# Patient Record
Sex: FEMALE | Race: WHITE | Employment: UNEMPLOYED | URBAN - METROPOLITAN AREA
[De-identification: names, ages, dates, MRNs, and addresses within clinical notes are randomized per-mention and may not be internally consistent; named-entity substitution may affect disease eponyms.]

---

## 2022-01-19 ENCOUNTER — HOSPITAL ENCOUNTER (EMERGENCY)
Age: 47
Discharge: HOME | End: 2022-01-19
Payer: MEDICARE

## 2022-01-19 ENCOUNTER — APPOINTMENT (OUTPATIENT)
Dept: CT IMAGING | Age: 47
End: 2022-01-19
Payer: MEDICARE

## 2022-01-19 ENCOUNTER — APPOINTMENT (OUTPATIENT)
Dept: GENERAL RADIOLOGY | Age: 47
End: 2022-01-19
Payer: MEDICARE

## 2022-01-19 VITALS
WEIGHT: 145 LBS | TEMPERATURE: 98.6 F | RESPIRATION RATE: 16 BRPM | HEART RATE: 77 BPM | DIASTOLIC BLOOD PRESSURE: 74 MMHG | OXYGEN SATURATION: 99 % | SYSTOLIC BLOOD PRESSURE: 122 MMHG | HEIGHT: 60 IN | BODY MASS INDEX: 28.47 KG/M2

## 2022-01-19 DIAGNOSIS — R07.9 CHEST PAIN, UNSPECIFIED TYPE: ICD-10-CM

## 2022-01-19 DIAGNOSIS — R06.02 SHORTNESS OF BREATH: ICD-10-CM

## 2022-01-19 DIAGNOSIS — Z76.0 ENCOUNTER FOR MEDICATION REFILL: Primary | ICD-10-CM

## 2022-01-19 LAB
A/G RATIO: 1.4 (ref 1.1–2.2)
ALBUMIN SERPL-MCNC: 4.1 G/DL (ref 3.4–5)
ALP BLD-CCNC: 148 U/L (ref 40–129)
ALT SERPL-CCNC: 10 U/L (ref 10–40)
ANION GAP SERPL CALCULATED.3IONS-SCNC: 12 MMOL/L (ref 3–16)
AST SERPL-CCNC: 13 U/L (ref 15–37)
BASOPHILS ABSOLUTE: 0 K/UL (ref 0–0.2)
BASOPHILS RELATIVE PERCENT: 0.5 %
BILIRUB SERPL-MCNC: 0.6 MG/DL (ref 0–1)
BUN BLDV-MCNC: 7 MG/DL (ref 7–20)
CALCIUM SERPL-MCNC: 9 MG/DL (ref 8.3–10.6)
CHLORIDE BLD-SCNC: 103 MMOL/L (ref 99–110)
CO2: 24 MMOL/L (ref 21–32)
CREAT SERPL-MCNC: 0.6 MG/DL (ref 0.6–1.1)
EKG ATRIAL RATE: 68 BPM
EKG DIAGNOSIS: NORMAL
EKG P AXIS: 18 DEGREES
EKG P-R INTERVAL: 140 MS
EKG Q-T INTERVAL: 380 MS
EKG QRS DURATION: 94 MS
EKG QTC CALCULATION (BAZETT): 404 MS
EKG R AXIS: -3 DEGREES
EKG T AXIS: 8 DEGREES
EKG VENTRICULAR RATE: 68 BPM
EOSINOPHILS ABSOLUTE: 0.1 K/UL (ref 0–0.6)
EOSINOPHILS RELATIVE PERCENT: 2.4 %
GFR AFRICAN AMERICAN: >60
GFR NON-AFRICAN AMERICAN: >60
GLUCOSE BLD-MCNC: 97 MG/DL (ref 70–99)
HCT VFR BLD CALC: 38.7 % (ref 36–48)
HEMOGLOBIN: 13.2 G/DL (ref 12–16)
INR BLD: 1.12 (ref 0.88–1.12)
LYMPHOCYTES ABSOLUTE: 1.6 K/UL (ref 1–5.1)
LYMPHOCYTES RELATIVE PERCENT: 27.4 %
MCH RBC QN AUTO: 30.8 PG (ref 26–34)
MCHC RBC AUTO-ENTMCNC: 34.1 G/DL (ref 31–36)
MCV RBC AUTO: 90.6 FL (ref 80–100)
MONOCYTES ABSOLUTE: 0.5 K/UL (ref 0–1.3)
MONOCYTES RELATIVE PERCENT: 8.3 %
NEUTROPHILS ABSOLUTE: 3.5 K/UL (ref 1.7–7.7)
NEUTROPHILS RELATIVE PERCENT: 61.4 %
PDW BLD-RTO: 12.5 % (ref 12.4–15.4)
PLATELET # BLD: 296 K/UL (ref 135–450)
PMV BLD AUTO: 8.5 FL (ref 5–10.5)
POTASSIUM SERPL-SCNC: 3.6 MMOL/L (ref 3.5–5.1)
PROTHROMBIN TIME: 12.7 SEC (ref 9.9–12.7)
RBC # BLD: 4.27 M/UL (ref 4–5.2)
SODIUM BLD-SCNC: 139 MMOL/L (ref 136–145)
SPECIMEN STATUS: NORMAL
TOTAL PROTEIN: 7.1 G/DL (ref 6.4–8.2)
TROPONIN: <0.01 NG/ML
WBC # BLD: 5.7 K/UL (ref 4–11)

## 2022-01-19 PROCEDURE — 93005 ELECTROCARDIOGRAM TRACING: CPT | Performed by: EMERGENCY MEDICINE

## 2022-01-19 PROCEDURE — 6360000002 HC RX W HCPCS: Performed by: NURSE PRACTITIONER

## 2022-01-19 PROCEDURE — 85025 COMPLETE CBC W/AUTO DIFF WBC: CPT

## 2022-01-19 PROCEDURE — 84484 ASSAY OF TROPONIN QUANT: CPT

## 2022-01-19 PROCEDURE — 85610 PROTHROMBIN TIME: CPT

## 2022-01-19 PROCEDURE — 96374 THER/PROPH/DIAG INJ IV PUSH: CPT

## 2022-01-19 PROCEDURE — 99283 EMERGENCY DEPT VISIT LOW MDM: CPT

## 2022-01-19 PROCEDURE — 80053 COMPREHEN METABOLIC PANEL: CPT

## 2022-01-19 PROCEDURE — 71260 CT THORAX DX C+: CPT

## 2022-01-19 PROCEDURE — 71045 X-RAY EXAM CHEST 1 VIEW: CPT

## 2022-01-19 PROCEDURE — 93010 ELECTROCARDIOGRAM REPORT: CPT | Performed by: INTERNAL MEDICINE

## 2022-01-19 PROCEDURE — 6360000004 HC RX CONTRAST MEDICATION: Performed by: NURSE PRACTITIONER

## 2022-01-19 RX ORDER — ONDANSETRON 2 MG/ML
4 INJECTION INTRAMUSCULAR; INTRAVENOUS ONCE
Status: COMPLETED | OUTPATIENT
Start: 2022-01-19 | End: 2022-01-19

## 2022-01-19 RX ADMIN — ONDANSETRON 4 MG: 2 INJECTION INTRAMUSCULAR; INTRAVENOUS at 15:17

## 2022-01-19 RX ADMIN — IOPAMIDOL 75 ML: 755 INJECTION, SOLUTION INTRAVENOUS at 15:43

## 2022-01-19 ASSESSMENT — PAIN SCALES - GENERAL: PAINLEVEL_OUTOF10: 6

## 2022-01-19 NOTE — ED NOTES
Patient left via personal vehicle to private residence in stable condition. Patients vitals were assessed before discharge and were stable. Patient verbalized understanding of discharge instructions, follow up and medications. RN explained information in discharge packet and patient verbalized they have no questions at this time. Patient left ER with all paperwork and belongings that RN is aware of.         Damon Molina RN  01/19/22 [id number]

## 2022-01-21 NOTE — ED PROVIDER NOTES
Cholesterol Mother     Heart Disease Father     High Blood Pressure Father     High Cholesterol Father     Asthma Sister      Social History     Socioeconomic History    Marital status: Single     Spouse name: Not on file    Number of children: Not on file    Years of education: Not on file    Highest education level: Not on file   Occupational History    Not on file   Tobacco Use    Smoking status: Current Every Day Smoker     Packs/day: 0.25     Types: Cigarettes    Smokeless tobacco: Not on file   Substance and Sexual Activity    Alcohol use: No    Drug use: No    Sexual activity: Yes     Partners: Male   Other Topics Concern    Not on file   Social History Narrative    Not on file     Social Determinants of Health     Financial Resource Strain:     Difficulty of Paying Living Expenses: Not on file   Food Insecurity:     Worried About Running Out of Food in the Last Year: Not on file    Cecy of Food in the Last Year: Not on file   Transportation Needs:     Lack of Transportation (Medical): Not on file    Lack of Transportation (Non-Medical):  Not on file   Physical Activity:     Days of Exercise per Week: Not on file    Minutes of Exercise per Session: Not on file   Stress:     Feeling of Stress : Not on file   Social Connections:     Frequency of Communication with Friends and Family: Not on file    Frequency of Social Gatherings with Friends and Family: Not on file    Attends Nondenominational Services: Not on file    Active Member of Clubs or Organizations: Not on file    Attends Club or Organization Meetings: Not on file    Marital Status: Not on file   Intimate Partner Violence:     Fear of Current or Ex-Partner: Not on file    Emotionally Abused: Not on file    Physically Abused: Not on file    Sexually Abused: Not on file   Housing Stability:     Unable to Pay for Housing in the Last Year: Not on file    Number of [place] in the Last Year: Not on file    Unstable Housing in the Last Year: Not on file     No current facility-administered medications for this encounter. Current Outpatient Medications   Medication Sig Dispense Refill    enoxaparin (LOVENOX) 40 MG/0.4ML injection Inject 0.4 mLs into the skin daily 12 mL 0    warfarin (COUMADIN) 5 MG tablet Take 5 mg by mouth Daily.  omeprazole (PRILOSEC) 40 MG capsule Take 40 mg by mouth 2 times daily.  oxycodone-acetaminophen (PERCOCET) 5-325 MG per tablet Take 2 tablets by mouth every 4 hours as needed.  nitroGLYCERIN (NITRODUR) 0.4 MG/HR Place 1 patch onto the skin daily.  gabapentin (NEURONTIN) 600 MG tablet Take 600 mg by mouth 3 times daily.  isosorbide mononitrate (IMDUR) 30 MG CR tablet Take 30 mg by mouth daily.  furosemide (LASIX) 40 MG tablet Take 40 mg by mouth daily.  UNABLE TO FIND magai 30mg       potassium chloride (KLOR-CON) 10 MEQ CR tablet Take 10 mEq by mouth daily. Unknown dosage or freq       enoxaparin (LOVENOX) 80 MG/0.8ML injection Inject 0.8 mLs into the skin every 12 hours. Continue until INR>2.0 10 Syringe 0    warfarin (COUMADIN) 5 MG tablet Take 2 tablets by mouth Daily. Pt to take 10 mg qHS beginning Wed 10/20 pm. PT/INR with primary Friday 10/22 and adjust per their instructions 1 tablet 0    busPIRone (BUSPAR) 5 MG tablet Take 15 mg by mouth 3 times daily.  nitroGLYCERIN (NITROSTAT) 0.4 MG SL tablet Place 0.4 mg under the tongue every 5 minutes as needed.  clopidogrel (PLAVIX) 75 MG tablet Take 75 mg by mouth daily.  alprazolam (XANAX) 0.25 MG tablet Take 0.25 mg by mouth as needed.  fluoxetine (PROZAC) 20 MG capsule Take 40 mg by mouth daily.  simvastatin (ZOCOR) 40 MG tablet Take 40 mg by mouth nightly.  cyclobenzaprine (FLEXERIL) 10 MG tablet Take 10 mg by mouth as needed.  vitamin D (CHOLECALCIFEROL) 400 UNIT TABS tablet Take 2,000 Units by mouth daily.        Allergies   Allergen Reactions    Hydrocodone        REVIEW OF SYSTEMS  10 systems reviewed, pertinent positives per HPI otherwise noted to be negative    PHYSICAL EXAM  /74   Pulse 77   Temp 98.6 °F (37 °C) (Oral)   Resp 16   Ht 5' (1.524 m)   Wt 145 lb (65.8 kg)   SpO2 99%   BMI 28.32 kg/m²   GENERAL APPEARANCE: Awake and alert. Cooperative. No acute distress. Vital signs are stable. Well appearing and non toxic. HEAD: Normocephalic. Atraumatic. EYES: PERRL. EOM's grossly intact. ENT: Mucous membranes are moist.   NECK: Supple. Normal ROM. HEART: RRR. Distal pulses are equal and intact. Cap refill less than 2 seconds. LUNGS: Respirations unlabored. CTAB. Good air exchange. Speaking comfortably in full sentences. No wheezing, rhonchi, rales, stridor. CHEST: Tenderness over pacemaker in left chest wall no ecchymosis  ABDOMEN: Soft. Non-distended. Non-tender. No guarding or rebound. No rigidity. Bowel sounds are present. Negative carmen's. Negative McBurney's point. Negative CVA tenderness. EXTREMITIES: No peripheral edema. Moves all extremities equally. All extremities neurovascularly intact. SKIN: Warm and dry. No acute rashes. NEUROLOGICAL: Alert and oriented. No gross facial drooping. Strength 5/5, sensation intact. PSYCHIATRIC: Normal mood and affect. SCREENINGS       RADIOLOGY  XR CHEST PORTABLE    Result Date: 1/19/2022  EXAMINATION: ONE XRAY VIEW OF THE CHEST 1/19/2022 2:50 pm COMPARISON: 05/30/2011. HISTORY: ORDERING SYSTEM PROVIDED HISTORY: chest pain TECHNOLOGIST PROVIDED HISTORY: Reason for exam:->chest pain FINDINGS: The cardiomediastinal silhouette is unremarkable. The lungs are clear. No infiltrate, pleural fluid or evidence of overt failure. Remote calcified granulomatous disease. Left-sided AICD device. No acute cardiopulmonary disease.      CT CHEST PULMONARY EMBOLISM W CONTRAST    Result Date: 1/19/2022  EXAMINATION: CTA OF THE CHEST 1/19/2022 3:34 pm TECHNIQUE: CTA of the chest was performed after the administration of intravenous contrast.  Multiplanar reformatted images are provided for review. MIP images are provided for review. Dose modulation, iterative reconstruction, and/or weight based adjustment of the mA/kV was utilized to reduce the radiation dose to as low as reasonably achievable. COMPARISON: None. HISTORY: ORDERING SYSTEM PROVIDED HISTORY: sob off meds history of pe TECHNOLOGIST PROVIDED HISTORY: Reason for exam:->sob off meds history of pe Decision Support Exception - unselect if not a suspected or confirmed emergency medical condition->Emergency Medical Condition (MA) Reason for Exam: chest pain and shortness of breath,off Lovenox x3 days Additional signs and symptoms: cough today,no hx of Covid Relevant Medical/Surgical History: cardiac stent,smoker x 15 years FINDINGS: Pulmonary Arteries: Pulmonary arteries are adequately opacified for evaluation. No evidence of intraluminal filling defect to suggest pulmonary embolism. Main pulmonary artery is normal in caliber. Mediastinum: There are a few mildly enlarged right hilar lymph nodes, the largest measuring approximately 1.5 x 1.5 cm (image 111 series 2). Lungs/pleura: The lungs are without acute process. No focal consolidation or pulmonary edema. No evidence of pleural effusion or pneumothorax. There are numerous scattered calcified granulomata. Upper Abdomen: Limited images of the upper abdomen are unremarkable. Soft Tissues/Bones: No acute bone or soft tissue abnormality. No evidence of pulmonary embolism. Mild right hilar adenopathy is nonspecific. Short-term follow-up should be considered if clinically indicated. Multiple scattered predominantly calcified or partially calcified pulmonary nodules. Findings are most likely granulomatous. However these are new since the previous exam from 2011. These could also be re-evaluated on short-term follow-up. RECOMMENDATIONS: Unavailable       ED COURSE/MDM  Patient seen and evaluated.  Old records Result Value Ref Range    WBC 5.7 4.0 - 11.0 K/uL    RBC 4.27 4.00 - 5.20 M/uL    Hemoglobin 13.2 12.0 - 16.0 g/dL    Hematocrit 38.7 36.0 - 48.0 %    MCV 90.6 80.0 - 100.0 fL    MCH 30.8 26.0 - 34.0 pg    MCHC 34.1 31.0 - 36.0 g/dL    RDW 12.5 12.4 - 15.4 %    Platelets 139 795 - 741 K/uL    MPV 8.5 5.0 - 10.5 fL    Neutrophils % 61.4 %    Lymphocytes % 27.4 %    Monocytes % 8.3 %    Eosinophils % 2.4 %    Basophils % 0.5 %    Neutrophils Absolute 3.5 1.7 - 7.7 K/uL    Lymphocytes Absolute 1.6 1.0 - 5.1 K/uL    Monocytes Absolute 0.5 0.0 - 1.3 K/uL    Eosinophils Absolute 0.1 0.0 - 0.6 K/uL    Basophils Absolute 0.0 0.0 - 0.2 K/uL   Comprehensive metabolic panel   Result Value Ref Range    Sodium 139 136 - 145 mmol/L    Potassium 3.6 3.5 - 5.1 mmol/L    Chloride 103 99 - 110 mmol/L    CO2 24 21 - 32 mmol/L    Anion Gap 12 3 - 16    Glucose 97 70 - 99 mg/dL    BUN 7 7 - 20 mg/dL    CREATININE 0.6 0.6 - 1.1 mg/dL    GFR Non-African American >60 >60    GFR African American >60 >60    Calcium 9.0 8.3 - 10.6 mg/dL    Total Protein 7.1 6.4 - 8.2 g/dL    Albumin 4.1 3.4 - 5.0 g/dL    Albumin/Globulin Ratio 1.4 1.1 - 2.2    Total Bilirubin 0.6 0.0 - 1.0 mg/dL    Alkaline Phosphatase 148 (H) 40 - 129 U/L    ALT 10 10 - 40 U/L    AST 13 (L) 15 - 37 U/L   Troponin   Result Value Ref Range    Troponin <0.01 <0.01 ng/mL   Sample possible blood bank testing   Result Value Ref Range    Specimen Status JOANIE    Protime-INR   Result Value Ref Range    Protime 12.7 9.9 - 12.7 sec    INR 1.12 0.88 - 1.12   EKG 12 Lead   Result Value Ref Range    Ventricular Rate 68 BPM    Atrial Rate 68 BPM    P-R Interval 140 ms    QRS Duration 94 ms    Q-T Interval 380 ms    QTc Calculation (Bazett) 404 ms    P Axis 18 degrees    R Axis -3 degrees    T Axis 8 degrees    Diagnosis       Normal sinus rhythmLateral infarct (cited on or before 16-MAY-2011)Inferior infarct (cited on or before 16-MAY-2011)Abnormal ECGConfirmed by Carolin Sexton MD, Evaristo Lesches ([id number]) on 1/19/2022 4:50:05 PM       I estimate there is LOW risk for PULMONARY EMBOLISM, ACUTE CORONARY SYNDROME, OR THORACIC AORTIC DISSECTION, thus I consider the discharge disposition reasonable. Shary Duverney and I have discussed the diagnosis and risks, and we agree with discharging home to follow-up with their primary doctor. We also discussed returning to the Emergency Department immediately if new or worsening symptoms occur. We have discussed the symptoms which are most concerning (e.g., bloody sputum, fever, worsening pain or shortness of breath, vomiting) that necessitate immediate return. FINAL Impression    1. Encounter for medication refill    2. Chest pain, unspecified type    3. Shortness of breath        Blood pressure 122/74, pulse 77, temperature 98.6 °F (37 °C), temperature source Oral, resp. rate 16, height 5' (1.524 m), weight 145 lb (65.8 kg), SpO2 99 %.mdm    Patient was sent home with a prescription for below medication/s. I did [place] patient on appropriate use of these medication. Discharge Medication List as of 1/19/2022  4:52 PM      START taking these medications    Details   !! enoxaparin (LOVENOX) 40 MG/0.4ML injection Inject 0.4 mLs into the skin daily, Disp-12 mL, R-0Print       !! - Potential duplicate medications found. Please discuss with provider. FOLLOW UP  [address] [place] [id number]  [phone]  Call in 1 day  follow up    [facility]  ED  [address]          DISPOSITION  Patient was discharged to home in good condition. Comment: Please note this report has been produced using speech recognition software and may contain errors related to that system including errors in grammar, punctuation, and spelling, as well as words and phrases that may be inappropriate.  If there are any questions or concerns please feel free to contact the dictating provider for clarification.             LESLI De La Rosa - CNP  01/20/22 2020

## 2022-02-24 ENCOUNTER — HOSPITAL ENCOUNTER (EMERGENCY)
Age: 47
Discharge: HOME | End: 2022-02-25
Payer: MEDICARE

## 2022-02-24 ENCOUNTER — APPOINTMENT (OUTPATIENT)
Dept: GENERAL RADIOLOGY | Age: 47
End: 2022-02-24
Payer: MEDICARE

## 2022-02-24 DIAGNOSIS — M25.512 ACUTE PAIN OF LEFT SHOULDER: Primary | ICD-10-CM

## 2022-02-24 DIAGNOSIS — M54.2 NECK PAIN ON LEFT SIDE: ICD-10-CM

## 2022-02-24 DIAGNOSIS — Z95.810 AICD (AUTOMATIC CARDIOVERTER/DEFIBRILLATOR) PRESENT: ICD-10-CM

## 2022-02-24 LAB
A/G RATIO: 1.7 (ref 1.1–2.2)
ALBUMIN SERPL-MCNC: 4.5 G/DL (ref 3.4–5)
ALP BLD-CCNC: 138 U/L (ref 40–129)
ALT SERPL-CCNC: 15 U/L (ref 10–40)
ANION GAP SERPL CALCULATED.3IONS-SCNC: 11 MMOL/L (ref 3–16)
AST SERPL-CCNC: 39 U/L (ref 15–37)
BASOPHILS ABSOLUTE: 0.1 K/UL (ref 0–0.2)
BASOPHILS RELATIVE PERCENT: 1 %
BILIRUB SERPL-MCNC: 0.4 MG/DL (ref 0–1)
BUN BLDV-MCNC: 9 MG/DL (ref 7–20)
CALCIUM SERPL-MCNC: 9.1 MG/DL (ref 8.3–10.6)
CHLORIDE BLD-SCNC: 102 MMOL/L (ref 99–110)
CO2: 24 MMOL/L (ref 21–32)
CREAT SERPL-MCNC: 0.7 MG/DL (ref 0.6–1.1)
EOSINOPHILS ABSOLUTE: 0.2 K/UL (ref 0–0.6)
EOSINOPHILS RELATIVE PERCENT: 2.1 %
GFR AFRICAN AMERICAN: >60
GFR NON-AFRICAN AMERICAN: >60
GLUCOSE BLD-MCNC: 81 MG/DL (ref 70–99)
HCG QUALITATIVE: NEGATIVE
HCT VFR BLD CALC: 42.1 % (ref 36–48)
HEMOGLOBIN: 14.3 G/DL (ref 12–16)
LYMPHOCYTES ABSOLUTE: 3.4 K/UL (ref 1–5.1)
LYMPHOCYTES RELATIVE PERCENT: 36.4 %
MCH RBC QN AUTO: 31 PG (ref 26–34)
MCHC RBC AUTO-ENTMCNC: 33.9 G/DL (ref 31–36)
MCV RBC AUTO: 91.6 FL (ref 80–100)
MONOCYTES ABSOLUTE: 0.6 K/UL (ref 0–1.3)
MONOCYTES RELATIVE PERCENT: 6 %
NEUTROPHILS ABSOLUTE: 5.1 K/UL (ref 1.7–7.7)
NEUTROPHILS RELATIVE PERCENT: 54.5 %
PDW BLD-RTO: 13 % (ref 12.4–15.4)
PLATELET # BLD: 165 K/UL (ref 135–450)
PMV BLD AUTO: 8.7 FL (ref 5–10.5)
POTASSIUM REFLEX MAGNESIUM: 5.7 MMOL/L (ref 3.5–5.1)
POTASSIUM SERPL-SCNC: 3.8 MMOL/L (ref 3.5–5.1)
PRO-BNP: 142 PG/ML (ref 0–124)
RBC # BLD: 4.59 M/UL (ref 4–5.2)
SODIUM BLD-SCNC: 137 MMOL/L (ref 136–145)
TOTAL PROTEIN: 7.1 G/DL (ref 6.4–8.2)
TROPONIN: <0.01 NG/ML
WBC # BLD: 9.3 K/UL (ref 4–11)

## 2022-02-24 PROCEDURE — 85025 COMPLETE CBC W/AUTO DIFF WBC: CPT

## 2022-02-24 PROCEDURE — 84703 CHORIONIC GONADOTROPIN ASSAY: CPT

## 2022-02-24 PROCEDURE — 84484 ASSAY OF TROPONIN QUANT: CPT

## 2022-02-24 PROCEDURE — 71046 X-RAY EXAM CHEST 2 VIEWS: CPT

## 2022-02-24 PROCEDURE — 93005 ELECTROCARDIOGRAM TRACING: CPT | Performed by: PHYSICIAN ASSISTANT

## 2022-02-24 PROCEDURE — 99285 EMERGENCY DEPT VISIT HI MDM: CPT

## 2022-02-24 PROCEDURE — 83880 ASSAY OF NATRIURETIC PEPTIDE: CPT

## 2022-02-24 PROCEDURE — 84132 ASSAY OF SERUM POTASSIUM: CPT

## 2022-02-24 PROCEDURE — 80053 COMPREHEN METABOLIC PANEL: CPT

## 2022-02-24 ASSESSMENT — ENCOUNTER SYMPTOMS
CONSTIPATION: 0
SHORTNESS OF BREATH: 0
ABDOMINAL PAIN: 0
SINUS PRESSURE: 0
SORE THROAT: 0
EYE DISCHARGE: 0
CHEST TIGHTNESS: 0
SINUS PAIN: 0
NAUSEA: 0
COUGH: 0
RHINORRHEA: 0
EYE REDNESS: 0
VOMITING: 0
DIARRHEA: 0

## 2022-02-24 ASSESSMENT — PAIN DESCRIPTION - DESCRIPTORS: DESCRIPTORS: BURNING

## 2022-02-24 ASSESSMENT — PAIN DESCRIPTION - PAIN TYPE: TYPE: ACUTE PAIN

## 2022-02-24 ASSESSMENT — PAIN SCALES - GENERAL: PAINLEVEL_OUTOF10: 6

## 2022-02-25 VITALS
DIASTOLIC BLOOD PRESSURE: 71 MMHG | RESPIRATION RATE: 18 BRPM | HEART RATE: 82 BPM | HEIGHT: 60 IN | BODY MASS INDEX: 28.47 KG/M2 | OXYGEN SATURATION: 98 % | WEIGHT: 145 LBS | SYSTOLIC BLOOD PRESSURE: 105 MMHG | TEMPERATURE: 97.2 F

## 2022-02-25 LAB
EKG ATRIAL RATE: 80 BPM
EKG DIAGNOSIS: NORMAL
EKG P AXIS: 23 DEGREES
EKG P-R INTERVAL: 134 MS
EKG Q-T INTERVAL: 380 MS
EKG QRS DURATION: 90 MS
EKG QTC CALCULATION (BAZETT): 438 MS
EKG R AXIS: -10 DEGREES
EKG T AXIS: -19 DEGREES
EKG VENTRICULAR RATE: 80 BPM

## 2022-02-25 PROCEDURE — 6370000000 HC RX 637 (ALT 250 FOR IP): Performed by: PHYSICIAN ASSISTANT

## 2022-02-25 PROCEDURE — 93010 ELECTROCARDIOGRAM REPORT: CPT | Performed by: INTERNAL MEDICINE

## 2022-02-25 RX ORDER — OXYCODONE HYDROCHLORIDE AND ACETAMINOPHEN 5; 325 MG/1; MG/1
1 TABLET ORAL ONCE
Status: COMPLETED | OUTPATIENT
Start: 2022-02-25 | End: 2022-02-25

## 2022-02-25 RX ADMIN — OXYCODONE HYDROCHLORIDE AND ACETAMINOPHEN 1 TABLET: 5; 325 TABLET ORAL at 00:10

## 2022-02-25 ASSESSMENT — PAIN SCALES - GENERAL: PAINLEVEL_OUTOF10: 5

## 2022-02-25 NOTE — ED NOTES
Bed: 29  Expected date:   Expected time:   Means of arrival:   Comments:  [address] [place] [address] Avenue, RN  02/24/22 [id number]

## 2022-02-25 NOTE — ED PROVIDER NOTES
[facility]  ED  EMERGENCY DEPARTMENT ENCOUNTER        Pt Name: Jennie Jacques  MRN: [id number]  [place] 1975  Date of evaluation: 2/24/2022  Provider: Xavi Porras PA-C  PCP: No primary care provider on file. ED Attending: No att. providers found      This patient was not seen and evaluated by the attending physician    I have independently evaluated this patient. CHIEF COMPLAINT       Chief Complaint   Patient presents with    AICD Problem     a few days ago, device flipped while she was sleeping and now she has some burning in her chest, neck and shoulder       HISTORY OF PRESENT ILLNESS   (Location/Symptom, Timing/Onset, Context/Setting, Quality, Duration, Modifying Factors, Severity)  Note limiting factors. Jennie Jacques is a 40 y.o. female history of DVT, PE, MI, CVA, cardiac sarcoidosis, CHF with AICD placed 12/18/2020 Dr. Tiera Najera at [facility], presents for evaluation via EMS, with a complaint of 6/10 left-sided burning pain in her left anterior chest, left side of her neck and left shoulder. Patient advised that while she was sleeping last night her AICD \"flipped\" she felt it turn on its side. Sudden onset of pain, which is present to the current time. Pt advised she is not taking any of her medications, as her medicaid does not kick in until March 1.     Nursing Notes were all reviewed and agreed with or any disagreements were addressed  in the HPI. REVIEW OF SYSTEMS  (2-9 systems for level 4, 10 or more for level 5)     Review of Systems   Constitutional: Negative for chills and fever. HENT: Negative. Negative for congestion, rhinorrhea, sinus pressure, sinus pain and sore throat. Eyes: Negative for discharge, redness and visual disturbance. Respiratory: Negative for cough, chest tightness and shortness of breath. Cardiovascular: Positive for chest pain. Negative for palpitations.    Gastrointestinal: Negative for abdominal pain, constipation, diarrhea, nausea and vomiting. Genitourinary: Negative for difficulty urinating, dysuria and frequency. Musculoskeletal: Positive for arthralgias and myalgias. Skin: Negative. Neurological: Negative. Negative for dizziness, weakness, numbness and headaches. Psychiatric/Behavioral: Negative. All other systems reviewed and are negative. Positivesand Pertinent negatives as per HPI. Except as noted above in the ROS, all other systems were reviewed and negative. PAST MEDICAL HISTORY     Past Medical History:   Diagnosis Date    DVT     Hypercholesterolemia     Myocardial infarct ([address]     Pulmonary embolism (HCC)     Unspecified cerebral artery occlusion with cerebral infarction          SURGICAL HISTORY       Past Surgical History:   Procedure Laterality Date    CARDIAC SURGERY      catherization x 4    CHEST TUBE INSERTION      at birth    CORONARY ANGIOPLASTY WITH STENT PLACEMENT      x 1 stent in rca 3/10    DILATION AND CURETTAGE OF UTERUS      KNEE ARTHROSCOPY      bilat knees    LAPAROSCOPY      x 2 for ovaries    TONSILLECTOMY AND ADENOIDECTOMY      UPPER GASTROINTESTINAL ENDOSCOPY  10/18/10    with biopsies         CURRENT MEDICATIONS       Previous Medications    ALPRAZOLAM (XANAX) 0.25 MG TABLET    Take 0.25 mg by mouth as needed. BUSPIRONE (BUSPAR) 5 MG TABLET    Take 15 mg by mouth 3 times daily. CLOPIDOGREL (PLAVIX) 75 MG TABLET    Take 75 mg by mouth daily. CYCLOBENZAPRINE (FLEXERIL) 10 MG TABLET    Take 10 mg by mouth as needed. ENOXAPARIN (LOVENOX) 80 MG/0.8ML INJECTION    Inject 0.8 mLs into the skin every 12 hours. Continue until INR>2.0    FLUOXETINE (PROZAC) 20 MG CAPSULE    Take 40 mg by mouth daily. FUROSEMIDE (LASIX) 40 MG TABLET    Take 40 mg by mouth daily. GABAPENTIN (NEURONTIN) 600 MG TABLET    Take 600 mg by mouth 3 times daily. ISOSORBIDE MONONITRATE (IMDUR) 30 MG CR TABLET    Take 30 mg by mouth daily.     NITROGLYCERIN (NITRODUR) 0.4 MG/HR    Place 1 patch onto the skin daily. NITROGLYCERIN (NITROSTAT) 0.4 MG SL TABLET    Place 0.4 mg under the tongue every 5 minutes as needed. OMEPRAZOLE (PRILOSEC) 40 MG CAPSULE    Take 40 mg by mouth 2 times daily. OXYCODONE-ACETAMINOPHEN (PERCOCET) 5-325 MG PER TABLET    Take 2 tablets by mouth every 4 hours as needed. POTASSIUM CHLORIDE (KLOR-CON) 10 MEQ CR TABLET    Take 10 mEq by mouth daily. Unknown dosage or freq     SIMVASTATIN (ZOCOR) 40 MG TABLET    Take 40 mg by mouth nightly. UNABLE TO FIND    magai 30mg     VITAMIN D (CHOLECALCIFEROL) 400 UNIT TABS TABLET    Take 2,000 Units by mouth daily. WARFARIN (COUMADIN) 5 MG TABLET    Take 2 tablets by mouth Daily. Pt to take 10 mg qHS beginning Wed 10/20 pm. PT/INR with primary Friday 10/22 and adjust per their instructions    WARFARIN (COUMADIN) 5 MG TABLET    Take 5 mg by mouth Daily.          ALLERGIES     Hydrocodone    FAMILY HISTORY       Family History   Problem Relation Age of Onset    Heart Disease Mother     High Blood Pressure Mother     Diabetes Mother     Depression Mother     High Cholesterol Mother     Heart Disease Father     High Blood Pressure Father     High Cholesterol Father     Asthma Sister          SOCIAL HISTORY       Social History     Socioeconomic History    Marital status: Single     Spouse name: None    Number of children: None    Years of education: None    Highest education level: None   Occupational History    None   Tobacco Use    Smoking status: Current Every Day Smoker     Packs/day: 0.25     Types: Cigarettes    Smokeless tobacco: None   Substance and Sexual Activity    Alcohol use: No    Drug use: No    Sexual activity: Yes     Partners: Male   Other Topics Concern    None   Social History Narrative    None     Social Determinants of Health     Financial Resource Strain:     Difficulty of Paying Living Expenses: Not on file   Food Insecurity:     Worried About Running Out of Food in the Last Year: Not on file    Ran Out of Food in the Last Year: Not on file   Transportation Needs:     Lack of Transportation (Medical): Not on file    Lack of Transportation (Non-Medical): Not on file   Physical Activity:     Days of Exercise per Week: Not on file    Minutes of Exercise per Session: Not on file   Stress:     Feeling of Stress : Not on file   Social Connections:     Frequency of Communication with Friends and Family: Not on file    Frequency of Social Gatherings with Friends and Family: Not on file    Attends Shinto Services: Not on file    Active Member of [address] [place] [address] [employer] or Organizations: Not on file    Attends Club or Organization Meetings: Not on file    Marital Status: Not on file   Intimate Partner Violence:     Fear of Current or Ex-Partner: Not on file    Emotionally Abused: Not on file    Physically Abused: Not on file    Sexually Abused: Not on file   Housing Stability:     Unable to Pay for Housing in the Last Year: Not on file    Number of [place] in the Last Year: Not on file    Unstable Housing in the Last Year: Not on file       SCREENINGS     NIH Score       [place] Coma Scale  Eye Opening: Spontaneous  Best Verbal Response: Oriented  Best Motor Response: Obeys commands  Juve Coma Scale Score: 15    [facility]     Heart Score         PHYSICAL EXAM    (up to 7 for level 4, 8 ormore for level 5)     ED Triage Vitals [02/24/22 2144]   BP Temp Temp Source Pulse Resp SpO2 Height Weight   124/70 97.2 °F (36.2 °C) Temporal 83 14 100 % 5' (1.524 m) 145 lb (65.8 kg)       Physical Exam  Vitals and nursing note reviewed. Constitutional:       Appearance: She is well-developed. She is not diaphoretic. HENT:      Head: Normocephalic. Nose: Nose normal.      Mouth/Throat:      Pharynx: No oropharyngeal exudate. Eyes:      General:         Right eye: No discharge. Left eye: No discharge.       Conjunctiva/sclera: Conjunctivae normal. Pupils: Pupils are equal, round, and reactive to light. Cardiovascular:      Rate and Rhythm: Normal rate and regular rhythm. Heart sounds: Normal heart sounds. No murmur heard. No friction rub. No gallop. Pulmonary:      Effort: Pulmonary effort is normal. No respiratory distress. Breath sounds: Normal breath sounds. No wheezing. Abdominal:      General: Bowel sounds are normal. There is no distension. Palpations: Abdomen is soft. Tenderness: There is no abdominal tenderness. Musculoskeletal:         General: Normal range of motion. Cervical back: Normal range of motion. Skin:     General: Skin is warm and dry. Capillary Refill: Capillary refill takes less than 2 seconds. Neurological:      General: No focal deficit present. Mental Status: She is alert.    Psychiatric:         Behavior: Behavior normal.           DIAGNOSTIC RESULTS   LABS:    Labs Reviewed   COMPREHENSIVE METABOLIC PANEL W/ REFLEX TO MG FOR LOW K - Abnormal; Notable for the following components:       Result Value    Potassium reflex Magnesium 5.7 (*)     Alkaline Phosphatase 138 (*)     AST 39 (*)     All other components within normal limits    Narrative:     Performed at:  [address],  [place], [address] [employer]   Phone [phone]   BRAIN NATRIURETIC PEPTIDE - Abnormal; Notable for the following components:    Pro- (*)     All other components within normal limits    Narrative:     Performed at:  [address],  [place], [address]   Phone [phone]   CBC WITH AUTO DIFFERENTIAL    Narrative:     Performed at:  [address],  [place], [address]   Phone [phone]   TROPONIN    Narrative:     Performed at:  [address],  [place], [address]   Phone [phone]   HCG, SERUM, QUALITATIVE Narrative:     Performed at:  [facility]) - [facility]  [address],  [place], [facility] [employer]   Phone [phone]   POTASSIUM    Narrative:     Performed at:  [facility]) - [address], [address] [place] [address] [place]   Phone [phone]       All other labs were within normal range or notreturned as of this dictation. EKG: All EKG's are interpreted by the Emergency Department Physician who either signs or Co-signs this chart in the absence of a cardiologist.  Please see their note for interpretation of EKG. RADIOLOGY:   Interpretation per the Radiologist below, if available at the time of this note:    XR CHEST (2 VW)   Final Result   No acute process by radiograph. CRITICAL CARE TIME   N/A    CONSULTS:  IP CONSULT TO CARDIOLOGY      EMERGENCY DEPARTMENT COURSE and DIFFERENTIAL DIAGNOSIS/MDM:   Vitals:    Vitals:    02/24/22 2144 02/24/22 2239 02/24/22 2306 02/24/22 2307   BP: 124/70 129/78  105/65   Pulse: 83 98 81 85   Resp: 14 17 25 17   Temp: 97.2 °F (36.2 °C)      TempSrc: Temporal      SpO2: 100% 99% 100% 97%   Weight: 145 lb (65.8 kg)      Height: 5' (1.524 m)          Patient was given the following medications:  Medications   oxyCODONE-acetaminophen (PERCOCET) 5-325 MG per tablet 1 tablet (1 tablet Oral Given 2/25/22 0010)         Afebrile, stable, patient presents to the ED for evaluation. Patients PO2 is 97% on room air they are not hypoxic. Nontoxic in no acute distress. Patient's pain is reproducible, although no evidence of trauma contusions hematoma formation to patient's anterior chest wall. Labs are evaluated which reveal a BNP elevated at 142. Otherwise unremarkable. EKG is sinus rhythm with occasional PVCs. Chest x-ray shows transvenous AICD appears similar in position to prior exam.  Consulted patient's cardiologist spoke with Dr. Divine Orozco on call electrophysiology physician at [facility].   Discussed patient's concern presentation and work-up. He advised to interrogate the device and as long as there have been no acute episodes would be appropriate to reassure the patient and have her follow-up as an outpatient. Device is interrogated and I spoke with AICD representative who advised no episodes recently, last episode in January. Patient is provided with percocet which helps her symptoms on reevaluation. All questions are answered. Indications for return to the ED are discussed. Patient is advised if any new or worsening symptoms arise they should immediately return to the emergency room. Follow-up with cardiology, PCP in 1-2 days. The patient tolerated their visit well. The patient and / or the family were informed of the results of any tests, a time was given to answer questions, a plan was proposed and they agreed Jose Maria Shine. I estimate there is LOW risk for PULMONARY EMBOLISM, ACUTE CORONARY SYNDROME, OR THORACIC AORTIC DISSECTION, thus I consider the discharge disposition reasonable. Matheus Masterson and I have discussed the diagnosis and risks, and we agree with discharging home to follow-up with their primary doctor. We also discussed returning to the Emergency Department immediately if new or worsening symptoms occur. We have discussed the symptoms which are most concerning (e.g., bloody sputum, fever, worsening pain or shortness of breath, vomiting) that necessitate immediate return. FINAL IMPRESSION      1. Acute pain of left shoulder    2. Neck pain on left side    3. AICD (automatic cardioverter/defibrillator) present          DISPOSITION/PLAN   DISPOSITION    D/c to home    PATIENT REFERRED TO:  Ashleigh Quevedo MD  [address]  [phone]    Schedule an appointment as soon as possible for a visit   for a recheck in 2 days    [facility]             Pt was seen during the [place] 19 pandemic.  Appropriate PPE worn by ME during patient encounters. Pt seen during a time with constrained hospital bed capacity and other potential inpatient and outpatient resources were constrained due to the viral pandemic.    Please note that portions of this note were completed with a voice recognition program.  Efforts were made to edit the dictations but occasionally words are mis-transcribed.)    Susan Lacey PA-C (electronically signed)        Susna Lacey PA-C  02/25/22 [id number]

## 2022-02-25 NOTE — ED PROVIDER NOTES
I have reviewed the below EKG. I was not otherwise involved in this patient's care. EKG  The Ekg interpreted by myself  normal sinus rhythm with a rate of 80 with occasional PVCs  Axis is   Normal  QTc is  normal  Intervals and Durations are unremarkable. T wave inversions noted in leads III and aVF  No evidence of acute ischemia. No significant change from prior EKG dated January 19, 2022.         Juanpablo Newby MD  02/24/22 9205

## 2022-02-25 NOTE — ED NOTES
Called  Cardio @[phone]  Per Kathy KOHLI  RE AICD problem  Walker Lombardi returned page @[phone]       Susana Harris  02/24/22 6560

## 2022-03-08 ENCOUNTER — OFFICE VISIT (OUTPATIENT)
Dept: FAMILY MEDICINE CLINIC | Age: 47
End: 2022-03-08
Payer: MEDICARE

## 2022-03-08 VITALS
HEART RATE: 87 BPM | WEIGHT: 150.8 LBS | SYSTOLIC BLOOD PRESSURE: 108 MMHG | HEIGHT: 61 IN | BODY MASS INDEX: 28.47 KG/M2 | OXYGEN SATURATION: 96 % | DIASTOLIC BLOOD PRESSURE: 74 MMHG

## 2022-03-08 DIAGNOSIS — K21.9 GASTROESOPHAGEAL REFLUX DISEASE WITHOUT ESOPHAGITIS: ICD-10-CM

## 2022-03-08 DIAGNOSIS — Z13.1 SCREENING FOR DIABETES MELLITUS: ICD-10-CM

## 2022-03-08 DIAGNOSIS — D86.9 SARCOIDOSIS: Primary | ICD-10-CM

## 2022-03-08 DIAGNOSIS — Z12.11 SCREENING FOR COLON CANCER: ICD-10-CM

## 2022-03-08 DIAGNOSIS — Z13.29 SCREENING FOR THYROID DISORDER: ICD-10-CM

## 2022-03-08 DIAGNOSIS — F33.1 MODERATE EPISODE OF RECURRENT MAJOR DEPRESSIVE DISORDER (HCC): ICD-10-CM

## 2022-03-08 DIAGNOSIS — Z76.89 ENCOUNTER TO ESTABLISH CARE: ICD-10-CM

## 2022-03-08 DIAGNOSIS — Z13.220 SCREENING FOR LIPID DISORDERS: ICD-10-CM

## 2022-03-08 PROCEDURE — G8427 DOCREV CUR MEDS BY ELIG CLIN: HCPCS | Performed by: NURSE PRACTITIONER

## 2022-03-08 PROCEDURE — G8484 FLU IMMUNIZE NO ADMIN: HCPCS | Performed by: NURSE PRACTITIONER

## 2022-03-08 PROCEDURE — G8419 CALC BMI OUT NRM PARAM NOF/U: HCPCS | Performed by: NURSE PRACTITIONER

## 2022-03-08 PROCEDURE — 36415 COLL VENOUS BLD VENIPUNCTURE: CPT | Performed by: NURSE PRACTITIONER

## 2022-03-08 PROCEDURE — 4004F PT TOBACCO SCREEN RCVD TLK: CPT | Performed by: NURSE PRACTITIONER

## 2022-03-08 PROCEDURE — 99203 OFFICE O/P NEW LOW 30 MIN: CPT | Performed by: NURSE PRACTITIONER

## 2022-03-08 RX ORDER — TIZANIDINE 2 MG/1
2 TABLET ORAL 3 TIMES DAILY PRN
Qty: 90 TABLET | Refills: 0 | Status: SHIPPED | OUTPATIENT
Start: 2022-03-08 | End: 2022-04-18

## 2022-03-08 RX ORDER — OMEPRAZOLE 40 MG/1
40 CAPSULE, DELAYED RELEASE ORAL 2 TIMES DAILY
Qty: 30 CAPSULE | Refills: 1 | Status: SHIPPED | OUTPATIENT
Start: 2022-03-08 | End: 2022-05-09

## 2022-03-08 RX ORDER — ESCITALOPRAM OXALATE 20 MG/1
20 TABLET ORAL DAILY
Qty: 30 TABLET | Refills: 1 | Status: SHIPPED | OUTPATIENT
Start: 2022-03-08 | End: 2022-05-09

## 2022-03-08 RX ORDER — METOPROLOL SUCCINATE 25 MG/1
25 TABLET, EXTENDED RELEASE ORAL DAILY
COMMUNITY
Start: 2021-08-28 | End: 2022-06-14 | Stop reason: SDUPTHER

## 2022-03-08 RX ORDER — GABAPENTIN 600 MG/1
600 TABLET ORAL 3 TIMES DAILY
Qty: 90 TABLET | Refills: 0 | Status: SHIPPED | OUTPATIENT
Start: 2022-03-08 | End: 2022-04-08

## 2022-03-08 RX ORDER — ESCITALOPRAM OXALATE 20 MG/1
20 TABLET ORAL DAILY
COMMUNITY
Start: 2021-04-27 | End: 2022-03-08 | Stop reason: SDUPTHER

## 2022-03-08 RX ORDER — ASPIRIN 81 MG/1
TABLET, CHEWABLE ORAL
COMMUNITY

## 2022-03-08 RX ORDER — TIZANIDINE 2 MG/1
2 TABLET ORAL 3 TIMES DAILY PRN
COMMUNITY
Start: 2021-05-03 | End: 2022-03-08 | Stop reason: SDUPTHER

## 2022-03-08 RX ORDER — NITROGLYCERIN 0.4 MG/1
0.4 TABLET SUBLINGUAL EVERY 5 MIN PRN
Qty: 25 TABLET | Refills: 1 | Status: SHIPPED | OUTPATIENT
Start: 2022-03-08

## 2022-03-08 RX ORDER — LORATADINE 10 MG/1
10 TABLET ORAL DAILY
COMMUNITY
Start: 2021-08-28 | End: 2022-06-14 | Stop reason: SDUPTHER

## 2022-03-08 ASSESSMENT — PATIENT HEALTH QUESTIONNAIRE - PHQ9
SUM OF ALL RESPONSES TO PHQ QUESTIONS 1-9: 1
2. FEELING DOWN, DEPRESSED OR HOPELESS: 1
SUM OF ALL RESPONSES TO PHQ QUESTIONS 1-9: 1
SUM OF ALL RESPONSES TO PHQ QUESTIONS 1-9: 1
1. LITTLE INTEREST OR PLEASURE IN DOING THINGS: 0
SUM OF ALL RESPONSES TO PHQ9 QUESTIONS 1 & 2: 1
SUM OF ALL RESPONSES TO PHQ QUESTIONS 1-9: 1

## 2022-03-08 ASSESSMENT — ENCOUNTER SYMPTOMS
GASTROINTESTINAL NEGATIVE: 1
EYES NEGATIVE: 1

## 2022-03-08 NOTE — PROGRESS NOTES
[facility]  Establish care visit   3/8/2022    Flako Costello (:  1975) is a 40 y.o. female, here to establish care. Chief Complaint   Patient presents with    New Patient     pt is fasting today, she notes she has been out of medications, she was previously diagnosed with heart failure, cardiac sarcoidosis         ASSESSMENT/ PLAN  1. Sarcoidosis  - Orlin Dumont MD, Cadiology, Orlando Olivo MD, Pulmonary, [facility]    2. Gastroesophageal reflux disease without esophagitis  - omeprazole (PRILOSEC) 40 MG delayed release capsule; Take 1 capsule by mouth 2 times daily  Dispense: 30 capsule; Refill: 1    3. Moderate episode of recurrent major depressive disorder (HCC)  - escitalopram (LEXAPRO) 20 MG tablet; Take 1 tablet by mouth daily  Dispense: 30 tablet; Refill: 1    4. Screening for colon cancer  - Fecal DNA Colorectal cancer screening (Cologuard)    5. Encounter to establish care  - CBC with Auto Differential  - Comprehensive Metabolic Panel    6. Screening for diabetes mellitus  - Hemoglobin A1C    7. Screening for lipid disorders  - Lipid Panel    8. Screening for thyroid disorder  - TSH  - T4, Free       Return in about 3 months (around 2022). HPI  Patient is here to establish care; she was diagnosed previously with cardiac sarcoidosis and needs referral to get established with cardiology and pulmonology; patient also needs medication refills for lexapro and omeprazole. She denied any complains     ROS  Review of Systems   Constitutional: Negative. HENT: Negative. Eyes: Negative. Cardiovascular: Negative. Gastrointestinal: Negative. Endocrine: Negative. Genitourinary: Negative. Musculoskeletal: Positive for myalgias. Skin: Negative. Neurological: Negative. Hematological: Negative. Psychiatric/Behavioral: Positive for behavioral problems and sleep disturbance.         HISTORIES  Current Outpatient Medications on File Prior to Visit   Medication Sig Dispense Refill    loratadine (CLARITIN) 10 MG tablet Take 10 mg by mouth daily      metoprolol succinate (TOPROL XL) 25 MG extended release tablet Take 25 mg by mouth daily      furosemide (LASIX) 40 MG tablet Take 40 mg by mouth daily.  potassium chloride (KLOR-CON) 10 MEQ CR tablet Take 10 mEq by mouth daily. Unknown dosage or freq       simvastatin (ZOCOR) 40 MG tablet Take 40 mg by mouth nightly.  vitamin D (CHOLECALCIFEROL) 400 UNIT TABS tablet Take 2,000 Units by mouth daily.  albuterol (PROVENTIL) (5 MG/ML) 0.5% nebulizer solution Inhale 2.5 mg into the lungs as needed      aspirin 81 MG chewable tablet Take by mouth       No current facility-administered medications on file prior to visit. Allergies   Allergen Reactions    Coumadin [Warfarin]      Other reaction(s): states \"bled out from by bowels'    Duloxetine      Increased depression and feelings of hopelessness.  Fluoxetine      Increased depression and feelings of hopelessness.  Lisinopril      Other reaction(s): severe bp drop  \"bottom out\"  Per patient req      Morphine      Other reaction(s): SEVERELY ALTERS PATIENTS MOOD  Pt states it makes her \"mentally go crazy\"  States that it makes her feel \"crazy\".  Pregabalin Nausea And Vomiting     Other reaction(s): vomitting  nausea  Nausea, vomiting and rapid heart rate       Baclofen      Increased spasticity and tightening of her muscles.  Losartan Other (See Comments)     \"lose potassium\"  Skin turned red   Skin turned red.       Bisoprolol Fumarate Other (See Comments)     Hypotension  hypotension      Bupropion     Hydrocodone     Hydrocodone-Acetaminophen Itching    Spironolactone Other (See Comments)     Nausea loss of appetite  Nausea loss of appetite  Nausea and loss of appetite         Past Medical History:   Diagnosis Date    DVT     Hypercholesterolemia     [facility] [address]     Pulmonary embolism ([facility] [address]     Unspecified cerebral artery occlusion with cerebral infarction        There is no problem list on file for this patient. Past Surgical History:   Procedure Laterality Date    CARDIAC SURGERY      catherization x 4    CHEST TUBE INSERTION      at birth   [facility] CORONARY ANGIOPLASTY WITH STENT PLACEMENT      x 1 stent in rca 3/10    DILATION AND CURETTAGE OF UTERUS      KNEE ARTHROSCOPY      bilat knees    LAPAROSCOPY      x 2 for ovaries    TONSILLECTOMY AND ADENOIDECTOMY      UPPER GASTROINTESTINAL ENDOSCOPY  10/18/10    with biopsies       Social History     Socioeconomic History    Marital status: Single     Spouse name: Not on file    Number of children: Not on file    Years of education: Not on file    Highest education level: Not on file   Occupational History    Not on file   Tobacco Use    Smoking status: Current Every Day Smoker     Packs/day: 0.25     Types: Cigarettes     Start date: 3/8/2007    Smokeless tobacco: Never Used   Substance and Sexual Activity    Alcohol use: No    Drug use: Not Currently     Types: Marijuana Don Safer)    Sexual activity: Yes     Partners: Male   Other Topics Concern    Not on file   Social History Narrative    Not on file     Social Determinants of Health     Financial Resource Strain:     Difficulty of Paying Living Expenses: Not on file   Food Insecurity:     Worried About Running Out of Food in the Last Year: Not on file    Cecy of Food in the Last Year: Not on file   Transportation Needs:     Lack of Transportation (Medical): Not on file    Lack of Transportation (Non-Medical):  Not on file   Physical Activity:     Days of Exercise per Week: Not on file    Minutes of Exercise per Session: Not on file   Stress:     Feeling of Stress : Not on file   Social Connections:     Frequency of Communication with Friends and Family: Not on file    Frequency of Social Gatherings with Friends and Family: Not on file    Attends Anglican Services: Not on file    Active Member of Clubs or Organizations: Not on file    Attends Club or Organization Meetings: Not on file    Marital Status: Not on file   Intimate Partner Violence:     Fear of Current or Ex-Partner: Not on file    Emotionally Abused: Not on file    Physically Abused: Not on file    Sexually Abused: Not on file   Housing Stability:     Unable to Pay for Housing in the Last Year: Not on file    Number of [place] in the Last Year: Not on file    Unstable Housing in the Last Year: Not on file        Family History   Problem Relation Age of Onset    Heart Disease Mother     High Blood Pressure Mother     Diabetes Mother     Depression Mother     High Cholesterol Mother     Heart Disease Father     High Blood Pressure Father     High Cholesterol Father     Asthma Sister        PE  Vitals:    03/08/22 1533   BP: 108/74   Site: Left Upper Arm   Position: Sitting   Pulse: 87   SpO2: 96%   Weight: 150 lb 12.8 oz (68.4 kg)   Height: 5' 1.1\" (1.552 m)     Estimated body mass index is 28.4 kg/m² as calculated from the following:    Height as of this encounter: 5' 1.1\" (1.552 m). Weight as of this encounter: 150 lb 12.8 oz (68.4 kg). Physical Exam  HENT:      Right Ear: Tympanic membrane and ear canal normal.      Left Ear: Tympanic membrane and ear canal normal.      Nose: Nose normal.      Mouth/Throat:      Mouth: Mucous membranes are moist.   Eyes:      Extraocular Movements: Extraocular movements intact. Cardiovascular:      Rate and Rhythm: Normal rate. Pulses: Normal pulses. Heart sounds: Normal heart sounds. Pulmonary:      Effort: Pulmonary effort is normal.      Breath sounds: Normal breath sounds. Abdominal:      General: Bowel sounds are normal.      Palpations: Abdomen is soft. Musculoskeletal:         General: Normal range of motion. Cervical back: Normal range of motion. Skin:     General: Skin is warm.    Neurological: General: No focal deficit present. Mental Status: She is alert and oriented to person, place, and time. Psychiatric:         Mood and Affect: Mood normal.         Behavior: Behavior normal.         Thought Content: Thought content normal.         Judgment: Judgment normal.         Immunization History   Administered Date(s) Administered    Influenza Virus Vaccine 10/17/2009, 10/19/2010, 11/14/2011, 01/02/2013, 10/18/2013, 09/09/2014, 12/04/2015, 12/25/2016, 09/18/2017, 10/15/2018, 09/06/2019, 09/10/2020    Influenza Whole 10/17/2009    PPD Test 11/07/2012    Pneumococcal Conjugate 7-valent (Prevnar7) 03/17/2010, 04/01/2010, 10/18/2010    Pneumococcal Polysaccharide ([id number]) 02/27/2016    Td (Adult), 2 Lf Tetanus Toxoid, Pf (Td, Absorbed) 04/01/2010    Td vaccine (adult) 04/01/2010    Tdap (Boostrix, Adacel) 04/27/2018       Health Maintenance   Topic Date Due    COVID-19 Vaccine (1) Never done    Flu vaccine (1) 09/01/2021    Colorectal Cancer Screen  Never done    Lipid screen  03/08/2023    Depression Monitoring  03/08/2023    Potassium monitoring  03/08/2023    Creatinine monitoring  03/08/2023    DTaP/Tdap/Td vaccine (2 - Td or Tdap) 04/27/2028    Pneumococcal 0-64 years Vaccine (2 of 2 - PPSV23) 09/04/2041    HIV screen  Completed    Hepatitis A vaccine  Aged Out    Hepatitis B vaccine  Aged Out    Hib vaccine  Aged Out    Meningococcal (ACWY) vaccine  Aged Out    Hepatitis C screen  Discontinued       PSH, PMH, SH and FH reviewed and noted. Recent and past labs, tests and consults also reviewed. Recent or new meds also reviewed. Marilyn Ruvalcaba, APRN - CNP    This dictation was generated by voice recognition computer software. Although all attempts are made to edit the dictation for accuracy, there may be errors in the transcription that are not intended.

## 2022-03-09 ENCOUNTER — TELEPHONE (OUTPATIENT)
Dept: FAMILY MEDICINE CLINIC | Age: 47
End: 2022-03-09

## 2022-03-09 LAB
A/G RATIO: 1.8 (ref 1.1–2.2)
ALBUMIN SERPL-MCNC: 4.6 G/DL (ref 3.4–5)
ALP BLD-CCNC: 146 U/L (ref 40–129)
ALT SERPL-CCNC: 21 U/L (ref 10–40)
ANION GAP SERPL CALCULATED.3IONS-SCNC: 15 MMOL/L (ref 3–16)
AST SERPL-CCNC: 23 U/L (ref 15–37)
BASOPHILS ABSOLUTE: 0.1 K/UL (ref 0–0.2)
BASOPHILS RELATIVE PERCENT: 0.9 %
BILIRUB SERPL-MCNC: 0.5 MG/DL (ref 0–1)
BUN BLDV-MCNC: 9 MG/DL (ref 7–20)
CALCIUM SERPL-MCNC: 10 MG/DL (ref 8.3–10.6)
CHLORIDE BLD-SCNC: 102 MMOL/L (ref 99–110)
CHOLESTEROL, TOTAL: 246 MG/DL (ref 0–199)
CO2: 24 MMOL/L (ref 21–32)
CREAT SERPL-MCNC: 0.7 MG/DL (ref 0.6–1.1)
EOSINOPHILS ABSOLUTE: 0.1 K/UL (ref 0–0.6)
EOSINOPHILS RELATIVE PERCENT: 1.9 %
ESTIMATED AVERAGE GLUCOSE: 99.7 MG/DL
GFR AFRICAN AMERICAN: >60
GFR NON-AFRICAN AMERICAN: >60
GLUCOSE BLD-MCNC: 90 MG/DL (ref 70–99)
HBA1C MFR BLD: 5.1 %
HCT VFR BLD CALC: 39.9 % (ref 36–48)
HDLC SERPL-MCNC: 58 MG/DL (ref 40–60)
HEMOGLOBIN: 13.5 G/DL (ref 12–16)
LDL CHOLESTEROL CALCULATED: 161 MG/DL
LYMPHOCYTES ABSOLUTE: 2.1 K/UL (ref 1–5.1)
LYMPHOCYTES RELATIVE PERCENT: 34.7 %
MCH RBC QN AUTO: 31.1 PG (ref 26–34)
MCHC RBC AUTO-ENTMCNC: 33.9 G/DL (ref 31–36)
MCV RBC AUTO: 91.8 FL (ref 80–100)
MONOCYTES ABSOLUTE: 0.3 K/UL (ref 0–1.3)
MONOCYTES RELATIVE PERCENT: 5.8 %
NEUTROPHILS ABSOLUTE: 3.4 K/UL (ref 1.7–7.7)
NEUTROPHILS RELATIVE PERCENT: 56.7 %
PDW BLD-RTO: 13.1 % (ref 12.4–15.4)
PLATELET # BLD: 324 K/UL (ref 135–450)
PMV BLD AUTO: 9 FL (ref 5–10.5)
POTASSIUM SERPL-SCNC: 4.9 MMOL/L (ref 3.5–5.1)
RBC # BLD: 4.35 M/UL (ref 4–5.2)
REASON FOR REJECTION: NORMAL
REJECTED TEST: NORMAL
SODIUM BLD-SCNC: 141 MMOL/L (ref 136–145)
T4 FREE: 1.1 NG/DL (ref 0.9–1.8)
TOTAL PROTEIN: 7.2 G/DL (ref 6.4–8.2)
TRIGL SERPL-MCNC: 136 MG/DL (ref 0–150)
TSH SERPL DL<=0.05 MIU/L-ACNC: 2.19 UIU/ML (ref 0.27–4.2)
VLDLC SERPL CALC-MCNC: 27 MG/DL
WBC # BLD: 5.9 K/UL (ref 4–11)

## 2022-03-09 NOTE — TELEPHONE ENCOUNTER
----- Message from Woo Zacarias sent at 3/9/2022  9:21 AM EST -----  Subject: Message to Provider    QUESTIONS  Information for Provider? Juliet Tompkins called in from the lab and stated that QUS   was rejected due to not enough to be tested and want to to let provider   know .  ---------------------------------------------------------------------------  --------------  [address]  What is the best way for the office to contact you? OK to leave message on   voicemail  Preferred Call Back Phone Number? [phone]  ---------------------------------------------------------------------------  --------------  SCRIPT ANSWERS  Relationship to Patient?  Third Party  Representative Name? Lela Vogel

## 2022-03-15 ENCOUNTER — NURSE ONLY (OUTPATIENT)
Dept: FAMILY MEDICINE CLINIC | Age: 47
End: 2022-03-15
Payer: MEDICARE

## 2022-03-15 DIAGNOSIS — Z13.220 SCREENING FOR LIPID DISORDERS: Primary | ICD-10-CM

## 2022-03-15 DIAGNOSIS — Z13.1 SCREENING FOR DIABETES MELLITUS: ICD-10-CM

## 2022-03-15 PROCEDURE — 36415 COLL VENOUS BLD VENIPUNCTURE: CPT | Performed by: NURSE PRACTITIONER

## 2022-03-16 LAB
A/G RATIO: 2 (ref 1.1–2.2)
ALBUMIN SERPL-MCNC: 4.8 G/DL (ref 3.4–5)
ALP BLD-CCNC: 143 U/L (ref 40–129)
ALT SERPL-CCNC: 26 U/L (ref 10–40)
ANION GAP SERPL CALCULATED.3IONS-SCNC: 16 MMOL/L (ref 3–16)
AST SERPL-CCNC: 29 U/L (ref 15–37)
BILIRUB SERPL-MCNC: 0.5 MG/DL (ref 0–1)
BUN BLDV-MCNC: 8 MG/DL (ref 7–20)
CALCIUM SERPL-MCNC: 9.9 MG/DL (ref 8.3–10.6)
CHLORIDE BLD-SCNC: 101 MMOL/L (ref 99–110)
CHOLESTEROL, TOTAL: 242 MG/DL (ref 0–199)
CO2: 25 MMOL/L (ref 21–32)
CREAT SERPL-MCNC: 0.8 MG/DL (ref 0.6–1.1)
GFR AFRICAN AMERICAN: >60
GFR NON-AFRICAN AMERICAN: >60
GLUCOSE BLD-MCNC: 95 MG/DL (ref 70–99)
HDLC SERPL-MCNC: 58 MG/DL (ref 40–60)
LDL CHOLESTEROL CALCULATED: 150 MG/DL
POTASSIUM SERPL-SCNC: 5.3 MMOL/L (ref 3.5–5.1)
SODIUM BLD-SCNC: 142 MMOL/L (ref 136–145)
TOTAL PROTEIN: 7.2 G/DL (ref 6.4–8.2)
TRIGL SERPL-MCNC: 169 MG/DL (ref 0–150)
VLDLC SERPL CALC-MCNC: 34 MG/DL

## 2022-03-17 ENCOUNTER — TELEPHONE (OUTPATIENT)
Dept: FAMILY MEDICINE CLINIC | Age: 47
End: 2022-03-17

## 2022-03-17 NOTE — TELEPHONE ENCOUNTER
Patient returned call. I read physician notes, I went over fasting protocol and she said yes she was fasting 12 hours and no alcohol 48 hours prior. She had no questions about medication dosage increase.

## 2022-03-22 LAB — NONINV COLON CA DNA+OCC BLD SCRN STL QL: NEGATIVE

## 2022-04-08 RX ORDER — GABAPENTIN 600 MG/1
600 TABLET ORAL 3 TIMES DAILY
Qty: 90 TABLET | Refills: 1 | Status: SHIPPED | OUTPATIENT
Start: 2022-04-08 | End: 2022-07-14 | Stop reason: SDUPTHER

## 2022-04-08 NOTE — TELEPHONE ENCOUNTER
Geovanny Simpson is requesting refill(s)   Last OV 03/08/2022 (pertaining to medication)  LR 03/08/2022 (per medication requested)  Next office visit scheduled or attempted 06/14/2022

## 2022-04-18 RX ORDER — TIZANIDINE 2 MG/1
TABLET ORAL
Qty: 90 TABLET | Refills: 0 | Status: SHIPPED | OUTPATIENT
Start: 2022-04-18 | End: 2022-05-27

## 2022-04-18 NOTE — TELEPHONE ENCOUNTER
Refill Request     Last Seen: Last Seen Department: 3/8/2022  Last Seen by PCP: 3/8/2022    Last Written: 03/08/2022, 90 tablets, 0 refills    Next Appointment: 06/14/2022  Future Appointments   Date Time Provider Alexis Arndt   6/14/2022 12:30 PM LESLI Heller - CNP [place] [facility] [place]       Medications pending      Requested Prescriptions     Pending Prescriptions Disp Refills    tiZANidine (ZANAFLEX) 2 MG tablet [Pharmacy Med Name: tiZANidine HCl 2 MG Oral Tablet] 90 tablet 0     Sig: TAKE 1 TABLET BY MOUTH THREE TIMES DAILY AS NEEDED (SARCODOISIS)

## 2022-05-07 DIAGNOSIS — F33.1 MODERATE EPISODE OF RECURRENT MAJOR DEPRESSIVE DISORDER (HCC): ICD-10-CM

## 2022-05-07 DIAGNOSIS — K21.9 GASTROESOPHAGEAL REFLUX DISEASE WITHOUT ESOPHAGITIS: ICD-10-CM

## 2022-05-09 RX ORDER — ENOXAPARIN SODIUM 100 MG/ML
INJECTION SUBCUTANEOUS
Qty: 30 ML | Refills: 0 | Status: SHIPPED | OUTPATIENT
Start: 2022-05-09 | End: 2022-06-14 | Stop reason: SDUPTHER

## 2022-05-09 RX ORDER — OMEPRAZOLE 40 MG/1
CAPSULE, DELAYED RELEASE ORAL
Qty: 30 CAPSULE | Refills: 0 | Status: SHIPPED | OUTPATIENT
Start: 2022-05-09 | End: 2022-06-16

## 2022-05-09 RX ORDER — ESCITALOPRAM OXALATE 20 MG/1
TABLET ORAL
Qty: 30 TABLET | Refills: 0 | Status: SHIPPED | OUTPATIENT
Start: 2022-05-09 | End: 2022-06-14 | Stop reason: SDUPTHER

## 2022-05-09 NOTE — TELEPHONE ENCOUNTER
Chan Gross is requesting refill(s)   Last OV 03/08/20022 (pertaining to medication)  LR 03/08/2022 (per medication requested)  Next office visit scheduled or attempted 06/14/2022

## 2022-05-27 RX ORDER — TIZANIDINE 2 MG/1
TABLET ORAL
Qty: 90 TABLET | Refills: 0 | Status: SHIPPED | OUTPATIENT
Start: 2022-05-27 | End: 2022-08-01 | Stop reason: SDUPTHER

## 2022-05-27 NOTE — TELEPHONE ENCOUNTER
Refill Request       Last Seen: Last Seen Department: 3/8/2022  Last Seen by PCP: 3/8/2022    Last Written: 04/18/2022    Next Appointment:   Future Appointments   Date Time Provider Alexis Arndt   6/14/2022 12:30 PM [facility]       Future appointment scheduled      Requested Prescriptions     Pending Prescriptions Disp Refills    tiZANidine ([place]) 2 MG tablet [Pharmacy Med Name: tiZANidine HCl 2 MG Oral Tablet] 90 tablet 0     Sig: TAKE 1 TABLET BY MOUTH THREE TIMES DAILY AS NEEDED (SARCODOISIS)

## 2022-06-14 ENCOUNTER — TELEMEDICINE (OUTPATIENT)
Dept: FAMILY MEDICINE CLINIC | Age: 47
End: 2022-06-14
Payer: COMMERCIAL

## 2022-06-14 DIAGNOSIS — F33.1 MODERATE EPISODE OF RECURRENT MAJOR DEPRESSIVE DISORDER (HCC): ICD-10-CM

## 2022-06-14 PROCEDURE — G8419 CALC BMI OUT NRM PARAM NOF/U: HCPCS | Performed by: NURSE PRACTITIONER

## 2022-06-14 PROCEDURE — 4004F PT TOBACCO SCREEN RCVD TLK: CPT | Performed by: NURSE PRACTITIONER

## 2022-06-14 PROCEDURE — G8427 DOCREV CUR MEDS BY ELIG CLIN: HCPCS | Performed by: NURSE PRACTITIONER

## 2022-06-14 PROCEDURE — 99213 OFFICE O/P EST LOW 20 MIN: CPT | Performed by: NURSE PRACTITIONER

## 2022-06-14 RX ORDER — FUROSEMIDE 40 MG/1
20 TABLET ORAL DAILY
Qty: 30 TABLET | Refills: 2 | Status: ON HOLD | OUTPATIENT
Start: 2022-06-14 | End: 2022-07-19 | Stop reason: HOSPADM

## 2022-06-14 RX ORDER — LORATADINE 10 MG/1
10 TABLET ORAL DAILY
Qty: 30 TABLET | Refills: 3 | Status: SHIPPED | OUTPATIENT
Start: 2022-06-14 | End: 2022-07-14

## 2022-06-14 RX ORDER — ENOXAPARIN SODIUM 100 MG/ML
80 INJECTION SUBCUTANEOUS DAILY
Qty: 30 ML | Refills: 0 | Status: SHIPPED | OUTPATIENT
Start: 2022-06-14 | End: 2022-07-14 | Stop reason: SDUPTHER

## 2022-06-14 RX ORDER — ESCITALOPRAM OXALATE 20 MG/1
20 TABLET ORAL DAILY
Qty: 30 TABLET | Refills: 0 | Status: SHIPPED | OUTPATIENT
Start: 2022-06-14 | End: 2022-08-01 | Stop reason: SDUPTHER

## 2022-06-14 RX ORDER — SIMVASTATIN 40 MG
40 TABLET ORAL NIGHTLY
Qty: 30 TABLET | Refills: 1 | Status: SHIPPED | OUTPATIENT
Start: 2022-06-14 | End: 2022-09-02

## 2022-06-14 RX ORDER — METOPROLOL SUCCINATE 25 MG/1
25 TABLET, EXTENDED RELEASE ORAL DAILY
Qty: 30 TABLET | Refills: 1 | Status: ON HOLD | OUTPATIENT
Start: 2022-06-14 | End: 2022-07-28 | Stop reason: SDUPTHER

## 2022-06-14 ASSESSMENT — PATIENT HEALTH QUESTIONNAIRE - PHQ9
SUM OF ALL RESPONSES TO PHQ QUESTIONS 1-9: 0
6. FEELING BAD ABOUT YOURSELF - OR THAT YOU ARE A FAILURE OR HAVE LET YOURSELF OR YOUR FAMILY DOWN: 0
9. THOUGHTS THAT YOU WOULD BE BETTER OFF DEAD, OR OF HURTING YOURSELF: 0
SUM OF ALL RESPONSES TO PHQ QUESTIONS 1-9: 0
SUM OF ALL RESPONSES TO PHQ QUESTIONS 1-9: 0
7. TROUBLE CONCENTRATING ON THINGS, SUCH AS READING THE NEWSPAPER OR WATCHING TELEVISION: 0
1. LITTLE INTEREST OR PLEASURE IN DOING THINGS: 0
8. MOVING OR SPEAKING SO SLOWLY THAT OTHER PEOPLE COULD HAVE NOTICED. OR THE OPPOSITE, BEING SO FIGETY OR RESTLESS THAT YOU HAVE BEEN MOVING AROUND A LOT MORE THAN USUAL: 0
5. POOR APPETITE OR OVEREATING: 0
10. IF YOU CHECKED OFF ANY PROBLEMS, HOW DIFFICULT HAVE THESE PROBLEMS MADE IT FOR YOU TO DO YOUR WORK, TAKE CARE OF THINGS AT HOME, OR GET ALONG WITH OTHER PEOPLE: 0
SUM OF ALL RESPONSES TO PHQ9 QUESTIONS 1 & 2: 0
3. TROUBLE FALLING OR STAYING ASLEEP: 0
SUM OF ALL RESPONSES TO PHQ QUESTIONS 1-9: 0
2. FEELING DOWN, DEPRESSED OR HOPELESS: 0
4. FEELING TIRED OR HAVING LITTLE ENERGY: 0

## 2022-06-14 NOTE — PROGRESS NOTES
Dinah Davis (:  1975) is a Established patient, here for evaluation of the following:    Assessment & Plan   Below is the assessment and plan developed based on review of pertinent history, physical exam, labs, studies, and medications. 1. Moderate episode of recurrent major depressive disorder (HCC)  No follow-ups on file. Subjective   HPI   Patient is following up on depression and medication refill. lexapro is working well for her but she stated she has been eating more and gaining weight.  She would like to review more options if this continues to be a concern  Review of Systems       Objective   Patient-Reported Vitals  No data recorded       Physical Exam  [INSTRUCTIONS:  \"[x]\" Indicates a positive item  \"[]\" Indicates a negative item  -- DELETE ALL ITEMS NOT EXAMINED]    Constitutional: [x] Appears well-developed and well-nourished [x] No apparent distress      [] Abnormal -     Mental status: [x] Alert and awake  [x] Oriented to person/place/time [x] Able to follow commands    [] Abnormal -     Eyes:   EOM    [x]  Normal    [] Abnormal -   Sclera  [x]  Normal    [] Abnormal -          Discharge [x]  None visible   [] Abnormal -     HENT: [x] Normocephalic, atraumatic  [] Abnormal -   [x] Mouth/Throat: Mucous membranes are moist    External Ears [x] Normal  [] Abnormal -    Neck: [x] No visualized mass [] Abnormal -     Pulmonary/Chest: [x] Respiratory effort normal   [x] No visualized signs of difficulty breathing or respiratory distress        [] Abnormal -      Musculoskeletal:   [x] Normal gait with no signs of ataxia         [x] Normal range of motion of neck        [] Abnormal -     Neurological:        [x] No Facial Asymmetry (Cranial nerve 7 motor function) (limited exam due to video visit)          [x] No gaze palsy        [] Abnormal -          Skin:        [x] No significant exanthematous lesions or discoloration noted on facial skin         [] Abnormal -            Psychiatric: [x] Normal Affect [] Abnormal -        [x] No Hallucinations    Other pertinent observable physical exam findings: Patty Mujica, was evaluated through a synchronous (real-time) audio-video encounter. The patient (or guardian if applicable) is aware that this is a billable service, which includes applicable co-pays. This Virtual Visit was conducted with patient's (and/or legal guardian's) consent. The visit was conducted pursuant to the emergency declaration under the [address] [place] [address] authority and the [employer] and [employer] General Act. Patient identification was verified, and a caregiver was present when appropriate. The patient was located at Home: [place] [address].    Provider was located at Home ([place] [address]): [place].        --LESLI Montgomery - CNP

## 2022-06-15 DIAGNOSIS — K21.9 GASTROESOPHAGEAL REFLUX DISEASE WITHOUT ESOPHAGITIS: ICD-10-CM

## 2022-06-16 ENCOUNTER — TELEPHONE (OUTPATIENT)
Dept: FAMILY MEDICINE CLINIC | Age: 47
End: 2022-06-16

## 2022-06-16 DIAGNOSIS — E78.5 HYPERLIPIDEMIA, UNSPECIFIED HYPERLIPIDEMIA TYPE: Primary | ICD-10-CM

## 2022-06-16 RX ORDER — GABAPENTIN 600 MG/1
TABLET ORAL
Qty: 90 TABLET | Refills: 0 | OUTPATIENT
Start: 2022-06-16

## 2022-06-16 RX ORDER — OMEPRAZOLE 40 MG/1
CAPSULE, DELAYED RELEASE ORAL
Qty: 30 CAPSULE | Refills: 0 | Status: SHIPPED | OUTPATIENT
Start: 2022-06-16 | End: 2022-09-02

## 2022-06-16 NOTE — TELEPHONE ENCOUNTER
Patient was on the lab schedule for 06/17 advised patient to go to [facility] outpatient lab for blood draw, pt needs orders for fbw for repeat lipid, her medication was increased 03/17/2022

## 2022-06-16 NOTE — TELEPHONE ENCOUNTER
Refill Request     CONFIRM preferrred pharmacy with the patient. If Mail Order Rx - Pend for 90 day refill.       Last Seen: Last Seen Department: 6/14/2022  Last Seen by PCP: 6/14/2022    Last Written:   1) prilosec - 05/09/2022, 30 capsules, 0 refills  2) gabapentin - 04/08/222, 90 tablets, 1 refill    Next Appointment:   Future Appointments   Date Time Provider Alexis Arndt   6/20/2022  1:20 PM [facility] [place]             Requested Prescriptions     Pending Prescriptions Disp Refills    omeprazole (PRILOSEC) 40 MG delayed release capsule [Pharmacy Med Name: Omeprazole 40 MG Oral Capsule Delayed Release] 30 capsule 0     Sig: Take 1 capsule by mouth twice daily    gabapentin (NEURONTIN) 600 MG tablet [Pharmacy Med Name: Gabapentin 600 MG Oral Tablet] 90 tablet 0     Sig: TAKE 1 TABLET BY MOUTH THREE TIMES DAILY

## 2022-06-17 NOTE — TELEPHONE ENCOUNTER
Spoke to the patient and advised that she would need an appointment, she stated that she did not have the means for transportation right now for an appointment. She will call back when she is able to make an appt.

## 2022-06-28 DIAGNOSIS — E78.5 HYPERLIPIDEMIA, UNSPECIFIED HYPERLIPIDEMIA TYPE: ICD-10-CM

## 2022-06-28 LAB
CHOLESTEROL, TOTAL: 228 MG/DL (ref 0–199)
HDLC SERPL-MCNC: 57 MG/DL (ref 40–60)
LDL CHOLESTEROL CALCULATED: 150 MG/DL
TRIGL SERPL-MCNC: 105 MG/DL (ref 0–150)
VLDLC SERPL CALC-MCNC: 21 MG/DL

## 2022-07-13 ENCOUNTER — NURSE TRIAGE (OUTPATIENT)
Dept: OTHER | Facility: CLINIC | Age: 47
End: 2022-07-13

## 2022-07-13 NOTE — TELEPHONE ENCOUNTER
Received call from [facility] with [employer] Complaint. Subjective: Caller states \"Having a headache, increased heart rate, low pulse ox, heart's skipping as well. \"     Current Symptoms: headache, HR 96 (high for me), fatigued, Pulse Ox 94%, weakness in legs    Onset: a few day ago; intermittent    Associated Symptoms: reduced activity    Pain Severity: 4/10; dull, aching; constant    Temperature: Denies      What has been tried: rest/fluids    LMP: NA Pregnant: NA    Recommended disposition: See PCP within 24 Hours    Care advice provided, patient verbalizes understanding; denies any other questions or concerns; instructed to call back for any new or worsening symptoms. Patient/Caller agrees with recommended disposition; writer provided warm transfer to [facility] for appointment scheduling     Attention Provider: Thank you for allowing me to participate in the care of your patient. The patient was connected to triage in response to information provided to the ECC/PSC. Please do not respond through this encounter as the response is not directed to a shared pool.       Reason for Disposition   [1] MODERATE weakness (i.e., interferes with work, school, normal activities) AND [2] persists > 3 days    Protocols used: WEAKNESS (GENERALIZED) AND FATIGUE-ADULT-

## 2022-07-14 ENCOUNTER — TELEPHONE (OUTPATIENT)
Dept: INTERNAL MEDICINE CLINIC | Age: 47
End: 2022-07-14

## 2022-07-14 ENCOUNTER — TELEMEDICINE (OUTPATIENT)
Dept: INTERNAL MEDICINE CLINIC | Age: 47
End: 2022-07-14
Payer: COMMERCIAL

## 2022-07-14 DIAGNOSIS — I50.9 CHRONIC CONGESTIVE HEART FAILURE, UNSPECIFIED HEART FAILURE TYPE (HCC): Primary | ICD-10-CM

## 2022-07-14 PROCEDURE — 99214 OFFICE O/P EST MOD 30 MIN: CPT | Performed by: NURSE PRACTITIONER

## 2022-07-14 PROCEDURE — G8427 DOCREV CUR MEDS BY ELIG CLIN: HCPCS | Performed by: NURSE PRACTITIONER

## 2022-07-14 RX ORDER — ENOXAPARIN SODIUM 100 MG/ML
80 INJECTION SUBCUTANEOUS DAILY
Qty: 30 ML | Refills: 0 | Status: SHIPPED | OUTPATIENT
Start: 2022-07-14 | End: 2022-09-02

## 2022-07-14 RX ORDER — GABAPENTIN 600 MG/1
600 TABLET ORAL 3 TIMES DAILY
Qty: 90 TABLET | Refills: 0 | Status: ON HOLD | OUTPATIENT
Start: 2022-07-14 | End: 2022-07-28 | Stop reason: HOSPADM

## 2022-07-14 SDOH — ECONOMIC STABILITY: FOOD INSECURITY: WITHIN THE PAST 12 MONTHS, YOU WORRIED THAT YOUR FOOD WOULD RUN OUT BEFORE YOU GOT MONEY TO BUY MORE.: NEVER TRUE

## 2022-07-14 SDOH — ECONOMIC STABILITY: FOOD INSECURITY: WITHIN THE PAST 12 MONTHS, THE FOOD YOU BOUGHT JUST DIDN'T LAST AND YOU DIDN'T HAVE MONEY TO GET MORE.: NEVER TRUE

## 2022-07-14 ASSESSMENT — SOCIAL DETERMINANTS OF HEALTH (SDOH): HOW HARD IS IT FOR YOU TO PAY FOR THE VERY BASICS LIKE FOOD, HOUSING, MEDICAL CARE, AND HEATING?: NOT VERY HARD

## 2022-07-14 ASSESSMENT — ENCOUNTER SYMPTOMS
BACK PAIN: 1
CHEST TIGHTNESS: 0
NAUSEA: 0
WHEEZING: 0
CONSTIPATION: 0
VOMITING: 0
DIARRHEA: 0
SHORTNESS OF BREATH: 0
ABDOMINAL DISTENTION: 0
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
## 2022-07-14 NOTE — TELEPHONE ENCOUNTER
Pt needs in person visit rather than VV as symptoms are concerning overall.  If symptoms have been persistent she might need to go to the ER rather than OV as well

## 2022-07-14 NOTE — PROGRESS NOTES
light-headedness and headaches. Psychiatric/Behavioral: Negative for decreased concentration and dysphoric mood. The patient is not nervous/anxious. Prior to Visit Medications    Medication Sig Taking? Authorizing Provider   enoxaparin (LOVENOX) 80 MG/0.8ML Inject 0.8 mLs into the skin daily Yes LESLI Mcgarry CNP   gabapentin (NEURONTIN) 600 MG tablet Take 1 tablet by mouth 3 times daily for 30 days. Yes LESLI Mcgarry CNP   omeprazole (PRILOSEC) 40 MG delayed release capsule Take 1 capsule by mouth twice daily Yes KAMILLA Lamas   loratadine (CLARITIN) 10 MG tablet Take 1 tablet by mouth daily Yes LESLI Lowry CNP   simvastatin (ZOCOR) 40 MG tablet Take 1 tablet by mouth nightly Yes LESLI Lowry CNP   furosemide (LASIX) 40 MG tablet Take 0.5 tablets by mouth daily Yes LESLI Lowry CNP   metoprolol succinate (TOPROL XL) 25 MG extended release tablet Take 1 tablet by mouth daily Yes LESLI Lowry CNP   escitalopram (LEXAPRO) 20 MG tablet Take 1 tablet by mouth daily Yes LESLI Lowry CNP   tiZANidine (ZANAFLEX) 2 MG tablet TAKE 1 TABLET BY MOUTH THREE TIMES DAILY AS NEEDED (SARCODOISIS) Yes LESLI Lowry CNP   aspirin 81 MG chewable tablet Take by mouth Yes Historical Provider, MD   nitroGLYCERIN (NITROSTAT) 0.4 MG SL tablet Place 1 tablet under the tongue every 5 minutes as needed for Chest pain Yes LESLI Lowry CNP   potassium chloride (KLOR-CON) 10 MEQ CR tablet Take 10 mEq by mouth daily. Unknown dosage or freq  Yes Historical Provider, MD   vitamin D (CHOLECALCIFEROL) 400 UNIT TABS tablet Take 2,000 Units by mouth daily.  Yes Historical Provider, MD       Social History     Tobacco Use    Smoking status: Current Every Day Smoker     Packs/day: 0.25     Types: Cigarettes     Start date: 3/8/2007    Smokeless tobacco: Never Used   Substance Use Topics    Alcohol use: No    Drug use: Not Currently     Types: Marijuana Yann Dong)        Allergies   Allergen Reactions    Coumadin [Warfarin]      Other reaction(s): states \"bled out from by bowels'    Duloxetine      Increased depression and feelings of hopelessness.  Fluoxetine      Increased depression and feelings of hopelessness.  Lisinopril      Other reaction(s): severe bp drop  \"bottom out\"  Per patient req      Morphine      Other reaction(s): SEVERELY ALTERS PATIENTS MOOD  Pt states it makes her \"mentally go crazy\"  States that it makes her feel \"crazy\".  Pregabalin Nausea And Vomiting     Other reaction(s): vomitting  nausea  Nausea, vomiting and rapid heart rate       Baclofen      Increased spasticity and tightening of her muscles.  Losartan Other (See Comments)     \"lose potassium\"  Skin turned red   Skin turned red.       Bisoprolol Fumarate Other (See Comments)     Hypotension  hypotension      Bupropion     Hydrocodone     Hydrocodone-Acetaminophen Itching    Spironolactone Other (See Comments)     Nausea loss of appetite  Nausea loss of appetite  Nausea and loss of appetite     ,   Past Medical History:   Diagnosis Date    DVT     Hypercholesterolemia     Myocardial infarct ([address]     Pulmonary embolism (HCC)     Unspecified cerebral artery occlusion with cerebral infarction    ,   Past Surgical History:   Procedure Laterality Date    CARDIAC SURGERY      catherization x 4    CHEST TUBE INSERTION      at birth    CORONARY ANGIOPLASTY WITH STENT PLACEMENT      x 1 stent in rca 3/10    DILATION AND CURETTAGE OF UTERUS      KNEE ARTHROSCOPY      bilat knees    LAPAROSCOPY      x 2 for ovaries    TONSILLECTOMY AND ADENOIDECTOMY      UPPER GASTROINTESTINAL ENDOSCOPY  10/18/10    with biopsies   ,   Social History     Tobacco Use    Smoking status: Current Every Day Smoker     Packs/day: 0.25     Types: Cigarettes     Start date: 3/8/2007    Smokeless tobacco: Never Used   Substance Use Topics    Alcohol use: No    Drug use: Not Currently Normal  [] Abnormal-  Sclera  [x]  Normal  [] Abnormal -         Discharge [x]  None visible  [] Abnormal -    HENT:   [x] Normocephalic, atraumatic. [] Abnormal   [x] Mouth/Throat: Mucous membranes are moist.     External Ears [x] Normal  [] Abnormal-     Neck: [x] No visualized mass     Pulmonary/Chest: [x] Respiratory effort normal.  [x] No visualized signs of difficulty breathing or respiratory distress        [] Abnormal-      Musculoskeletal:   [x] Normal gait with no signs of ataxia         [x] Normal range of motion of neck        [] Abnormal-       Neurological:        [x] No Facial Asymmetry (Cranial nerve 7 motor function) (limited exam to video visit)          [] No gaze palsy        [] Abnormal-         Skin:        [x] No significant exanthematous lesions or discoloration noted on facial skin         [] Abnormal-            Psychiatric:       [x] Normal Affect [x] No Hallucinations        [] Abnormal-     Other pertinent observable physical exam findings-       ASSESSMENT/PLAN:    1. Chronic congestive heart failure, unspecified heart failure type ([address]        Bo Ortez was seen today for fatigue, palpitations and headache. Diagnoses and all orders for this visit:    Chronic congestive heart failure, unspecified heart failure type ([address]  -     N TERMINAL PROBNP (AKA NTPROBNP); Future  -     Erica Larry MD, [facility], [facility]    Other orders  -     enoxaparin (LOVENOX) 80 MG/0.8ML; Inject 0.8 mLs into the skin daily  -     gabapentin (NEURONTIN) 600 MG tablet; Take 1 tablet by mouth 3 times daily for 30 days. Referral provided to cardiology if she does not plan on staying with [facility]. She needs to be seen for evaluation given the amount of time it has been. Discussed that she could try to take an extra 1/2 tablet of her metoprolol to counter palpitations, however do not take more. Continue diuretic daily. Consider dose increase to counter potential fluid retention.  If symptoms worsen aggressively or further concerning symptoms (chest pain, shortness of breath, further weight gain, faintness, lethargy, rapid heart rate, neurological deficits) recommend she call 911 immediately. Return if symptoms worsen or fail to improve. Mily Perez is a 40 y.o. female being evaluated by a Virtual Visit (video visit) encounter to address concerns as mentioned above. A caregiver was present when appropriate. Due to this being a TeleHealth encounter (During YHNXG-58 public health emergency), evaluation of the following organ systems was limited: Vitals/Constitutional/EENT/Resp/CV/GI//MS/Neuro/Skin/Heme-Lymph-Imm. Pursuant to the emergency declaration under the [address] [place] [address] authority and the [employer] Act, this Virtual Visit was conducted with patient's (and/or legal guardian's) consent, to reduce the patient's risk of exposure to COVID-19 and provide necessary medical care. The patient (and/or legal guardian) has also been advised to contact this office for worsening conditions or problems, and seek emergency medical treatment and/or call 911 if deemed necessary. Services were provided through a phone discussion virtually to substitute for in-person clinic visit. Patient and provider were located at their individual homes. Consent:  She and/or health care decision maker is aware that that she may receive a bill for this telephone service, depending on her insurance coverage, and has provided verbal consent to proceed: Yes    Total Time: minutes: 21-30 minutes    --LESLI Vidal CNP on 7/14/2022 at 1:05 PM    An electronic signature was used to authenticate this note.

## 2022-07-15 ENCOUNTER — NURSE ONLY (OUTPATIENT)
Dept: CARDIOLOGY CLINIC | Age: 47
End: 2022-07-15
Payer: COMMERCIAL

## 2022-07-15 ENCOUNTER — APPOINTMENT (OUTPATIENT)
Dept: GENERAL RADIOLOGY | Age: 47
End: 2022-07-15
Payer: COMMERCIAL

## 2022-07-15 ENCOUNTER — APPOINTMENT (OUTPATIENT)
Dept: NUCLEAR MEDICINE | Age: 47
End: 2022-07-15
Payer: COMMERCIAL

## 2022-07-15 ENCOUNTER — HOSPITAL ENCOUNTER (OUTPATIENT)
Age: 47
Setting detail: OBSERVATION
Discharge: HOME | End: 2022-07-19
Attending: EMERGENCY MEDICINE | Admitting: INTERNAL MEDICINE
Payer: COMMERCIAL

## 2022-07-15 DIAGNOSIS — R00.2 PALPITATIONS: ICD-10-CM

## 2022-07-15 DIAGNOSIS — R55 SYNCOPE AND COLLAPSE: ICD-10-CM

## 2022-07-15 DIAGNOSIS — D86.9 SARCOIDOSIS: ICD-10-CM

## 2022-07-15 DIAGNOSIS — Z95.810 ICD (IMPLANTABLE CARDIOVERTER-DEFIBRILLATOR), DUAL, IN SITU: Primary | ICD-10-CM

## 2022-07-15 DIAGNOSIS — I46.9 SUDDEN CARDIAC DEATH (HCC): ICD-10-CM

## 2022-07-15 DIAGNOSIS — R07.9 CHEST PAIN, UNSPECIFIED TYPE: Primary | ICD-10-CM

## 2022-07-15 DIAGNOSIS — Z95.5 STENTED CORONARY ARTERY: ICD-10-CM

## 2022-07-15 LAB
A/G RATIO: 1.4 (ref 1.1–2.2)
ALBUMIN SERPL-MCNC: 4.6 G/DL (ref 3.4–5)
ALP BLD-CCNC: 163 U/L (ref 40–129)
ALT SERPL-CCNC: 16 U/L (ref 10–40)
ANION GAP SERPL CALCULATED.3IONS-SCNC: 10 MMOL/L (ref 3–16)
AST SERPL-CCNC: 20 U/L (ref 15–37)
BASOPHILS ABSOLUTE: 0.1 K/UL (ref 0–0.2)
BASOPHILS RELATIVE PERCENT: 0.7 %
BILIRUB SERPL-MCNC: 0.5 MG/DL (ref 0–1)
BUN BLDV-MCNC: 7 MG/DL (ref 7–20)
CALCIUM SERPL-MCNC: 9.3 MG/DL (ref 8.3–10.6)
CHLORIDE BLD-SCNC: 96 MMOL/L (ref 99–110)
CO2: 27 MMOL/L (ref 21–32)
CREAT SERPL-MCNC: 0.7 MG/DL (ref 0.6–1.1)
EKG ATRIAL RATE: 68 BPM
EKG DIAGNOSIS: NORMAL
EKG P AXIS: 13 DEGREES
EKG P-R INTERVAL: 152 MS
EKG Q-T INTERVAL: 414 MS
EKG QRS DURATION: 96 MS
EKG QTC CALCULATION (BAZETT): 440 MS
EKG R AXIS: -11 DEGREES
EKG T AXIS: -1 DEGREES
EKG VENTRICULAR RATE: 68 BPM
EOSINOPHILS ABSOLUTE: 0.2 K/UL (ref 0–0.6)
EOSINOPHILS RELATIVE PERCENT: 2.3 %
GFR AFRICAN AMERICAN: >60
GFR NON-AFRICAN AMERICAN: >60
GLUCOSE BLD-MCNC: 92 MG/DL (ref 70–99)
HCG QUALITATIVE: NEGATIVE
HCT VFR BLD CALC: 38.7 % (ref 36–48)
HEMOGLOBIN: 13 G/DL (ref 12–16)
LV EF: 50 %
LV EF: 57 %
LVEF MODALITY: NORMAL
LVEF MODALITY: NORMAL
LYMPHOCYTES ABSOLUTE: 3.2 K/UL (ref 1–5.1)
LYMPHOCYTES RELATIVE PERCENT: 37.7 %
MCH RBC QN AUTO: 30.8 PG (ref 26–34)
MCHC RBC AUTO-ENTMCNC: 33.7 G/DL (ref 31–36)
MCV RBC AUTO: 91.5 FL (ref 80–100)
MONOCYTES ABSOLUTE: 0.5 K/UL (ref 0–1.3)
MONOCYTES RELATIVE PERCENT: 6.6 %
NEUTROPHILS ABSOLUTE: 4.4 K/UL (ref 1.7–7.7)
NEUTROPHILS RELATIVE PERCENT: 52.7 %
PDW BLD-RTO: 12.5 % (ref 12.4–15.4)
PLATELET # BLD: 407 K/UL (ref 135–450)
PMV BLD AUTO: 8.5 FL (ref 5–10.5)
POTASSIUM SERPL-SCNC: 3.7 MMOL/L (ref 3.5–5.1)
PRO-BNP: 115 PG/ML (ref 0–124)
RBC # BLD: 4.23 M/UL (ref 4–5.2)
SARS-COV-2, NAAT: NOT DETECTED
SODIUM BLD-SCNC: 133 MMOL/L (ref 136–145)
TOTAL PROTEIN: 7.9 G/DL (ref 6.4–8.2)
TROPONIN: <0.01 NG/ML
WBC # BLD: 8.4 K/UL (ref 4–11)

## 2022-07-15 PROCEDURE — 6360000002 HC RX W HCPCS: Performed by: INTERNAL MEDICINE

## 2022-07-15 PROCEDURE — 6370000000 HC RX 637 (ALT 250 FOR IP): Performed by: NURSE PRACTITIONER

## 2022-07-15 PROCEDURE — 6360000002 HC RX W HCPCS: Performed by: EMERGENCY MEDICINE

## 2022-07-15 PROCEDURE — 96374 THER/PROPH/DIAG INJ IV PUSH: CPT

## 2022-07-15 PROCEDURE — 96372 THER/PROPH/DIAG INJ SC/IM: CPT

## 2022-07-15 PROCEDURE — 78452 HT MUSCLE IMAGE SPECT MULT: CPT

## 2022-07-15 PROCEDURE — 96375 TX/PRO/DX INJ NEW DRUG ADDON: CPT

## 2022-07-15 PROCEDURE — 99285 EMERGENCY DEPT VISIT HI MDM: CPT

## 2022-07-15 PROCEDURE — 84484 ASSAY OF TROPONIN QUANT: CPT

## 2022-07-15 PROCEDURE — 85025 COMPLETE CBC W/AUTO DIFF WBC: CPT

## 2022-07-15 PROCEDURE — 93017 CV STRESS TEST TRACING ONLY: CPT

## 2022-07-15 PROCEDURE — 83880 ASSAY OF NATRIURETIC PEPTIDE: CPT

## 2022-07-15 PROCEDURE — 36415 COLL VENOUS BLD VENIPUNCTURE: CPT

## 2022-07-15 PROCEDURE — 6370000000 HC RX 637 (ALT 250 FOR IP): Performed by: INTERNAL MEDICINE

## 2022-07-15 PROCEDURE — 2580000003 HC RX 258: Performed by: INTERNAL MEDICINE

## 2022-07-15 PROCEDURE — G0378 HOSPITAL OBSERVATION PER HR: HCPCS

## 2022-07-15 PROCEDURE — 71045 X-RAY EXAM CHEST 1 VIEW: CPT

## 2022-07-15 PROCEDURE — 6360000002 HC RX W HCPCS

## 2022-07-15 PROCEDURE — 93005 ELECTROCARDIOGRAM TRACING: CPT | Performed by: EMERGENCY MEDICINE

## 2022-07-15 PROCEDURE — 93306 TTE W/DOPPLER COMPLETE: CPT

## 2022-07-15 PROCEDURE — 87635 SARS-COV-2 COVID-19 AMP PRB: CPT

## 2022-07-15 PROCEDURE — 3430000000 HC RX DIAGNOSTIC RADIOPHARMACEUTICAL: Performed by: INTERNAL MEDICINE

## 2022-07-15 PROCEDURE — A9502 TC99M TETROFOSMIN: HCPCS | Performed by: INTERNAL MEDICINE

## 2022-07-15 PROCEDURE — 93010 ELECTROCARDIOGRAM REPORT: CPT | Performed by: INTERNAL MEDICINE

## 2022-07-15 PROCEDURE — 84703 CHORIONIC GONADOTROPIN ASSAY: CPT

## 2022-07-15 PROCEDURE — 80053 COMPREHEN METABOLIC PANEL: CPT

## 2022-07-15 RX ORDER — FUROSEMIDE 20 MG/1
20 TABLET ORAL DAILY
Status: DISCONTINUED | OUTPATIENT
Start: 2022-07-15 | End: 2022-07-19

## 2022-07-15 RX ORDER — PANTOPRAZOLE SODIUM 40 MG/1
40 TABLET, DELAYED RELEASE ORAL
Status: DISCONTINUED | OUTPATIENT
Start: 2022-07-15 | End: 2022-07-19 | Stop reason: HOSPADM

## 2022-07-15 RX ORDER — POLYETHYLENE GLYCOL 3350 17 G/17G
17 POWDER, FOR SOLUTION ORAL DAILY PRN
Status: DISCONTINUED | OUTPATIENT
Start: 2022-07-15 | End: 2022-07-19 | Stop reason: HOSPADM

## 2022-07-15 RX ORDER — ACETAMINOPHEN 650 MG/1
650 SUPPOSITORY RECTAL EVERY 6 HOURS PRN
Status: DISCONTINUED | OUTPATIENT
Start: 2022-07-15 | End: 2022-07-19 | Stop reason: HOSPADM

## 2022-07-15 RX ORDER — ESCITALOPRAM OXALATE 10 MG/1
20 TABLET ORAL DAILY
Status: DISCONTINUED | OUTPATIENT
Start: 2022-07-15 | End: 2022-07-19 | Stop reason: HOSPADM

## 2022-07-15 RX ORDER — METOPROLOL SUCCINATE 25 MG/1
25 TABLET, EXTENDED RELEASE ORAL DAILY
Status: DISCONTINUED | OUTPATIENT
Start: 2022-07-15 | End: 2022-07-19 | Stop reason: HOSPADM

## 2022-07-15 RX ORDER — ACETAMINOPHEN 325 MG/1
650 TABLET ORAL EVERY 6 HOURS PRN
Status: DISCONTINUED | OUTPATIENT
Start: 2022-07-15 | End: 2022-07-19 | Stop reason: HOSPADM

## 2022-07-15 RX ORDER — FENTANYL CITRATE 50 UG/ML
50 INJECTION, SOLUTION INTRAMUSCULAR; INTRAVENOUS ONCE
Status: COMPLETED | OUTPATIENT
Start: 2022-07-15 | End: 2022-07-15

## 2022-07-15 RX ORDER — POTASSIUM CHLORIDE 750 MG/1
10 TABLET, EXTENDED RELEASE ORAL DAILY
Status: DISCONTINUED | OUTPATIENT
Start: 2022-07-15 | End: 2022-07-19

## 2022-07-15 RX ORDER — GABAPENTIN 300 MG/1
600 CAPSULE ORAL EVERY 8 HOURS PRN
Status: DISCONTINUED | OUTPATIENT
Start: 2022-07-15 | End: 2022-07-19 | Stop reason: HOSPADM

## 2022-07-15 RX ORDER — NITROGLYCERIN 0.4 MG/1
0.4 TABLET SUBLINGUAL EVERY 5 MIN PRN
Status: DISCONTINUED | OUTPATIENT
Start: 2022-07-15 | End: 2022-07-19 | Stop reason: HOSPADM

## 2022-07-15 RX ORDER — LORATADINE 10 MG/1
10 CAPSULE, LIQUID FILLED ORAL DAILY
COMMUNITY

## 2022-07-15 RX ORDER — TIZANIDINE 4 MG/1
2 TABLET ORAL EVERY 8 HOURS PRN
Status: DISCONTINUED | OUTPATIENT
Start: 2022-07-15 | End: 2022-07-19 | Stop reason: HOSPADM

## 2022-07-15 RX ORDER — ATORVASTATIN CALCIUM 10 MG/1
20 TABLET, FILM COATED ORAL NIGHTLY
Status: DISCONTINUED | OUTPATIENT
Start: 2022-07-15 | End: 2022-07-19 | Stop reason: HOSPADM

## 2022-07-15 RX ORDER — SODIUM CHLORIDE 9 MG/ML
INJECTION, SOLUTION INTRAVENOUS PRN
Status: DISCONTINUED | OUTPATIENT
Start: 2022-07-15 | End: 2022-07-19 | Stop reason: HOSPADM

## 2022-07-15 RX ORDER — ONDANSETRON 4 MG/1
4 TABLET, ORALLY DISINTEGRATING ORAL EVERY 8 HOURS PRN
Status: DISCONTINUED | OUTPATIENT
Start: 2022-07-15 | End: 2022-07-19 | Stop reason: HOSPADM

## 2022-07-15 RX ORDER — ONDANSETRON 2 MG/ML
4 INJECTION INTRAMUSCULAR; INTRAVENOUS EVERY 6 HOURS PRN
Status: DISCONTINUED | OUTPATIENT
Start: 2022-07-15 | End: 2022-07-19 | Stop reason: HOSPADM

## 2022-07-15 RX ORDER — GABAPENTIN 300 MG/1
600 CAPSULE ORAL 3 TIMES DAILY
Status: DISCONTINUED | OUTPATIENT
Start: 2022-07-15 | End: 2022-07-15

## 2022-07-15 RX ORDER — ONDANSETRON 2 MG/ML
INJECTION INTRAMUSCULAR; INTRAVENOUS
Status: COMPLETED
Start: 2022-07-15 | End: 2022-07-15

## 2022-07-15 RX ORDER — SODIUM CHLORIDE 0.9 % (FLUSH) 0.9 %
5-40 SYRINGE (ML) INJECTION PRN
Status: DISCONTINUED | OUTPATIENT
Start: 2022-07-15 | End: 2022-07-19 | Stop reason: HOSPADM

## 2022-07-15 RX ORDER — ONDANSETRON 2 MG/ML
4 INJECTION INTRAMUSCULAR; INTRAVENOUS ONCE
Status: COMPLETED | OUTPATIENT
Start: 2022-07-15 | End: 2022-07-15

## 2022-07-15 RX ORDER — SODIUM CHLORIDE 0.9 % (FLUSH) 0.9 %
5-40 SYRINGE (ML) INJECTION EVERY 12 HOURS SCHEDULED
Status: DISCONTINUED | OUTPATIENT
Start: 2022-07-15 | End: 2022-07-19 | Stop reason: HOSPADM

## 2022-07-15 RX ORDER — ENOXAPARIN SODIUM 100 MG/ML
80 INJECTION SUBCUTANEOUS DAILY
Status: DISCONTINUED | OUTPATIENT
Start: 2022-07-15 | End: 2022-07-19 | Stop reason: HOSPADM

## 2022-07-15 RX ORDER — ASPIRIN 81 MG/1
81 TABLET, CHEWABLE ORAL DAILY
Status: DISCONTINUED | OUTPATIENT
Start: 2022-07-15 | End: 2022-07-19 | Stop reason: HOSPADM

## 2022-07-15 RX ORDER — ALBUTEROL SULFATE 90 UG/1
2 AEROSOL, METERED RESPIRATORY (INHALATION) EVERY 6 HOURS PRN
Status: DISCONTINUED | OUTPATIENT
Start: 2022-07-15 | End: 2022-07-19 | Stop reason: HOSPADM

## 2022-07-15 RX ORDER — AMINOPHYLLINE DIHYDRATE 25 MG/ML
75 INJECTION, SOLUTION INTRAVENOUS ONCE
Status: COMPLETED | OUTPATIENT
Start: 2022-07-15 | End: 2022-07-15

## 2022-07-15 RX ADMIN — AMINOPHYLLINE 75 MG: 25 INJECTION, SOLUTION INTRAVENOUS at 14:12

## 2022-07-15 RX ADMIN — ASPIRIN 81 MG: 81 TABLET, CHEWABLE ORAL at 15:42

## 2022-07-15 RX ADMIN — PANTOPRAZOLE SODIUM 40 MG: 40 TABLET, DELAYED RELEASE ORAL at 06:44

## 2022-07-15 RX ADMIN — REGADENOSON 0.4 MG: 0.08 INJECTION, SOLUTION INTRAVENOUS at 13:56

## 2022-07-15 RX ADMIN — GABAPENTIN 600 MG: 300 CAPSULE ORAL at 23:51

## 2022-07-15 RX ADMIN — TETROFOSMIN 10.9 MILLICURIE: 1.38 INJECTION, POWDER, LYOPHILIZED, FOR SOLUTION INTRAVENOUS at 12:46

## 2022-07-15 RX ADMIN — ONDANSETRON 4 MG: 2 INJECTION INTRAMUSCULAR; INTRAVENOUS at 04:39

## 2022-07-15 RX ADMIN — ESCITALOPRAM OXALATE 20 MG: 10 TABLET ORAL at 15:41

## 2022-07-15 RX ADMIN — TIZANIDINE 2 MG: 4 TABLET ORAL at 22:24

## 2022-07-15 RX ADMIN — POTASSIUM CHLORIDE 10 MEQ: 10 TABLET, EXTENDED RELEASE ORAL at 15:41

## 2022-07-15 RX ADMIN — ATORVASTATIN CALCIUM 20 MG: 10 TABLET, FILM COATED ORAL at 20:20

## 2022-07-15 RX ADMIN — ENOXAPARIN SODIUM 80 MG: 100 INJECTION SUBCUTANEOUS at 15:41

## 2022-07-15 RX ADMIN — GABAPENTIN 600 MG: 300 CAPSULE ORAL at 15:41

## 2022-07-15 RX ADMIN — ACETAMINOPHEN 650 MG: 325 TABLET ORAL at 06:44

## 2022-07-15 RX ADMIN — FENTANYL CITRATE 50 MCG: 50 INJECTION, SOLUTION INTRAMUSCULAR; INTRAVENOUS at 04:12

## 2022-07-15 RX ADMIN — TETROFOSMIN 30 MILLICURIE: 1.38 INJECTION, POWDER, LYOPHILIZED, FOR SOLUTION INTRAVENOUS at 13:56

## 2022-07-15 RX ADMIN — Medication 10 ML: at 20:20

## 2022-07-15 ASSESSMENT — PAIN SCALES - GENERAL
PAINLEVEL_OUTOF10: 6
PAINLEVEL_OUTOF10: 5
PAINLEVEL_OUTOF10: 6
PAINLEVEL_OUTOF10: 5
PAINLEVEL_OUTOF10: 5

## 2022-07-15 ASSESSMENT — PAIN DESCRIPTION - ORIENTATION
ORIENTATION: MID

## 2022-07-15 ASSESSMENT — PAIN DESCRIPTION - LOCATION
LOCATION: CHEST
LOCATION: GENERALIZED

## 2022-07-15 ASSESSMENT — PAIN DESCRIPTION - DESCRIPTORS
DESCRIPTORS: CRUSHING
DESCRIPTORS: PRESSURE
DESCRIPTORS: PRESSURE

## 2022-07-15 ASSESSMENT — PAIN - FUNCTIONAL ASSESSMENT
PAIN_FUNCTIONAL_ASSESSMENT: 0-10
PAIN_FUNCTIONAL_ASSESSMENT: 0-10
PAIN_FUNCTIONAL_ASSESSMENT: ACTIVITIES ARE NOT PREVENTED

## 2022-07-15 ASSESSMENT — PAIN DESCRIPTION - ONSET: ONSET: GRADUAL

## 2022-07-15 ASSESSMENT — ENCOUNTER SYMPTOMS
COUGH: 1
HEMATOCHEZIA: 0
RIGHT EYE: 0
WHEEZING: 0
LEFT EYE: 0
STRIDOR: 0
HEMATEMESIS: 0
SHORTNESS OF BREATH: 1

## 2022-07-15 ASSESSMENT — PAIN DESCRIPTION - FREQUENCY: FREQUENCY: INTERMITTENT

## 2022-07-15 ASSESSMENT — PAIN DESCRIPTION - PAIN TYPE: TYPE: ACUTE PAIN

## 2022-07-15 NOTE — ED PROVIDER NOTES
CHIEF COMPLAINT  Tachycardia (patient called cardiologist for tachycardia, Dr told her to take an additional half of metoprolol. Patient did this around 4pm and hasn't felt well since taking it. reports a chest pressure. ) and Fall (lost balance and fell forward hitting head on wall)      HISTORY OF PRESENT ILLNESS  Trinh Siegel is a 40 y.o. female with a history of DVT, pulmonary emboli, and myocardial infarction who presents to the ED complaining of chest pain, palpitations. Patient states that she was noting labile pulses with range from  earlier today. Patient contacted her PCP and was instructed to take an extra half dose of Lopressor. Patient did so and states that since that time she has felt extreme fatigue and passed out earlier. Patient denies fevers, chills, or sweats. No cough, congestion. She has noted substernal chest pain/pressure. No alleviating or exacerbating factors. .  Of note, patient states that she is currently taking Lovenox. No other complaints, modifying factors or associated symptoms. I have reviewed the following from the nursing documentation.     Past Medical History:   Diagnosis Date    DVT     Hypercholesterolemia     Myocardial infarct ([address]     Pulmonary embolism (HCC)     Unspecified cerebral artery occlusion with cerebral infarction      Past Surgical History:   Procedure Laterality Date    CARDIAC SURGERY      catherization x 4    CHEST TUBE INSERTION      at birth    CORONARY ANGIOPLASTY WITH STENT PLACEMENT      x 1 stent in rca 3/10    DILATION AND CURETTAGE OF UTERUS      KNEE ARTHROSCOPY      bilat knees    LAPAROSCOPY      x 2 for ovaries    TONSILLECTOMY AND ADENOIDECTOMY      UPPER GASTROINTESTINAL ENDOSCOPY  10/18/10    with biopsies     Family History   Problem Relation Age of Onset    Heart Disease Mother     High Blood Pressure Mother     Diabetes Mother     Depression Mother     High Cholesterol Mother     Heart Disease Father     High Blood Pressure Father     High Cholesterol Father     Asthma Sister      Social History     Socioeconomic History    Marital status: Single     Spouse name: Not on file    Number of children: Not on file    Years of education: Not on file    Highest education level: Not on file   Occupational History    Not on file   Tobacco Use    Smoking status: Every Day     Packs/day: 0.25     Types: Cigarettes     Start date: 3/8/2007    Smokeless tobacco: Never   Substance and Sexual Activity    Alcohol use: No    Drug use: Not Currently     Types: Marijuana Vilma Sarkis)    Sexual activity: Yes     Partners: Male   Other Topics Concern    Not on file   Social History Narrative    Not on file     Social Determinants of Health     Financial Resource Strain: Low Risk     Difficulty of Paying Living Expenses: Not very hard   Food Insecurity: No Food Insecurity    Worried About Running Out of Food in the Last Year: Never true    Ran Out of Food in the Last Year: Never true   Transportation Needs: Not on file   Physical Activity: Not on file   Stress: Not on file   Social Connections: Not on file   Intimate Partner Violence: Not on file   Housing Stability: Not on file     No current facility-administered medications for this encounter. Current Outpatient Medications   Medication Sig Dispense Refill    enoxaparin (LOVENOX) 80 MG/0.8ML Inject 0.8 mLs into the skin daily 30 mL 0    gabapentin (NEURONTIN) 600 MG tablet Take 1 tablet by mouth 3 times daily for 30 days.  90 tablet 0    omeprazole (PRILOSEC) 40 MG delayed release capsule Take 1 capsule by mouth twice daily 30 capsule 0    simvastatin (ZOCOR) 40 MG tablet Take 1 tablet by mouth nightly 30 tablet 1    furosemide (LASIX) 40 MG tablet Take 0.5 tablets by mouth daily 30 tablet 2    metoprolol succinate (TOPROL XL) 25 MG extended release tablet Take 1 tablet by mouth daily 30 tablet 1    escitalopram (LEXAPRO) 20 MG tablet Take 1 tablet by mouth daily 30 tablet 0    tiZANidine (ZANAFLEX) 2 MG tablet TAKE 1 TABLET BY MOUTH THREE TIMES DAILY AS NEEDED (SARCODOISIS) 90 tablet 0    aspirin 81 MG chewable tablet Take by mouth      nitroGLYCERIN (NITROSTAT) 0.4 MG SL tablet Place 1 tablet under the tongue every 5 minutes as needed for Chest pain 25 tablet 1    potassium chloride (KLOR-CON) 10 MEQ CR tablet Take 10 mEq by mouth daily. Unknown dosage or freq       vitamin D (CHOLECALCIFEROL) 400 UNIT TABS tablet Take 2,000 Units by mouth daily. Allergies   Allergen Reactions    Coumadin [Warfarin]      Other reaction(s): states \"bled out from by bowels'    Duloxetine      Increased depression and feelings of hopelessness. Fluoxetine      Increased depression and feelings of hopelessness. Lisinopril      Other reaction(s): severe bp drop  \"bottom out\"  Per patient req      Morphine      Other reaction(s): SEVERELY ALTERS PATIENTS MOOD  Pt states it makes her \"mentally go crazy\"  States that it makes her feel \"crazy\". Pregabalin Nausea And Vomiting     Other reaction(s): vomitting  nausea  Nausea, vomiting and rapid heart rate       Baclofen      Increased spasticity and tightening of her muscles. Losartan Other (See Comments)     \"lose potassium\"  Skin turned red   Skin turned red. Bisoprolol Fumarate Other (See Comments)     Hypotension  hypotension      Bupropion     Hydrocodone     Hydrocodone-Acetaminophen Itching    Spironolactone Other (See Comments)     Nausea loss of appetite  Nausea loss of appetite  Nausea and loss of appetite         REVIEW OF SYSTEMS  10 systems reviewed, pertinent positives per HPI otherwise noted to be negative. PHYSICAL EXAM  /73   Pulse 62   Temp 97.9 °F (36.6 °C) (Oral)   Resp 18   Ht 5' (1.524 m)   Wt 183 lb (83 kg)   SpO2 100%   BMI 35.74 kg/m²   GENERAL APPEARANCE: Awake and alert. Cooperative. No acute distress  HEAD: Normocephalic. Atraumatic. EYES: PERRL. EOM's grossly intact.    ENT: Mucous membranes are moist. NECK: Supple, trachea midline. HEART: RRR. Normal S1, S2. No murmurs, rubs or gallops. LUNGS: Respirations unlabored. CTAB. Good air exchange. No wheezes, rales, or rhonchi. Speaking comfortably in full sentences. ABDOMEN: Soft. Non-distended. Non-tender. No guarding or rebound. Normal Bowel sounds. EXTREMITIES: No peripheral edema. MAEE. No acute deformities. SKIN: Warm and dry. No acute rashes. NEUROLOGICAL: Alert and oriented X 3. CN II-XII intact. No gross facial drooping. Strength 5/5, sensation intact. No pronator drift. Normal coordination. PSYCHIATRIC: Normal mood and affect. LABS  I have reviewed all labs for this visit.    Results for orders placed or performed during the hospital encounter of 07/15/22   COVID-19, Rapid   Result Value Ref Range    SARS-CoV-2, NAAT Not Detected Not Detected   CBC with Auto Differential   Result Value Ref Range    WBC 8.4 4.0 - 11.0 K/uL    RBC 4.23 4.00 - 5.20 M/uL    Hemoglobin 13.0 12.0 - 16.0 g/dL    Hematocrit 38.7 36.0 - 48.0 %    MCV 91.5 80.0 - 100.0 fL    MCH 30.8 26.0 - 34.0 pg    MCHC 33.7 31.0 - 36.0 g/dL    RDW 12.5 12.4 - 15.4 %    Platelets 512 120 - 722 K/uL    MPV 8.5 5.0 - 10.5 fL    Neutrophils % 52.7 %    Lymphocytes % 37.7 %    Monocytes % 6.6 %    Eosinophils % 2.3 %    Basophils % 0.7 %    Neutrophils Absolute 4.4 1.7 - 7.7 K/uL    Lymphocytes Absolute 3.2 1.0 - 5.1 K/uL    Monocytes Absolute 0.5 0.0 - 1.3 K/uL    Eosinophils Absolute 0.2 0.0 - 0.6 K/uL    Basophils Absolute 0.1 0.0 - 0.2 K/uL   Comprehensive Metabolic Panel   Result Value Ref Range    Sodium 133 (L) 136 - 145 mmol/L    Potassium 3.7 3.5 - 5.1 mmol/L    Chloride 96 (L) 99 - 110 mmol/L    CO2 27 21 - 32 mmol/L    Anion Gap 10 3 - 16    Glucose 92 70 - 99 mg/dL    BUN 7 7 - 20 mg/dL    CREATININE 0.7 0.6 - 1.1 mg/dL    GFR Non-African American >60 >60    GFR African American >60 >60    Calcium 9.3 8.3 - 10.6 mg/dL    Total Protein 7.9 6.4 - 8.2 g/dL    Albumin 4.6 3.4 - 5.0 g/dL    Albumin/Globulin Ratio 1.4 1.1 - 2.2    Total Bilirubin 0.5 0.0 - 1.0 mg/dL    Alkaline Phosphatase 163 (H) 40 - 129 U/L    ALT 16 10 - 40 U/L    AST 20 15 - 37 U/L   Troponin   Result Value Ref Range    Troponin <0.01 <0.01 ng/mL   EKG 12 Lead   Result Value Ref Range    Ventricular Rate 68 BPM    Atrial Rate 68 BPM    P-R Interval 152 ms    QRS Duration 96 ms    Q-T Interval 414 ms    QTc Calculation (Bazett) 440 ms    P Axis 13 degrees    R Axis -11 degrees    T Axis -1 degrees    Diagnosis       Normal sinus rhythmLateral infarct (cited on or before 16-MAY-2011)Inferior infarct (cited on or before 16-MAY-2011)Abnormal ECGWhen compared with ECG of 24-FEB-2022 22:34,Premature ventricular complexes are no longer Present       EKG  The Ekg interpreted by myself  Normal sinus rhythm with a rate of 68. Normal axis. Normal intervals and durations. Inferior/lateral Q waves noted. These are unchanged when compared to previous EKG on 2/24/2022. RADIOLOGY  X-RAYS:  I have reviewed radiologic plain film image(s). ALL OTHER NON-PLAIN FILM IMAGES SUCH AS CT, ULTRASOUND AND MRI HAVE BEEN READ BY THE RADIOLOGIST. No orders to display              Rechecks: Physical assessment performed. Patient resting comfortably throughout her stay. Is this patient to be included in the SEP-1 Core Measure? No   Exclusion criteria - the patient is NOT to be included for SEP-1 Core Measure due to: Infection is not suspected          Pacer interrogation: No noted events. ED COURSE/MDM  Patient seen and evaluated. Old records reviewed. Labs and imaging reviewed and results discussed with patient. Patient was given pain medicine in the ED with good symptomatic relief. Patient was reassessed as noted above. Patient's labs, EKG, and imaging are stable. Pacer interrogation was without noted events. Given patient's ongoing chest pain she will be admitted for further evaluation and treatment. . Plan of care discussed with patient and family. Patient and family in agreement with plan. Patient was given scripts for the following medications. I counseled patient how to take these medications. New Prescriptions    No medications on file       CLINICAL IMPRESSION  1. Chest pain, unspecified type    2. Palpitations    3. Syncope and collapse        Blood pressure 106/73, pulse 62, temperature 97.9 °F (36.6 °C), temperature source Oral, resp. rate 18, height 5' (1.524 m), weight 183 lb (83 kg), SpO2 100 %. AmyRhonda Lambert 89 was admitted in stable condition.          Verona Parrish DO  07/15/22 [id number]

## 2022-07-15 NOTE — PROGRESS NOTES
A Xiomara Myoview stress test was completed on this patient as ordered. The patient became nauseated. Aminophylline 75 mg given IVP per protocol. Patient states she feels better. Awaiting stress imaging at this time.

## 2022-07-15 NOTE — PROGRESS NOTES
2nd Device programming evaluation for patient's DC ICD by company representative shows normal device function. EP physician will review. See interrogation under cardiology tab in the [address] [place] [address] [address] field for more details. Update after checking. I reduced atrial sensitivity from 0.3 to 0.5 mV which should reduce majority of oversensing. Unable to reproduce noise with isometrics. She also has a history of some RNRVAS during SIR (rate response sensor indicated rate) causing some competitive pacing during refractory. I had 2 options to either reduce rate response aggressiveness or just lower PAVD. I reduced PAVD to 200 ms from 225 to see if that would help pace longer after the retrograde conduction and not into refractory. She has good conduction and VIP on, so it should not prevent her from conducting. I can reduce rate response if needed.

## 2022-07-15 NOTE — CONSULTS
follow after the stress test    Subjective:     History of Present Illness:    Patient is a 40year old woman with multiple medical issues. 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 Status post ICD implant in 2020. Usually follows up with  cardiology. She presented to [facility] with variety of symptoms including dizziness (possible fall forward while sitting on toilet but with no reported loss of consciousness). Has been having issues with episodes of rapid heart beat associated with sweating and not feeling well (few seconds). Sometimes more sustained palpitations. Not always related are symptoms of chest pain and shortness of breath. Symptoms have not improved despite some medication changes recently by outside physician (including increase in diuretic dose). Feels her legs are weak and about to give out. No reported bleeding issues. No fever reported, but has intermittent chills. She is an active smoker but trying to cut down. No significant caffeine or alcohol intake as per patient. Some recent weight gain.      Problem List:  Patient Active Problem List   Diagnosis    Chest pain       History:  Past Medical History:   Diagnosis Date    CHF (congestive heart failure) ([facility])     COPD (chronic obstructive pulmonary disease) (HCC)     DVT     Hx of blood clots     Hypercholesterolemia     Myocardial infarct ([facility] [address]     Pulmonary embolism ([facility])     Unspecified cerebral artery occlusion with cerebral infarction      Past Surgical History:   Procedure Laterality Date    CARDIAC SURGERY      catherization x 4    CHEST TUBE INSERTION      at birth    [address] [facility]      x 1 stent in rca 3/10    DILATION AND CURETTAGE OF UTERUS      KNEE ARTHROSCOPY      bilat knees    LAPAROSCOPY      x 2 for ovaries    TONSILLECTOMY AND ADENOIDECTOMY      UPPER GASTROINTESTINAL ENDOSCOPY  10/18/10    with biopsies     Family History   Problem Relation Age of Onset Heart Disease Mother     High Blood Pressure Mother     Diabetes Mother     Depression Mother     High Cholesterol Mother     Heart Disease Father     High Blood Pressure Father     High Cholesterol Father     Asthma Sister          Review of Systems:  Review of Systems   Constitutional: Positive for chills, malaise/fatigue and weight gain. Negative for weight loss. HENT:  Negative for nosebleeds and stridor. Eyes:  Negative for vision loss in left eye and vision loss in right eye. Cardiovascular:  Positive for chest pain and dyspnea on exertion. Negative for leg swelling and syncope. Respiratory:  Positive for cough and shortness of breath. Negative for wheezing. Hematologic/Lymphatic: Negative for bleeding problem. Does not bruise/bleed easily. Skin:  Negative for itching and rash. Musculoskeletal:  Negative for joint pain and joint swelling. Gastrointestinal:  Negative for hematemesis and hematochezia. Genitourinary:  Negative for dysuria and hematuria. Neurological:  Positive for dizziness and light-headedness. Psychiatric/Behavioral:  Negative for altered mental status. The patient is nervous/anxious. Objective:     Vital Signs (last 24 hours):  Patient Vitals for the past 24 hrs:   BP Temp Temp src Pulse Resp SpO2 Height Weight   07/15/22 1225 (!) 102/57 97.9 °F (36.6 °C) Oral 63 17 93 % -- --   07/15/22 0758 (!) 91/54 97.5 °F (36.4 °C) -- 60 18 99 % -- --   07/15/22 0633 -- -- -- -- -- -- 5' (1.524 m) 178 lb 11.2 oz (81.1 kg)   07/15/22 0541 111/80 98 °F (36.7 °C) Oral 63 17 93 % -- --   07/15/22 0511 102/66 -- -- 63 24 92 % -- --   07/15/22 0333 106/73 -- -- 62 18 -- -- --   07/15/22 0046 (!) 116/52 97.9 °F (36.6 °C) Oral 65 16 100 % 5' (1.524 m) 183 lb (83 kg)         Physical Examination:     Physical Exam  Constitutional:       Appearance: Normal appearance.    HENT:      Right Ear: External ear normal.      Left Ear: External ear normal.      Nose: Nose normal. No rhinorrhea. Eyes:      General: No scleral icterus. Conjunctiva/sclera: Conjunctivae normal.   Cardiovascular:      Rate and Rhythm: Normal rate and regular rhythm. Pulmonary:      Comments: Coarse breath sounds bilaterally  Abdominal:      General: There is no distension. Tenderness: There is no abdominal tenderness. Musculoskeletal:         General: No swelling or deformity. Cervical back: Normal range of motion and neck supple. Skin:     General: Skin is warm and dry. Neurological:      General: No focal deficit present. Mental Status: She is alert and oriented to person, place, and time.    Psychiatric:         Mood and Affect: Mood normal.         Behavior: Behavior normal.         Medications:    Current Facility-Administered Medications   Medication Dose Route Frequency Provider Last Rate Last Admin    aspirin chewable tablet 81 mg  81 mg Oral Daily Ila Bragg MD        enoxaparin (LOVENOX) injection 80 mg  80 mg SubCUTAneous Daily Ila Bragg MD        escitalopram (LEXAPRO) tablet 20 mg  20 mg Oral Daily Ila Bragg MD        [Held by provider] furosemide (LASIX) tablet 20 mg  20 mg Oral Daily Ila Bragg MD        metoprolol succinate (TOPROL XL) extended release tablet 25 mg  25 mg Oral Daily Ila Bragg MD        pantoprazole (PROTONIX) tablet 40 mg  40 mg Oral QAM AC Ila Bragg MD   40 mg at 07/15/22 0644    potassium chloride (KLOR-CON M) extended release tablet 10 mEq  10 mEq Oral Daily Ila Bragg MD        atorvastatin (LIPITOR) tablet 20 mg  20 mg Oral Nightly Ila Bragg MD        sodium chloride flush 0.9 % injection 5-40 mL  5-40 mL IntraVENous 2 times per day Ila Bragg MD        sodium chloride flush 0.9 % injection 5-40 mL  5-40 mL IntraVENous PRN Ila Bragg MD        0.9 % sodium chloride infusion   IntraVENous PRN Ila Bragg MD        ondansetron (ZOFRAN-ODT) disintegrating tablet 4 mg  4 mg Oral Q8H PRN Dann Block MD        Or    ondansetron (ZOFRAN) injection 4 mg  4 mg IntraVENous Q6H PRN Dann Block MD        polyethylene glycol (GLYCOLAX) packet 17 g  17 g Oral Daily PRN Dann Block MD        acetaminophen (TYLENOL) tablet 650 mg  650 mg Oral Q6H PRN Dann Block MD   650 mg at 07/15/22 8930    Or    acetaminophen (TYLENOL) suppository 650 mg  650 mg Rectal Q6H PRN Dann Block MD        nitroGLYCERIN (NITROSTAT) SL tablet 0.4 mg  0.4 mg SubLINGual Q5 Min PRN Dann Block MD        perflutren lipid microspheres (DEFINITY) injection 1.65 mg  1.5 mL IntraVENous ONCE PRN Carlos Sheppard MD        regadenoson (LEXISCAN) injection 0.4 mg  0.4 mg IntraVENous ONCE PRN Carlos Sheppard MD        albuterol sulfate HFA (PROVENTIL;VENTOLIN;PROAIR) 108 (90 Base) MCG/ACT inhaler 2 puff  2 puff Inhalation Q6H PRN Carlos Sheppard MD        gabapentin (NEURONTIN) capsule 600 mg  600 mg Oral Q8H PRN Carlos Sheppard MD             ECG Interpretation:  Date: 7/15/2022  Rhythm: Sinus rhythm  Rate: 68 beats/minute  QT/QTc: normal  Other: probable infero-lateral old infarct        Echocardiogram:    Ordered here    February 2020 (outside hospital)    Study Conclusions     - Left ventricle: The cavity size is normal. Wall thickness is normal. Systolic function was normal.     The estimated ejection fraction was in the range of 50% to 55%. The study is not technically     sufficient to allow evaluation of LV diastolic function.   - Regional wall motion abnormality: Akinesis of the basal inferior myocardium; severe hypokinesis of     the mid inferior and mid inferolateral myocardium.   - Mitral valve: Mild to moderate regurgitation.   - Right ventricle: The cavity size is mildly dilated. Wall thickness is normal. Systolic function     was low normal by visual assessment.   - Tricuspid valve:  Moderate-severe regurgitation.   - Pulmonary arteries: The peak pressure during systole by Doppler is 31mm Hg. Telemetry:    Sinus rhythm with no significant arrhythmias noted    Lab Review:    CBC:   Lab Results   Component Value Date/Time    WBC 8.4 07/15/2022 12:56 AM    RBC 4.23 07/15/2022 12:56 AM     BMP:   Lab Results   Component Value Date/Time    GLUCOSE 92 07/15/2022 12:56 AM    CO2 27 07/15/2022 12:56 AM    BUN 7 07/15/2022 12:56 AM    CREATININE 0.7 07/15/2022 12:56 AM    CALCIUM 9.3 07/15/2022 12:56 AM     Coagulation:   Lab Results   Component Value Date/Time    INR 1.12 01/19/2022 03:00 PM    APTT 28.9 05/30/2011 06:15 PM       Lab Results   Component Value Date    CKTOTAL 28 12/13/2010    CKMB 0.57 10/25/2010    TROPONINI <0.01 07/15/2022        Imaging:    Chest X-Ray:   Cardiac and mediastinal silhouettes appear stable. Calcified granulomatous   changes are seen in the lungs. Implanted cardiac device appear stable. No   acute focal infiltrate, pleural effusion, pneumothorax or pulmonary edema. No acute osseous abnormality. I have spent a total of 40 minutes providing care to this patient during this encounter. At least half of this time was spent in patient counseling and coordination of care.       Signed by: Zeke Tucker MD
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
## 2022-07-15 NOTE — CARE COORDINATION
Attempted to meet with patient for initial assessment. She is currently off unit for stress test.   Chart reviewed. Has PCP and payor source. Hx of Cardiac Sarcoidosis (ICD), Copd, and COPD . Reportedly comes from home and is IPTA. Will continue to follow, and complete assessment as time allows.       Darrel Bishop RN

## 2022-07-15 NOTE — ED NOTES
Dr. Nandini Gamboa at [address] [place] [address], [address] [place] [address]  07/15/22 0612

## 2022-07-15 NOTE — PROGRESS NOTES
Device programming evaluation for patient's DC ICD by company representative shows normal device function. EP physician will review. See interrogation under cardiology tab in the [address] [place] [address] [address] field for more details. 7/15/2022 - present  [facility]    Please find the remote interrogation for our interrogation at 3:30AM at the [place] ED. Currently enrolled with  for remote monitoring. Patient has a DC-ICD with leads implanted in 2012. Presenting AS-VS around 67 bpm.  No tachy episodes since June 15th.  4 mode switch episodes since June 15th with longest 10 seconds. All egms indicate low amplitude noise on the atrial lead. Our atrial sensitivity is set quite aggressive. Eli Beaulieu-company rep will re interrogate for isometrics and optimize sensitivity to hopefully reduce oversensing of noise. I will pass along any updates when there.

## 2022-07-16 PROCEDURE — G0378 HOSPITAL OBSERVATION PER HR: HCPCS

## 2022-07-16 PROCEDURE — 96372 THER/PROPH/DIAG INJ SC/IM: CPT

## 2022-07-16 PROCEDURE — 94640 AIRWAY INHALATION TREATMENT: CPT

## 2022-07-16 PROCEDURE — 6370000000 HC RX 637 (ALT 250 FOR IP): Performed by: INTERNAL MEDICINE

## 2022-07-16 PROCEDURE — 99233 SBSQ HOSP IP/OBS HIGH 50: CPT | Performed by: INTERNAL MEDICINE

## 2022-07-16 PROCEDURE — 6360000002 HC RX W HCPCS: Performed by: INTERNAL MEDICINE

## 2022-07-16 PROCEDURE — 2580000003 HC RX 258: Performed by: INTERNAL MEDICINE

## 2022-07-16 PROCEDURE — 6370000000 HC RX 637 (ALT 250 FOR IP): Performed by: NURSE PRACTITIONER

## 2022-07-16 RX ORDER — IPRATROPIUM BROMIDE AND ALBUTEROL SULFATE 2.5; .5 MG/3ML; MG/3ML
1 SOLUTION RESPIRATORY (INHALATION)
Status: DISCONTINUED | OUTPATIENT
Start: 2022-07-16 | End: 2022-07-16

## 2022-07-16 RX ORDER — IPRATROPIUM BROMIDE AND ALBUTEROL SULFATE 2.5; .5 MG/3ML; MG/3ML
1 SOLUTION RESPIRATORY (INHALATION) 2 TIMES DAILY
Status: DISCONTINUED | OUTPATIENT
Start: 2022-07-17 | End: 2022-07-17

## 2022-07-16 RX ADMIN — ATORVASTATIN CALCIUM 20 MG: 10 TABLET, FILM COATED ORAL at 22:00

## 2022-07-16 RX ADMIN — ENOXAPARIN SODIUM 80 MG: 100 INJECTION SUBCUTANEOUS at 09:49

## 2022-07-16 RX ADMIN — IPRATROPIUM BROMIDE AND ALBUTEROL SULFATE 1 AMPULE: .5; 2.5 SOLUTION RESPIRATORY (INHALATION) at 15:43

## 2022-07-16 RX ADMIN — ESCITALOPRAM OXALATE 20 MG: 10 TABLET ORAL at 09:49

## 2022-07-16 RX ADMIN — TIZANIDINE 2 MG: 4 TABLET ORAL at 22:00

## 2022-07-16 RX ADMIN — GABAPENTIN 600 MG: 300 CAPSULE ORAL at 22:00

## 2022-07-16 RX ADMIN — PANTOPRAZOLE SODIUM 40 MG: 40 TABLET, DELAYED RELEASE ORAL at 05:54

## 2022-07-16 RX ADMIN — Medication 10 ML: at 09:51

## 2022-07-16 RX ADMIN — POTASSIUM CHLORIDE 10 MEQ: 10 TABLET, EXTENDED RELEASE ORAL at 09:49

## 2022-07-16 RX ADMIN — ASPIRIN 81 MG: 81 TABLET, CHEWABLE ORAL at 09:49

## 2022-07-16 RX ADMIN — Medication 10 ML: at 22:00

## 2022-07-16 NOTE — RT PROTOCOL NOTE
RT Inhaler-Nebulizer Bronchodilator Protocol Note    There is a bronchodilator order in the chart from a provider indicating to follow the RT Bronchodilator Protocol and there is an Initiate RT Inhaler-Nebulizer Bronchodilator Protocol order as well (see protocol at bottom of note). CXR Findings:  XR CHEST PORTABLE    Result Date: 7/15/2022  Stable chest without acute cardiopulmonary process. The findings from the last RT Protocol Assessment were as follows:   History Pulmonary Disease: Smoker 15 pack years or more  Respiratory Pattern: Regular pattern and RR 12-20 bpm  Breath Sounds: Intermittent or unilateral wheezes  Cough: Strong, spontaneous, non-productive  Indication for Bronchodilator Therapy: On home bronchodilators  Bronchodilator Assessment Score: 5    Aerosolized bronchodilator medication orders have been revised according to the RT Inhaler-Nebulizer Bronchodilator Protocol below. Respiratory Therapist to perform RT Therapy Protocol Assessment initially then follow the protocol. Repeat RT Therapy Protocol Assessment PRN for score 0-3 or on second treatment, BID, and PRN for scores above 3. No Indications - adjust the frequency to every 6 hours PRN wheezing or bronchospasm, if no treatments needed after 48 hours then discontinue using Per Protocol order mode. If indication present, adjust the RT bronchodilator orders based on the Bronchodilator Assessment Score as indicated below. Use Inhaler orders unless patient has one or more of the following: on home nebulizer, not able to hold breath for 10 seconds, is not alert and oriented, cannot activate and use MDI correctly, or respiratory rate 25 breaths per minute or more, then use the equivalent nebulizer order(s) with same Frequency and PRN reasons based on the score. If a patient is on this medication at home then do not decrease Frequency below that used at home.     0-3 - enter or revise RT bronchodilator order(s) to equivalent RT Bronchodilator order with Frequency of every 4 hours PRN for wheezing or increased work of breathing using Per Protocol order mode. 4-6 - enter or revise RT Bronchodilator order(s) to two equivalent RT bronchodilator orders with one order with BID Frequency and one order with Frequency of every 4 hours PRN wheezing or increased work of breathing using Per Protocol order mode. 7-10 - enter or revise RT Bronchodilator order(s) to two equivalent RT bronchodilator orders with one order with TID Frequency and one order with Frequency of every 4 hours PRN wheezing or increased work of breathing using Per Protocol order mode. 11-13 - enter or revise RT Bronchodilator order(s) to one equivalent RT bronchodilator order with QID Frequency and an Albuterol order with Frequency of every 4 hours PRN wheezing or increased work of breathing using Per Protocol order mode. Greater than 13 - enter or revise RT Bronchodilator order(s) to one equivalent RT bronchodilator order with every 4 hours Frequency and an Albuterol order with Frequency of every 2 hours PRN wheezing or increased work of breathing using Per Protocol order mode. RT to enter RT Home Evaluation for COPD & MDI Assessment order using Per Protocol order mode.     Electronically signed by Amy Fuller RCP on 7/16/2022 at 4:36 PM

## 2022-07-16 NOTE — PLAN OF CARE
Problem: Pain  Goal: Verbalizes/displays adequate comfort level or baseline comfort level  Outcome: Progressing   Monitor for pain. Give pain medication if needed. Problem: Safety - Adult  Goal: Free from fall injury  Outcome: Progressing   Encouraged patient to call for assistance before getting out of bed. Non skid slipper socks on. Bed alarm is on.

## 2022-07-16 NOTE — PROGRESS NOTES
Hospitalist Progress Note      PCP: Yessica Stephens APRN - CNP    Date of Admission: 7/15/2022    Chief Complaint: palpitations, exertional chest pain, weight gain and orthopnea       Subjective:  she is still having some mild chest pressure. Stress test with some areas of mild ischemia yesterday. Further cardia input pending. Medications:  Reviewed    Infusion Medications    sodium chloride       Scheduled Medications    aspirin  81 mg Oral Daily    enoxaparin  80 mg SubCUTAneous Daily    escitalopram  20 mg Oral Daily    [Held by provider] furosemide  20 mg Oral Daily    metoprolol succinate  25 mg Oral Daily    pantoprazole  40 mg Oral QAM AC    potassium chloride  10 mEq Oral Daily    atorvastatin  20 mg Oral Nightly    sodium chloride flush  5-40 mL IntraVENous 2 times per day     PRN Meds: sodium chloride flush, sodium chloride, ondansetron **OR** ondansetron, polyethylene glycol, acetaminophen **OR** acetaminophen, nitroGLYCERIN, perflutren lipid microspheres, albuterol sulfate HFA, gabapentin, tiZANidine      Intake/Output Summary (Last 24 hours) at 7/16/2022 1147  Last data filed at 7/16/2022 9748  Gross per 24 hour   Intake 1080 ml   Output 600 ml   Net 480 ml       Physical Exam Performed:    BP (!) 93/35   Pulse 60   Temp 97.7 °F (36.5 °C) (Oral)   Resp 16   Ht 5' (1.524 m)   Wt 178 lb 11.2 oz (81.1 kg)   SpO2 95%   BMI 34.90 kg/m²       General appearance:  No apparent distress, appears stated age and cooperative. HEENT:  Normal cephalic, atraumatic without obvious deformity. Pupils equal, round, and reactive to light. Extra ocular muscles intact. Conjunctivae/corneas clear. Neck: Supple, with full range of motion. No jugular venous distention. Trachea midline. Respiratory:  Normal respiratory effort. Bilaterally without Rales/Rhonchi. Prominent bilateral expiratory wheezing.    Cardiovascular:  Regular rate and rhythm with normal S1/S2 without murmurs, rubs or abuse and is quite anxious and a tangential historian. She has been lost to follow up with her  cardiologist and wants to start over with a [facility] cardiologist.  Also, her pulmonary sarcoidosis specialist retired. She presents with essentially two complaints:  First, she complains of 3 months of weight gain with orthopnea. She weighed 150.5 lbs at PCP visit four months ago, is 178 here. Recently her PCP increased her furosemide from 20 mg po qd to 20 mg po BID. This didn't seem to help. She actually doesn't have any peripheral edema, no rales, CXR was clear, and BNP is normal.  Since increasing her furosemide the patient has felt lightheaded upon standing. She became very dizzy today while standing, lost her balance, and fell forward into a wall, hitting her head. She denies actually losing consciousness. Orthostats were negative in the ED, but she had a new mild hyponatremia. Second, she complains of spells of simultaneous palpitations, chest pressure, diaphoresis, and exertional intolerance. These spells typically occur with exertion, last about 2 minutes, and have been occurring about 2 times per day for the last week. She takes her pulse and is alarmed that it is sometimes as high as 100 - 110. Her PCP has instructed her to take an extra half dose of her metoprolol when these symptoms are bothering her, but this hasn't seemed to help. The chest pressure is substernal and is relieved with rest, associated with prominent diaphoresis. No radiation of the discomfort. For the last couple of weeks the patient hasn't been able to walk more than 30 feet or so without stopping to rest and catch her breath. Suspected tissue weight gain due to excess calories. Doubt CHF decompensation. Encouraged diet and exercise. Orthostatic symptoms and fall. Perhaps she didn't need the recent furosemide increase, held this med.     - patient also says that she has a h/o adrenal insufficiency, as diagnosed at some hospital in [place], [place] in 2011. She says that she was on hydrocortisone for a year, but then was lost to follow up when she moved. She hasn't been on any prolonged courses of steroid since, other than a 30 day course when she first established care with Dr. Mohit Cosme in 2020. F/u AM cortisol level here. Palpitations. Pacer interrogations did not reveal any tachyarrhythmia. No events on tele here. Anxiety may be contributing. Exertional chest pressure, with known h/o CAD s/p previous RCA stent. Troponin and EKG were eassuring. Nuclear stress test showed infarcts and areas of mild ischemia. Cardiology consulted. Aspirin, statin, metoprolol. Chronic diastolic CHF. EF 50% with mild RWMA's. Held furosemide initially. Ok to continue metoprolol, but advised her against taking the extra half dose, advised her that occasional HR's in the 100's, especially during exertion, are OK. Did not start ACEi/ARB due to orthostatic symptoms. Possible h/o \"groin\" DVT and PE in 2010. Details are murky. I can see about a dozen CTPA's which were negative for PE. Also negative venous dopplers, V/Q scans. 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 There are a few anticoagulants listed in her allergies, so the patient says that she has been on enoxaparin injections for at least 6 years. This doesn't make sense to me either, she isn't on a therapeutic dose. I suppose I will defer treatment to whichever outpatient physician is giving her refills of this medication. Sarcoidosis. She is no longer on methotrexate because she didn't tolerate this med, also her doc has retired. She will need to re-establish care with another pulmonologist as outpatient. COPD, with mild exacerbation. Encouraged smoking cessation.   Inhaled bronchodilators. Avoiding steroids for now so as to not interfere with testing for adrenal insufficiency. Possible h/o CVA. On a 2012 MRI brain at [facility]. E's a possible old L periventricular infarct was found. Exertional intolerance. Due to combination of all of the above issues, also muscular deconditioning, treat accordingly. Neuropathy. She takes 600 mg gabapentin PRN, seen on [facility]. DVT Prophylaxis: anticoagulation as above  Diet: ADULT DIET; Regular; Low Fat/Low Chol/High Fiber/VERENICE  Code Status: Full Code    PT/OT Eval Status: not indicated    Dispo - when untreated adrenal insufficiency has been ruled out, and when inpatient ischemia workup is complete per cardiology. Perhaps 7/18. She lives at home.         Nishant Campuzano MD

## 2022-07-16 NOTE — PROGRESS NOTES
[facility]   Cardiology Progress Note     Admit Date: 7/15/2022     Reason for follow up: Chest discomfort    HPI and Interval History: 40 y.o. female presented with chest discomfort. History of CAD and RCA stent, cardiac arrest, s/p ICD, ischemic cardiomyopathy, EFrEF now with improved EF, sarcoidosis with cardiac involvement by PET scan at [facility] , who has presented with chest discomfort. Stress test is abnormal.     Patient seen and examined. Clinical notes reviewed. Telemetry reviewed. No new complaint today. No major events overnight. Denies having chest pain, shortness of breath, dyspnea on exertion, Orthopnea, PND at the time of this visit. Assessment:   - CAD, Chest discomfort    History of RCA stent. Abnormal stress test   - History of MI, cardiac arrest, s/p AICD implantation   - History of HFrEF with improved LV function.  - HTN  - History of DVT/PE    Plan:     - CAD now has chest discomfort and also abnormal stress testing. Diagnostic options including cardiac cath reviewed by patient. Interventional cardiology evaluation on Monday for possible cath. Keep NPO on Sunday night. - ASA  - Statin  - Toprol XL    - Sarcoidosis with PET in 2019 showing cardiac involvement     She was seeing Dr. Gerald Barrientos pulmonary, at [facility]. Needs follow up and establishing care with pulmonary for sarcoidosis. She was being treated with methotrexate. Will defer to primary team.     ICD has been interrogated and normal function with no significant arrhythmia. AP: 20% and  < 1%. Discussed with nursing staff. Active Hospital Problems    Diagnosis Date Noted    Chest pain [R07.9] 07/15/2022     Priority: Medium       Diagnostic studies:     Myoview:   Abnormal moderate risk myocardial perfusion study. There is a defect within the inferior wall consistent with prior infarction    and mild terry-infarct ischemia. There is mild ischemia defect within the apical lateral wall.     There is a mild fixed defect within the basal chris-septal wall. The estimated left ventricular function is 57%. Recommendation    There is extra-cardiac activity within the GI tract that reduces    sensitivity. CT: 2022:   No evidence of pulmonary embolism. Mild right hilar adenopathy is nonspecific. Short-term follow-up should be   considered if clinically indicated. Multiple scattered predominantly calcified or partially calcified pulmonary   nodules. Findings are most likely granulomatous. However these are new   since the previous exam from . These could also be re-evaluated on   short-term follow-up. I independently reviewed the cardiac diagnostic studies, ECG and relevant imaging studies. Physical Examination:  Vitals:    22 1222   BP: 103/64   Pulse: 59   Resp: 17   Temp: 97.5 °F (36.4 °C)   SpO2: 97%      In: 1560 [P.O.:1560]  Out: 1500    Wt Readings from Last 3 Encounters:   07/15/22 178 lb 11.2 oz (81.1 kg)   22 150 lb 12.8 oz (68.4 kg)   22 145 lb (65.8 kg)     Temp  Av.8 °F (36.6 °C)  Min: 97.5 °F (36.4 °C)  Max: 98.2 °F (36.8 °C)  Pulse  Av.2  Min: 59  Max: 64  BP  Min: 83/52  Max: 107/53  SpO2  Av %  Min: 95 %  Max: 97 %    Intake/Output Summary (Last 24 hours) at 2022 1345  Last data filed at 2022 1332  Gross per 24 hour   Intake 1560 ml   Output 1500 ml   Net 60 ml       I independently reviewed all cardiac tracing from cardiac telemetry. Constitutional: Oriented. No distress. Head: Normocephalic and atraumatic. Mouth/Throat: Oropharynx is clear and moist.   Eyes: Conjunctivae normal. EOM are normal.   Neck: Neck supple. No JVD present. Cardiovascular: Normal rate, regular rhythm, S1&S2. Pulmonary/Chest: Bilateral respiratory sounds. No rhonchi. Abdominal: Soft. No tenderness. Musculoskeletal: No tenderness. No edema    Lymphadenopathy: Has no cervical adenopathy. Neurological: Alert and oriented. Follows command, No Gross deficit   Skin: Skin is warm, No rash noted. Psychiatric: Has a normal behavior     Scheduled Meds:   ipratropium-albuterol  1 ampule Inhalation Q4H WA    aspirin  81 mg Oral Daily    enoxaparin  80 mg SubCUTAneous Daily    escitalopram  20 mg Oral Daily    [Held by provider] furosemide  20 mg Oral Daily    metoprolol succinate  25 mg Oral Daily    pantoprazole  40 mg Oral QAM AC    potassium chloride  10 mEq Oral Daily    atorvastatin  20 mg Oral Nightly    sodium chloride flush  5-40 mL IntraVENous 2 times per day     Continuous Infusions:   sodium chloride       PRN Meds:sodium chloride flush, sodium chloride, ondansetron **OR** ondansetron, polyethylene glycol, acetaminophen **OR** acetaminophen, nitroGLYCERIN, perflutren lipid microspheres, albuterol sulfate HFA, gabapentin, tiZANidine     Prior to Admission medications    Medication Sig Start Date End Date Taking? Authorizing Provider   loratadine (CLARITIN) 10 MG capsule Take 10 mg by mouth in the morning. Yes Historical Provider, MD   enoxaparin (LOVENOX) 80 MG/0.8ML Inject 0.8 mLs into the skin daily 7/14/22   LESLI Osorio CNP   gabapentin (NEURONTIN) 600 MG tablet Take 1 tablet by mouth 3 times daily for 30 days. Patient taking differently: Take 600 mg by mouth in the morning and 600 mg at noon and 600 mg before bedtime. Taking only 1 daily.  7/14/22 8/13/22  LESLI Osorio CNP   omeprazole (PRILOSEC) 40 MG delayed release capsule Take 1 capsule by mouth twice daily 6/16/22   KAMILLA Castellanos   simvastatin (ZOCOR) 40 MG tablet Take 1 tablet by mouth nightly 6/14/22 7/14/22  LESLI Joe CNP   furosemide (LASIX) 40 MG tablet Take 0.5 tablets by mouth daily 6/14/22 7/14/22  LESLI Joe CNP   metoprolol succinate (TOPROL XL) 25 MG extended release tablet Take 1 tablet by mouth daily 6/14/22   LESLI Joe CNP   escitalopram (LEXAPRO) 20 MG tablet Take 1 tablet by mouth daily 6/14/22 understanding and agreed with the plan. NOTE: This report was transcribed using voice recognition software. Every effort was made to ensure accuracy, however, inadvertent computerized transcription errors may be present.      Dave Wright MD, MPH  [facility]   Office: [phone]  Fax: [phone]

## 2022-07-17 PROBLEM — I25.119 CORONARY ARTERY DISEASE INVOLVING NATIVE CORONARY ARTERY OF NATIVE HEART WITH ANGINA PECTORIS (HCC): Status: ACTIVE | Noted: 2022-07-17

## 2022-07-17 PROBLEM — I10 PRIMARY HYPERTENSION: Status: ACTIVE | Noted: 2022-07-17

## 2022-07-17 PROBLEM — D86.9 SARCOIDOSIS: Status: ACTIVE | Noted: 2022-07-17

## 2022-07-17 PROBLEM — I50.32 CHRONIC DIASTOLIC CHF (CONGESTIVE HEART FAILURE) (HCC): Status: ACTIVE | Noted: 2022-07-17

## 2022-07-17 PROBLEM — R00.2 PALPITATIONS: Status: ACTIVE | Noted: 2022-07-17

## 2022-07-17 PROBLEM — R55 SYNCOPE AND COLLAPSE: Status: ACTIVE | Noted: 2022-07-17

## 2022-07-17 PROBLEM — Z95.810 ICD (IMPLANTABLE CARDIOVERTER-DEFIBRILLATOR) IN PLACE: Status: ACTIVE | Noted: 2022-07-17

## 2022-07-17 LAB
ANION GAP SERPL CALCULATED.3IONS-SCNC: 13 MMOL/L (ref 3–16)
BUN BLDV-MCNC: 16 MG/DL (ref 7–20)
CALCIUM SERPL-MCNC: 9.9 MG/DL (ref 8.3–10.6)
CHLORIDE BLD-SCNC: 101 MMOL/L (ref 99–110)
CO2: 25 MMOL/L (ref 21–32)
CORTISOL - AM: 6.2 UG/DL (ref 4.3–22.4)
CREAT SERPL-MCNC: 0.9 MG/DL (ref 0.6–1.1)
GFR AFRICAN AMERICAN: >60
GFR NON-AFRICAN AMERICAN: >60
GLUCOSE BLD-MCNC: 118 MG/DL (ref 70–99)
HCT VFR BLD CALC: 38.6 % (ref 36–48)
HEMOGLOBIN: 12.9 G/DL (ref 12–16)
MAGNESIUM: 2.3 MG/DL (ref 1.8–2.4)
MCH RBC QN AUTO: 30.5 PG (ref 26–34)
MCHC RBC AUTO-ENTMCNC: 33.4 G/DL (ref 31–36)
MCV RBC AUTO: 91.2 FL (ref 80–100)
PDW BLD-RTO: 12.7 % (ref 12.4–15.4)
PLATELET # BLD: 371 K/UL (ref 135–450)
PMV BLD AUTO: 8.3 FL (ref 5–10.5)
POTASSIUM SERPL-SCNC: 4.6 MMOL/L (ref 3.5–5.1)
RBC # BLD: 4.23 M/UL (ref 4–5.2)
SODIUM BLD-SCNC: 139 MMOL/L (ref 136–145)
WBC # BLD: 6.9 K/UL (ref 4–11)

## 2022-07-17 PROCEDURE — 6360000002 HC RX W HCPCS: Performed by: INTERNAL MEDICINE

## 2022-07-17 PROCEDURE — 85027 COMPLETE CBC AUTOMATED: CPT

## 2022-07-17 PROCEDURE — G0378 HOSPITAL OBSERVATION PER HR: HCPCS

## 2022-07-17 PROCEDURE — 83735 ASSAY OF MAGNESIUM: CPT

## 2022-07-17 PROCEDURE — 6370000000 HC RX 637 (ALT 250 FOR IP): Performed by: INTERNAL MEDICINE

## 2022-07-17 PROCEDURE — 6370000000 HC RX 637 (ALT 250 FOR IP): Performed by: NURSE PRACTITIONER

## 2022-07-17 PROCEDURE — 96372 THER/PROPH/DIAG INJ SC/IM: CPT

## 2022-07-17 PROCEDURE — 99232 SBSQ HOSP IP/OBS MODERATE 35: CPT | Performed by: NURSE PRACTITIONER

## 2022-07-17 PROCEDURE — 36415 COLL VENOUS BLD VENIPUNCTURE: CPT

## 2022-07-17 PROCEDURE — 80048 BASIC METABOLIC PNL TOTAL CA: CPT

## 2022-07-17 PROCEDURE — 2580000003 HC RX 258: Performed by: INTERNAL MEDICINE

## 2022-07-17 PROCEDURE — 82533 TOTAL CORTISOL: CPT

## 2022-07-17 RX ORDER — IPRATROPIUM BROMIDE AND ALBUTEROL SULFATE 2.5; .5 MG/3ML; MG/3ML
1 SOLUTION RESPIRATORY (INHALATION) EVERY 4 HOURS PRN
Status: DISCONTINUED | OUTPATIENT
Start: 2022-07-17 | End: 2022-07-19 | Stop reason: HOSPADM

## 2022-07-17 RX ADMIN — ESCITALOPRAM OXALATE 20 MG: 10 TABLET ORAL at 09:13

## 2022-07-17 RX ADMIN — ASPIRIN 81 MG: 81 TABLET, CHEWABLE ORAL at 09:13

## 2022-07-17 RX ADMIN — TIZANIDINE 2 MG: 4 TABLET ORAL at 20:44

## 2022-07-17 RX ADMIN — GABAPENTIN 600 MG: 300 CAPSULE ORAL at 20:44

## 2022-07-17 RX ADMIN — POTASSIUM CHLORIDE 10 MEQ: 10 TABLET, EXTENDED RELEASE ORAL at 09:13

## 2022-07-17 RX ADMIN — Medication 10 ML: at 09:14

## 2022-07-17 RX ADMIN — ATORVASTATIN CALCIUM 20 MG: 10 TABLET, FILM COATED ORAL at 20:44

## 2022-07-17 RX ADMIN — PANTOPRAZOLE SODIUM 40 MG: 40 TABLET, DELAYED RELEASE ORAL at 05:37

## 2022-07-17 RX ADMIN — ENOXAPARIN SODIUM 80 MG: 100 INJECTION SUBCUTANEOUS at 09:13

## 2022-07-17 ASSESSMENT — PAIN SCALES - GENERAL
PAINLEVEL_OUTOF10: 0
PAINLEVEL_OUTOF10: 5
PAINLEVEL_OUTOF10: 0

## 2022-07-17 ASSESSMENT — PAIN DESCRIPTION - LOCATION: LOCATION: GENERALIZED
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
## 2022-07-17 NOTE — PROGRESS NOTES
low  Hx DVT/ PE      PLAN:  Continue aspirin statin beta-blocker  Agree with holding Lasix  Will have interventional cardiology evaluate patient tomorrow consider cardiac catheterization. We will keep n.p.o. after midnight  Labs in a.m. Maymonika DanyellLESLI CNP, 7/17/2022, 11:09 AM  [facility]   [phone]       Telemetry: A paced 60s with intermittent sinus rhythm up to 90s  NYHA: III    Physical Exam:  General:  Awake, alert, NAD  Skin:  Warm and dry  Neck:  JVP normal  Chest: Coarse throughout upon inspiration and expiration posteriorly  Cardiovascular:  RRR, normal S1S2, no MRG  Abdomen:  Soft, nontender, +bowel sounds  Extremities: No BLE edema      Medications:    ipratropium-albuterol  1 ampule Inhalation BID    aspirin  81 mg Oral Daily    enoxaparin  80 mg SubCUTAneous Daily    escitalopram  20 mg Oral Daily    [Held by provider] furosemide  20 mg Oral Daily    metoprolol succinate  25 mg Oral Daily    pantoprazole  40 mg Oral QAM AC    potassium chloride  10 mEq Oral Daily    atorvastatin  20 mg Oral Nightly    sodium chloride flush  5-40 mL IntraVENous 2 times per day      sodium chloride         Lab Data: Lab results independently reviewed and analyzed by myself 7/17/2022    CBC:   Recent Labs     07/15/22  0056 07/17/22  0626   WBC 8.4 6.9   HGB 13.0 12.9    371     BMP:    Recent Labs     07/15/22  0056 07/17/22  0626   * 139   K 3.7 4.6   CO2 27 25   BUN 7 16   CREATININE 0.7 0.9     INR:  No results for input(s): INR in the last 72 hours.   BNP:    Recent Labs     07/15/22  0056   PROBNP 115     Cardiac Enzymes:   Recent Labs     07/15/22  0056 07/15/22  0717 07/15/22  1104   TROPONINI <0.01 <0.01 <0.01     Lipids:   Lab Results   Component Value Date/Time    TRIG 105 06/28/2022 01:28 PM    TRIG 169 03/15/2022 03:14 PM    HDL 57 06/28/2022 01:28 PM    HDL 58 03/15/2022 03:14 PM    HDL 68 05/09/2011 06:15 AM    HDL 50 12/06/2010 06:05 AM    LDLCALC 150 06/28/2022 01:28 PM    [address] 150 03/15/2022 03:14 PM       Cardiac Imaging:   ECHO 7/5/22:    Summary   Normal left ventricle systolic function with an estimated ejection fraction   of 50%. There is mild hypokinesis of the inferior wall segments. Normal left ventricular diastolic filling pressures. The right ventricle is normal in size and function. The right atrium is mildly dilated. Mild posterior mitral annular calcification is present. Systolic pulmonary artery pressure (sPAP) is normal and estimated at 35 mmHg   (right atrial pressure 8 mmHg)    Myoview: 7/15/22  Abnormal moderate risk myocardial perfusion study. There is a defect within the inferior wall consistent with prior infarction    and mild terry-infarct ischemia. There is mild ischemia defect within the apical lateral wall. There is a mild fixed defect within the basal chris-septal wall. The estimated left ventricular function is 57%. Recommendation    There is extra-cardiac activity within the GI tract that reduces    sensitivity. CT: 1/19/2022:   No evidence of pulmonary embolism. Mild right hilar adenopathy is nonspecific. Short-term follow-up should be   considered if clinically indicated. Multiple scattered predominantly calcified or partially calcified pulmonary   nodules. Findings are most likely granulomatous. However these are new   since the previous exam from 2011. These could also be re-evaluated on   short-term follow-up.

## 2022-07-17 NOTE — PLAN OF CARE
Problem: Pain  Goal: Verbalizes/displays adequate comfort level or baseline comfort level  Outcome: Progressing  Monitor for pain. Currently pain free.

## 2022-07-17 NOTE — PLAN OF CARE
Problem: Discharge Planning  Goal: Discharge to home or other facility with appropriate resources  7/17/2022 0627 by Isaac Cleveland RN  Outcome: Progressing  Flowsheets (Taken 7/17/2022 0535)  Discharge to home or other facility with appropriate resources:   Identify barriers to discharge with patient and caregiver   Arrange for needed discharge resources and transportation as appropriate   Identify discharge learning needs (meds, wound care, etc)   Arrange for interpreters to assist at discharge as needed   Refer to discharge planning if patient needs post-hospital services based on physician order or complex needs related to functional status, cognitive ability or social support system
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
## 2022-07-17 NOTE — RT PROTOCOL NOTE
RT Inhaler-Nebulizer Bronchodilator Protocol Note    There is a bronchodilator order in the chart from a provider indicating to follow the RT Bronchodilator Protocol and there is an Initiate RT Inhaler-Nebulizer Bronchodilator Protocol order as well (see protocol at bottom of note). CXR Findings:  No results found. The findings from the last RT Protocol Assessment were as follows:   History Pulmonary Disease: Smoker 15 pack years or more  Respiratory Pattern: Regular pattern and RR 12-20 bpm  Breath Sounds: Slightly diminished and/or crackles  Cough: Strong, spontaneous, non-productive  Indication for Bronchodilator Therapy: None  Bronchodilator Assessment Score: 3    Aerosolized bronchodilator medication orders have been revised according to the RT Inhaler-Nebulizer Bronchodilator Protocol below. Respiratory Therapist to perform RT Therapy Protocol Assessment initially then follow the protocol. Repeat RT Therapy Protocol Assessment PRN for score 0-3 or on second treatment, BID, and PRN for scores above 3. No Indications - adjust the frequency to every 6 hours PRN wheezing or bronchospasm, if no treatments needed after 48 hours then discontinue using Per Protocol order mode. If indication present, adjust the RT bronchodilator orders based on the Bronchodilator Assessment Score as indicated below. Use Inhaler orders unless patient has one or more of the following: on home nebulizer, not able to hold breath for 10 seconds, is not alert and oriented, cannot activate and use MDI correctly, or respiratory rate 25 breaths per minute or more, then use the equivalent nebulizer order(s) with same Frequency and PRN reasons based on the score. If a patient is on this medication at home then do not decrease Frequency below that used at home.     0-3 - enter or revise RT bronchodilator order(s) to equivalent RT Bronchodilator order with Frequency of every 4 hours PRN for wheezing or increased work of breathing using Per Protocol order mode. 4-6 - enter or revise RT Bronchodilator order(s) to two equivalent RT bronchodilator orders with one order with BID Frequency and one order with Frequency of every 4 hours PRN wheezing or increased work of breathing using Per Protocol order mode. 7-10 - enter or revise RT Bronchodilator order(s) to two equivalent RT bronchodilator orders with one order with TID Frequency and one order with Frequency of every 4 hours PRN wheezing or increased work of breathing using Per Protocol order mode. 11-13 - enter or revise RT Bronchodilator order(s) to one equivalent RT bronchodilator order with QID Frequency and an Albuterol order with Frequency of every 4 hours PRN wheezing or increased work of breathing using Per Protocol order mode. Greater than 13 - enter or revise RT Bronchodilator order(s) to one equivalent RT bronchodilator order with every 4 hours Frequency and an Albuterol order with Frequency of every 2 hours PRN wheezing or increased work of breathing using Per Protocol order mode. RT to enter RT Home Evaluation for COPD & MDI Assessment order using Per Protocol order mode.     Electronically signed by Ashley Rosas RCP on 7/17/2022 at 12:01 PM

## 2022-07-18 ENCOUNTER — APPOINTMENT (OUTPATIENT)
Dept: CARDIAC CATH/INVASIVE PROCEDURES | Age: 47
End: 2022-07-18
Payer: COMMERCIAL

## 2022-07-18 PROBLEM — R94.39 ABNORMAL CARDIOVASCULAR STRESS TEST: Status: ACTIVE | Noted: 2022-07-18

## 2022-07-18 LAB
ANION GAP SERPL CALCULATED.3IONS-SCNC: 13 MMOL/L (ref 3–16)
BUN BLDV-MCNC: 18 MG/DL (ref 7–20)
CALCIUM SERPL-MCNC: 10.2 MG/DL (ref 8.3–10.6)
CHLORIDE BLD-SCNC: 99 MMOL/L (ref 99–110)
CO2: 25 MMOL/L (ref 21–32)
CREAT SERPL-MCNC: 0.7 MG/DL (ref 0.6–1.1)
GFR AFRICAN AMERICAN: >60
GFR NON-AFRICAN AMERICAN: >60
GLUCOSE BLD-MCNC: 91 MG/DL (ref 70–99)
POTASSIUM SERPL-SCNC: 5 MMOL/L (ref 3.5–5.1)
SODIUM BLD-SCNC: 137 MMOL/L (ref 136–145)

## 2022-07-18 PROCEDURE — 92978 ENDOLUMINL IVUS OCT C 1ST: CPT

## 2022-07-18 PROCEDURE — 2580000003 HC RX 258

## 2022-07-18 PROCEDURE — 6370000000 HC RX 637 (ALT 250 FOR IP)

## 2022-07-18 PROCEDURE — C1753 CATH, INTRAVAS ULTRASOUND: HCPCS

## 2022-07-18 PROCEDURE — 2580000003 HC RX 258: Performed by: INTERNAL MEDICINE

## 2022-07-18 PROCEDURE — 99153 MOD SED SAME PHYS/QHP EA: CPT

## 2022-07-18 PROCEDURE — 6360000004 HC RX CONTRAST MEDICATION

## 2022-07-18 PROCEDURE — 93458 L HRT ARTERY/VENTRICLE ANGIO: CPT | Performed by: INTERNAL MEDICINE

## 2022-07-18 PROCEDURE — 92978 ENDOLUMINL IVUS OCT C 1ST: CPT | Performed by: INTERNAL MEDICINE

## 2022-07-18 PROCEDURE — 2500000003 HC RX 250 WO HCPCS

## 2022-07-18 PROCEDURE — 6370000000 HC RX 637 (ALT 250 FOR IP): Performed by: INTERNAL MEDICINE

## 2022-07-18 PROCEDURE — C1769 GUIDE WIRE: HCPCS

## 2022-07-18 PROCEDURE — C1894 INTRO/SHEATH, NON-LASER: HCPCS

## 2022-07-18 PROCEDURE — G0378 HOSPITAL OBSERVATION PER HR: HCPCS

## 2022-07-18 PROCEDURE — 80048 BASIC METABOLIC PNL TOTAL CA: CPT

## 2022-07-18 PROCEDURE — C1874 STENT, COATED/COV W/DEL SYS: HCPCS

## 2022-07-18 PROCEDURE — C1887 CATHETER, GUIDING: HCPCS

## 2022-07-18 PROCEDURE — 2700000000 HC OXYGEN THERAPY PER DAY

## 2022-07-18 PROCEDURE — 92928 PRQ TCAT PLMT NTRAC ST 1 LES: CPT | Performed by: INTERNAL MEDICINE

## 2022-07-18 PROCEDURE — 2709999900 HC NON-CHARGEABLE SUPPLY

## 2022-07-18 PROCEDURE — C1725 CATH, TRANSLUMIN NON-LASER: HCPCS

## 2022-07-18 PROCEDURE — 99205 OFFICE O/P NEW HI 60 MIN: CPT | Performed by: INTERNAL MEDICINE

## 2022-07-18 PROCEDURE — 99152 MOD SED SAME PHYS/QHP 5/>YRS: CPT

## 2022-07-18 PROCEDURE — 6370000000 HC RX 637 (ALT 250 FOR IP): Performed by: NURSE PRACTITIONER

## 2022-07-18 PROCEDURE — 36415 COLL VENOUS BLD VENIPUNCTURE: CPT

## 2022-07-18 PROCEDURE — 94761 N-INVAS EAR/PLS OXIMETRY MLT: CPT

## 2022-07-18 PROCEDURE — C9600 PERC DRUG-EL COR STENT SING: HCPCS

## 2022-07-18 PROCEDURE — 6360000002 HC RX W HCPCS

## 2022-07-18 PROCEDURE — 93458 L HRT ARTERY/VENTRICLE ANGIO: CPT

## 2022-07-18 RX ORDER — FENTANYL CITRATE 50 UG/ML
INJECTION, SOLUTION INTRAMUSCULAR; INTRAVENOUS
Status: COMPLETED | OUTPATIENT
Start: 2022-07-18 | End: 2022-07-18

## 2022-07-18 RX ORDER — MIDAZOLAM HYDROCHLORIDE 1 MG/ML
INJECTION INTRAMUSCULAR; INTRAVENOUS
Status: COMPLETED | OUTPATIENT
Start: 2022-07-18 | End: 2022-07-18

## 2022-07-18 RX ORDER — 0.9 % SODIUM CHLORIDE 0.9 %
500 INTRAVENOUS SOLUTION INTRAVENOUS ONCE
Status: COMPLETED | OUTPATIENT
Start: 2022-07-18 | End: 2022-07-18

## 2022-07-18 RX ORDER — CLOPIDOGREL BISULFATE 75 MG/1
TABLET ORAL
Status: COMPLETED | OUTPATIENT
Start: 2022-07-18 | End: 2022-07-18

## 2022-07-18 RX ADMIN — FENTANYL CITRATE 50 MCG: 50 INJECTION, SOLUTION INTRAMUSCULAR; INTRAVENOUS at 11:26

## 2022-07-18 RX ADMIN — TIZANIDINE 2 MG: 4 TABLET ORAL at 21:05

## 2022-07-18 RX ADMIN — ASPIRIN 81 MG: 81 TABLET, CHEWABLE ORAL at 09:06

## 2022-07-18 RX ADMIN — FENTANYL CITRATE 50 MCG: 50 INJECTION, SOLUTION INTRAMUSCULAR; INTRAVENOUS at 11:12

## 2022-07-18 RX ADMIN — SODIUM CHLORIDE 500 ML: 9 INJECTION, SOLUTION INTRAVENOUS at 13:07

## 2022-07-18 RX ADMIN — Medication 10 ML: at 21:05

## 2022-07-18 RX ADMIN — MIDAZOLAM HYDROCHLORIDE 1 MG: 1 INJECTION INTRAMUSCULAR; INTRAVENOUS at 11:19

## 2022-07-18 RX ADMIN — ATORVASTATIN CALCIUM 20 MG: 10 TABLET, FILM COATED ORAL at 21:05

## 2022-07-18 RX ADMIN — Medication 10 ML: at 09:06

## 2022-07-18 RX ADMIN — FENTANYL CITRATE 50 MCG: 50 INJECTION, SOLUTION INTRAMUSCULAR; INTRAVENOUS at 11:54

## 2022-07-18 RX ADMIN — ESCITALOPRAM OXALATE 20 MG: 10 TABLET ORAL at 09:06

## 2022-07-18 RX ADMIN — MIDAZOLAM HYDROCHLORIDE 1 MG: 1 INJECTION INTRAMUSCULAR; INTRAVENOUS at 11:56

## 2022-07-18 RX ADMIN — MIDAZOLAM HYDROCHLORIDE 2 MG: 1 INJECTION INTRAMUSCULAR; INTRAVENOUS at 11:13

## 2022-07-18 RX ADMIN — CLOPIDOGREL BISULFATE 600 MG: 75 TABLET ORAL at 11:35

## 2022-07-18 RX ADMIN — GABAPENTIN 600 MG: 300 CAPSULE ORAL at 21:05

## 2022-07-18 ASSESSMENT — PAIN SCALES - GENERAL
PAINLEVEL_OUTOF10: 0

## 2022-07-18 NOTE — PROGRESS NOTES
Pt is alert and oriented, vs stable, assessment updated and charted. Denies pain at this time. Call light in reach. Pt denies any other needs at this time. Will continue to monitor.

## 2022-07-18 NOTE — PLAN OF CARE
Problem: Discharge Planning  Goal: Discharge to home or other facility with appropriate resources  Outcome: Progressing  Flowsheets (Taken 7/17/2022 6281)  Discharge to home or other facility with appropriate resources:   Identify barriers to discharge with patient and caregiver   Arrange for needed discharge resources and transportation as appropriate   Identify discharge learning needs (meds, wound care, etc)   Arrange for interpreters to assist at discharge as needed   Refer to discharge planning if patient needs post-hospital services based on physician order or complex needs related to functional status, cognitive ability or social support system     Problem: Safety - Adult  Goal: Free from fall injury  7/17/2022 2145 by Jeff Garcia RN  Outcome: Progressing  Note: Pt at risk for accidental injury. Pt will remain free from injury during admission. Pt educated on the use of the call light and the need to have an active bed alarm. Pt will call out prior to ambulation. Pt's pathway will remain free from tripping hazards. Pt's bed is in lowest position and call light in reach. Pt has no further needs at this time.

## 2022-07-18 NOTE — PROGRESS NOTES
Dr. Ryan Rash aware of BP in 80s. Pt otherwise asymptomatic and resting comfortably. No new orders. Will likely remain low for a period of time due to meds given in procedure. Will continue to monitor.

## 2022-07-18 NOTE — PROGRESS NOTES
Pt returned from cath lab. Angiomax infusing. TR band/armboard in place. CMU aware. VSS. Will continue to monitor.

## 2022-07-18 NOTE — POST SEDATION
Patient:  Karin Sheppard   :   1975    A pre-sedation re-evaluation was performed immediately at the end of the procedure. Procedure:  Cardiac cath  Medications: Procedural sedation with minimal conscious sedation  Complications: None  Estimated Blood Loss: none  Specimens: Were not obtained        Tigre Medication and Procedural Reconciliation:  I agree that the documented medications and procedures performed are true. The medications were given under my order. The procedures were performed under my direct supervision.

## 2022-07-18 NOTE — CONSULTS
[address] [facility]   Cardiovascular Evaluation    PATIENT: Adarsh Salter  DATE: 2022  MRN: [id number]  CSN: [id number]  : 1975    Primary Care Doctor/Referring provider: LESLI Lyn CNP, Dann Block MD     Reason for evaluation/Chief complaint:   Tachycardia (patient called cardiologist for tachycardia, Dr told her to take an additional half of metoprolol. Patient did this around 4pm and hasn't felt well since taking it. reports a chest pressure. ) and Fall (lost balance and fell forward hitting head on wall)      Subjective:    History of present illness on initial date of evaluation:   Adarsh Salter is a 40 y.o. patient who presents for the evaluation of palpitations and overall feeling weak. Patient states that her symptom complex prompted her to seek medical evaluation. She was seen and evaluated in our hospital and admitted. She subsequently underwent stress testing which demonstrated inferior and inferolateral wall ischemia. She was seen by general cardiology over the weekend for further evaluation. She is asked to be seen by interventional cardiology for formal consultation regarding candidacy and appropriateness for cardiac catheterization. The patient states that she is also had chest pressure that she has felt like a tightness in her chest that has been going on. This is worse with exertion and associated with some shortness of breath and diaphoresis. The patient has a previous history of ischemic heart disease for which she underwent a stent placement in the right coronary artery in . Patient has been symptom-free until recently. Her stress test was reviewed and demonstrates a large ischemic defect along with some associated fixed defect.        Patient Active Problem List   Diagnosis    Chest pain    Syncope and collapse    Palpitations    Coronary artery disease involving native coronary artery of native heart with angina pectoris [facility])    ICD (implantable cardioverter-defibrillator) in place    Sarcoidosis    Chronic diastolic CHF (congestive heart failure) ([facility]    Primary hypertension         Cardiac Testing: I have reviewed the findings below. EKG:  ECHO:   STRESS TEST:  CATH:  BYPASS:  VASCULAR:    Past Medical History:   has a past medical history of CHF (congestive heart failure) ([facility] [address] COPD (chronic obstructive pulmonary disease) ([facility] [address] DVT, Hx of blood clots, Hypercholesterolemia, Myocardial infarct ([facility] [address] [facility] embol[facility] ([facility] and Unspecified cerebral artery occlusion with cerebral infarction. Surgical History:   has a past surgical history that includes laparoscopy; Knee arthroscopy; Tonsillectomy and adenoidectomy; chest tube insertion; Dilation and curettage of uterus; Cardiac surgery; Coronary angioplasty with stent; and Upper gastrointestinal endoscopy (10/18/10). Social History:   reports that she has been smoking cigarettes. She started smoking about 15 years ago. She has been smoking an average of .25 packs per day. She has never used smokeless tobacco. She reports current drug use. Drug: Marijuana Vilma [place]anes. She reports that she does not drink alcohol. Family History:  No evidence for sudden cardiac death or premature CAD    Medications:  Reviewed and are listed in nursing record. and/or listed below  Outpatient Medications:  Prior to Admission medications    Medication Sig Start Date End Date Taking? Authorizing Provider   loratadine (CLARITIN) 10 MG capsule Take 10 mg by mouth in the morning. Yes Historical Provider, MD   enoxaparin (LOVENOX) 80 MG/0.8ML Inject 0.8 mLs into the skin daily 7/14/22   LESLI Hernandez CNP   gabapentin (NEURONTIN) 600 MG tablet Take 1 tablet by mouth 3 times daily for 30 days. Patient taking differently: Take 600 mg by mouth in the morning and 600 mg at noon and 600 mg before bedtime. Taking only 1 daily.  7/14/22 8/13/22  LESLI Hernandez CNP   omeprazole (PRILOSEC) 40 MG delayed release capsule Take 1 capsule by mouth twice daily 6/16/22   KAMILLA Jose   simvastatin (ZOCOR) 40 MG tablet Take 1 tablet by mouth nightly 6/14/22 7/14/22  Radha Pippa, APRN - CNP   furosemide (LASIX) 40 MG tablet Take 0.5 tablets by mouth daily 6/14/22 7/14/22  Radha Popflorencia, APRN - CNP   metoprolol succinate (TOPROL XL) 25 MG extended release tablet Take 1 tablet by mouth daily 6/14/22   Radha Popper, APRN - CNP   escitalopram (LEXAPRO) 20 MG tablet Take 1 tablet by mouth daily 6/14/22   [facility], APRN - CNP   tiZANidine (ZANAFLEX) 2 MG tablet TAKE 1 TABLET BY MOUTH THREE TIMES DAILY AS NEEDED (SARCODOISIS) 5/27/22   Radha Pippa, APRN - CNP   aspirin 81 MG chewable tablet Take by mouth    Historical Provider, MD   nitroGLYCERIN (NITROSTAT) 0.4 MG SL tablet Place 1 tablet under the tongue every 5 minutes as needed for Chest pain 3/8/22   Radha Pippa, APRN - CNP   potassium chloride (KLOR-CON) 10 MEQ CR tablet Take 10 mEq by mouth daily. Unknown dosage or freq     Historical Provider, MD   vitamin D (CHOLECALCIFEROL) 400 UNIT TABS tablet Take 2,000 Units by mouth daily. Historical Provider, MD       In-patient schedule medications:   aspirin  81 mg Oral Daily    enoxaparin  80 mg SubCUTAneous Daily    escitalopram  20 mg Oral Daily    [Held by provider] furosemide  20 mg Oral Daily    metoprolol succinate  25 mg Oral Daily    pantoprazole  40 mg Oral QAM AC    potassium chloride  10 mEq Oral Daily    atorvastatin  20 mg Oral Nightly    sodium chloride flush  5-40 mL IntraVENous 2 times per day         Infusion Medications:   sodium chloride           Allergies:  Coumadin [warfarin], Duloxetine, Fluoxetine, Lisinopril, Morphine, Pregabalin, Baclofen, Losartan, Bisoprolol fumarate, Bupropion, Hydrocodone, Hydrocodone-acetaminophen, and Spironolactone     Review of Systems:   All 14 point review of symptoms completed.  Pertinent positives identified in the HPI, all other review of symptoms findings as below.      Review of Systems - History obtained from the patient  General ROS: negative for - chills, fever or night sweats  Psychological ROS: negative for - disorientation or hallucinations  Ophthalmic ROS: negative for - dry eyes, eye pain or loss of vision  ENT ROS: negative for - nasal discharge or sore throat  Allergy and Immunology ROS: negative for - hives or itchy/watery eyes  Hematological and Lymphatic ROS: negative for - jaundice or night sweats  Endocrine ROS: negative for - mood swings or temperature intolerance  Breast ROS: deferred  Respiratory ROS: negative for - hemoptysis or stridor  Gastrointestinal ROS: no abdominal pain, change in bowel habits, or black or bloody stools  Genito-Urinary ROS: no dysuria, trouble voiding, or hematuria  Musculoskeletal ROS: negative for - gait disturbance, joint pain or joint stiffness  Neurological ROS: negative for - seizures or speech problems  Dermatological ROS: negative for - rash or skin lesion changes      Physical Examination:    [unfilled]  BP (!) 102/50   Pulse 61   Temp 97.9 °F (36.6 °C) (Oral)   Resp 16   Ht 5' (1.524 m)   Wt 178 lb 11.2 oz (81.1 kg)   SpO2 94%   BMI 34.90 kg/m²    Weight: 178 lb 11.2 oz (81.1 kg)     Wt Readings from Last 3 Encounters:   07/15/22 178 lb 11.2 oz (81.1 kg)   03/08/22 150 lb 12.8 oz (68.4 kg)   02/24/22 145 lb (65.8 kg)       Intake/Output Summary (Last 24 hours) at 7/18/2022 1244  Last data filed at 7/17/2022 2345  Gross per 24 hour   Intake 1920 ml   Output 1900 ml   Net 20 ml       General Appearance:  Alert, cooperative, no distress, appears stated age   Head:  Normocephalic, without obvious abnormality, atraumatic   Eyes:  PERRL, conjunctiva/corneas clear       Nose: Nares normal, no drainage or sinus tenderness   Throat: Lips, mucosa, and tongue normal   Neck: Supple, symmetrical, trachea midline, no adenopathy, thyroid: not enlarged, symmetric, no tenderness/mass/nodules, no carotid bruit or JVD       Lungs:   Clear to auscultation bilaterally, respirations unlabored   Chest Wall:  No tenderness or deformity   Heart:  Regular rhythm and normal rate; S1, S2 are normal; no murmur noted; no rub or gallop   Abdomen:   Soft, non-tender, bowel sounds active all four quadrants,  no masses, no organomegaly           Extremities: Extremities normal, atraumatic, no cyanosis or edema   Pulses: 2+ and symmetric   Skin: Skin color, texture, turgor normal, no rashes or lesions   Pysch: Normal mood and affect   Neurologic: Normal gross motor and sensory exam.         Labs  Recent Labs     07/17/22  0626   WBC 6.9   HGB 12.9   HCT 38.6   MCV 91.2        Recent Labs     07/17/22  0626 07/18/22  0553   CREATININE 0.9 0.7   BUN 16 18    137   K 4.6 5.0    99   CO2 25 25     No results for input(s): INR, PROTIME in the last 72 hours. Recent Labs     07/15/22  1104   TROPONINI <0.01     Invalid input(s): PRO-BNP  No results for input(s): CHOL, LDL, HDL in the last 72 hours. Invalid input(s): TG      Imaging:  I have reviewed the below testing personally and my interpretation is below. EKG:  Normal sinus rhythmLateral infarct (cited on or before 16-MAY-2011)Inferior infarct (cited on or before 16-MAY-2011)Nonspecific ST abnormalityWhen compared with ECG of 24-FEB-2022 22:34,Premature ventricular complexes are no longer Presentinferior ST changes improvedConfirmed by Radha Mosquera MD, Rebecca Batista ([id number]) on 7/15/2022 7:53:52 AM      CXR:      Assessment:  40 y.o. patient with:  Abnormal stress test   ~Patient stress test is intermediate risk but may actually be high risk with extensive territory at jeopardy.     Chest pain    Coronary artery disease involving native coronary artery of native heart with angina pectoris [facility])    ICD (implantable cardioverter-defibrillator) in place    Primary hypertension      Plan:  1. I had the opportunity to review the Lunaannita Krishna clinical presentation and the available pertinent data. With the patient's clinical risk factors and symptoms, there is a high pretest likelihood for CAD. I have asked Darryl Sorto to undergo cardiac angiography for further diagnostic testing. The procedure was explained in depth. All questions and alternate treatment strategies were discussed. The patient agrees to proceed. They understand all the risks associated with the procedure, including myocardial infarction, stroke, death, vascular complications, and the possible need for emergent surgery. 2. Serial troponin levels will be ordered and reviewed  3. The patient has been given instructions on addressing diet, regular exercise, weight control, smoking abstention, medication compliance, and stress minimization. 4. The patient should be treated with these therapies unless contraindicated:   ~asa daily   ~beta-blockers   ~statin therapy   ~ACE/ARB   ~repeat troponin until down trending   ~EKG as clinically needed  5. Keep NPO   6. Pre-cath orders will be placed. Medical Decision Making: The following items were considered in medical decision making:  Independent review of images  Review / order clinical lab tests  Review / order radiology tests  Decision to obtain old records  Review and summation of old records as accessed through [facility] (a summary of my findings in these old records)            All questions and concerns were addressed to the patient/family. Alternatives to my treatment were discussed. The note was completed using EMR. Every effort was made to ensure accuracy; however, inadvertent computerized transcription errors may be present.     Marina Becker MD, [id number], [place], [place]  [phone] [facility]  [phone] [facility]  7/18/2022  8:24 AM
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
## 2022-07-18 NOTE — PLAN OF CARE
Problem: Chronic Conditions and Co-morbidities  Goal: Patient's chronic conditions and co-morbidity symptoms are monitored and maintained or improved  Outcome: Progressing Towards Goal  Note: Discussed pts diagnosis of CAD with stents. Patient's EF (Ejection Fraction) is greater than 40%    Heart Failure Medications:  Diuretics[de-identified] None    (One of the following REQUIRED for EF </= 40%/SYSTOLIC FAILURE but MAY be used in EF% >40%/DIASTOLIC FAILURE)        ACE[de-identified] None        ARB[de-identified] None         ARNI[de-identified] None    (Beta Blockers)  NON- Evidenced Based Beta Blocker (for EF% >40%/DIASTOLIC FAILURE): None    Evidenced Based Beta Blocker::(REQUIRED for EF% <40%/SYSTOLIC FAILURE) Metoprolol SUCCinate- Toprol XL  . .................................................................................................................................................. Patient's weights and intake/output reviewed: Yes    Patient's Last Weight: 172 lbs obtained by standing scale. Difference of N/A lbs than last documented weight. Intake/Output Summary (Last 24 hours) at 7/18/2022 1820  Last data filed at 7/18/2022 0830  Gross per 24 hour   Intake 720 ml   Output 1200 ml   Net -480 ml       Comorbidities Reviewed Yes    Patient has a past medical history of CHF (congestive heart failure) ([address] COPD (chronic obstructive pulmonary disease) ([address] DVT, Hx of blood clots, Hypercholesterolemia, Myocardial infarct ([address] Pulmonary embolism ([address] and Unspecified cerebral artery occlusion with cerebral infarction. >>For CHF and Comorbidity documentation on Education Time and Topics, please see Education Tab    Progressive Mobility Assessment:  What is this patient's Current Level of Mobility?: Ambulatory- with Assistance  How was this patient Mobilized today?: Edge of Bed and  Up to Toilet/Shower, ambulated 10 ft                 With Whom?  Nurse                 Level of Difficulty/Assistance: Independent     Pt resting in bed at this time on room air. Pt denies shortness of breath. Pt without lower extremity edema.      Patient and/or Family's stated Goal of Care this Admission: increase activity tolerance and better understand heart failure and disease management prior to discharge        :

## 2022-07-18 NOTE — PROCEDURES
[address]       Cardiac Catheterization Lab Report    PATIENT: Doreen Sifuentes  DATE: 2022  MRN: [id number]  CSN: [id number]  : 1975      Performing Physician: Chirag Girard MD, [address], [place], [place]  Primary Care Physician: Theodora Howard APRN - CNP  Admitting Provider: Linda Anton MD    Procedures Performed:   1. Left heart catheterization  2. Selective left and right coronary angiogram  3. Left ventriculography   4. Intravascular ultrasound of the right coronary artery  5. Cutting Balloon angioplasty, noncompliant balloon angioplasty, and drug-eluting stent insertion to the right coronary artery    Procedure Findings:  1. Single-vessel critical coronary disease with 70% in-stent restenosis of the proximal and mid right coronary artery in the setting of a previously placed bare-metal stent from   2. Normal left ventricular function with EF estimated at 55-60% with mild inferior wall hypokinesis  3. Normal left heart hemodynamics    Indications:   Patient Active Problem List   Diagnosis    Chest pain    Syncope and collapse    Palpitations    Coronary artery disease involving native coronary artery of native heart with angina pectoris ([address]    ICD (implantable cardioverter-defibrillator) in place    Sarcoidosis    Chronic diastolic CHF (congestive heart failure) ([address]    Primary hypertension       Details:   Doreen Sifuentes was brought to the cardiac catheterization lab in a fasting state after informed consent was obtained. If the patient was able to provide written consent, it was obtained. The patient's vitals were monitored through out the procedure. The patient was sedated using the appropriate levels of sedation and ASA guidelines. The appropriate access site area was prepped and drapped in a sterile fashion. The area was anesthetized with 2% lidocaine.  Using the modified Seldinger technique, an arterial sheath was introduced into the arterial access restenosis that creates about a 70% area of atherosclerosis. 5. Left ventriculogram showed normal LVEF at 55-60%. There was inferior wall motion hypokinesis. There was no significant mitral valve or aortic valve disease noted. LVEDP was normal. There was no gradient noted across the aortic valve during pullback of the catheter. /61   Pulse 65   Temp 97.7 °F (36.5 °C) (Oral)   Resp 16   Ht 5' (1.524 m)   Wt 178 lb 11.2 oz (81.1 kg)   SpO2 99%   BMI 34.90 kg/m²     The access site was controlled with manual pressure and/or appropriate closure device. Moderate Conscious Sedation Details: An independent trained observer pushed medications at my direction. We monitoring the patient's level of consciousness and vital signs/physiologic status throughout the procedure. CPT codes 60701 and 36061      Start time: 1119  Stop time: 1201  ASA class: 3    Sedation totals:  Versad - 6mg  Fentanyl - 150mcg    EBL - minimal <5 cc blood loss    The patient was monitored continuously with the ECG, pulse oximetry, blood pressure, and direct observation. CONCLUSIONS:    1. Successful IVUS guided Cutting Balloon angioplasty, noncompliant balloon angioplasty, and drug-eluting stent insertion to the right coronary artery    ASSESSMENT/RECOMMENDATIONS:    1. Antiplatelet therapy with aspirin and Plavix for minimum of 1-3 months followed by single antiplatelet coverage. 2.  The patient will continue to take coagulation with Lovenox as necessary.       Emily Henriquez MD, DILCIA, [place] [address] Cardiology  [facility]  [phone] Main central office  [phone] [facility]  7/18/2022  12:06 PM

## 2022-07-18 NOTE — PRE SEDATION
Brief Pre-Op Note/Sedation Assessment      Ryan Simons  1975  [id number]  11:10 AM    Planned Procedure: Cardiac Catheterization Procedure  Post Procedure Plan: Return to same level of care  Consent: I have discussed with the patient and/or the patient representative the indication, alternatives, and the possible risks and/or complications of the planned procedure and the anesthesia methods. The patient and/or patient representative appear to understand and agree to proceed. Chief Complaint:   Chest Pain/Pressure      Indications for Cath Procedure:  Presentation:  New Onset Angina <= 2 months  2. Anginal Classification within 2 weeks:  CCS III - Symptoms with everyday living activities, i.e., moderate limitation  3. Angina Symptoms Assessment:  Typical Chest Pain  4. Heart Failure Class within last 2 weeks:  Yes:  Heart Failure Type: Diastolic Severity:  Class II - Symptoms of HF on ordinary exertion  5. Cardiovascular Instability:  No    Prior Ischemic Workup/Eval:  Pre-Procedural Medications: Yes: Aspirin, Beta Blockers, and STATIN  2. Stress Test Completed? Yes:  Stress or Imaging Studies Performed (within ANY time period):   Type:  Stress Nuclear  Results:  Positive:  Myocardial Perfusion Defects (Nuclear) Extent of Ischemia:  Intermediate    Does Patient need surgery? Cath Valve Surgery:  No    Pre-Procedure Medical History:  Vital Signs:  /61   Pulse 65   Temp 97.7 °F (36.5 °C) (Oral)   Resp 16   Ht 5' (1.524 m)   Wt 178 lb 11.2 oz (81.1 kg)   SpO2 99%   BMI 34.90 kg/m²     Allergies: Allergies   Allergen Reactions    Coumadin [Warfarin]      Other reaction(s): states \"bled out from by bowels'    Duloxetine      Increased depression and feelings of hopelessness. Fluoxetine      Increased depression and feelings of hopelessness.     Lisinopril      Other reaction(s): severe bp drop  \"bottom out\"  Per patient req      Morphine      Other reaction(s): SEVERELY ALTERS PATIENTS MOOD  Pt states it makes her \"mentally go crazy\"  States that it makes her feel \"crazy\". Pregabalin Nausea And Vomiting     Other reaction(s): vomitting  nausea  Nausea, vomiting and rapid heart rate       Baclofen      Increased spasticity and tightening of her muscles. Losartan Other (See Comments)     \"lose potassium\"  Skin turned red   Skin turned red.       Bisoprolol Fumarate Other (See Comments)     Hypotension  hypotension      Bupropion     Hydrocodone     Hydrocodone-Acetaminophen Itching    Spironolactone Other (See Comments)     Nausea loss of appetite  Nausea loss of appetite  Nausea and loss of appetite       Medications:    Current Facility-Administered Medications   Medication Dose Route Frequency Provider Last Rate Last Admin    ipratropium-albuterol (DUONEB) nebulizer solution 1 ampule  1 ampule Inhalation Q4H PRN Lyle Toney MD        aspirin chewable tablet 81 mg  81 mg Oral Daily Anderson Hogan MD   81 mg at 07/18/22 0906    [Held by provider] enoxaparin (LOVENOX) injection 80 mg  80 mg SubCUTAneous Daily Anderson Hogan MD   80 mg at 07/17/22 0913    escitalopram (LEXAPRO) tablet 20 mg  20 mg Oral Daily Anderson Hogan MD   20 mg at 07/18/22 2138    [Held by provider] furosemide (LASIX) tablet 20 mg  20 mg Oral Daily Anderson Hogan MD        metoprolol succinate (TOPROL XL) extended release tablet 25 mg  25 mg Oral Daily Anderson Hogan MD        pantoprazole (PROTONIX) tablet 40 mg  40 mg Oral QAM AC Anderson Hogan MD   40 mg at 07/17/22 0537    potassium chloride (KLOR-CON M) extended release tablet 10 mEq  10 mEq Oral Daily Anderson Hogan MD   10 mEq at 07/17/22 0913    atorvastatin (LIPITOR) tablet 20 mg  20 mg Oral Nightly Anderson Hogan MD   20 mg at 07/17/22 2044    sodium chloride flush 0.9 % injection 5-40 mL  5-40 mL IntraVENous 2 times per day Anderson Hogan MD   10 mL at 07/18/22 0906    sodium chloride flush 0.9 % injection 5-40 mL  5-40 mL IntraVENous PRN Desirae Ricardo MD        0.9 % sodium chloride infusion   IntraVENous PRN Desirae Ricardo MD        ondansetron (ZOFRAN-ODT) disintegrating tablet 4 mg  4 mg Oral Q8H PRN Desirae Ricardo MD        Or    ondansetron (ZOFRAN) injection 4 mg  4 mg IntraVENous Q6H PRN Desirae Ricardo MD        polyethylene glycol (GLYCOLAX) packet 17 g  17 g Oral Daily PRN Desirae Ricardo MD        acetaminophen (TYLENOL) tablet 650 mg  650 mg Oral Q6H PRN Desirae Ricardo MD   650 mg at 07/15/22 6760    Or    acetaminophen (TYLENOL) suppository 650 mg  650 mg Rectal Q6H PRN Desirae Ricardo MD        nitroGLYCERIN (NITROSTAT) SL tablet 0.4 mg  0.4 mg SubLINGual Q5 Min PRN Desirae Ricardo MD        perflutren lipid microspheres (DEFINITY) injection 1.65 mg  1.5 mL IntraVENous ONCE PRN Sneha Andrade MD        albuterol sulfate HFA (PROVENTIL;VENTOLIN;PROAIR) 108 (90 Base) MCG/ACT inhaler 2 puff  2 puff Inhalation Q6H PRN Sneha Andrade MD        gabapentin (NEURONTIN) capsule 600 mg  600 mg Oral Q8H PRN Sneha Andrade MD   600 mg at 07/17/22 2044    tiZANidine (ZANAFLEX) tablet 2 mg  2 mg Oral Q8H PRN LESLI Arriola - CNP   2 mg at 07/17/22 2044       Past Medical History:    Past Medical History:   Diagnosis Date    CHF (congestive heart failure) ([address]     COPD (chronic obstructive pulmonary disease) ([address]     DVT     Hx of blood clots     Hypercholesterolemia     Myocardial infarct ([address]     Pulmonary embolism ([address]     Unspecified cerebral artery occlusion with cerebral infarction        Surgical History:    Past Surgical History:   Procedure Laterality Date    CARDIAC DEFIBRILLATOR PLACEMENT  09/15/2020    gen change, original device implanted 8/25/2012    CARDIAC SURGERY      catherization x 4    CHEST TUBE INSERTION      at birth    CORONARY ANGIOPLASTY WITH STENT PLACEMENT  03/2010    BMS to pRCA 4x28 [employer]    DILATION AND

## 2022-07-19 VITALS
BODY MASS INDEX: 35.08 KG/M2 | DIASTOLIC BLOOD PRESSURE: 56 MMHG | HEART RATE: 70 BPM | TEMPERATURE: 97.7 F | OXYGEN SATURATION: 96 % | RESPIRATION RATE: 16 BRPM | SYSTOLIC BLOOD PRESSURE: 95 MMHG | WEIGHT: 178.7 LBS | HEIGHT: 60 IN

## 2022-07-19 PROBLEM — Z95.5 STENTED CORONARY ARTERY: Status: ACTIVE | Noted: 2022-07-19

## 2022-07-19 LAB
ANION GAP SERPL CALCULATED.3IONS-SCNC: 11 MMOL/L (ref 3–16)
BUN BLDV-MCNC: 16 MG/DL (ref 7–20)
CALCIUM SERPL-MCNC: 10.1 MG/DL (ref 8.3–10.6)
CHLORIDE BLD-SCNC: 101 MMOL/L (ref 99–110)
CO2: 22 MMOL/L (ref 21–32)
CREAT SERPL-MCNC: 0.8 MG/DL (ref 0.6–1.1)
GFR AFRICAN AMERICAN: >60
GFR NON-AFRICAN AMERICAN: >60
GLUCOSE BLD-MCNC: 121 MG/DL (ref 70–99)
HCT VFR BLD CALC: 38.2 % (ref 36–48)
HEMOGLOBIN: 13.1 G/DL (ref 12–16)
MCH RBC QN AUTO: 30.9 PG (ref 26–34)
MCHC RBC AUTO-ENTMCNC: 34.4 G/DL (ref 31–36)
MCV RBC AUTO: 89.9 FL (ref 80–100)
PDW BLD-RTO: 12.3 % (ref 12.4–15.4)
PLATELET # BLD: 333 K/UL (ref 135–450)
PMV BLD AUTO: 8.5 FL (ref 5–10.5)
POTASSIUM REFLEX MAGNESIUM: 4.9 MMOL/L (ref 3.5–5.1)
RBC # BLD: 4.25 M/UL (ref 4–5.2)
SODIUM BLD-SCNC: 134 MMOL/L (ref 136–145)
WBC # BLD: 6.4 K/UL (ref 4–11)

## 2022-07-19 PROCEDURE — 2580000003 HC RX 258: Performed by: INTERNAL MEDICINE

## 2022-07-19 PROCEDURE — 80048 BASIC METABOLIC PNL TOTAL CA: CPT

## 2022-07-19 PROCEDURE — 85027 COMPLETE CBC AUTOMATED: CPT

## 2022-07-19 PROCEDURE — 6370000000 HC RX 637 (ALT 250 FOR IP): Performed by: NURSE PRACTITIONER

## 2022-07-19 PROCEDURE — 6370000000 HC RX 637 (ALT 250 FOR IP): Performed by: INTERNAL MEDICINE

## 2022-07-19 PROCEDURE — 36415 COLL VENOUS BLD VENIPUNCTURE: CPT

## 2022-07-19 PROCEDURE — G0378 HOSPITAL OBSERVATION PER HR: HCPCS

## 2022-07-19 PROCEDURE — 99215 OFFICE O/P EST HI 40 MIN: CPT | Performed by: NURSE PRACTITIONER

## 2022-07-19 RX ORDER — CLOPIDOGREL BISULFATE 75 MG/1
75 TABLET ORAL DAILY
Qty: 30 TABLET | Refills: 11 | Status: SHIPPED | OUTPATIENT
Start: 2022-07-19

## 2022-07-19 RX ORDER — CLOPIDOGREL BISULFATE 75 MG/1
75 TABLET ORAL DAILY
Status: DISCONTINUED | OUTPATIENT
Start: 2022-07-19 | End: 2022-07-19 | Stop reason: HOSPADM

## 2022-07-19 RX ADMIN — METOPROLOL SUCCINATE 25 MG: 25 TABLET, EXTENDED RELEASE ORAL at 10:26

## 2022-07-19 RX ADMIN — Medication 10 ML: at 10:28

## 2022-07-19 RX ADMIN — ESCITALOPRAM OXALATE 20 MG: 10 TABLET ORAL at 10:26

## 2022-07-19 RX ADMIN — CLOPIDOGREL BISULFATE 75 MG: 75 TABLET ORAL at 10:30

## 2022-07-19 RX ADMIN — PANTOPRAZOLE SODIUM 40 MG: 40 TABLET, DELAYED RELEASE ORAL at 05:32

## 2022-07-19 RX ADMIN — ASPIRIN 81 MG: 81 TABLET, CHEWABLE ORAL at 10:26

## 2022-07-19 ASSESSMENT — PAIN SCALES - GENERAL: PAINLEVEL_OUTOF10: 0

## 2022-07-19 NOTE — DISCHARGE INSTRUCTIONS
FOLLOW-UP APPOINTMENTS      Follow up with PCP within 1-2 weeks. Follow up with cardiology per their instructions. Try to get an appointment with another  pulmonologist who specializes in sarcoidosis patients. Otherwise please call to make an appointment with our Dr. Vasquez Lynn:    [place], Sleep and Doroteo Franco MD  [address] [place] [address] [address], [facility], [address]  Carrie Murray [address] - Follow-up appointment on August 9th at 8:45am with Sadia Novoa CNP. Please arrive 15 minutes early to complete necessary paperwork. You and your one visitor will need to have your mouth and nose covered with a mask. No children please. [facility] Office [address]  [address]:  [phone]. If you are unable to make this appointment, please call to reschedule [phone]. To get to the office go to the side of the hospital at [facility], enter at the [facility], entrance that faces [address]. Take the elevator to the 3rd floor turn right off elevator then take hallway to the right. Go down the [place] and office will be on your right [address].

## 2022-07-19 NOTE — PROGRESS NOTES
[address]   Daily Progress Note    Admit Date:  7/15/2022  HPI:    Chief Complaint   Patient presents with    Tachycardia     patient called cardiologist for tachycardia, Dr told her to take an additional half of metoprolol. Patient did this around 4pm and hasn't felt well since taking it. reports a chest pressure. Fall     lost balance and fell forward hitting head on wall      Juan Manuel Tamayo presented with chest pain. Hx of CAD s/p PCI to RCA, cardiac arrest s/p ICD, ICM (with recovered EF), sarcoidosis with cardiac involvement. EKG and troponin's without evidence of ischemia. ICD device check without arrhythmia;s. Stress test abnormal.   [facility] 7/18/22 with PCI to RCA. Subjective:  Ms. Grover Arevalo sitting up in bed, states feels much better. Denies any chest pain or palpitations. Still with some wheezing and shortness of breath. BP at home runs 110's/ 70's, tolerates the Toprol. She reports feeling more puffy after taking Lasix.      Objective:   Patient Vitals for the past 24 hrs:   BP Temp Temp src Pulse Resp SpO2   07/19/22 0830 (!) 95/56 97.7 °F (36.5 °C) Oral 70 16 96 %   07/19/22 0419 (!) 98/51 98.2 °F (36.8 °C) Oral 64 18 92 %   07/18/22 2356 130/61 -- -- 75 -- --   07/18/22 2352 (!) 87/44 98.6 °F (37 °C) Oral 66 18 96 %   07/18/22 2002 117/71 97.7 °F (36.5 °C) Oral 73 18 --   07/18/22 2001 117/71 97.7 °F (36.5 °C) Oral 71 18 98 %   07/18/22 1640 (!) 102/56 -- -- 59 -- 97 %   07/18/22 1625 (!) 100/56 -- -- -- -- --   07/18/22 1624 (!) 102/56 -- -- 60 -- --   07/18/22 1600 (!) 102/52 -- -- 65 -- --   07/18/22 1545 99/60 -- -- 60 16 --   07/18/22 1530 (!) 98/56 -- -- 64 16 --   07/18/22 1515 (!) 98/49 -- -- 59 16 97 %   07/18/22 1500 (!) 102/57 -- -- 60 16 97 %   07/18/22 1445 99/64 -- -- 59 16 99 %   07/18/22 1430 (!) 91/30 -- -- 59 -- 98 %   07/18/22 1415 87/60 -- -- 67 18 99 %   07/18/22 1400 (!) 89/56 -- -- 59 16 98 %   07/18/22 1345 83/63 -- -- 61 16 99 %   07/18/22 1330 88/60 -- -- 62 16 100 %   07/18/22 1315 (!) 88/58 -- -- 59 16 100 %   07/18/22 1300 (!) 83/52 -- -- 59 16 99 %   07/18/22 1250 (!) 89/56 -- -- 59 16 96 %   07/18/22 1245 95/61 -- -- 60 16 95 %   07/18/22 1235 103/65 -- -- 60 16 100 %   07/18/22 1218 103/70 97.7 °F (36.5 °C) Oral 59 16 97 %         Intake/Output Summary (Last 24 hours) at 7/19/2022 0849  Last data filed at 7/19/2022 0225  Gross per 24 hour   Intake 245 ml   Output 2100 ml   Net -1855 ml       Wt Readings from Last 3 Encounters:   07/15/22 178 lb 11.2 oz (81.1 kg)   03/08/22 150 lb 12.8 oz (68.4 kg)   02/24/22 145 lb (65.8 kg)         ASSESSMENT:   Chest pain: Troponins and EKG negative for ischemia, stress test abnormal  CAD: prior RCA stent, s/p PCI to RCA 7/18/22,   Hx cardiac arrest s/p ICD: Per  cardiology, recent interrogation without arrhythmia  Sarcoidosis with cardiac involvement  CHF, chronic diastolic: Euvolemia on exam  HYPERTENSION: Now stable to low  Hx DVT/ PE      PLAN:  Resume Toprol XL 25 mg nightly  Hold Lasix and KCL  Continue ASA 81 mg and Plavix 75 mg daily. After 30 days, stop ASA. Continue Lovenox injections per PCP or hem/onc  Continue atorvastatin  Okay for discharge from cardiac perspective. Will review cardiac medications on discharge medication reconciliation form. Patient has follow up appointment with [facility] in 3 weeks.       LESLI Christiansen CNP, 7/19/2022, 8:49 AM  [facility]   [phone]       Telemetry:   NYHA: III    Physical Exam:  General:  Awake, alert, NAD  Skin:  Warm and dry  Neck:  JVP normal upright  Chest: Inspiratory and expiratory wheezing  Cardiovascular:  RRR, normal S1S2, no MRG  Abdomen:  Soft, nontender, +bowel sounds  Extremities: No BLE edema  right radial site without ooze, bruise or hematoma, dressing C,D,I, 2+ pulse       Medications:    aspirin  81 mg Oral Daily    [Held by provider] enoxaparin  80 mg SubCUTAneous Daily    escitalopram  20 mg Oral Daily    [Held by provider] furosemide  20 mg Oral Daily    metoprolol succinate  25 mg Oral Daily    pantoprazole  40 mg Oral QAM AC    potassium chloride  10 mEq Oral Daily    atorvastatin  20 mg Oral Nightly    sodium chloride flush  5-40 mL IntraVENous 2 times per day      sodium chloride         Lab Data: Lab results independently reviewed and analyzed by myself 7/19/2022    CBC:   Recent Labs     07/17/22  0626 07/19/22  0648   WBC 6.9 6.4   HGB 12.9 13.1    333       BMP:    Recent Labs     07/17/22  0626 07/18/22  0553 07/19/22  0648    137 134*   K 4.6 5.0 4.9   CO2 25 25 22   BUN 16 18 16   CREATININE 0.9 0.7 0.8       INR:  No results for input(s): INR in the last 72 hours. BNP:    No results for input(s): PROBNP in the last 72 hours. Cardiac Enzymes:   No results for input(s): TROPONINI in the last 72 hours. Lipids:   Lab Results   Component Value Date/Time    TRIG 105 06/28/2022 01:28 PM    TRIG 169 03/15/2022 03:14 PM    HDL 57 06/28/2022 01:28 PM    HDL 58 03/15/2022 03:14 PM    HDL 68 05/09/2011 06:15 AM    HDL 50 12/06/2010 06:05 AM    LDLCALC 150 06/28/2022 01:28 PM    LDLCALC 150 03/15/2022 03:14 PM       Cardiac Imaging:   CARDIAC CATH/ PCI 7/18/22  Procedures Performed:  1. Left heart catheterization  2. Selective left and right coronary angiogram  3. Left ventriculography   4. Intravascular ultrasound of the right coronary artery  5. Cutting Balloon angioplasty, noncompliant balloon angioplasty, and drug-eluting stent insertion to the right coronary artery   Procedure Findings:  1. Single-vessel critical coronary disease with 70% in-stent restenosis of the proximal and mid right coronary artery in the setting of a previously placed bare-metal stent from 2010  2. Normal left ventricular function with EF estimated at 55-60% with mild inferior wall hypokinesis  3.  Normal left heart hemodynamics  Details:              Mily Perez was brought to the cardiac catheterization lab in a fasting state after informed consent was obtained. If the patient was able to provide written consent, it was obtained. The patient's vitals were monitored through out the procedure. The patient was sedated using the appropriate levels of sedation and ASA guidelines. The appropriate access site area was prepped and drapped in a sterile fashion. The area was anesthetized with 2% lidocaine. Using the modified Seldinger technique, an arterial sheath was introduced into the arterial access site using a single anterior wall puncture. The sheath was flushed and prepped in usual fashion. Catheters used during the procedure included 5 [place] TIG 4.0 catheter. The catheters were advanced and removed over a .035\" wire, into the appropriate positions. Multiple angiographic views were obtained. An LV gram was obtained. ~The interventional portion of the procedure was completed utilizing a 6 [place] Ngozi system. Adjunctive pharmacotherapy was aspirin, Angiomax, and Plavix. Initially Daryn JR4 6 Appseera guide was advanced to the right coronary ostium. A BMW wire was advanced into the distal right coronary without difficulty. We completed intravascular ultrasound using the Cashback Chintai system. Vessel diameter and plaque composition were evaluated. The vessel was measured at 4.4 to 4.97 mm in diameter. There was a previously placed stent within the mid and proximal right coronary that had diffuse in-stent restenosis. The stent was subsequently then predilated with initially a 4.0 mm x 15 mm [place] cutting balloon. We then utilized a noncompliant four 5 x 15 [place] balloon. In the subcu stented to the right coronary utilizing 2 drug-eluting stents. We used a resolute Tonny 5.0 mm x 30 mm followed by a 5.0 mm x 12 mm. These were both inflated to rated burst pressures and postdilated to maximal luminal diameter. Findings:   1.  Left main coronary artery was normal. It gave off the left anterior descending artery and left circumflex. 2. Left anterior descending artery was normal. It was moderate in size. It gave off septal perforators and a moderate sized diagonal branch. The LAD covered the entire apex of the left ventricle. 3. Left circumflex was normal. It was moderate in size. There was a moderate sized obtuse marginal branch. 4. Right coronary had a previously placed stent within the right coronary. This is evident in the proximal mid segment. There is diffuse in-stent restenosis that creates about a 70% area of atherosclerosis. 5. Left ventriculogram showed normal LVEF at 55-60%. There was inferior wall motion hypokinesis. There was no significant mitral valve or aortic valve disease noted. LVEDP was normal. There was no gradient noted across the aortic valve during pullback of the catheter. CONCLUSIONS:   1. Successful IVUS guided Cutting Balloon angioplasty, noncompliant balloon angioplasty, and drug-eluting stent insertion to the right coronary artery   ASSESSMENT/RECOMMENDATIONS:   1. Antiplatelet therapy with aspirin and Plavix for minimum of 1-3 months followed by single antiplatelet coverage. 2.  The patient will continue to take coagulation with Lovenox as necessary. Myoview: 7/15/22  Abnormal moderate risk myocardial perfusion study. There is a defect within the inferior wall consistent with prior infarction    and mild terry-infarct ischemia. There is mild ischemia defect within the apical lateral wall. There is a mild fixed defect within the basal chris-septal wall. The estimated left ventricular function is 57%. Recommendation    There is extra-cardiac activity within the GI tract that reduces    sensitivity. ECHO 7/5/22:    Summary   Normal left ventricle systolic function with an estimated ejection fraction of 50%. There is mild hypokinesis of the inferior wall segments.    Normal left ventricular diastolic filling pressures. The right ventricle is normal in size and function. The right atrium is mildly dilated. Mild posterior mitral annular calcification is present. Systolic pulmonary artery pressure (sPAP) is normal and estimated at 35 mmHg   (right atrial pressure 8 mmHg)    CT: 1/19/2022:   No evidence of pulmonary embolism. Mild right hilar adenopathy is nonspecific. Short-term follow-up should be   considered if clinically indicated. Multiple scattered predominantly calcified or partially calcified pulmonary   nodules. Findings are most likely granulomatous. However these are new   since the previous exam from 2011. These could also be re-evaluated on   short-term follow-up.
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
## 2022-07-19 NOTE — PROGRESS NOTES
Pt d/c'd home. Removed peripheral IV and stopped bleeding. Catheter intact. Pt tolerated well. No redness noted at site. Notified [facility] and removed tele box. Reviewed d/c instructions, home meds, and  f/u information utilizing teach-back method. Scripts for Plavix given to patient. Patient verbalized understanding.

## 2022-07-19 NOTE — PROGRESS NOTES
[facility] called to notify writer that pt had 6 beats of vtach. Pt asymptomatic. VSS. Cardiology notified. Will continue to monitor.

## 2022-07-20 ENCOUNTER — TELEPHONE (OUTPATIENT)
Dept: FAMILY MEDICINE CLINIC | Age: 47
End: 2022-07-20

## 2022-07-20 PROCEDURE — 93283 PRGRMG EVAL IMPLANTABLE DFB: CPT | Performed by: INTERNAL MEDICINE

## 2022-07-20 NOTE — TELEPHONE ENCOUNTER
R[facility] Transitions Initial Follow Up Call    Outreach made within 2 business days of discharge: yes    Patient: Gerald Brown Patient : 1975   MRN: [id number]  Reason for Admission: chest pain  Discharge Date: 22       Spoke with: [facility] with patient, [phone], 1st attempt    Discharge department/facility: [facility]ram[facility] Plate     Will need to complete tcm report and schedule f/u, can be scheduled with Shasta Engel    Follow Up  Future Appointments   Date Time Provider Alexis Arndt   2022  8:45 AM LESLI Mock - CNP P [facility]  [address] [place] [address]

## 2022-07-24 ENCOUNTER — TELEPHONE (OUTPATIENT)
Dept: FAMILY MEDICINE CLINIC | Age: 47
End: 2022-07-24

## 2022-07-25 ENCOUNTER — HOSPITAL ENCOUNTER (INPATIENT)
Age: 47
LOS: 1 days | Discharge: HOME | DRG: 291 | End: 2022-07-29
Attending: EMERGENCY MEDICINE | Admitting: INTERNAL MEDICINE
Payer: COMMERCIAL

## 2022-07-25 ENCOUNTER — APPOINTMENT (OUTPATIENT)
Dept: GENERAL RADIOLOGY | Age: 47
DRG: 291 | End: 2022-07-25
Payer: COMMERCIAL

## 2022-07-25 DIAGNOSIS — I50.9 ACUTE CONGESTIVE HEART FAILURE, UNSPECIFIED HEART FAILURE TYPE (HCC): ICD-10-CM

## 2022-07-25 DIAGNOSIS — R07.9 CHEST PAIN, UNSPECIFIED TYPE: Primary | ICD-10-CM

## 2022-07-25 DIAGNOSIS — E87.70 HYPERVOLEMIA, UNSPECIFIED HYPERVOLEMIA TYPE: ICD-10-CM

## 2022-07-25 PROBLEM — I25.10 CAD (CORONARY ARTERY DISEASE): Status: ACTIVE | Noted: 2022-07-17

## 2022-07-25 PROBLEM — E66.9 OBESITY: Status: ACTIVE | Noted: 2022-07-25

## 2022-07-25 PROBLEM — R63.5 WEIGHT GAIN: Status: ACTIVE | Noted: 2022-07-25

## 2022-07-25 LAB
A/G RATIO: 1.3 (ref 1.1–2.2)
ALBUMIN SERPL-MCNC: 4.1 G/DL (ref 3.4–5)
ALP BLD-CCNC: 144 U/L (ref 40–129)
ALT SERPL-CCNC: 12 U/L (ref 10–40)
ANION GAP SERPL CALCULATED.3IONS-SCNC: 10 MMOL/L (ref 3–16)
ANION GAP SERPL CALCULATED.3IONS-SCNC: 11 MMOL/L (ref 3–16)
AST SERPL-CCNC: 16 U/L (ref 15–37)
BASOPHILS ABSOLUTE: 0.1 K/UL (ref 0–0.2)
BASOPHILS ABSOLUTE: 0.1 K/UL (ref 0–0.2)
BASOPHILS RELATIVE PERCENT: 0.8 %
BASOPHILS RELATIVE PERCENT: 0.9 %
BILIRUB SERPL-MCNC: <0.2 MG/DL (ref 0–1)
BUN BLDV-MCNC: 12 MG/DL (ref 7–20)
BUN BLDV-MCNC: 15 MG/DL (ref 7–20)
CALCIUM SERPL-MCNC: 9.3 MG/DL (ref 8.3–10.6)
CALCIUM SERPL-MCNC: 9.4 MG/DL (ref 8.3–10.6)
CHLORIDE BLD-SCNC: 102 MMOL/L (ref 99–110)
CHLORIDE BLD-SCNC: 104 MMOL/L (ref 99–110)
CO2: 25 MMOL/L (ref 21–32)
CO2: 26 MMOL/L (ref 21–32)
CREAT SERPL-MCNC: 0.8 MG/DL (ref 0.6–1.1)
CREAT SERPL-MCNC: 0.8 MG/DL (ref 0.6–1.1)
EKG ATRIAL RATE: 67 BPM
EKG DIAGNOSIS: NORMAL
EKG P AXIS: 19 DEGREES
EKG P-R INTERVAL: 144 MS
EKG Q-T INTERVAL: 408 MS
EKG QRS DURATION: 100 MS
EKG QTC CALCULATION (BAZETT): 431 MS
EKG R AXIS: -12 DEGREES
EKG T AXIS: -1 DEGREES
EKG VENTRICULAR RATE: 67 BPM
EOSINOPHILS ABSOLUTE: 0.3 K/UL (ref 0–0.6)
EOSINOPHILS ABSOLUTE: 0.3 K/UL (ref 0–0.6)
EOSINOPHILS RELATIVE PERCENT: 3.2 %
EOSINOPHILS RELATIVE PERCENT: 3.7 %
GFR AFRICAN AMERICAN: >60
GFR AFRICAN AMERICAN: >60
GFR NON-AFRICAN AMERICAN: >60
GFR NON-AFRICAN AMERICAN: >60
GLUCOSE BLD-MCNC: 100 MG/DL (ref 70–99)
GLUCOSE BLD-MCNC: 106 MG/DL (ref 70–99)
HCT VFR BLD CALC: 32.7 % (ref 36–48)
HCT VFR BLD CALC: 35.9 % (ref 36–48)
HEMOGLOBIN: 11.1 G/DL (ref 12–16)
HEMOGLOBIN: 12.4 G/DL (ref 12–16)
LYMPHOCYTES ABSOLUTE: 3 K/UL (ref 1–5.1)
LYMPHOCYTES ABSOLUTE: 3.2 K/UL (ref 1–5.1)
LYMPHOCYTES RELATIVE PERCENT: 39.6 %
LYMPHOCYTES RELATIVE PERCENT: 42 %
MAGNESIUM: 2 MG/DL (ref 1.8–2.4)
MCH RBC QN AUTO: 30.9 PG (ref 26–34)
MCH RBC QN AUTO: 31.4 PG (ref 26–34)
MCHC RBC AUTO-ENTMCNC: 34 G/DL (ref 31–36)
MCHC RBC AUTO-ENTMCNC: 34.5 G/DL (ref 31–36)
MCV RBC AUTO: 90.9 FL (ref 80–100)
MCV RBC AUTO: 90.9 FL (ref 80–100)
MONOCYTES ABSOLUTE: 0.4 K/UL (ref 0–1.3)
MONOCYTES ABSOLUTE: 0.5 K/UL (ref 0–1.3)
MONOCYTES RELATIVE PERCENT: 5.4 %
MONOCYTES RELATIVE PERCENT: 6.9 %
NEUTROPHILS ABSOLUTE: 3.3 K/UL (ref 1.7–7.7)
NEUTROPHILS ABSOLUTE: 4.1 K/UL (ref 1.7–7.7)
NEUTROPHILS RELATIVE PERCENT: 46.6 %
NEUTROPHILS RELATIVE PERCENT: 50.9 %
PDW BLD-RTO: 12.6 % (ref 12.4–15.4)
PDW BLD-RTO: 12.6 % (ref 12.4–15.4)
PLATELET # BLD: 285 K/UL (ref 135–450)
PLATELET # BLD: 318 K/UL (ref 135–450)
PMV BLD AUTO: 8.6 FL (ref 5–10.5)
PMV BLD AUTO: 9.1 FL (ref 5–10.5)
POTASSIUM REFLEX MAGNESIUM: 3.7 MMOL/L (ref 3.5–5.1)
POTASSIUM SERPL-SCNC: 3.9 MMOL/L (ref 3.5–5.1)
PRO-BNP: 643 PG/ML (ref 0–124)
RBC # BLD: 3.6 M/UL (ref 4–5.2)
RBC # BLD: 3.95 M/UL (ref 4–5.2)
SODIUM BLD-SCNC: 138 MMOL/L (ref 136–145)
SODIUM BLD-SCNC: 140 MMOL/L (ref 136–145)
TOTAL PROTEIN: 7.3 G/DL (ref 6.4–8.2)
TROPONIN: <0.01 NG/ML
WBC # BLD: 7.1 K/UL (ref 4–11)
WBC # BLD: 8 K/UL (ref 4–11)

## 2022-07-25 PROCEDURE — 6370000000 HC RX 637 (ALT 250 FOR IP): Performed by: EMERGENCY MEDICINE

## 2022-07-25 PROCEDURE — 36415 COLL VENOUS BLD VENIPUNCTURE: CPT

## 2022-07-25 PROCEDURE — 80053 COMPREHEN METABOLIC PANEL: CPT

## 2022-07-25 PROCEDURE — 96376 TX/PRO/DX INJ SAME DRUG ADON: CPT

## 2022-07-25 PROCEDURE — 85025 COMPLETE CBC W/AUTO DIFF WBC: CPT

## 2022-07-25 PROCEDURE — 99285 EMERGENCY DEPT VISIT HI MDM: CPT

## 2022-07-25 PROCEDURE — 96374 THER/PROPH/DIAG INJ IV PUSH: CPT

## 2022-07-25 PROCEDURE — 6360000002 HC RX W HCPCS: Performed by: NURSE PRACTITIONER

## 2022-07-25 PROCEDURE — 96372 THER/PROPH/DIAG INJ SC/IM: CPT

## 2022-07-25 PROCEDURE — 96375 TX/PRO/DX INJ NEW DRUG ADDON: CPT

## 2022-07-25 PROCEDURE — 83735 ASSAY OF MAGNESIUM: CPT

## 2022-07-25 PROCEDURE — G0378 HOSPITAL OBSERVATION PER HR: HCPCS

## 2022-07-25 PROCEDURE — 6370000000 HC RX 637 (ALT 250 FOR IP): Performed by: NURSE PRACTITIONER

## 2022-07-25 PROCEDURE — 84484 ASSAY OF TROPONIN QUANT: CPT

## 2022-07-25 PROCEDURE — 71045 X-RAY EXAM CHEST 1 VIEW: CPT

## 2022-07-25 PROCEDURE — 93010 ELECTROCARDIOGRAM REPORT: CPT | Performed by: INTERNAL MEDICINE

## 2022-07-25 PROCEDURE — 6360000002 HC RX W HCPCS: Performed by: EMERGENCY MEDICINE

## 2022-07-25 PROCEDURE — 2580000003 HC RX 258: Performed by: NURSE PRACTITIONER

## 2022-07-25 PROCEDURE — 83880 ASSAY OF NATRIURETIC PEPTIDE: CPT

## 2022-07-25 PROCEDURE — 93005 ELECTROCARDIOGRAM TRACING: CPT | Performed by: EMERGENCY MEDICINE

## 2022-07-25 RX ORDER — SODIUM CHLORIDE 9 MG/ML
INJECTION, SOLUTION INTRAVENOUS PRN
Status: DISCONTINUED | OUTPATIENT
Start: 2022-07-25 | End: 2022-07-29 | Stop reason: HOSPADM

## 2022-07-25 RX ORDER — ONDANSETRON 2 MG/ML
4 INJECTION INTRAMUSCULAR; INTRAVENOUS ONCE
Status: COMPLETED | OUTPATIENT
Start: 2022-07-25 | End: 2022-07-25

## 2022-07-25 RX ORDER — ENOXAPARIN SODIUM 100 MG/ML
40 INJECTION SUBCUTANEOUS DAILY
Status: DISCONTINUED | OUTPATIENT
Start: 2022-07-25 | End: 2022-07-29 | Stop reason: HOSPADM

## 2022-07-25 RX ORDER — ONDANSETRON 2 MG/ML
4 INJECTION INTRAMUSCULAR; INTRAVENOUS EVERY 6 HOURS PRN
Status: DISCONTINUED | OUTPATIENT
Start: 2022-07-25 | End: 2022-07-29 | Stop reason: HOSPADM

## 2022-07-25 RX ORDER — CLOPIDOGREL BISULFATE 75 MG/1
75 TABLET ORAL DAILY
Status: DISCONTINUED | OUTPATIENT
Start: 2022-07-25 | End: 2022-07-29 | Stop reason: HOSPADM

## 2022-07-25 RX ORDER — METOPROLOL SUCCINATE 25 MG/1
25 TABLET, EXTENDED RELEASE ORAL DAILY
Status: DISCONTINUED | OUTPATIENT
Start: 2022-07-25 | End: 2022-07-28

## 2022-07-25 RX ORDER — ASPIRIN 81 MG/1
81 TABLET, CHEWABLE ORAL DAILY
Status: DISCONTINUED | OUTPATIENT
Start: 2022-07-25 | End: 2022-07-29 | Stop reason: HOSPADM

## 2022-07-25 RX ORDER — ACETAMINOPHEN 650 MG/1
650 SUPPOSITORY RECTAL EVERY 6 HOURS PRN
Status: DISCONTINUED | OUTPATIENT
Start: 2022-07-25 | End: 2022-07-29 | Stop reason: HOSPADM

## 2022-07-25 RX ORDER — POLYETHYLENE GLYCOL 3350 17 G/17G
17 POWDER, FOR SOLUTION ORAL DAILY PRN
Status: DISCONTINUED | OUTPATIENT
Start: 2022-07-25 | End: 2022-07-29 | Stop reason: HOSPADM

## 2022-07-25 RX ORDER — SIMVASTATIN 40 MG
40 TABLET ORAL NIGHTLY
Status: DISCONTINUED | OUTPATIENT
Start: 2022-07-25 | End: 2022-07-25

## 2022-07-25 RX ORDER — OXYCODONE HYDROCHLORIDE AND ACETAMINOPHEN 5; 325 MG/1; MG/1
1 TABLET ORAL ONCE
Status: COMPLETED | OUTPATIENT
Start: 2022-07-25 | End: 2022-07-25

## 2022-07-25 RX ORDER — ESCITALOPRAM OXALATE 10 MG/1
20 TABLET ORAL DAILY
Status: DISCONTINUED | OUTPATIENT
Start: 2022-07-25 | End: 2022-07-29 | Stop reason: HOSPADM

## 2022-07-25 RX ORDER — GABAPENTIN 300 MG/1
600 CAPSULE ORAL 3 TIMES DAILY
Status: DISCONTINUED | OUTPATIENT
Start: 2022-07-25 | End: 2022-07-27

## 2022-07-25 RX ORDER — FUROSEMIDE 10 MG/ML
20 INJECTION INTRAMUSCULAR; INTRAVENOUS 2 TIMES DAILY
Status: DISCONTINUED | OUTPATIENT
Start: 2022-07-25 | End: 2022-07-26

## 2022-07-25 RX ORDER — FUROSEMIDE 10 MG/ML
20 INJECTION INTRAMUSCULAR; INTRAVENOUS ONCE
Status: COMPLETED | OUTPATIENT
Start: 2022-07-25 | End: 2022-07-25

## 2022-07-25 RX ORDER — SODIUM CHLORIDE 0.9 % (FLUSH) 0.9 %
5-40 SYRINGE (ML) INJECTION PRN
Status: DISCONTINUED | OUTPATIENT
Start: 2022-07-25 | End: 2022-07-29 | Stop reason: HOSPADM

## 2022-07-25 RX ORDER — SODIUM CHLORIDE 0.9 % (FLUSH) 0.9 %
5-40 SYRINGE (ML) INJECTION EVERY 12 HOURS SCHEDULED
Status: DISCONTINUED | OUTPATIENT
Start: 2022-07-25 | End: 2022-07-29 | Stop reason: HOSPADM

## 2022-07-25 RX ORDER — ONDANSETRON 4 MG/1
4 TABLET, ORALLY DISINTEGRATING ORAL EVERY 8 HOURS PRN
Status: DISCONTINUED | OUTPATIENT
Start: 2022-07-25 | End: 2022-07-29 | Stop reason: HOSPADM

## 2022-07-25 RX ORDER — ACETAMINOPHEN 325 MG/1
650 TABLET ORAL EVERY 6 HOURS PRN
Status: DISCONTINUED | OUTPATIENT
Start: 2022-07-25 | End: 2022-07-29 | Stop reason: HOSPADM

## 2022-07-25 RX ORDER — ATORVASTATIN CALCIUM 10 MG/1
20 TABLET, FILM COATED ORAL DAILY
Status: DISCONTINUED | OUTPATIENT
Start: 2022-07-25 | End: 2022-07-29 | Stop reason: HOSPADM

## 2022-07-25 RX ADMIN — GABAPENTIN 600 MG: 300 CAPSULE ORAL at 20:50

## 2022-07-25 RX ADMIN — FUROSEMIDE 20 MG: 10 INJECTION, SOLUTION INTRAMUSCULAR; INTRAVENOUS at 18:27

## 2022-07-25 RX ADMIN — ESCITALOPRAM OXALATE 20 MG: 10 TABLET ORAL at 09:53

## 2022-07-25 RX ADMIN — ONDANSETRON 4 MG: 2 INJECTION INTRAMUSCULAR; INTRAVENOUS at 01:59

## 2022-07-25 RX ADMIN — ATORVASTATIN CALCIUM 20 MG: 10 TABLET, FILM COATED ORAL at 09:53

## 2022-07-25 RX ADMIN — ASPIRIN 81 MG: 81 TABLET, CHEWABLE ORAL at 09:53

## 2022-07-25 RX ADMIN — CLOPIDOGREL BISULFATE 75 MG: 75 TABLET ORAL at 09:53

## 2022-07-25 RX ADMIN — FUROSEMIDE 20 MG: 10 INJECTION, SOLUTION INTRAMUSCULAR; INTRAVENOUS at 02:03

## 2022-07-25 RX ADMIN — GABAPENTIN 600 MG: 300 CAPSULE ORAL at 09:53

## 2022-07-25 RX ADMIN — OXYCODONE AND ACETAMINOPHEN 1 TABLET: 5; 325 TABLET ORAL at 02:00

## 2022-07-25 RX ADMIN — SODIUM CHLORIDE, PRESERVATIVE FREE 10 ML: 5 INJECTION INTRAVENOUS at 09:53

## 2022-07-25 RX ADMIN — ENOXAPARIN SODIUM 40 MG: 100 INJECTION SUBCUTANEOUS at 09:52

## 2022-07-25 ASSESSMENT — PAIN SCALES - GENERAL
PAINLEVEL_OUTOF10: 7
PAINLEVEL_OUTOF10: 0
PAINLEVEL_OUTOF10: 6

## 2022-07-25 ASSESSMENT — PAIN - FUNCTIONAL ASSESSMENT: PAIN_FUNCTIONAL_ASSESSMENT: 0-10

## 2022-07-25 ASSESSMENT — PAIN DESCRIPTION - ORIENTATION: ORIENTATION: MID

## 2022-07-25 ASSESSMENT — PAIN DESCRIPTION - LOCATION
LOCATION: CHEST;BACK
LOCATION: CHEST

## 2022-07-25 ASSESSMENT — PAIN DESCRIPTION - DESCRIPTORS
DESCRIPTORS: TIGHTNESS;SQUEEZING;PRESSURE
DESCRIPTORS: HEAVINESS

## 2022-07-25 ASSESSMENT — PAIN DESCRIPTION - PAIN TYPE: TYPE: ACUTE PAIN

## 2022-07-25 NOTE — ED PROVIDER NOTES
[facility] Emergency Department      CHIEF COMPLAINT  Chest Pain (Recently released from hospital after getting stents placed. Pt states has gained 11 lb in 5 days. History of CHF)      HISTORY OF PRESENT ILLNESS  Doreen Sifuentes is a 40 y.o. female with a history of COPD, MI and CHF. She was just admitted here in the past week for chest pain and had 2 stents placed in the RCA. She states while she was here her blood pressures were low so they took her off of her Lasix. She states in the past 5 days since she has been home she has gained 11 pounds. She is feeling short of breath and bloated and having leg swelling. She is also having some chest pressure. She has been compliant with her aspirin and Plavix. .   No other complaints, modifying factors or associated symptoms. I have reviewed the following from the nursing documentation.     Past Medical History:   Diagnosis Date    CHF (congestive heart failure) (HCC)     COPD (chronic obstructive pulmonary disease) (HCC)     DVT     Hx of blood clots     Hypercholesterolemia     Myocardial infarct (HCC)     Pulmonary embolism (HCC)     Unspecified cerebral artery occlusion with cerebral infarction      Past Surgical History:   Procedure Laterality Date    CARDIAC DEFIBRILLATOR PLACEMENT  09/15/2020    gen change, original device implanted 8/25/2012    CARDIAC SURGERY      catherization x 4    CHEST TUBE INSERTION      at birth    [address] [facility]  03/2010    BMS to pRCA 4x28 Vision    DILATION AND CURETTAGE OF UTERUS      KNEE ARTHROSCOPY      bilat knees    LAPAROSCOPY      x 2 for ovaries    TONSILLECTOMY AND ADENOIDECTOMY      UPPER GASTROINTESTINAL ENDOSCOPY  10/18/2010    with biopsies     Family History   Problem Relation Age of Onset    Heart Disease Mother     High Blood Pressure Mother     Diabetes Mother     Depression Mother     High Cholesterol Mother     Heart Disease Father     High Blood Pressure Father High Cholesterol Father     Asthma Sister      Social History     Socioeconomic History    Marital status: Single     Spouse name: Not on file    Number of children: Not on file    Years of education: Not on file    Highest education level: Not on file   Occupational History    Not on file   Tobacco Use    Smoking status: Every Day     Packs/day: 0.25     Types: Cigarettes     Start date: 3/8/2007    Smokeless tobacco: Never   Vaping Use    Vaping Use: Never used   Substance and Sexual Activity    Alcohol use: No    Drug use: Yes     Types: Marijuana Satish Semen)     Comment: rarely    Sexual activity: Not Currently     Partners: Male   Other Topics Concern    Not on file   Social History Narrative    Not on file     Social Determinants of Health     Financial Resource Strain: Low Risk     Difficulty of Paying Living Expenses: Not very hard   Food Insecurity: No Food Insecurity    Worried About Running Out of Food in the Last Year: Never true    Ran Out of Food in the Last Year: Never true   Transportation Needs: Not on file   Physical Activity: Not on file   Stress: Not on file   Social Connections: Not on file   Intimate Partner Violence: Not on file   Housing Stability: Not on file     Current Facility-Administered Medications   Medication Dose Route Frequency Provider Last Rate Last Admin    aspirin chewable tablet 81 mg  81 mg Oral Daily Julia Overcast, APRN - CNP   81 mg at 07/25/22 0953    clopidogrel (PLAVIX) tablet 75 mg  75 mg Oral Daily Julia Overcast, APRN - CNP   75 mg at 07/25/22 0953    escitalopram (LEXAPRO) tablet 20 mg  20 mg Oral Daily Julia Overcast, APRN - CNP   20 mg at 07/25/22 0953    gabapentin (NEURONTIN) capsule 600 mg  600 mg Oral TID Julia Overcast, APRN - CNP   600 mg at 07/25/22 2050    metoprolol succinate (TOPROL XL) extended release tablet 25 mg  25 mg Oral Daily Theresa Ryann, DO        sodium chloride flush 0.9 % injection 5-40 mL  5-40 mL IntraVENous 2 times per day Julia Overcast, APRN - CNP 10 mL at 07/25/22 0953    sodium chloride flush 0.9 % injection 5-40 mL  5-40 mL IntraVENous PRN LESLI Parsons - CNP        0.9 % sodium chloride infusion   IntraVENous PRN LESLI Parsons - HELENE        ondansetron (ZOFRAN-ODT) disintegrating tablet 4 mg  4 mg Oral Q8H PRN LESLI Parsons - CNP        Or    ondansetron (ZOFRAN) injection 4 mg  4 mg IntraVENous Q6H PRN Shahram Wesley, LESLI - HELENE        polyethylene glycol (GLYCOLAX) packet 17 g  17 g Oral Daily PRN Shahram Wesley, APRN - HELENE        acetaminophen (TYLENOL) tablet 650 mg  650 mg Oral Q6H PRN Shahram Wesley, LESLI - CNP        Or    acetaminophen (TYLENOL) suppository 650 mg  650 mg Rectal Q6H PRN Shahram Wesley, LESLI - CNP        enoxaparin (LOVENOX) injection 40 mg  40 mg SubCUTAneous Daily Shahram Wesley APRN - CNP   40 mg at 07/25/22 1304    furosemide (LASIX) injection 20 mg  20 mg IntraVENous BID Shahram Wesley APRN - CNP   20 mg at 07/25/22 1827    atorvastatin (LIPITOR) tablet 20 mg  20 mg Oral Daily LESLI Greene - CNP   20 mg at 07/25/22 5672     Allergies   Allergen Reactions    Coumadin [Warfarin]      Other reaction(s): states \"bled out from by bowels'    Duloxetine      Increased depression and feelings of hopelessness. Fluoxetine      Increased depression and feelings of hopelessness. Lisinopril      Other reaction(s): severe bp drop  \"bottom out\"  Per patient req      Morphine      Other reaction(s): SEVERELY ALTERS PATIENTS MOOD  Pt states it makes her \"mentally go crazy\"  States that it makes her feel \"crazy\". Pregabalin Nausea And Vomiting     Other reaction(s): vomitting  nausea  Nausea, vomiting and rapid heart rate       Baclofen      Increased spasticity and tightening of her muscles. Losartan Other (See Comments)     \"lose potassium\"  Skin turned red   Skin turned red.       Bisoprolol Fumarate Other (See Comments)     Hypotension  hypotension      Bupropion     Hydrocodone     Hydrocodone-Acetaminophen Itching Spironolactone Other (See Comments)     Nausea loss of appetite  Nausea loss of appetite  Nausea and loss of appetite         REVIEW OF SYSTEMS    General:  No fevers. Weight gain  Eyes:  No recent vison changes  ENT:  No sore throat, no nasal congestion  Cardiovascular:  no palpitations. Chest pain  Respiratory:   no cough, no wheezing. Shortness of breath  Gastrointestinal:  No abdominal pain, no vomiting, no diarrhea  Musculoskeletal:  No muscle pain, no joint pain. Leg edema  Skin:  No rash   Neurologic:  No speech problems, no headache, no extremity numbness, no extremity weakness  Genitourinary:  No dysuria  Extremities:  no edema, no pain      Unless otherwise stated in this report, this patient's positive and negative responses for review of systems (constitutional, eyes, ENT, cardiovascular, respiratory, gastrointestinal, neurological, genitourinary, musculoskeletal, integument systems and systems related to the presenting problem) are either stated in the preceding paragraph, were not pertinent or were negative for the symptoms and/or complaints related to the medical problem. PHYSICAL EXAM  /60   Pulse 60   Temp 97.9 °F (36.6 °C) (Oral)   Resp 16   Ht 5' (1.524 m)   Wt 183 lb 8 oz (83.2 kg)   SpO2 93%   BMI 35.84 kg/m²   GENERAL APPEARANCE: Awake and alert. Cooperative. No acute distress. HEAD: Normocephalic. Atraumatic. EYES: PERRL. EOM's grossly intact. ENT: Mucous membranes are moist.   NECK: Supple, trachea midline. HEART: RRR. LUNGS: Respirations unlabored. Faint bibasilar crackles. Speaking comfortably in full sentences. ABDOMEN: Soft. Non-distended. Non-tender. No guarding or rebound. EXTREMITIES: 1+ pitting edema to bilateral ankles. MAEE. No acute deformities. SKIN: Warm, dry and intact. No acute rashes. NEUROLOGICAL: Alert and oriented X 3. CN II-XII grossly intact. PSYCHIATRIC: Normal mood and affect. LABS  I have reviewed all labs for this visit. Results for orders placed or performed during the hospital encounter of 07/25/22   Comprehensive Metabolic Panel w/ Reflex to MG   Result Value Ref Range    Sodium 138 136 - 145 mmol/L    Potassium reflex Magnesium 3.7 3.5 - 5.1 mmol/L    Chloride 102 99 - 110 mmol/L    CO2 25 21 - 32 mmol/L    Anion Gap 11 3 - 16    Glucose 100 (H) 70 - 99 mg/dL    BUN 12 7 - 20 mg/dL    Creatinine 0.8 0.6 - 1.1 mg/dL    GFR Non-African American >60 >60    GFR African American >60 >60    Calcium 9.4 8.3 - 10.6 mg/dL    Total Protein 7.3 6.4 - 8.2 g/dL    Albumin 4.1 3.4 - 5.0 g/dL    Albumin/Globulin Ratio 1.3 1.1 - 2.2    Total Bilirubin <0.2 0.0 - 1.0 mg/dL    Alkaline Phosphatase 144 (H) 40 - 129 U/L    ALT 12 10 - 40 U/L    AST 16 15 - 37 U/L   CBC with Auto Differential   Result Value Ref Range    WBC 8.0 4.0 - 11.0 K/uL    RBC 3.95 (L) 4.00 - 5.20 M/uL    Hemoglobin 12.4 12.0 - 16.0 g/dL    Hematocrit 35.9 (L) 36.0 - 48.0 %    MCV 90.9 80.0 - 100.0 fL    MCH 31.4 26.0 - 34.0 pg    MCHC 34.5 31.0 - 36.0 g/dL    RDW 12.6 12.4 - 15.4 %    Platelets 787 880 - 675 K/uL    MPV 8.6 5.0 - 10.5 fL    Neutrophils % 50.9 %    Lymphocytes % 39.6 %    Monocytes % 5.4 %    Eosinophils % 3.2 %    Basophils % 0.9 %    Neutrophils Absolute 4.1 1.7 - 7.7 K/uL    Lymphocytes Absolute 3.2 1.0 - 5.1 K/uL    Monocytes Absolute 0.4 0.0 - 1.3 K/uL    Eosinophils Absolute 0.3 0.0 - 0.6 K/uL    Basophils Absolute 0.1 0.0 - 0.2 K/uL   Troponin   Result Value Ref Range    Troponin <0.01 <0.01 ng/mL   Brain Natriuretic Peptide   Result Value Ref Range    Pro- (H) 0 - 124 pg/mL   Basic Metabolic Panel   Result Value Ref Range    Sodium 140 136 - 145 mmol/L    Potassium 3.9 3.5 - 5.1 mmol/L    Chloride 104 99 - 110 mmol/L    CO2 26 21 - 32 mmol/L    Anion Gap 10 3 - 16    Glucose 106 (H) 70 - 99 mg/dL    BUN 15 7 - 20 mg/dL    Creatinine 0.8 0.6 - 1.1 mg/dL    GFR Non-African American >60 >60    GFR African American >60 >60    Calcium 9.3 8.3 - 10.6 mg/dL   Magnesium   Result Value Ref Range    Magnesium 2.00 1.80 - 2.40 mg/dL   CBC with Auto Differential   Result Value Ref Range    WBC 7.1 4.0 - 11.0 K/uL    RBC 3.60 (L) 4.00 - 5.20 M/uL    Hemoglobin 11.1 (L) 12.0 - 16.0 g/dL    Hematocrit 32.7 (L) 36.0 - 48.0 %    MCV 90.9 80.0 - 100.0 fL    MCH 30.9 26.0 - 34.0 pg    MCHC 34.0 31.0 - 36.0 g/dL    RDW 12.6 12.4 - 15.4 %    Platelets 114 289 - 718 K/uL    MPV 9.1 5.0 - 10.5 fL    Neutrophils % 46.6 %    Lymphocytes % 42.0 %    Monocytes % 6.9 %    Eosinophils % 3.7 %    Basophils % 0.8 %    Neutrophils Absolute 3.3 1.7 - 7.7 K/uL    Lymphocytes Absolute 3.0 1.0 - 5.1 K/uL    Monocytes Absolute 0.5 0.0 - 1.3 K/uL    Eosinophils Absolute 0.3 0.0 - 0.6 K/uL    Basophils Absolute 0.1 0.0 - 0.2 K/uL   EKG 12 Lead   Result Value Ref Range    Ventricular Rate 67 BPM    Atrial Rate 67 BPM    P-R Interval 144 ms    QRS Duration 100 ms    Q-T Interval 408 ms    QTc Calculation (Bazett) 431 ms    P Axis 19 degrees    R Axis -12 degrees    T Axis -1 degrees    Diagnosis       Normal sinus rhythmInferior infarct (cited on or before 16-MAY-2011)Anterolateral infarct (cited on or before 16-MAY-2011)Abnormal ECGConfirmed by Marin Fernandez MD, Rosa Shown ([id number]) on 7/25/2022 9:10:49 AM       EKG  The Ekg interpreted by myself  normal sinus rhythm with a rate of 67  Axis is   Normal  QTc is  normal  Intervals and Durations are unremarkable. No specific ST-T wave changes appreciated. No evidence of acute ischemia. No significant change from prior EKG dated 6/15/2022    Cardiac Monitoring: The cardiac monitor revealed normal sinus rhythm as interpreted by me. The cardiac monitor was ordered secondary to the patient's complaint of chest pain and to monitor the patient for dysrhythmia. RADIOLOGY  X-RAYS: ALL IMAGES INCLUDING PLAIN FILMS, CT, ULTRASOUND AND MRI HAVE BEEN READ BY THE RADIOLOGIST.  I have personally reviewed plain film images and have reviewed the radiology reports. XR CHEST PORTABLE   Final Result   Stable chest with no acute abnormality seen. Rechecks: Physical assessment performed. She was given a dose of Lasix here in the ER and is agreeable to admission            Sepsis:  Is this patient to be included in the SEP-1 Core Measure due to severe sepsis or septic shock? No   Exclusion criteria - the patient is NOT to be included for SEP-1 Core Measure due to: Infection is not suspected     Heart Score: HEART SCORE:  History: +2 high suspicion, +1 moderate, 0 low  EKG: +2 significant ST depression, +1 nonspec changes, 0 normal  Age: +2 >65, +1 45-65, 0 <45  Risk factors: +2 >3 or known CAD, +1 1-2, 0 none (factors = HLD, HTN, DM, tobacco, obesity, FHx)  Troponin: +2 >3x normal limit, +1 1-3x normal limit, 0 neg    Heart score: 4      ED COURSE/MDM  Patient seen and evaluated. Here the patient is afebrile with normal vitals signs. Old records reviewed, including details of her recent admission and heart cath. Here she does appear fluid overloaded although she is not in distress. No hypoxia. Chest x-ray is normal.  Her BNP is elevated compared with prior. EKG shows no ischemic changes and her troponin is negative. I have restarted her Lasix here with 1 dose of IV Lasix and will admit her to the hospitalist.  Labs and imaging reviewed and results discussed with patient. Patient was reassessed as noted above . Plan of care discussed with patient. Patient in agreement with plan. CLINICAL IMPRESSION  1. Chest pain, unspecified type    2. Hypervolemia, unspecified hypervolemia type    3. Acute congestive heart failure, unspecified heart failure type (HCC)        Blood pressure 115/60, pulse 60, temperature 97.9 °F (36.6 °C), temperature source Oral, resp. rate 16, height 5' (1.524 m), weight 183 lb 8 oz (83.2 kg), SpO2 93 %. Paula Verdin 89 was admitted in stable condition.     Edward Cano MD am the primary clinician of record.     (Please note this note was completed with a voice recognition program.  Efforts were made to edit the dictations but occasionally words are mis-transcribed.)        Elmo Enriquez MD  07/26/22 1562

## 2022-07-25 NOTE — TELEPHONE ENCOUNTER
Patient contact me to the on-call service line in regard to significant shortness of breath, 10 pound weight gain since being discharged from [facility] on 7/19/2022. During her admission the patient had stent placed in her RCA and also had her furosemide discontinued. Does not appear the patient ever had furosemide resumed and the patient confirms she is not actively taking at home. Patient states she feels like her blood pressure is low but she cannot monitor her BP because her machine is not actively working. Feels that she cannot catch her breath, has extreme fatigue, and overall feels very lousy. Given the patient's recent hospital mission and current symptoms I advised her to be seen back in the ED for evaluation. Patient does have a history of CHF and may have fluid overload with her 10 pound weight gain since hospital discharge and 6 pounds stated weight gain today alone. Patient denies any fluid buildup in her lower extremities. Patient verbalized understanding and will go back to the emergency department for evaluation.

## 2022-07-25 NOTE — H&P
Hospital Medicine History & Physical      PCP: Danita Gonzalez, APRN - CNP    Date of Admission: 7/25/2022    Date of Service: Pt seen/examined on 7/25/2022 and Admitted to observation with expected LOS less  than two midnights due to medical therapy. Chief Complaint:  Chest pain, shortness of breath, weight gain and bloating    History Of Present Illness:      40 y.o. female, with PMH of HTN, CAD, HLD and obesity, who presented to [facility] with chest pain, shortness of breath, weight gain and bloating. History obtained from the patient and review of EMR. The patient stated she had 2 stents placed 5 days ago here at [facility] with Dr. Julia Ramirez. She stated at discharge she was hypotensive and was told to stop taking her lasix. The patient stated over the last 5 days she has gained 11 pounds. She stated she has also been experiencing shortness of breath, chest pain and bloating. In the emergency room a CXR was obtained that revealed no acute cardiopulmonary findings. The patient had a negative troponin. Her ProBNP was slightly elevated at 643. She was given 20 mg IV lasix in the ED. The patient will be admitted for further evaluation and treatment. She denied any other associated symptoms as well as any aggravating and/or alleviating factors. At the time of this assessment, the patient was resting comfortably in bed. She currently denies any chest pain, back pain, abdominal pain, shortness of breath, numbness, tingling, N/V/C/D, fever and/or chills.      Past Medical History:          Diagnosis Date    CHF (congestive heart failure) (HCC)     COPD (chronic obstructive pulmonary disease) (HCC)     DVT     Hx of blood clots     Hypercholesterolemia     Myocardial infarct ([facility] [address]     Pulmonary embolism ([facility])     Unspecified cerebral artery occlusion with cerebral infarction      Past Surgical History:          Procedure Laterality Date    CARDIAC DEFIBRILLATOR PLACEMENT  09/15/2020    gen change, original device implanted 8/25/2012    CARDIAC SURGERY      catherization x 4    CHEST TUBE INSERTION      at birth    CORONARY ANGIOPLASTY WITH STENT PLACEMENT  03/2010    BMS to pRCA 4x28 [facility]    DILATION AND CURETTAGE OF UTERUS      KNEE ARTHROSCOPY      bilat knees    LAPAROSCOPY      x 2 for ovaries    TONSILLECTOMY AND ADENOIDECTOMY      UPPER GASTROINTESTINAL ENDOSCOPY  10/18/2010    with biopsies     Medications Prior to Admission:      Prior to Admission medications    Medication Sig Start Date End Date Taking? Authorizing Provider   clopidogrel (PLAVIX) 75 MG tablet Take 1 tablet by mouth in the morning. 7/19/22   LESLI Arreola CNP   loratadine (CLARITIN) 10 MG capsule Take 10 mg by mouth in the morning. Historical Provider, MD   enoxaparin (LOVENOX) 80 MG/0.8ML Inject 0.8 mLs into the skin daily 7/14/22   LESLI Hoyt CNP   gabapentin (NEURONTIN) 600 MG tablet Take 1 tablet by mouth 3 times daily for 30 days.  7/14/22 8/13/22  LESLI Hoyt CNP   omeprazole (PRILOSEC) 40 MG delayed release capsule Take 1 capsule by mouth twice daily 6/16/22   Olton, PA   simvastatin (ZOCOR) 40 MG tablet Take 1 tablet by mouth nightly 6/14/22 7/14/22  LESLI Fitzgerald CNP   metoprolol succinate (TOPROL XL) 25 MG extended release tablet Take 1 tablet by mouth daily 6/14/22   Radha LESLI Das CNP   escitalopram (LEXAPRO) 20 MG tablet Take 1 tablet by mouth daily 6/14/22   LESLI Fitzgerald CNP   furosemide (LASIX) 40 MG tablet Take 0.5 tablets by mouth daily 6/14/22 7/19/22  LESLI Fitzgerald CNP   tiZANidine (ZANAFLEX) 2 MG tablet TAKE 1 TABLET BY MOUTH THREE TIMES DAILY AS NEEDED (SARCODOISIS) 5/27/22   LESLI Fitzgerald CNP   aspirin 81 MG chewable tablet Take by mouth    Historical Provider, MD   nitroGLYCERIN (NITROSTAT) 0.4 MG SL tablet Place 1 tablet under the tongue every 5 minutes as needed for Chest pain 3/8/22   LESLI Fitzgerald CNP   vitamin D (CHOLECALCIFEROL) 400 UNIT TABS tablet Take 2,000 Units by mouth daily. Historical Provider, MD     Allergies:  Coumadin [warfarin], Duloxetine, Fluoxetine, Lisinopril, Morphine, Pregabalin, Baclofen, Losartan, Bisoprolol fumarate, Bupropion, Hydrocodone, Hydrocodone-acetaminophen, and Spironolactone    Social History:      The patient currently lives at home    TOBACCO:   reports that she has been smoking cigarettes. She started smoking about 15 years ago. She has been smoking an average of .25 packs per day. She has never used smokeless tobacco.  ETOH:   reports no history of alcohol use. E-cigarette/Vaping       Questions Responses    E-cigarette/Vaping Use Never User    Start Date     Passive Exposure     Quit Date     Counseling Given     Comments           Family History:      Reviewed and negative in regards to presenting illness/complaint. Problem Relation Age of Onset    Heart Disease Mother     High Blood Pressure Mother     Diabetes Mother     Depression Mother     High Cholesterol Mother     Heart Disease Father     High Blood Pressure Father     High Cholesterol Father     Asthma Sister      REVIEW OF SYSTEMS COMPLETED:   Pertinent positives as noted in the HPI. All other systems reviewed and negative. PHYSICAL EXAM PERFORMED:    BP (!) 114/56   Pulse 72   Temp 98.7 °F (37.1 °C) (Oral)   Resp 18   Ht 5' 5\" (1.651 m)   Wt 189 lb (85.7 kg)   SpO2 93%   BMI 31.45 kg/m²     General appearance:  Pleasant, obese female in no apparent distress, appears stated age and cooperative. HEENT: Pupils equal, round, and reactive to light. Extra ocular muscles intact. Conjunctivae/corneas clear. Neck: Supple, with full range of motion. No jugular venous distention. Trachea midline. Respiratory:  Normal respiratory effort. Clear to auscultation, bilaterally without Rales/Wheezes/Rhonchi. Cardiovascular:  Regular rate and rhythm with normal S1/S2 without murmurs, rubs or gallops.   Abdomen: Soft, obese, round non-tender, non-distended with normal bowel sounds. Musculoskeletal:  No clubbing, cyanosis or edema bilaterally. Full range of motion without deformity. Skin: Skin color, texture, turgor normal.  No significant rashes or lesions. Neurologic:  Neurovascularly intact. Cranial nerves: II-XII intact, grossly non-focal.  Psychiatric:  Alert and oriented, thought content appropriate, normal insight  Capillary Refill: Brisk,3 seconds, normal  Peripheral Pulses: +2 palpable, equal bilaterally     Labs:     Recent Labs     07/25/22 0039   WBC 8.0   HGB 12.4   HCT 35.9*        Recent Labs     07/25/22 0039      K 3.7      CO2 25   BUN 12   CREATININE 0.8   CALCIUM 9.4     Recent Labs     07/25/22 0039   AST 16   ALT 12   BILITOT <0.2   ALKPHOS 144*     Recent Labs     07/25/22 0039   TROPONINI <0.01     Urinalysis:      Lab Results   Component Value Date/Time    NITRU NEGATIVE 12/17/2010 10:40 PM    WBCUA Rare 12/17/2010 10:40 PM    RBCUA 0-2 12/17/2010 10:40 PM    BLOODU TRACE 12/17/2010 10:40 PM    SPECGRAV 1.015 12/17/2010 10:40 PM    GLUCOSEU NEGATIVE 12/17/2010 10:40 PM     Radiology:     CXR: I have reviewed the CXR with the following interpretation: no acute cardiopulmonary findings    EKG:  I have reviewed the EKG with the following interpretation: The Ekg interpreted in the absence of a cardiologist shows Normal sinus rhythm. Inferior infarct (cited on or before 16-MAY-2011) Anterolateral infarct (cited on or before 16-MAY-2011) Abnormal ECG. When compared with ECG of 15-JUL-2022 00:44, Questionable change in initial forces of Anterior leads. Nonspecific T wave abnormality now evident in Lateral leads    XR CHEST PORTABLE   Final Result   Stable chest with no acute abnormality seen.            Consults:    IP CONSULT TO HOSPITALIST    ASSESSMENT:    Active Hospital Problems    Diagnosis Date Noted    Obesity [E66.9] 07/25/2022     Priority: Medium    Ch[facility] [I50.32] 07/17/2022     Priority: Medium    CAD (coronary artery disease) [I25.10] 07/17/2022     Priority: Medium     PLAN:    Chest pain, shortness of breath, weight gain and bloating in setting of chronic diastolic CHF, unstable  -pt has not been taking lasix d/t hypotension  -CXR revealed: no acute cardiopulmonary findings  -ProBNP 643  -20 mg IV lasix given in ED  -Oxycodone given in ED  -strict I&O  -daily weights  -tele monitoring  -IV lasix bid until po restarted    Essential HTN in setting of known CAD  -continue ASA and plavix  -continue metoprolol    HLD  -continue simvastatin    Obesity  With Body mass index is 31.4 kg/m². Complicating assessment and treatment. Placing patient at risk for multiple co-morbidities as well as early death and contributing to the patient's presentation. Counseled on weight loss    DVT Prophylaxis: Lovenox    Diet: No diet orders on file    Code Status: Prior    PT/OT Eval Status: No indication for need at this time    Dispo - 1-2 days pending clinical improvement     LESLI Peñaloza - HELENE    Thank you LESLI Connell - HELENE for the opportunity to be involved in this patient's care. If you have any questions or concerns please feel free to contact me at [phone].  -------------------Anticipated DR. Maite valentine-----------------------

## 2022-07-25 NOTE — CONSULTS
Nutrition Note    RD received consult for CHF nutrition education. Pt sleeping soundly at time of visit. Handout on low sodium, fluid restriction and weight monitoring left at bedside. RD to follow up per pt and RD availability. Will continue to monitor per nutrition standards of care.      Panda Cerda MS, RD, LD on 7/25/2022 at 12:23 PM  Office: [phone]

## 2022-07-25 NOTE — PLAN OF CARE
Problem: Chronic Conditions and Co-morbidities  Goal: Patient's chronic conditions and co-morbidity symptoms are monitored and maintained or improved  Outcome: Progressing     Patient's EF (Ejection Fraction) is greater than 40%    Heart Failure Medications:  Diuretics[de-identified] Furosemide    (One of the following REQUIRED for EF </= 40%/SYSTOLIC FAILURE but MAY be used in EF% >40%/DIASTOLIC FAILURE)        ACE[de-identified] None        ARB[de-identified] None         ARNI[de-identified] None    (Beta Blockers)  NON- Evidenced Based Beta Blocker (for EF% >40%/DIASTOLIC FAILURE): None    Evidenced Based Beta Blocker::(REQUIRED for EF% <40%/SYSTOLIC FAILURE) Metoprolol SUCCinate- Toprol XL  . .................................................................................................................................................. Patient's weights and intake/output reviewed: Yes    Patient's Last Weight: 188 lbs obtained by standing scale. Difference of 1 lbs more than last documented weight. No intake or output data in the 24 hours ending 07/25/22 1031    Comorbidities Reviewed Yes    Patient has a past medical history of CHF (congestive heart failure) ([address] COPD (chronic obstructive pulmonary disease) ([address] DVT, Hx of blood clots, Hypercholesterolemia, Myocardial infarct ([address] Pulmonary embolism ([address] and Unspecified cerebral artery occlusion with cerebral infarction. >>For CHF and Comorbidity documentation on Education Time and Topics, please see Education Tab    Progressive Mobility Assessment:  What is this patient's Current Level of Mobility?: Ambulatory-Up Ad Nancy  How was this patient Mobilized today?: Edge of Bed, Up to Chair,  Up to Toilet/Shower, and Up in Room, ambulated 50 ft                 With Whom? Self                 Level of Difficulty/Assistance: Independent     Pt resting in bed at this time on room air. Pt denies shortness of breath. Pt without lower extremity edema.      Patient and/or Family's stated Goal of Care this Admission: increase activity tolerance, better understand heart failure and disease management, and be more comfortable prior to discharge        :

## 2022-07-25 NOTE — CARE COORDINATION
Chart reviewed. Pt is 40year old female admitted for weight gain secondary to CHF. Pt had two stents placed 5 days ago and was told to hold her lasix dose due to hypotension. Pt from mobile home. IPTA. Has PCP and insurance. Previous CM notes denies needs. No needs identified. Please consult if needs arise.

## 2022-07-26 LAB
ANION GAP SERPL CALCULATED.3IONS-SCNC: 12 MMOL/L (ref 3–16)
BUN BLDV-MCNC: 20 MG/DL (ref 7–20)
CALCIUM SERPL-MCNC: 9.1 MG/DL (ref 8.3–10.6)
CHLORIDE BLD-SCNC: 101 MMOL/L (ref 99–110)
CO2: 26 MMOL/L (ref 21–32)
CREAT SERPL-MCNC: 0.7 MG/DL (ref 0.6–1.1)
GFR AFRICAN AMERICAN: >60
GFR NON-AFRICAN AMERICAN: >60
GLUCOSE BLD-MCNC: 101 MG/DL (ref 70–99)
MAGNESIUM: 2.3 MG/DL (ref 1.8–2.4)
POTASSIUM SERPL-SCNC: 4.8 MMOL/L (ref 3.5–5.1)
SODIUM BLD-SCNC: 139 MMOL/L (ref 136–145)

## 2022-07-26 PROCEDURE — 80048 BASIC METABOLIC PNL TOTAL CA: CPT

## 2022-07-26 PROCEDURE — 6370000000 HC RX 637 (ALT 250 FOR IP)

## 2022-07-26 PROCEDURE — 36415 COLL VENOUS BLD VENIPUNCTURE: CPT

## 2022-07-26 PROCEDURE — 2580000003 HC RX 258: Performed by: NURSE PRACTITIONER

## 2022-07-26 PROCEDURE — 83735 ASSAY OF MAGNESIUM: CPT

## 2022-07-26 PROCEDURE — G0378 HOSPITAL OBSERVATION PER HR: HCPCS

## 2022-07-26 PROCEDURE — 6370000000 HC RX 637 (ALT 250 FOR IP): Performed by: NURSE PRACTITIONER

## 2022-07-26 PROCEDURE — 6360000002 HC RX W HCPCS: Performed by: NURSE PRACTITIONER

## 2022-07-26 PROCEDURE — 96372 THER/PROPH/DIAG INJ SC/IM: CPT

## 2022-07-26 RX ORDER — FUROSEMIDE 20 MG/1
20 TABLET ORAL DAILY
Status: DISCONTINUED | OUTPATIENT
Start: 2022-07-26 | End: 2022-07-27

## 2022-07-26 RX ORDER — HYDROXYZINE HYDROCHLORIDE 25 MG/1
50 TABLET, FILM COATED ORAL NIGHTLY PRN
Status: DISCONTINUED | OUTPATIENT
Start: 2022-07-26 | End: 2022-07-27 | Stop reason: CLARIF

## 2022-07-26 RX ADMIN — GABAPENTIN 600 MG: 300 CAPSULE ORAL at 09:58

## 2022-07-26 RX ADMIN — ASPIRIN 81 MG: 81 TABLET, CHEWABLE ORAL at 09:58

## 2022-07-26 RX ADMIN — HYDROXYZINE HYDROCHLORIDE 50 MG: 25 TABLET, FILM COATED ORAL at 21:45

## 2022-07-26 RX ADMIN — ESCITALOPRAM OXALATE 20 MG: 10 TABLET ORAL at 09:58

## 2022-07-26 RX ADMIN — ENOXAPARIN SODIUM 40 MG: 100 INJECTION SUBCUTANEOUS at 09:59

## 2022-07-26 RX ADMIN — CLOPIDOGREL BISULFATE 75 MG: 75 TABLET ORAL at 09:58

## 2022-07-26 RX ADMIN — SODIUM CHLORIDE, PRESERVATIVE FREE 10 ML: 5 INJECTION INTRAVENOUS at 20:08

## 2022-07-26 RX ADMIN — GABAPENTIN 600 MG: 300 CAPSULE ORAL at 20:05

## 2022-07-26 RX ADMIN — SODIUM CHLORIDE, PRESERVATIVE FREE 10 ML: 5 INJECTION INTRAVENOUS at 09:59

## 2022-07-26 RX ADMIN — ATORVASTATIN CALCIUM 20 MG: 10 TABLET, FILM COATED ORAL at 09:58

## 2022-07-26 ASSESSMENT — PAIN SCALES - GENERAL
PAINLEVEL_OUTOF10: 0

## 2022-07-26 NOTE — PROGRESS NOTES
Progress Note      PCP: LESLI Smith - CNP    Date of Admission: 7/25/2022    Chief Complaint: chest pain, SOB, weight gain, bloating    Hospital Course:   40 y.o. female, with PMH of HTN, CAD, HLD and obesity, who presented to [facility] with chest pain, shortness of breath, weight gain and bloating. History obtained from the patient and review of EMR. The patient stated she had 2 stents placed 5 days ago here at [facility] with Dr. Cordell Perez. She stated at discharge she was hypotensive and was told to stop taking her lasix. The patient stated over the last 5 days she has gained 11 pounds. She stated she has also been experiencing shortness of breath, chest pain and bloating. In the emergency room a CXR was obtained that revealed no acute cardiopulmonary findings. The patient had a negative troponin. Her ProBNP was slightly elevated at 643. She was given 20 mg IV lasix in the ED. The patient will be admitted for further evaluation and treatment. She denied any other associated symptoms as well as any aggravating and/or alleviating factors. At the time of this assessment, the patient was resting comfortably in bed. She currently denies any chest pain, back pain, abdominal pain, shortness of breath, numbness, tingling, N/V/C/D, fever and/or chills. Subjective:  Pt seen and examined this morning. Reports feeling groggy but denies any chest pain, SOB, n/v/d/c, or peripheral edema. Feels breathing/chest tightness has improved. States she has been voiding frequently. Denies any lightheadedness or dizziness when up or ambulating.        Medications:  Reviewed    Infusion Medications    sodium chloride       Scheduled Medications    aspirin  81 mg Oral Daily    clopidogrel  75 mg Oral Daily    escitalopram  20 mg Oral Daily    gabapentin  600 mg Oral TID    metoprolol succinate  25 mg Oral Daily    sodium chloride flush  5-40 mL IntraVENous 2 times per day    enoxaparin  40 mg SubCUTAneous Daily    furosemide  20 mg IntraVENous BID    atorvastatin  20 mg Oral Daily     PRN Meds: sodium chloride flush, sodium chloride, ondansetron **OR** ondansetron, polyethylene glycol, acetaminophen **OR** acetaminophen      Intake/Output Summary (Last 24 hours) at 7/26/2022 0754  Last data filed at 7/26/2022 0747  Gross per 24 hour   Intake 840 ml   Output 1400 ml   Net -560 ml       Physical Exam Performed:    /60   Pulse 60   Temp 97.9 °F (36.6 °C) (Oral)   Resp 16   Ht 5' (1.524 m)   Wt 183 lb 8 oz (83.2 kg)   SpO2 93%   BMI 35.84 kg/m²     General appearance: No apparent distress, appears stated age and cooperative. HEENT: Pupils equal, round, and reactive to light. Conjunctivae/corneas clear. Neck: Supple, with full range of motion. No jugular venous distention. Trachea midline. Respiratory:  Normal respiratory effort. Clear to auscultation, bilaterally without Rales/Wheezes/Rhonchi. Cardiovascular: Regular rate and rhythm with normal S1/S2 without murmurs, rubs or gallops. Abdomen: Soft, non-tender, non-distended with normal bowel sounds. Musculoskeletal: No clubbing, cyanosis or edema bilaterally. Full range of motion without deformity. Skin: Skin color, texture, turgor normal.  No rashes or lesions. Neurologic:  Neurovascularly intact without any focal sensory/motor deficits.  Cranial nerves: II-XII intact, grossly non-focal.  Psychiatric: Alert and oriented, thought content appropriate, normal insight  Capillary Refill: Brisk,3 seconds, normal   Peripheral Pulses: +2 palpable, equal bilaterally       Labs:   Recent Labs     07/25/22  0039 07/25/22  0647   WBC 8.0 7.1   HGB 12.4 11.1*   HCT 35.9* 32.7*    285     Recent Labs     07/25/22  0039 07/25/22  0647 07/26/22  0708    140 139   K 3.7 3.9 4.8    104 101   CO2 25 26 26   BUN 12 15 20   CREATININE 0.8 0.8 0.7   CALCIUM 9.4 9.3 9.1     Recent Labs     07/25/22  0039   AST 16   ALT 12   BILITOT <0.2   ALKPHOS 144*     No results for input(s): INR in the last 72 hours. Recent Labs     07/25/22  0039   TROPONINI <0.01       Urinalysis:      Lab Results   Component Value Date/Time    NITRU NEGATIVE 12/17/2010 10:40 PM    WBCUA Rare 12/17/2010 10:40 PM    RBCUA 0-2 12/17/2010 10:40 PM    BLOODU TRACE 12/17/2010 10:40 PM    SPECGRAV 1.015 12/17/2010 10:40 PM    GLUCOSEU NEGATIVE 12/17/2010 10:40 PM       Radiology:  XR CHEST PORTABLE   Final Result   Stable chest with no acute abnormality seen. Assessment/Plan:    Active Hospital Problems    Diagnosis     Obesity [E66.9]      Priority: Medium    Weight gain [R63.5]      Priority: Medium    Chronic diastolic CHF (congestive heart failure) (HCC) [I50.32]      Priority: Medium    CAD (coronary artery disease) [I25.10]      Priority: Medium     Chest pain, shortness of breath, weight gain and bloating in setting of chronic diastolic CHF, unstable  -ED CXR revealed no acute cardiopulmonary findings  -ProBNP 643 in ED  -strict I&O, out 1.4L since admit  -daily weights, down 6 lbs since admit  -tele monitoring  -IV lasix held this AM d/t soft BP, plan to do po lasix 20 mg this afternoon if BP tolerates      Essential HTN in setting of known CAD  -continue ASA and plavix  -continue metoprolol with hold parameters in place for low BP     HLD  -continue simvastatin     Obesity  With Body mass index is 31.4 kg/m². Complicating assessment and treatment. Placing patient at risk for multiple co-morbidities as well as early death and contributing to the patient's presentation. Counseled on weight loss    DVT Prophylaxis: lovenox  Diet: ADULT DIET; Regular;  Low Sodium (2 gm)  Code Status: Full Code    PT/OT Eval Status: not indicated at this time    Dispo - 1-2 days pending clinical improvement       Jeanna Adams DO, PGY-2  [facility]

## 2022-07-27 LAB
ANION GAP SERPL CALCULATED.3IONS-SCNC: 13 MMOL/L (ref 3–16)
BUN BLDV-MCNC: 17 MG/DL (ref 7–20)
CALCIUM SERPL-MCNC: 9.6 MG/DL (ref 8.3–10.6)
CHLORIDE BLD-SCNC: 101 MMOL/L (ref 99–110)
CO2: 23 MMOL/L (ref 21–32)
CREAT SERPL-MCNC: 0.7 MG/DL (ref 0.6–1.1)
GFR AFRICAN AMERICAN: >60
GFR NON-AFRICAN AMERICAN: >60
GLUCOSE BLD-MCNC: 107 MG/DL (ref 70–99)
MAGNESIUM: 2.4 MG/DL (ref 1.8–2.4)
POTASSIUM SERPL-SCNC: 4.4 MMOL/L (ref 3.5–5.1)
SODIUM BLD-SCNC: 137 MMOL/L (ref 136–145)

## 2022-07-27 PROCEDURE — 96372 THER/PROPH/DIAG INJ SC/IM: CPT

## 2022-07-27 PROCEDURE — 36415 COLL VENOUS BLD VENIPUNCTURE: CPT

## 2022-07-27 PROCEDURE — 6370000000 HC RX 637 (ALT 250 FOR IP): Performed by: NURSE PRACTITIONER

## 2022-07-27 PROCEDURE — 83735 ASSAY OF MAGNESIUM: CPT

## 2022-07-27 PROCEDURE — 6360000002 HC RX W HCPCS: Performed by: INTERNAL MEDICINE

## 2022-07-27 PROCEDURE — 80048 BASIC METABOLIC PNL TOTAL CA: CPT

## 2022-07-27 PROCEDURE — 6370000000 HC RX 637 (ALT 250 FOR IP): Performed by: INTERNAL MEDICINE

## 2022-07-27 PROCEDURE — G0378 HOSPITAL OBSERVATION PER HR: HCPCS

## 2022-07-27 PROCEDURE — 6360000002 HC RX W HCPCS: Performed by: NURSE PRACTITIONER

## 2022-07-27 PROCEDURE — 2580000003 HC RX 258: Performed by: NURSE PRACTITIONER

## 2022-07-27 PROCEDURE — 99222 1ST HOSP IP/OBS MODERATE 55: CPT | Performed by: INTERNAL MEDICINE

## 2022-07-27 RX ORDER — GABAPENTIN 300 MG/1
600 CAPSULE ORAL DAILY
Status: DISCONTINUED | OUTPATIENT
Start: 2022-07-27 | End: 2022-07-29 | Stop reason: HOSPADM

## 2022-07-27 RX ORDER — COSYNTROPIN 0.25 MG/ML
250 INJECTION, POWDER, FOR SOLUTION INTRAMUSCULAR; INTRAVENOUS ONCE
Status: COMPLETED | OUTPATIENT
Start: 2022-07-28 | End: 2022-07-28

## 2022-07-27 RX ORDER — FUROSEMIDE 20 MG/1
10 TABLET ORAL DAILY
Status: DISCONTINUED | OUTPATIENT
Start: 2022-07-28 | End: 2022-07-28

## 2022-07-27 RX ORDER — HYDROXYZINE PAMOATE 25 MG/1
50 CAPSULE ORAL NIGHTLY PRN
Status: DISCONTINUED | OUTPATIENT
Start: 2022-07-27 | End: 2022-07-29 | Stop reason: HOSPADM

## 2022-07-27 RX ORDER — FUROSEMIDE 10 MG/ML
20 INJECTION INTRAMUSCULAR; INTRAVENOUS ONCE
Status: COMPLETED | OUTPATIENT
Start: 2022-07-27 | End: 2022-07-27

## 2022-07-27 RX ORDER — HYDROXYZINE PAMOATE 25 MG/1
50 CAPSULE ORAL NIGHTLY PRN
Status: DISCONTINUED | OUTPATIENT
Start: 2022-07-28 | End: 2022-07-27 | Stop reason: SDUPTHER

## 2022-07-27 RX ADMIN — SODIUM CHLORIDE, PRESERVATIVE FREE 10 ML: 5 INJECTION INTRAVENOUS at 10:30

## 2022-07-27 RX ADMIN — ATORVASTATIN CALCIUM 20 MG: 10 TABLET, FILM COATED ORAL at 10:30

## 2022-07-27 RX ADMIN — HYDROXYZINE PAMOATE 50 MG: 25 CAPSULE ORAL at 21:39

## 2022-07-27 RX ADMIN — ENOXAPARIN SODIUM 40 MG: 100 INJECTION SUBCUTANEOUS at 10:30

## 2022-07-27 RX ADMIN — CLOPIDOGREL BISULFATE 75 MG: 75 TABLET ORAL at 10:30

## 2022-07-27 RX ADMIN — SODIUM CHLORIDE, PRESERVATIVE FREE 10 ML: 5 INJECTION INTRAVENOUS at 21:39

## 2022-07-27 RX ADMIN — ASPIRIN 81 MG: 81 TABLET, CHEWABLE ORAL at 10:30

## 2022-07-27 RX ADMIN — ESCITALOPRAM OXALATE 20 MG: 10 TABLET ORAL at 10:30

## 2022-07-27 RX ADMIN — FUROSEMIDE 20 MG: 10 INJECTION, SOLUTION INTRAMUSCULAR; INTRAVENOUS at 17:05

## 2022-07-27 ASSESSMENT — PAIN SCALES - GENERAL
PAINLEVEL_OUTOF10: 0

## 2022-07-27 NOTE — PLAN OF CARE
Patient's EF (Ejection Fraction) is greater than 40%    Heart Failure Medications:  Diuretics[de-identified] Furosemide- hold parameters for BP    (One of the following REQUIRED for EF </= 40%/SYSTOLIC FAILURE but MAY be used in EF% >40%/DIASTOLIC FAILURE)        ACE[de-identified] None        ARB[de-identified] None         ARNI[de-identified] None    (Beta Blockers)  NON- Evidenced Based Beta Blocker (for EF% >40%/DIASTOLIC FAILURE): None    Evidenced Based Beta Blocker::(REQUIRED for EF% <40%/SYSTOLIC FAILURE) Metoprolol SUCCinate- Toprol XL- hold parameters for BP  . .................................................................................................................................................. Patient's weights and intake/output reviewed: Yes    Patient's Last Weight: 180 lbs obtained by standing scale. Difference of 3 lbs less than last documented weight. Intake/Output Summary (Last 24 hours) at 7/27/2022 1519  Last data filed at 7/27/2022 1423  Gross per 24 hour   Intake 980 ml   Output 3600 ml   Net -2620 ml       Comorbidities Reviewed Yes    Patient has a past medical history of CHF (congestive heart failure) ([address] COPD (chronic obstructive pulmonary disease) ([address] DVT, Hx of blood clots, Hypercholesterolemia, Myocardial infarct ([address] Pulmonary embolism ([address] and Unspecified cerebral artery occlusion with cerebral infarction. >>For CHF and Comorbidity documentation on Education Time and Topics, please see Education Tab    Progressive Mobility Assessment:  What is this patient's Current Level of Mobility?: Ambulatory-Up Ad Nancy  How was this patient Mobilized today?: Edge of Bed, Up to Chair,  Up to Toilet/Shower, Up in Room, and Up in Hallway, ambulated 25 ft                 With Whom? Self                 Level of Difficulty/Assistance: Independent     Pt resting in bed at this time on room air. Pt denies shortness of breath. Pt without lower extremity edema.      Patient and/or Family's stated Goal of Care this Admission:

## 2022-07-27 NOTE — PROGRESS NOTES
Progress Note      PCP: LESLI Perrin - CNP    Date of Admission: 7/25/2022    Chief Complaint: shortness of breath, weight gain    Hospital Course: 46/F CAD s/p RCA stent (07/15/2022), CHF, HLD, obesity admitted for acute shortness of breath and weight gain. The patient was recently discharged from [facility] with RCA stent and was advised to stop the lasix d/t hypotension. The patient developed shortness of breath, weight gain, and bloating over the following several days and was urged by her PCP to be evaluated at the ED. At ED, the patient had neg trop and unremarkable CXR but her proBNP was elevated at 643. She was given 20 mg IV lasix in the ED and was admitted for further evaluation and treatment. The patient denied any other associated symptoms. At this time, the patient states that her SOB has been resolved and feel like she is back to her baseline. Currently denies any chest pain, abdominal pain, SOB, nausea, vomiting, fever, or chills. Of note, the patient states that she always had low BP and does not have hypertension. Patient also states that her weight during the RCA stent procedure several weeks ago (July 2022) was 173 lbs. Subjective: Pt seen and examined this morning. Reports feeling better today, states that she is back to her baseline. Feels breathing/chest tightness has resolved. States she has been voiding frequently about 6-10 times per day. Had nml BM. Eating and drinking fine. She can drink as much as she likes but states that she doesn't drink all that much. Her grogginess has resolved. Denies any chest pain, SOB, n/v/d/c, or peripheral edema. Denies any lightheadedness or dizziness when up or ambulating.      Medications:  Reviewed    Infusion Medications    sodium chloride       Scheduled Medications    gabapentin  600 mg Oral Daily    [START ON 7/28/2022] furosemide  10 mg Oral Daily    [START ON 7/28/2022] cosyntropin  250 mcg IntraVENous Once    aspirin  81 mg Oral Daily clopidogrel  75 mg Oral Daily    escitalopram  20 mg Oral Daily    metoprolol succinate  25 mg Oral Daily    sodium chloride flush  5-40 mL IntraVENous 2 times per day    enoxaparin  40 mg SubCUTAneous Daily    atorvastatin  20 mg Oral Daily     PRN Meds: hydrOXYzine HCl, sodium chloride flush, sodium chloride, ondansetron **OR** ondansetron, polyethylene glycol, acetaminophen **OR** acetaminophen      Intake/Output Summary (Last 24 hours) at 7/27/2022 1142  Last data filed at 7/27/2022 0443  Gross per 24 hour   Intake 1200 ml   Output 3900 ml   Net -2700 ml       Physical Exam Performed:    BP (!) 90/55   Pulse 61   Temp 98.1 °F (36.7 °C) (Oral)   Resp 16   Ht 5' (1.524 m)   Wt 180 lb 12.8 oz (82 kg)   SpO2 95%   BMI 35.31 kg/m²     General appearance: No apparent distress, appears stated age and cooperative. HEENT: deferred  Neck: deferred  Respiratory:  Normal respiratory effort. Clear to auscultation, bilaterally without Rales/Wheezes/Rhonchi. Cardiovascular: Regular rate and rhythm with normal S1/S2 without murmurs, rubs or gallops. Abdomen: mildly distended, soft, non-tender, non-distended with normal bowel sounds. Musculoskeletal: no peripheral edema  Skin: No rashes or lesions. Neurologic:  deferred  Psychiatric: Alert and oriented, thought content appropriate, normal insight  Capillary Refill: deferred  Peripheral Pulses: deferred      Labs:   Recent Labs     07/25/22  0039 07/25/22  0647   WBC 8.0 7.1   HGB 12.4 11.1*   HCT 35.9* 32.7*    285     Recent Labs     07/25/22  0647 07/26/22  0708 07/27/22  0737    139 137   K 3.9 4.8 4.4    101 101   CO2 26 26 23   BUN 15 20 17   CREATININE 0.8 0.7 0.7   CALCIUM 9.3 9.1 9.6     Recent Labs     07/25/22  0039   AST 16   ALT 12   BILITOT <0.2   ALKPHOS 144*     No results for input(s): INR in the last 72 hours.   Recent Labs     07/25/22  0039   TROPONINI <0.01       Urinalysis:      Lab Results   Component Value Date/Time    NITRU NEGATIVE 12/17/2010 10:40 PM    WBCUA Rare 12/17/2010 10:40 PM    RBCUA 0-2 12/17/2010 10:40 PM    BLOODU TRACE 12/17/2010 10:40 PM    SPECGRAV 1.015 12/17/2010 10:40 PM    GLUCOSEU NEGATIVE 12/17/2010 10:40 PM       Radiology:  XR CHEST PORTABLE   Final Result   Stable chest with no acute abnormality seen. Assessment/Plan:    Active Hospital Problems    Diagnosis     Obesity [E66.9]      Priority: Medium    Weight gain [R63.5]      Priority: Medium    Chronic diastolic CHF (congestive heart failure) (HCC) [I50.32]      Priority: Medium    CAD (coronary artery disease) [I25.10]      Priority: Medium       ACUTE PROBLEMS    #acute on chronic HF  - basline wt 173, currently 180 today from 183 yesterday  - lasix was held on 07/25 d/t hypotension (90/55 this [place])  - if SBP > 90, give lasix 10 mg PO daily  - continue strict I/O  - continue daily wt    #Hypotension  - chronic likely d/t prior MI, has pace-maker that was interrogated recently and was fine  - continue to hold metop succinate unless SBP >110 (consider stopping given no hx of HTN and hx of chronic bradycardia)  - consult cards for metop succinate med mgnt  - r/o adrenal insufficiency    #r/o adrenal insufficiency  - pt reports that she was diagnosed with adrenal insufficiency many years ago  - most recent (07/15/2022) [place] cortisol lvl was nml (6.2) but no documented workup for AI  - ordered ACTH stim test for tomorrow morning      CHRONIC PROBLEMS    #HLD  - continue atorvastatin 20 mg qd    #Obesity  - BMI 35.31  - PCP f/u       DVT Prophylaxis: lovenox 40 mg qd  Diet: ADULT DIET; Regular;  Low Sodium (2 gm)  Code Status: Full Code     PT/OT Eval Status: not indicated at this time     Dispo - 1-2 days pending establishing sustainable lasix med mgnt for her HF      Scarlet Ball MD PhD   Family Medicine, PGY-1

## 2022-07-27 NOTE — PROGRESS NOTES
FYI pts BP is 88/49, asymptomatic. She has been running low and I did give her PRN hydroxyzine for sleep aid tonight.  Thanks

## 2022-07-27 NOTE — CONSULTS
[facility]  [phone]      Attending Physician: Karina Cerna MD  Reason for Consultation/Chief Complaint: Chest pressure and shortness of breath and weight gain as well as leg swelling    Subjective   History of Present Illness:  Jay Simms is a 40 y.o. patient who presented to the hospital with complaints of Chest pressure and shortness of breath and weight gain as well as leg swelling over the last few days, patient is status post recent hospital admission from July 15, 2022 until July 19, 2022. At that time, patient had shortness of breath, was noted to have tachycardia, also chest pressure and a fall and was evaluated with testing including stress test which was abnormal, patient underwent cardiac catheterization Dr. Curtis Joe with RCA stenting. She had lower blood pressures and as such she was asked to stop Lasix. She says her weight had gone up by 5 to 10 pounds, her baseline weight is 175 pounds and current weight is 180 pounds. In the course of work-up in the emergency room today, patient was found to have troponin levels that was negative, proBNP level was elevated at 643. Patient has a cardiac history which dates back to 2010, has been diagnosed with cardiac sarcoid as well as inferior wall MI in 2010 with RCA stenting. She has had an ischemic remapping, followed at [facility], she ultimately underwent dual-chamber ICD implantation in 2012. She also had SVT status post ablation in 2015. She had a history of DVT/PE as well as factor VIII deficiency on Lovenox and history of stroke. She has had a generator change of her ICD in 2020. She additionally has a history of tobacco abuse, she continues to smoke. She states she has been compliant with her medications.     Past Medical History:   has a past medical history of CHF (congestive heart failure) ([address] COPD (chronic obstructive pulmonary disease) ([facility] [address] DVT, Hx of blood clots, Hypercholesterolemia, Myocardial infarct [facility]), Pulmonary embolism ([facility] [address] and Unspecified cerebral artery occlusion with cerebral infarction. Surgical History:   has a past surgical history that includes laparoscopy; Knee arthroscopy; Tonsillectomy and adenoidectomy; chest tube insertion; Dilation and curettage of uterus; Cardiac surgery; Coronary angioplasty with stent (03/2010); Upper gastrointestinal endoscopy (10/18/2010); and Cardiac defibrillator placement (09/15/2020). Social History:   reports that she has been smoking cigarettes. She started smoking about 15 years ago. She has been smoking an average of .25 packs per day. She has never used smokeless tobacco. She reports current drug use. Drug: Marijuana [place]). She reports that she does not drink alcohol. Family History:  family history includes Asthma in her sister; Depression in her mother; Diabetes in her mother; Heart Disease in her father and mother; High Blood Pressure in her father and mother; High Cholesterol in her father and mother. Home Medications:  Were reviewed and are listed in nursing record and/or below  Prior to Admission medications    Medication Sig Start Date End Date Taking? Authorizing Provider   clopidogrel (PLAVIX) 75 MG tablet Take 1 tablet by mouth in the morning. 7/19/22   LESLI Noriega CNP   loratadine (CLARITIN) 10 MG capsule Take 10 mg by mouth in the morning. Historical Provider, MD   enoxaparin (LOVENOX) 80 MG/0.8ML Inject 0.8 mLs into the skin daily 7/14/22   LESLI Ellsworth CNP   gabapentin (NEURONTIN) 600 MG tablet Take 1 tablet by mouth 3 times daily for 30 days.  7/14/22 8/13/22  LESLI Ellsworth CNP   omeprazole (PRILOSEC) 40 MG delayed release capsule Take 1 capsule by mouth twice daily 6/16/22   KAMILLA Schwarz   simvastatin (ZOCOR) 40 MG tablet Take 1 tablet by mouth nightly 6/14/22 7/14/22  LESLI Meng CNP   metoprolol succinate (TOPROL XL) 25 MG extended release tablet Take 1 tablet by mouth daily 6/14/22   LESLI Saucedo CNP   escitalopram (LEXAPRO) 20 MG tablet Take 1 tablet by mouth daily 6/14/22   LESLI Saucedo CNP   furosemide (LASIX) 40 MG tablet Take 0.5 tablets by mouth daily 6/14/22 7/19/22  LESLI Saucedo CNP   tiZANidine (ZANAFLEX) 2 MG tablet TAKE 1 TABLET BY MOUTH THREE TIMES DAILY AS NEEDED (SARCODOISIS) 5/27/22   LESLI Saucedo CNP   aspirin 81 MG chewable tablet Take by mouth    Historical Provider, MD   nitroGLYCERIN (NITROSTAT) 0.4 MG SL tablet Place 1 tablet under the tongue every 5 minutes as needed for Chest pain 3/8/22   LESLI Saucedo CNP   vitamin D (CHOLECALCIFEROL) 400 UNIT TABS tablet Take 2,000 Units by mouth daily.     Historical Provider, MD        CURRENT Medications:  gabapentin (NEURONTIN) capsule 600 mg, Daily  [START ON 7/28/2022] furosemide (LASIX) tablet 10 mg, Daily  [START ON 7/28/2022] cosyntropin (CORTROSYN) injection 250 mcg, Once  hydrOXYzine HCl (ATARAX) tablet 50 mg, Nightly PRN  aspirin chewable tablet 81 mg, Daily  clopidogrel (PLAVIX) tablet 75 mg, Daily  escitalopram (LEXAPRO) tablet 20 mg, Daily  metoprolol succinate (TOPROL XL) extended release tablet 25 mg, Daily  sodium chloride flush 0.9 % injection 5-40 mL, 2 times per day  sodium chloride flush 0.9 % injection 5-40 mL, PRN  0.9 % sodium chloride infusion, PRN  ondansetron (ZOFRAN-ODT) disintegrating tablet 4 mg, Q8H PRN   Or  ondansetron (ZOFRAN) injection 4 mg, Q6H PRN  polyethylene glycol (GLYCOLAX) packet 17 g, Daily PRN  acetaminophen (TYLENOL) tablet 650 mg, Q6H PRN   Or  acetaminophen (TYLENOL) suppository 650 mg, Q6H PRN  enoxaparin (LOVENOX) injection 40 mg, Daily  atorvastatin (LIPITOR) tablet 20 mg, Daily        Allergies:  Coumadin [warfarin], Duloxetine, Fluoxetine, Lisinopril, Morphine, Pregabalin, Baclofen, Losartan, Bisoprolol fumarate, Bupropion, Hydrocodone, Hydrocodone-acetaminophen, and Spironolactone Review of Systems:   A 14 point review of symptoms completed. Pertinent positives identified in the HPI, all other review of symptoms negative as below.       Objective   PHYSICAL EXAM:    Vitals:    07/27/22 1144   BP: (!) 100/54   Pulse: 65   Resp: 14   Temp: 98.2 °F (36.8 °C)   SpO2: 94%    Weight: 180 lb 12.8 oz (82 kg)         General Appearance:  Alert, cooperative, no distress, appears stated age, able to lie flat, speaking full sentences   Head:  Normocephalic, without obvious abnormality, atraumatic   Eyes:  PERRL, conjunctiva/corneas clear   Nose: Nares normal, no drainage or sinus tenderness   Throat: Lips, mucosa, and tongue normal   Neck: Supple, no JVP   Lungs:   Clear to auscultation bilaterally, respirations unlabored   Chest Wall:  No deformity or tenderness   Heart:  Regular rate and rhythm, S1, S2 normal, 2/6 sm   Abdomen:   Soft, non-tender, bowel sounds active all four quadrants,  no masses, no organomegaly   Extremities: Extremities normal, atraumatic, no cyanosis or edema   Pulses: 2+ and symmetric   Skin: Skin color, texture, turgor normal, no rashes or lesions   Pysch: Normal mood and affect   Neurologic: Normal gross motor and sensory exam.         Labs   CBC:   Lab Results   Component Value Date/Time    WBC 7.1 07/25/2022 06:47 AM    RBC 3.60 07/25/2022 06:47 AM    HGB 11.1 07/25/2022 06:47 AM    HCT 32.7 07/25/2022 06:47 AM    MCV 90.9 07/25/2022 06:47 AM    RDW 12.6 07/25/2022 06:47 AM     07/25/2022 06:47 AM     CMP:  Lab Results   Component Value Date/Time     07/27/2022 07:37 AM    K 4.4 07/27/2022 07:37 AM    K 3.7 07/25/2022 12:39 AM     07/27/2022 07:37 AM    CO2 23 07/27/2022 07:37 AM    BUN 17 07/27/2022 07:37 AM    CREATININE 0.7 07/27/2022 07:37 AM    GFRAA >60 07/27/2022 07:37 AM    GFRAA >60 05/30/2011 06:15 PM    AGRATIO 1.3 07/25/2022 12:39 AM    LABGLOM >60 07/27/2022 07:37 AM    GLUCOSE 107 07/27/2022 07:37 AM    PROT 7.3 07/25/2022 12:39 AM    PROT 7.3 2011 12:30 PM    CALCIUM 9.6 2022 07:37 AM    BILITOT <0.2 2022 12:39 AM    ALKPHOS 144 2022 12:39 AM    AST 16 2022 12:39 AM    ALT 12 2022 12:39 AM     PT/INR:  No results found for: PTINR  HgBA1c:  Lab Results   Component Value Date    LABA1C 5.1 2022     Lab Results   Component Value Date    CKTOTAL 28 2010    CKMB 0.57 10/25/2010    TROPONINI <0.01 2022         Cardiac Data     Last EKG: Normal sinus rhythm, incomplete right bundle branch block, inferior/lateral infarct pattern    Echo:    2022    Summary   Normal left ventricle systolic function with an estimated ejection fraction   of 50%. There is mild hypokinesis of the inferior wall segments. Normal left ventricular diastolic filling pressures. The right ventricle is normal in size and function. The right atrium is mildly dilated. Mild posterior mitral annular calcification is present. Systolic pulmonary artery pressure (sPAP) is normal and estimated at 35 mmHg   (right atrial pressure 8 mmHg)    Stress Test:    2022    Summary    Abnormal moderate risk myocardial perfusion study. There is a defect within the inferior wall consistent with prior infarction    and mild terry-infarct ischemia. There is mild ischemia defect within the apical lateral wall. There is a mild fixed defect within the basal chris-septal wall. The estimated left ventricular function is 57%. Cath:    2022    [address]                                                  Cardiac Catheterization Lab Report     PATIENT: Irineo Toney  DATE: 2022  MRN: [id number]  CSN: [id number]  : 1975        Performing Physician: Nicholas Landau, MD, [address], [place], [place]  Primary Care Physician: LESLI Hwang - CNP  Admitting Provider: Brent Reyes MD     Procedures Performed:  1. Left heart catheterization  2. Selective left and right coronary angiogram  3.  Left ventriculography 4.  Intravascular ultrasound of the right coronary artery  5. Cutting Balloon angioplasty, noncompliant balloon angioplasty, and drug-eluting stent insertion to the right coronary artery     Procedure Findings:  1. Single-vessel critical coronary disease with 70% in-stent restenosis of the proximal and mid right coronary artery in the setting of a previously placed bare-metal stent from 2010  2. Normal left ventricular function with EF estimated at 55-60% with mild inferior wall hypokinesis  3. Normal left heart hemodynamics     Indications:       Patient Active Problem List   Diagnosis    Chest pain    Syncope and collapse    Palpitations    Coronary artery disease involving native coronary artery of native heart with angina pectoris ([address]    ICD (implantable cardioverter-defibrillator) in place    Sarcoidosis    Chronic diastolic CHF (congestive heart failure) ([facility] [address]    Primary hypertension         Details:              Paul Aparicio was brought to the cardiac catheterization lab in a fasting state after informed consent was obtained. If the patient was able to provide written consent, it was obtained. The patient's vitals were monitored through out the procedure. The patient was sedated using the appropriate levels of sedation and ASA guidelines. The appropriate access site area was prepped and drapped in a sterile fashion. The area was anesthetized with 2% lidocaine. Using the modified Seldinger technique, an arterial sheath was introduced into the arterial access site using a single anterior wall puncture. The sheath was flushed and prepped in usual fashion. Catheters used during the procedure included 5 [place] TIG 4.0 catheter. The catheters were advanced and removed over a .035\" wire, into the appropriate positions. Multiple angiographic views were obtained. An LV gram was obtained.               ~The interventional portion of the procedure was completed utilizing a 6 [place] system. Adjunctive pharmacotherapy was aspirin, Angiomax, and Plavix. Initially Daryn JR4 6 [place]zi guide was advanced to the right coronary ostium. A BMW wire was advanced into the distal right coronary without difficulty. We completed intravascular ultrasound using the [employer] system. Vessel diameter and plaque composition were evaluated. The vessel was measured at 4.4 to 4.97 mm in diameter. There was a previously placed stent within the mid and proximal right coronary that had diffuse in-stent restenosis. The stent was subsequently then predilated with initially a 4.0 mm x 15 mm [place] cutting balloon. We then utilized a noncompliant four 5 x 15 [place] balloon. In the subcu stented to the right coronary utilizing 2 drug-eluting stents. We used a resolute [place] 5.0 mm x 30 mm followed by a 5.0 mm x 12 mm. These were both inflated to rated burst pressures and postdilated to maximal luminal diameter. Findings:     1. Left main coronary artery was normal. It gave off the left anterior descending artery and left circumflex. 2. Left anterior descending artery was normal. It was moderate in size. It gave off septal perforators and a moderate sized diagonal branch. The LAD covered the entire apex of the left ventricle. 3. Left circumflex was normal. It was moderate in size. There was a moderate sized obtuse marginal branch. 4. Right coronary had a previously placed stent within the right coronary. This is evident in the proximal mid segment. There is diffuse in-stent restenosis that creates about a 70% area of atherosclerosis. 5. Left ventriculogram showed normal LVEF at 55-60%. There was inferior wall motion hypokinesis. There was no significant mitral valve or aortic valve disease noted. LVEDP was normal. There was no gradient noted across the aortic valve during pullback of the catheter.      /61   Pulse 65   Temp 97.7 °F (36.5 °C) (Oral) Resp 16   Ht 5' (1.524 m)   Wt 178 lb 11.2 oz (81.1 kg)   SpO2 99%   BMI 34.90 kg/m²      The access site was controlled with manual pressure and/or appropriate closure device. Moderate Conscious Sedation Details: An independent trained observer pushed medications at my direction. We monitoring the patient's level of consciousness and vital signs/physiologic status throughout the procedure. CPT codes 08926 and 92253        Start time: 1119  Stop time: 1201  ASA class: 3     Sedation totals:  Versad - 6mg  Fentanyl - 150mcg     EBL - minimal <5 cc blood loss     The patient was monitored continuously with the ECG, pulse oximetry, blood pressure, and direct observation. CONCLUSIONS:     1. Successful IVUS guided Cutting Balloon angioplasty, noncompliant balloon angioplasty, and drug-eluting stent insertion to the right coronary artery     ASSESSMENT/RECOMMENDATIONS:     1. Antiplatelet therapy with aspirin and Plavix for minimum of 1-3 months followed by single antiplatelet coverage. 2.  The patient will continue to take coagulation with Lovenox as necessary. Studies:     Cxr       Impression   Stable chest with no acute abnormality seen. I have reviewed labs and imaging/xray/diagnostic testing in this note. Assessment and Plan          Patient Active Problem List   Diagnosis    Chest pain    Syncope and collapse    Palpitations    CAD (coronary artery disease)    ICD (implantable cardioverter-defibrillator) in place    Sarcoidosis    Chronic diastolic CHF (congestive heart failure) (HCC)    Primary hypertension    Abnormal cardiovascular stress test    Stented coronary artery    Obesity    Weight gain       Acute on chronic systolic heart failure, give one-time IV Lasix, continue oral Lasix at low doses due to intermittent hypotension. Obtain follow-up limited echocardiogram for LV function as well as filling pressures/diastolic function.   Obtain f/u lexiscan nuc stress test.      Ischemic cardiomyopathy, continue beta-blocker, no ACE/ARB/ARNI/aldosterone antagonist due to intolerance/hypotension. Hypotension, agree with work-up for secondary causes as per primary service. Noted plans for cosyntropin stim test    CAD/ASHD, status post recent RCA PCI, continue dual endplate therapy    Hypercholesterolemia, continue statin        Thank you for allowing us to participate in the care of Boogie Alberts. Please call me with any questions [phone].     Juan Alva MD, [facility]   Interventional Cardiologist  [facility]  [phone] [facility]  [phone] [address]  7/27/2022 1:41 PM

## 2022-07-27 NOTE — PLAN OF CARE
Patient's EF (Ejection Fraction) is greater than 40%    Heart Failure Medications:  Diuretics[de-identified] Furosemide- with hold parameters for BP    (One of the following REQUIRED for EF </= 40%/SYSTOLIC FAILURE but MAY be used in EF% >40%/DIASTOLIC FAILURE)        ACE[de-identified] None        ARB[de-identified] None         ARNI[de-identified] None    (Beta Blockers)  NON- Evidenced Based Beta Blocker (for EF% >40%/DIASTOLIC FAILURE): None    Evidenced Based Beta Blocker::(REQUIRED for EF% <40%/SYSTOLIC FAILURE) Metoprolol SUCCinate- Toprol XL- with hold parameters for BP  . .................................................................................................................................................. Patient's weights and intake/output reviewed: Yes    Patient's Last Weight: 183 lbs obtained by standing scale. Difference of 2 lbs less than last documented weight. Intake/Output Summary (Last 24 hours) at 7/27/2022 1522  Last data filed at 7/27/2022 1423  Gross per 24 hour   Intake 980 ml   Output 3600 ml   Net -2620 ml       Comorbidities Reviewed Yes    Patient has a past medical history of CHF (congestive heart failure) ([address] COPD (chronic obstructive pulmonary disease) ([address] DVT, Hx of blood clots, Hypercholesterolemia, Myocardial infarct ([address] Pulmonary embolism ([address] and Unspecified cerebral artery occlusion with cerebral infarction. >>For CHF and Comorbidity documentation on Education Time and Topics, please see Education Tab    Progressive Mobility Assessment:  What is this patient's Current Level of Mobility?: Ambulatory-Up Ad Nancy  How was this patient Mobilized today?: Edge of Bed, Up to Chair,  Up to Toilet/Shower, Up in Room, and Up in Hallway, ambulated 100 ft                 With Whom? Self                 Level of Difficulty/Assistance: Independent     Pt resting in bed at this time on room air. Pt denies shortness of breath. Pt without lower extremity edema.      Patient and/or Family's stated Goal of Care this Admission: increase activity tolerance, better understand heart failure and disease management, and be more comfortable prior to discharge        :

## 2022-07-28 PROBLEM — I50.43 CHF (CONGESTIVE HEART FAILURE), NYHA CLASS I, ACUTE ON CHRONIC, COMBINED (HCC): Status: ACTIVE | Noted: 2022-07-28

## 2022-07-28 PROBLEM — I25.5 ISCHEMIC CARDIOMYOPATHY: Status: ACTIVE | Noted: 2022-07-28

## 2022-07-28 PROBLEM — E78.00 PURE HYPERCHOLESTEROLEMIA: Status: ACTIVE | Noted: 2022-07-28

## 2022-07-28 PROBLEM — I95.9 HYPOTENSION: Status: ACTIVE | Noted: 2022-07-28

## 2022-07-28 LAB
ANION GAP SERPL CALCULATED.3IONS-SCNC: 14 MMOL/L (ref 3–16)
BUN BLDV-MCNC: 22 MG/DL (ref 7–20)
CALCIUM SERPL-MCNC: 9.6 MG/DL (ref 8.3–10.6)
CHLORIDE BLD-SCNC: 101 MMOL/L (ref 99–110)
CO2: 22 MMOL/L (ref 21–32)
CREAT SERPL-MCNC: 0.7 MG/DL (ref 0.6–1.1)
GFR AFRICAN AMERICAN: >60
GFR NON-AFRICAN AMERICAN: >60
GLUCOSE BLD-MCNC: 109 MG/DL (ref 70–99)
MAGNESIUM: 2.5 MG/DL (ref 1.8–2.4)
POTASSIUM SERPL-SCNC: 4.1 MMOL/L (ref 3.5–5.1)
PRO-BNP: 72 PG/ML (ref 0–124)
SODIUM BLD-SCNC: 137 MMOL/L (ref 136–145)

## 2022-07-28 PROCEDURE — 6360000002 HC RX W HCPCS: Performed by: STUDENT IN AN ORGANIZED HEALTH CARE EDUCATION/TRAINING PROGRAM

## 2022-07-28 PROCEDURE — 6370000000 HC RX 637 (ALT 250 FOR IP): Performed by: INTERNAL MEDICINE

## 2022-07-28 PROCEDURE — 6370000000 HC RX 637 (ALT 250 FOR IP)

## 2022-07-28 PROCEDURE — 80048 BASIC METABOLIC PNL TOTAL CA: CPT

## 2022-07-28 PROCEDURE — 2580000003 HC RX 258: Performed by: NURSE PRACTITIONER

## 2022-07-28 PROCEDURE — G0378 HOSPITAL OBSERVATION PER HR: HCPCS

## 2022-07-28 PROCEDURE — 80400 ACTH STIMULATION PANEL: CPT

## 2022-07-28 PROCEDURE — C8923 2D TTE W OR W/O FOL W/CON,CO: HCPCS

## 2022-07-28 PROCEDURE — 83880 ASSAY OF NATRIURETIC PEPTIDE: CPT

## 2022-07-28 PROCEDURE — 96376 TX/PRO/DX INJ SAME DRUG ADON: CPT

## 2022-07-28 PROCEDURE — 6370000000 HC RX 637 (ALT 250 FOR IP): Performed by: STUDENT IN AN ORGANIZED HEALTH CARE EDUCATION/TRAINING PROGRAM

## 2022-07-28 PROCEDURE — 96372 THER/PROPH/DIAG INJ SC/IM: CPT

## 2022-07-28 PROCEDURE — 6370000000 HC RX 637 (ALT 250 FOR IP): Performed by: NURSE PRACTITIONER

## 2022-07-28 PROCEDURE — 99233 SBSQ HOSP IP/OBS HIGH 50: CPT | Performed by: NURSE PRACTITIONER

## 2022-07-28 PROCEDURE — 6360000002 HC RX W HCPCS: Performed by: NURSE PRACTITIONER

## 2022-07-28 PROCEDURE — 36415 COLL VENOUS BLD VENIPUNCTURE: CPT

## 2022-07-28 PROCEDURE — 96375 TX/PRO/DX INJ NEW DRUG ADDON: CPT

## 2022-07-28 PROCEDURE — 83735 ASSAY OF MAGNESIUM: CPT

## 2022-07-28 PROCEDURE — 2060000000 HC ICU INTERMEDIATE R&B

## 2022-07-28 RX ORDER — GABAPENTIN 300 MG/1
600 CAPSULE ORAL DAILY
Qty: 20 CAPSULE | Refills: 0 | Status: SHIPPED | OUTPATIENT
Start: 2022-07-29 | End: 2022-09-27

## 2022-07-28 RX ORDER — FUROSEMIDE 20 MG/1
10 TABLET ORAL DAILY PRN
Qty: 30 TABLET | Refills: 3 | Status: SHIPPED | OUTPATIENT
Start: 2022-07-28 | End: 2022-08-11 | Stop reason: SDUPTHER

## 2022-07-28 RX ORDER — METOPROLOL SUCCINATE 25 MG/1
12.5 TABLET, EXTENDED RELEASE ORAL DAILY
Status: DISCONTINUED | OUTPATIENT
Start: 2022-07-29 | End: 2022-07-29 | Stop reason: HOSPADM

## 2022-07-28 RX ORDER — FUROSEMIDE 20 MG/1
20 TABLET ORAL DAILY
Status: DISCONTINUED | OUTPATIENT
Start: 2022-07-29 | End: 2022-07-29 | Stop reason: HOSPADM

## 2022-07-28 RX ORDER — FUROSEMIDE 20 MG/1
10 TABLET ORAL ONCE
Status: COMPLETED | OUTPATIENT
Start: 2022-07-28 | End: 2022-07-28

## 2022-07-28 RX ORDER — METOPROLOL SUCCINATE 25 MG/1
12.5 TABLET, EXTENDED RELEASE ORAL DAILY
Qty: 30 TABLET | Refills: 1
Start: 2022-07-28 | End: 2022-09-02

## 2022-07-28 RX ADMIN — ENOXAPARIN SODIUM 40 MG: 100 INJECTION SUBCUTANEOUS at 08:49

## 2022-07-28 RX ADMIN — GABAPENTIN 600 MG: 300 CAPSULE ORAL at 08:49

## 2022-07-28 RX ADMIN — ASPIRIN 81 MG: 81 TABLET, CHEWABLE ORAL at 08:49

## 2022-07-28 RX ADMIN — ESCITALOPRAM OXALATE 20 MG: 10 TABLET ORAL at 08:49

## 2022-07-28 RX ADMIN — FUROSEMIDE 10 MG: 20 TABLET ORAL at 14:24

## 2022-07-28 RX ADMIN — HYDROXYZINE PAMOATE 50 MG: 25 CAPSULE ORAL at 21:15

## 2022-07-28 RX ADMIN — ONDANSETRON 4 MG: 2 INJECTION INTRAMUSCULAR; INTRAVENOUS at 08:42

## 2022-07-28 RX ADMIN — FUROSEMIDE 10 MG: 20 TABLET ORAL at 08:49

## 2022-07-28 RX ADMIN — ATORVASTATIN CALCIUM 20 MG: 10 TABLET, FILM COATED ORAL at 08:49

## 2022-07-28 RX ADMIN — SODIUM CHLORIDE, PRESERVATIVE FREE 10 ML: 5 INJECTION INTRAVENOUS at 08:51

## 2022-07-28 RX ADMIN — CLOPIDOGREL BISULFATE 75 MG: 75 TABLET ORAL at 08:49

## 2022-07-28 RX ADMIN — COSYNTROPIN 250 MCG: 0.25 INJECTION, POWDER, LYOPHILIZED, FOR SOLUTION INTRAMUSCULAR; INTRAVENOUS at 08:37

## 2022-07-28 ASSESSMENT — PAIN SCALES - GENERAL
PAINLEVEL_OUTOF10: 0

## 2022-07-28 NOTE — PROGRESS NOTES
Cosyntropin given with lab at bedside.  at bedside to draw initial blood draw and is aware of 30 minute and 60 minute blood draw.

## 2022-07-28 NOTE — DISCHARGE SUMMARY
Discharge Summary    Patient ID: [facility]      Patient's PCP: LESLI Blanco CNP    Admit Date: 7/25/2022     Discharge Date:  7/29/2022    Admitting Physician: Ada Malloy MD     Discharge Physician: Vlad Bowling DO     Discharge Diagnoses: acute on chronic combined systolic and diastolic heart failure       Active Hospital Problems    Diagnosis     Acute on chronic combined systolic and diastolic congestive heart failure ([address] [I50.43]      Priority: Medium    Ischemic cardiomyopathy [I25.5]      Priority: Medium    Hypotension [I95.9]      Priority: Medium    Pure hypercholesterolemia [E78.00]      Priority: Medium    Obesity [E66.9]      Priority: Medium    Weight gain [R63.5]      Priority: Medium    Chronic diastolic CHF (congestive heart failure) (HCC) [I50.32]      Priority: Medium    CAD (coronary artery disease) [I25.10]      Priority: Medium       The patient was seen and examined on day of discharge and this discharge summary is in conjunction with any daily progress note from day of discharge. Hospital Course: 39 yo F CAD s/p RCA stent (07/15/2022), CHF, HLD, obesity admitted for acute shortness of breath and weight gain. The patient was recently discharged from [facility] on 7/19 with RCA stent and was advised to stop the lasix d/t hypotension. Presented to the ED after developing SOB, weight gain, and bloating over the next few days. ED workup: neg trop, unremarkable CXR but her proBNP was elevated at 643. She was given 20 mg IV lasix in the ED and was admitted for obs for further evaluation and treatment. Admit weight 189 lbs (recent baseline 173 lbs). During her obs stay, patient denied any other associated symptoms, SOB was resolved, denied any chest pain, abdominal pain, nausea, vomiting, fever, or chills.     During obs stay, continued on IV lasix for diuresis but exhibited intermittent low BP (80-90s/50s-60s) in response to lasix and the decision was made to hold her meto succ and gradually re-introducing lasix to her. The patient was admitted to inpatient for continued care. Adrenal insufficiency work-up was performed for secondary causes of hypotension. The cosyntropin stim test results were normal, no adrenal insufficiency concerns. The cardiology was consulted who recommended one time IV lasix followed by low dose PO lasix and continue metop succ. Limited echo showed left ventricular systolic function is mildly reduced with an EF 45-50%, hypokinesis of the inferior wall, and normal left ventricular diastolic filling pressure, which were similar findings when compared to previous ECHO. Good UOP with Lasix. Her wt gradually decreased from 189 lbs to 177 with complete resolution of her SOB and chest pain. Her proBNP returned to nml at 72 (from 643 on admission). The patient reported grogginess which was resolved with decreasing her gabapentin from 600 tid to qd. Per cardiology recs, the patient was discharged with lasix 10 mg prn based on daily wt (take if over 3 lbs from her baseline) and daily meto succ 12.5 mg po, and antiplatelet therapy with aspirin and Plavix for minimum of 1-3 months followed by single antiplatelet coverage. The patient was discharged safely to home with close follow-up with PCP and cardiology for the following week as detailed in the below. Physical Exam Performed:     /69   Pulse 81   Temp 97.7 °F (36.5 °C) (Oral)   Resp 16   Ht 5' (1.524 m)   Wt 177 lb 3.2 oz (80.4 kg)   SpO2 97%   BMI 34.61 kg/m²       General appearance:  No apparent distress, appears stated age and cooperative. HEENT:  Normal cephalic, atraumatic without obvious deformity. Pupils equal, round, and reactive to light. Extra ocular muscles intact. Conjunctivae/corneas clear. Neck: Supple, with full range of motion. No jugular venous distention. Trachea midline. Respiratory:  Normal respiratory effort.  Clear to auscultation, bilaterally without Rales/Wheezes/Rhonchi. Cardiovascular:  Regular rate and rhythm with normal S1/S2 without murmurs, rubs or gallops. Abdomen: Soft, non-tender, non-distended with normal bowel sounds. Musculoskeletal:  No clubbing, cyanosis or edema bilaterally. Full range of motion without deformity. Skin: Skin color, texture, turgor normal.  No rashes or lesions. Neurologic:  Neurovascularly intact without any focal sensory/motor deficits. Cranial nerves: II-XII intact, grossly non-focal.  Psychiatric:  Alert and oriented, thought content appropriate, normal insight  Capillary Refill: Brisk,< 3 seconds   Peripheral Pulses: +2 palpable, equal bilaterally       Labs: For convenience and continuity at follow-up the following most recent labs are provided:      CBC:    Lab Results   Component Value Date/Time    WBC 7.1 07/25/2022 06:47 AM    HGB 11.1 07/25/2022 06:47 AM    HCT 32.7 07/25/2022 06:47 AM     07/25/2022 06:47 AM       Renal:    Lab Results   Component Value Date/Time     07/29/2022 06:56 AM    K 4.3 07/29/2022 06:56 AM    K 3.7 07/25/2022 12:39 AM     07/29/2022 06:56 AM    CO2 21 07/29/2022 06:56 AM    BUN 21 07/29/2022 06:56 AM    CREATININE 0.7 07/29/2022 06:56 AM    CALCIUM 9.7 07/29/2022 06:56 AM    PHOS 5.6 12/14/2010 05:30 AM         Significant Diagnostic Studies    Radiology:   XR CHEST PORTABLE   Final Result   Stable chest with no acute abnormality seen.                 Consults:     IP CONSULT TO HOSPITALIST  IP CONSULT TO DIETITIAN  IP CONSULT TO CARDIOLOGY    Disposition:  home     Condition at Discharge: Stable    Discharge Instructions/Follow-up:   - 08/01/2022: Family Medicine with LESLI May  - 08/09/2022: Cardiology with Reyes Salts, APRN    Code Status:  Full Code    Activity: activity as tolerated    Diet: cardiac diet      Discharge Medications:     Current Discharge Medication List             Details   gabapentin (NEURONTIN) 300 MG capsule Take 2 capsules by mouth in the morning for 10 days. Qty: 20 capsule, Refills: 0                Details   metoprolol succinate (TOPROL XL) 25 MG extended release tablet Take 0.5 tablets by mouth in the morning. Qty: 30 tablet, Refills: 1      furosemide (LASIX) 20 MG tablet Take 0.5 tablets by mouth daily as needed (weight gain of 3 lbs over baseline weight)  Qty: 30 tablet, Refills: 3                Details   clopidogrel (PLAVIX) 75 MG tablet Take 1 tablet by mouth in the morning. Qty: 30 tablet, Refills: 11      loratadine (CLARITIN) 10 MG capsule Take 10 mg by mouth in the morning. enoxaparin (LOVENOX) 80 MG/0.8ML Inject 0.8 mLs into the skin daily  Qty: 30 mL, Refills: 0      omeprazole (PRILOSEC) 40 MG delayed release capsule Take 1 capsule by mouth twice daily  Qty: 30 capsule, Refills: 0    Associated Diagnoses: Gastroesophageal reflux disease without esophagitis      simvastatin (ZOCOR) 40 MG tablet Take 1 tablet by mouth nightly  Qty: 30 tablet, Refills: 1      escitalopram (LEXAPRO) 20 MG tablet Take 1 tablet by mouth daily  Qty: 30 tablet, Refills: 0    Associated Diagnoses: Moderate episode of recurrent major depressive disorder (HCC)      tiZANidine (ZANAFLEX) 2 MG tablet TAKE 1 TABLET BY MOUTH THREE TIMES DAILY AS NEEDED (SARCODOISIS)  Qty: 90 tablet, Refills: 0      aspirin 81 MG chewable tablet Take by mouth      nitroGLYCERIN (NITROSTAT) 0.4 MG SL tablet Place 1 tablet under the tongue every 5 minutes as needed for Chest pain  Qty: 25 tablet, Refills: 1      vitamin D (CHOLECALCIFEROL) 400 UNIT TABS tablet Take 2,000 Units by mouth daily. Time Spent on discharge is more than 30 min in the examination, evaluation, counseling and review of medications and discharge plan. Signed:    Trish Steele DO   7/29/2022      Thank you LESLI Lynch CNP for the opportunity to be involved in this patient's care. If you have any questions or concerns please feel free to contact me at [phone].

## 2022-07-28 NOTE — PLAN OF CARE
Patient's EF (Ejection Fraction) is greater than 40%    Heart Failure Medications:  Diuretics[de-identified] Furosemide- hold parameters for BP    (One of the following REQUIRED for EF </= 40%/SYSTOLIC FAILURE but MAY be used in EF% >40%/DIASTOLIC FAILURE)        ACE[de-identified] None        ARB[de-identified] None         ARNI[de-identified] None    (Beta Blockers)  NON- Evidenced Based Beta Blocker (for EF% >40%/DIASTOLIC FAILURE): None    Evidenced Based Beta Blocker::(REQUIRED for EF% <40%/SYSTOLIC FAILURE) Metoprolol SUCCinate- Toprol XL- hold parameters for BP  . .................................................................................................................................................. Patient's weights and intake/output reviewed: Yes    Patient's Last Weight: 177 lbs obtained by standing scale. Difference of 3 lbs less than last documented weight. Intake/Output Summary (Last 24 hours) at 7/28/2022 3523  Last data filed at 7/28/2022 6776  Gross per 24 hour   Intake 960 ml   Output 2850 ml   Net -1890 ml       Comorbidities Reviewed Yes    Patient has a past medical history of CHF (congestive heart failure) ([address] COPD (chronic obstructive pulmonary disease) ([address] DVT, Hx of blood clots, Hypercholesterolemia, Myocardial infarct ([address] Pulmonary embolism ([address] and Unspecified cerebral artery occlusion with cerebral infarction. >>For CHF and Comorbidity documentation on Education Time and Topics, please see Education Tab    Progressive Mobility Assessment:  What is this patient's Current Level of Mobility?: Ambulatory-Up Ad Nancy  How was this patient Mobilized today?: Edge of Bed,  Up to Toilet/Shower, Up in Room, and Up in Hallway, ambulated 15 ft                 With Whom? Self                 Level of Difficulty/Assistance: Independent     Pt resting in bed at this time on room air. Pt denies shortness of breath. Pt without lower extremity edema.      Patient and/or Family's stated Goal of Care this Admission: increase activity tolerance, better understand heart failure and disease management, and be more comfortable prior to discharge        :

## 2022-07-28 NOTE — PROGRESS NOTES
[facility]   Daily Progress Note    Admit Date:  7/25/2022  HPI:    Chief Complaint   Patient presents with    Chest Pain     Recently released from hospital after getting stents placed. Pt states has gained 11 lb in 5 days. History of CHF      Larissa Powell presented with shortness of breath, edema and weight gain. She was recently hospitalized with chest pain -> abnormal stress-> [facility] with PCI to RCA. Due to hypotension the Lasix was stopped. She has a hx of CAD s/p PCI to RCA (MI, 2010), cardiac arrest s/t ICD, ICM (recovered EF), sarcoidosis with cardiac involvement, SVT s/p ablation 2015, DVT/ PE/ factor VIII deficiency (lovenox), hx CVA, HLD, COPD and tobacco abuse. Subjective:  Ms. Jhony Terry sitting up in bed. Denies any chest pain. Breathing much improved.       Objective:   Patient Vitals for the past 24 hrs:   BP Temp Temp src Pulse Resp SpO2 Weight   07/28/22 0719 106/75 98.4 °F (36.9 °C) Oral 73 18 95 % --   07/28/22 0517 -- -- -- -- -- -- 177 lb 4.8 oz (80.4 kg)   07/28/22 0428 110/76 98.2 °F (36.8 °C) Oral 72 20 95 % --   07/27/22 2354 115/80 98.2 °F (36.8 °C) Oral 74 18 96 % --   07/27/22 2029 (!) 93/59 98.8 °F (37.1 °C) Oral 72 18 97 % --   07/27/22 1634 107/65 98 °F (36.7 °C) Oral 78 16 95 % --   07/27/22 1419 111/78 -- -- 82 -- -- --   07/27/22 1144 (!) 100/54 98.2 °F (36.8 °C) Oral 65 14 94 % --       Intake/Output Summary (Last 24 hours) at 7/28/2022 0835  Last data filed at 7/28/2022 8041  Gross per 24 hour   Intake 960 ml   Output 2850 ml   Net -1890 ml     Wt Readings from Last 3 Encounters:   07/28/22 177 lb 4.8 oz (80.4 kg)   07/15/22 178 lb 11.2 oz (81.1 kg)   03/08/22 150 lb 12.8 oz (68.4 kg)         ASSESSMENT:   CHF, acute on chronic combined: weight down 3 lbs overnight, down 11 lbs since admission, net -4.8L, symptoms improved, euvolemia on exam, -> 72  CAD: s/p recent PCI to RCA (7/18/22), on dual antiplatelet therapy, statin; beta blocker held for hypotension   Ischemic cardiomyopathy: recovered EF, s/p dual ICD, on low dose Toprol but being held for hypotension, no ACEi/ARB/ARNI due to hypotension  Hypotension: stable this am, undergoing evaluation for adrenal insufficiency  HLD:  6/28/22, on simvastatin 40 mg  Sarcoidosis: with cardiac involvement  Adrenal insufficiency: further testing underway per IM        PLAN:  Change diuretic to furosemide 10 mg for weight gain of 3 lbs over baseline weight. Patient to weigh herself the AM after discharge to establish baseline weight. I have asked her to call the office with this weight. Plan to recommend Lasix 10 mg for weight 3 lbs over her baseline weight. Restart Toprol XL 25 mg, half tablet daily  Repeat limited echo completed this am - results pending  Continue ASA and Plavix X 30 days then stop aspirin and continue Plavix and lovenox injections  Adrenal insufficiency per primary service  Recommend repeating fasting lipid panel and if not to goal consider adjusting dose vs changing to different high intensity statin  Okay for discharge from cardiac perspective. Will review cardiac medications on discharge medication reconciliation form. Patient has follow up appointment with Mary Kay Ho CNP in ~10 days.       LESLI Cai CNP, 7/28/2022, 8:35 AM  [facility], [place], operated by [facility]   [phone]       Telemetry: SR/ ST   NYHA: III    Physical Exam:  General:  Awake, alert, NAD  Skin:  Warm and dry  Neck:  JVP normal upright  Chest:  Clear to auscultation posteriorly  Cardiovascular:  RRR, normal S1S2, no m/g/r  Abdomen:  Soft, nontender, +bowel sounds  Extremities:  No BLE edema      Medications:    gabapentin  600 mg Oral Daily    furosemide  10 mg Oral Daily    cosyntropin  250 mcg IntraVENous Once    aspirin  81 mg Oral Daily    clopidogrel  75 mg Oral Daily    escitalopram  20 mg Oral Daily    metoprolol succinate  25 mg Oral Daily    sodium chloride flush  5-40 mL IntraVENous 2 times per day and ASA guidelines. The appropriate access site area was prepped and drapped in a sterile fashion. The area was anesthetized with 2% lidocaine. Using the modified Seldinger technique, an arterial sheath was introduced into the arterial access site using a single anterior wall puncture. The sheath was flushed and prepped in usual fashion. Catheters used during the procedure included 5 Georgian TIG 4.0 catheter. The catheters were advanced and removed over a .035\" wire, into the appropriate positions. Multiple angiographic views were obtained. An LV gram was obtained. ~The interventional portion of the procedure was completed utilizing a 6 Western Ngozi system. Adjunctive pharmacotherapy was aspirin, Angiomax, and Plavix. Initially Daryn JR4 6 Western Ngozi guide was advanced to the right coronary ostium. A BMW wire was advanced into the distal right coronary without difficulty. We completed intravascular ultrasound using the [employer] system. Vessel diameter and plaque composition were evaluated. The vessel was measured at 4.4 to 4.97 mm in diameter. There was a previously placed stent within the mid and proximal right coronary that had diffuse in-stent restenosis. The stent was subsequently then predilated with initially a 4.0 mm x 15 mm [place] cutting balloon. We then utilized a noncompliant four 5 x 15 [place] balloon. In the subcu stented to the right coronary utilizing 2 drug-eluting stents. We used a resolute Tonny 5.0 mm x 30 mm followed by a 5.0 mm x 12 mm. These were both inflated to rated burst pressures and postdilated to maximal luminal diameter. Findings:   1. Left main coronary artery was normal. It gave off the left anterior descending artery and left circumflex. 2. Left anterior descending artery was normal. It was moderate in size. It gave off septal perforators and a moderate sized diagonal branch.  The LAD covered the entire apex of the left ventricle. 3. Left circumflex was normal. It was moderate in size. There was a moderate sized obtuse marginal branch. 4. Right coronary had a previously placed stent within the right coronary. This is evident in the proximal mid segment. There is diffuse in-stent restenosis that creates about a 70% area of atherosclerosis. 5. Left ventriculogram showed normal LVEF at 55-60%. There was inferior wall motion hypokinesis. There was no significant mitral valve or aortic valve disease noted. LVEDP was normal. There was no gradient noted across the aortic valve during pullback of the catheter. CONCLUSIONS:   1. Successful IVUS guided Cutting Balloon angioplasty, noncompliant balloon angioplasty, and drug-eluting stent insertion to the right coronary artery   ASSESSMENT/RECOMMENDATIONS:   1. Antiplatelet therapy with aspirin and Plavix for minimum of 1-3 months followed by single antiplatelet coverage. 2.  The patient will continue to take coagulation with Lovenox as necessary. Myoview: 7/15/22  Abnormal moderate risk myocardial perfusion study. There is a defect within the inferior wall consistent with prior infarction    and mild terry-infarct ischemia. There is mild ischemia defect within the apical lateral wall. There is a mild fixed defect within the basal chris-septal wall. The estimated left ventricular function is 57%. Recommendation    There is extra-cardiac activity within the GI tract that reduces    sensitivity. ECHO 7/5/22:    Summary   Normal left ventricle systolic function with an estimated ejection fraction of 50%. There is mild hypokinesis of the inferior wall segments. Normal left ventricular diastolic filling pressures. The right ventricle is normal in size and function. The right atrium is mildly dilated. Mild posterior mitral annular calcification is present.    Systolic pulmonary artery pressure (sPAP) is normal and estimated at 35 mmHg (right atrial pressure 8 mmHg)

## 2022-07-28 NOTE — PROGRESS NOTES
Progress Note      PCP: Gayathri Cardenas, APRN - CNP    Date of Admission: 7/25/2022    Chief Complaint: shortness of breath, weight gain    Hospital Course: 46/F CAD s/p RCA stent (07/15/2022), CHF, HLD, obesity admitted for acute shortness of breath and weight gain. The patient was recently discharged from [facility] with RCA stent and was advised to stop the lasix d/t hypotension. The patient developed shortness of breath, weight gain, and bloating over the following several days and was urged by her PCP to be evaluated at the ED. At ED, the patient had neg trop and unremarkable CXR but her proBNP was elevated at 643. She was given 20 mg IV lasix in the ED and was admitted for further evaluation and treatment. The patient denied any other associated symptoms. At this time, the patient states that her SOB has been resolved and feel like she is back to her baseline. Currently denies any chest pain, abdominal pain, SOB, nausea, vomiting, fever, or chills. Her proBNP returned to nml to 72. Of note, the patient states that she always had low BP and does not have hypertension. Patient also states that her weight during the RCA stent procedure several weeks ago (July 2022) was 170s lbs.     Subjective: pt feeling much better today, appears fully alert, grogginess resolved, no SOB, cp, f/c/n/v.    Cardiology consult stopped by the room and recommended to start meto succ 12.5 mg.    Medications:  Reviewed    Infusion Medications    sodium chloride       Scheduled Medications    gabapentin  600 mg Oral Daily    furosemide  10 mg Oral Daily    aspirin  81 mg Oral Daily    clopidogrel  75 mg Oral Daily    escitalopram  20 mg Oral Daily    metoprolol succinate  25 mg Oral Daily    sodium chloride flush  5-40 mL IntraVENous 2 times per day    enoxaparin  40 mg SubCUTAneous Daily    atorvastatin  20 mg Oral Daily     PRN Meds: perflutren lipid microspheres, hydrOXYzine pamoate, sodium chloride flush, sodium chloride, ondansetron **OR** ondansetron, polyethylene glycol, acetaminophen **OR** acetaminophen      Intake/Output Summary (Last 24 hours) at 7/28/2022 1025  Last data filed at 7/28/2022 0738  Gross per 24 hour   Intake 960 ml   Output 2850 ml   Net -1890 ml       Physical Exam Performed:    /75   Pulse 73   Temp 98.4 °F (36.9 °C) (Oral)   Resp 18   Ht 5' (1.524 m)   Wt 177 lb 4.8 oz (80.4 kg)   SpO2 95%   BMI 34.63 kg/m²     General appearance: No apparent distress, appears stated age and cooperative. HEENT: deferred  Neck: deferred  Respiratory:  Normal respiratory effort. Clear to auscultation, bilaterally without Rales/Wheezes/Rhonchi. Cardiovascular: Regular rate and rhythm with normal S1/S2 without murmurs, rubs or gallops. Abdomen: mildly distended, soft, non-tender, non-distended with normal bowel sounds. Musculoskeletal: no peripheral edema  Skin: No rashes or lesions. Neurologic:  deferred  Psychiatric: Alert and oriented, thought content appropriate, normal insight  Capillary Refill: deferred  Peripheral Pulses: deferred      Labs:   No results for input(s): WBC, HGB, HCT, PLT in the last 72 hours. Recent Labs     07/26/22  0708 07/27/22  0737 07/28/22  0840    137 137   K 4.8 4.4 4.1    101 101   CO2 26 23 22   BUN 20 17 22*   CREATININE 0.7 0.7 0.7   CALCIUM 9.1 9.6 9.6     No results for input(s): AST, ALT, BILIDIR, BILITOT, ALKPHOS in the last 72 hours. No results for input(s): INR in the last 72 hours. No results for input(s): Francisco Kim in the last 72 hours. Urinalysis:      Lab Results   Component Value Date/Time    NITRU NEGATIVE 12/17/2010 10:40 PM    WBCUA Rare 12/17/2010 10:40 PM    RBCUA 0-2 12/17/2010 10:40 PM    BLOODU TRACE 12/17/2010 10:40 PM    SPECGRAV 1.015 12/17/2010 10:40 PM    GLUCOSEU NEGATIVE 12/17/2010 10:40 PM       Radiology:  XR CHEST PORTABLE   Final Result   Stable chest with no acute abnormality seen.              Assessment/Plan:    C/Marvin Angel 0371 Problems    Diagnosis     CHF (congestive heart failure), NYHA class I, acute on chronic, combined ([facility]) [I50.43]      Priority: Medium    Obesity [E66.9]      Priority: Medium    Weight gain [R63.5]      Priority: Medium    Chronic diastolic CHF (congestive heart failure) ([facility]) [I50.32]      Priority: Medium    CAD (coronary artery disease) [I25.10]      Priority: Medium       ACUTE PROBLEMS    #acute on chronic HF  - wt down to 177 lb from yesterday's 180 lb  - tolerated lasix 20 mg IV well yesterday  - start lasix 20 mg qd  - start meto succ 12.5 mg qd per cards recs  - f/u limited ECHO result  - hold lasix if SBP < 90 or DBP <60  - hold metop succ if SBP < 110  - continue strict I/O  - continue daily wt    #Hypotension  - chronic likely d/t prior MI, has pace-maker that was interrogated recently and was fine  - pt reports prior dx of adrenal insufficiency  - most recent (07/15/2022) [place] cortisol lvl was nml (6.2) but no documented workup for AI  - f/u cosyntropin stim test result      CHRONIC PROBLEMS    #RCA Stent  - continue clopidogrel 75 mg daily  - continue aspirin 81 mg daily    #HLD  - continue atorvastatin 20 mg qd    #Obesity  - BMI 35.31  -  on diet, exercise, sleep    #Nerve pain  - continue gabapentin 600 mg daily    #Depression  - continue escitalopram 20 mg daily       DVT Prophylaxis: lovenox 40 mg qd  Diet: ADULT DIET; Regular;  Low Sodium (2 gm)  Code Status: Full Code     PT/OT Eval Status: not indicated at this time     Dispo - likely tomorrow pending echo and stim results  - FM appt on 08/01/2022  - Cards appt 08/09/2022      Leann Andre MD PhD   Family Medicine, PGY-1

## 2022-07-28 NOTE — PROGRESS NOTES
This RN spoke with  regarding ACTH stim test. RN to call  prior to medication administration at [phone].

## 2022-07-28 NOTE — PLAN OF CARE
Problem: Discharge Planning  Goal: Discharge to home or other facility with appropriate resources  Outcome: Progressing     Problem: ABCDS Injury Assessment  Goal: Absence of physical injury  Outcome: Progressing     Problem: Chronic Conditions and Co-morbidities  Goal: Patient's chronic conditions and co-morbidity symptoms are monitored and maintained or improved  Outcome: Progressing

## 2022-07-29 VITALS
HEART RATE: 81 BPM | OXYGEN SATURATION: 97 % | RESPIRATION RATE: 16 BRPM | HEIGHT: 60 IN | DIASTOLIC BLOOD PRESSURE: 69 MMHG | TEMPERATURE: 97.7 F | WEIGHT: 177.2 LBS | SYSTOLIC BLOOD PRESSURE: 102 MMHG | BODY MASS INDEX: 34.79 KG/M2

## 2022-07-29 LAB
ANION GAP SERPL CALCULATED.3IONS-SCNC: 12 MMOL/L (ref 3–16)
BUN BLDV-MCNC: 21 MG/DL (ref 7–20)
CALCIUM SERPL-MCNC: 9.7 MG/DL (ref 8.3–10.6)
CHLORIDE BLD-SCNC: 100 MMOL/L (ref 99–110)
CO2: 21 MMOL/L (ref 21–32)
CORTISOL 30 MIN: 28.8 UG/DL
CORTISOL 60 MIN: 32.1 UG/DL
CORTISOL BASE: 10.4 UG/DL
CREAT SERPL-MCNC: 0.7 MG/DL (ref 0.6–1.1)
GFR AFRICAN AMERICAN: >60
GFR NON-AFRICAN AMERICAN: >60
GLUCOSE BLD-MCNC: 104 MG/DL (ref 70–99)
MAGNESIUM: 2.6 MG/DL (ref 1.8–2.4)
POTASSIUM SERPL-SCNC: 4.3 MMOL/L (ref 3.5–5.1)
SODIUM BLD-SCNC: 133 MMOL/L (ref 136–145)

## 2022-07-29 PROCEDURE — 99232 SBSQ HOSP IP/OBS MODERATE 35: CPT | Performed by: NURSE PRACTITIONER

## 2022-07-29 PROCEDURE — 36415 COLL VENOUS BLD VENIPUNCTURE: CPT

## 2022-07-29 PROCEDURE — 6370000000 HC RX 637 (ALT 250 FOR IP): Performed by: NURSE PRACTITIONER

## 2022-07-29 PROCEDURE — 6370000000 HC RX 637 (ALT 250 FOR IP): Performed by: STUDENT IN AN ORGANIZED HEALTH CARE EDUCATION/TRAINING PROGRAM

## 2022-07-29 PROCEDURE — 6370000000 HC RX 637 (ALT 250 FOR IP)

## 2022-07-29 PROCEDURE — 83735 ASSAY OF MAGNESIUM: CPT

## 2022-07-29 PROCEDURE — 80048 BASIC METABOLIC PNL TOTAL CA: CPT

## 2022-07-29 RX ADMIN — ASPIRIN 81 MG: 81 TABLET, CHEWABLE ORAL at 09:51

## 2022-07-29 RX ADMIN — CLOPIDOGREL BISULFATE 75 MG: 75 TABLET ORAL at 09:51

## 2022-07-29 RX ADMIN — GABAPENTIN 600 MG: 300 CAPSULE ORAL at 09:51

## 2022-07-29 RX ADMIN — FUROSEMIDE 20 MG: 20 TABLET ORAL at 09:51

## 2022-07-29 RX ADMIN — ESCITALOPRAM OXALATE 20 MG: 10 TABLET ORAL at 09:51

## 2022-07-29 RX ADMIN — ATORVASTATIN CALCIUM 20 MG: 10 TABLET, FILM COATED ORAL at 09:51

## 2022-07-29 ASSESSMENT — PAIN SCALES - GENERAL
PAINLEVEL_OUTOF10: 0

## 2022-07-29 NOTE — PROGRESS NOTES
Pt d/c'd home. Catheter intact. Pt tolerated well. No redness noted at site. Notified [facility] and removed tele box. Reviewed d/c instructions, home meds, and  f/u information utilizing teach-back method. Patient verbalized understanding. Patient has meds from outpatient pharmacy. Pt declined wheelchair off unit.

## 2022-07-29 NOTE — DISCHARGE INSTRUCTIONS
Discharge Instructions  You were admitted to [facility] with shortness of breath and weight gain concerns. You are now ready to discharge and will be discharging to: home    Please take your medications as prescribed. Medication changes are listed below and a full list of medications is in this discharge packet. Please keep your follow-up appointments after the hospital for ongoing care. It has been a pleasure taking care of you, we wish you the best.     MEDICATION CHANGES:  -- START taking HALF of your Metoprolol succinate pill daily. You were on 25 mg daily. Now take 0.5 tablet (12.5 mg) daily. -- TAKE Gabapentin 600 mg daily. This is a change from your previous 3 times daily. FOLLOW-UP:  -- It is important to see your primary care provider (PCP) after being in the hospital. Your PCP is LESLI Gary CNP and the phone number of their office is [phone]. Discharging Physician:   Joel Joyce, DO     FOLLOW-UP APPOINTMENTS    [place] your follow-up appointment on August 9th at 8:45am with Tamika Tejeda CNP. Please arrive 15 minutes early to complete necessary paperwork. You and your one visitor will need to have your mouth and nose covered with a mask. No children please. [facility] Office [address]:  [phone]. If you are unable to make this appointment, please call to reschedule [phone]. To get to the office go to the side of the hospital at [facility], enter at the [facility], entrance that faces [address]. Take the elevator to the 3rd floor turn right off elevator then take hallway to the right. Go down the [place] and office will be on your right [address].

## 2022-07-29 NOTE — PROGRESS NOTES
antiplatelet therapy, statin; beta blocker held for hypotension then resumed at low dose - tolerating  Ischemic cardiomyopathy: EF 45-50% by repeat echo, s/p dual ICD, on low dose Toprol, no ACEi/ARB/ARNI due to hypotension  Hypotension: stable this am, undergoing evaluation for adrenal insufficiency  HLD:  6/28/22, on simvastatin 40 mg  Sarcoidosis: with cardiac involvement  Adrenal insufficiency: further testing underway per IM        PLAN:  Furosemide 10 mg prn for weight gain of 3 lbs over baseline weight. Patient to weigh herself the AM after discharge to establish baseline weight. I have asked her to call the office with this weight. Continue Toprol XL 25 mg, half tablet daily  Repeat limited echo with EF 45-50%, reviewed with Dr. Beuford Kussmaul, continue beta blocker   Continue ASA and Plavix X 30 days then stop aspirin and continue Plavix and lovenox injections  Adrenal insufficiency per primary service  Recommend repeating fasting lipid panel and if not to goal consider adjusting dose vs changing to different high intensity statin  Okay for discharge from cardiac perspective. Will review cardiac medications on discharge medication reconciliation form. Patient has follow up appointment with Cruz Bustos CNP in ~10 days.       LESLI Wilson CNP, 7/29/2022, 11:15 AM  [facility]   [phone]       Telemetry: SR/ ST   NYHA: III    Physical Exam:  General:  Awake, alert, NAD  Skin:  Warm and dry  Neck:  JVP normal upright  Chest:  Clear to auscultation posteriorly  Cardiovascular:  RRR, normal S1S2, no m/g/r  Abdomen:  Soft, nontender, +bowel sounds  Extremities:  No BLE edema      Medications:    metoprolol succinate  12.5 mg Oral Daily    furosemide  20 mg Oral Daily    gabapentin  600 mg Oral Daily    aspirin  81 mg Oral Daily    clopidogrel  75 mg Oral Daily    escitalopram  20 mg Oral Daily    sodium chloride flush  5-40 mL IntraVENous 2 times per day    enoxaparin  40 mg SubCUTAneous Daily    atorvastatin  20 mg Oral Daily      sodium chloride         Lab Data: Lab results independently reviewed and analyzed by myself 7/29/2022    CBC: No results for input(s): WBC, HGB, PLT in the last 72 hours. BMP:    Recent Labs     07/27/22  0737 07/28/22  0840 07/29/22  0656    137 133*   K 4.4 4.1 4.3   CO2 23 22 21   BUN 17 22* 21*   CREATININE 0.7 0.7 0.7       INR:  No results for input(s): INR in the last 72 hours. BNP:    Recent Labs     07/28/22  0840   PROBNP 72     Cardiac Enzymes: No results for input(s): TROPONINI in the last 72 hours. Lipids:   Lab Results   Component Value Date/Time    TRIG 105 06/28/2022 01:28 PM    TRIG 169 03/15/2022 03:14 PM    HDL 57 06/28/2022 01:28 PM    HDL 58 03/15/2022 03:14 PM    HDL 68 05/09/2011 06:15 AM    HDL 50 12/06/2010 06:05 AM    LDLCALC 150 06/28/2022 01:28 PM    LDLCALC 150 03/15/2022 03:14 PM       Cardiac Imaging:      CARDIAC CATH/ PCI 7/18/22  Procedures Performed:  1. Left heart catheterization  2. Selective left and right coronary angiogram  3. Left ventriculography   4. Intravascular ultrasound of the right coronary artery  5. Cutting Balloon angioplasty, noncompliant balloon angioplasty, and drug-eluting stent insertion to the right coronary artery   Procedure Findings:  1. Single-vessel critical coronary disease with 70% in-stent restenosis of the proximal and mid right coronary artery in the setting of a previously placed bare-metal stent from 2010  2. Normal left ventricular function with EF estimated at 55-60% with mild inferior wall hypokinesis  3. Normal left heart hemodynamics  Details:              Sandra Simon was brought to the cardiac catheterization lab in a fasting state after informed consent was obtained. If the patient was able to provide written consent, it was obtained. The patient's vitals were monitored through out the procedure.  The patient was sedated using the appropriate levels of sedation and ASA guidelines. The appropriate access site area was prepped and drapped in a sterile fashion. The area was anesthetized with 2% lidocaine. Using the modified Seldinger technique, an arterial sheath was introduced into the arterial access site using a single anterior wall puncture. The sheath was flushed and prepped in usual fashion. Catheters used during the procedure included 5 Tamazight TIG 4.0 catheter. The catheters were advanced and removed over a .035\" wire, into the appropriate positions. Multiple angiographic views were obtained. An LV gram was obtained. ~The interventional portion of the procedure was completed utilizing a 6 Western Ngozi system. Adjunctive pharmacotherapy was aspirin, Angiomax, and Plavix. Initially Daryn JR4 6 Western Ngozi guide was advanced to the right coronary ostium. A BMW wire was advanced into the distal right coronary without difficulty. We completed intravascular ultrasound using the [employer] system. Vessel diameter and plaque composition were evaluated. The vessel was measured at 4.4 to 4.97 mm in diameter. There was a previously placed stent within the mid and proximal right coronary that had diffuse in-stent restenosis. The stent was subsequently then predilated with initially a 4.0 mm x 15 mm Bradford cutting balloon. We then utilized a noncompliant four 5 x 15 Euphora balloon. In the subcu stented to the right coronary utilizing 2 drug-eluting stents. We used a resolute Tonny 5.0 mm x 30 mm followed by a 5.0 mm x 12 mm. These were both inflated to rated burst pressures and postdilated to maximal luminal diameter. Findings:   1. Left main coronary artery was normal. It gave off the left anterior descending artery and left circumflex. 2. Left anterior descending artery was normal. It was moderate in size. It gave off septal perforators and a moderate sized diagonal branch.  The LAD covered the entire mmHg   (right atrial pressure 8 mmHg)

## 2022-07-29 NOTE — PLAN OF CARE
Problem: Discharge Planning  Goal: Discharge to home or other facility with appropriate resources  7/29/2022 1053 by Zofia Berrios RN  Outcome: Progressing  Note:   Patient's EF (Ejection Fraction) is greater than 40%    Heart Failure Medications:  Diuretics[de-identified] Furosemide    (One of the following REQUIRED for EF </= 40%/SYSTOLIC FAILURE but MAY be used in EF% >40%/DIASTOLIC FAILURE)        ACE[de-identified] None        ARB[de-identified] None         ARNI[de-identified] None    (Beta Blockers)  NON- Evidenced Based Beta Blocker (for EF% >40%/DIASTOLIC FAILURE): None    Evidenced Based Beta Blocker::(REQUIRED for EF% <40%/SYSTOLIC FAILURE) None  . .................................................................................................................................................. Patient's weights and intake/output reviewed: Yes    Patient's Last Weight: 177 lbs obtained by standing scale. Difference of 0 lbs less than last documented weight. Intake/Output Summary (Last 24 hours) at 7/29/2022 1054  Last data filed at 7/29/2022 1019  Gross per 24 hour   Intake 2960 ml   Output 1450 ml   Net 1510 ml       Comorbidities Reviewed Yes    Patient has a past medical history of CHF (congestive heart failure) ([address] COPD (chronic obstructive pulmonary disease) ([address] DVT, Hx of blood clots, Hypercholesterolemia, Myocardial infarct ([address] Pulmonary embolism ([address] and Unspecified cerebral artery occlusion with cerebral infarction. >>For CHF and Comorbidity documentation on Education Time and Topics, please see Education Tab    Progressive Mobility Assessment:  What is this patient's Current Level of Mobility?: Ambulatory-Up Ad Nancy  How was this patient Mobilized today?: Edge of Bed, Up to Chair,  Up to Toilet/Shower, Up in Room, and Up in Hallway, ambulated 200 ft                 With Whom? Self                 Level of Difficulty/Assistance: Independent     Pt up in chair at this time on room air. Pt denies shortness of breath.  Pt without lower extremity edema. Patient and/or Family's stated Goal of Care this Admission: increase activity tolerance, better understand heart failure and disease management, and be more comfortable prior to discharge        :      Problem: Chronic Conditions and Co-morbidities  Goal: Patient's chronic conditions and co-morbidity symptoms are monitored and maintained or improved  7/29/2022 1053 by Coni Wynn RN  Outcome: Progressing  Note: Pt remains on telemetry for continuous monitoring of heart rate/rhythm. Cardiac medications administered as ordered. Problem: Pain  Goal: Verbalizes/displays adequate comfort level or baseline comfort level  Outcome: Progressing  Note: Pt will be satisfied with pain control. Pt uses numeric pain rating scale with reassessments after pain med administration. Will continue to monitor progression throughout shift.

## 2022-08-01 ENCOUNTER — FOLLOWUP TELEPHONE ENCOUNTER (OUTPATIENT)
Dept: TELEMETRY | Age: 47
End: 2022-08-01

## 2022-08-01 ENCOUNTER — OFFICE VISIT (OUTPATIENT)
Dept: FAMILY MEDICINE CLINIC | Age: 47
End: 2022-08-01
Payer: COMMERCIAL

## 2022-08-01 VITALS
DIASTOLIC BLOOD PRESSURE: 70 MMHG | HEIGHT: 60 IN | WEIGHT: 179.6 LBS | SYSTOLIC BLOOD PRESSURE: 100 MMHG | OXYGEN SATURATION: 97 % | BODY MASS INDEX: 35.26 KG/M2 | RESPIRATION RATE: 16 BRPM | HEART RATE: 73 BPM

## 2022-08-01 DIAGNOSIS — D86.9 SARCOIDOSIS: ICD-10-CM

## 2022-08-01 DIAGNOSIS — L02.419 ABSCESS OF AXILLA: ICD-10-CM

## 2022-08-01 DIAGNOSIS — F33.1 MODERATE EPISODE OF RECURRENT MAJOR DEPRESSIVE DISORDER (HCC): ICD-10-CM

## 2022-08-01 DIAGNOSIS — Z09 HOSPITAL DISCHARGE FOLLOW-UP: Primary | ICD-10-CM

## 2022-08-01 DIAGNOSIS — I50.42 CHRONIC COMBINED SYSTOLIC AND DIASTOLIC CONGESTIVE HEART FAILURE (HCC): ICD-10-CM

## 2022-08-01 PROCEDURE — 1111F DSCHRG MED/CURRENT MED MERGE: CPT

## 2022-08-01 PROCEDURE — 99495 TRANSJ CARE MGMT MOD F2F 14D: CPT

## 2022-08-01 RX ORDER — ESCITALOPRAM OXALATE 20 MG/1
20 TABLET ORAL DAILY
Qty: 30 TABLET | Refills: 0 | Status: SHIPPED | OUTPATIENT
Start: 2022-08-01 | End: 2022-09-02

## 2022-08-01 RX ORDER — TIZANIDINE 2 MG/1
TABLET ORAL
Qty: 90 TABLET | Refills: 0 | Status: SHIPPED | OUTPATIENT
Start: 2022-08-01 | End: 2022-10-13

## 2022-08-01 ASSESSMENT — PATIENT HEALTH QUESTIONNAIRE - PHQ9
SUM OF ALL RESPONSES TO PHQ QUESTIONS 1-9: 0
7. TROUBLE CONCENTRATING ON THINGS, SUCH AS READING THE NEWSPAPER OR WATCHING TELEVISION: 0
6. FEELING BAD ABOUT YOURSELF - OR THAT YOU ARE A FAILURE OR HAVE LET YOURSELF OR YOUR FAMILY DOWN: 0
SUM OF ALL RESPONSES TO PHQ QUESTIONS 1-9: 0
SUM OF ALL RESPONSES TO PHQ9 QUESTIONS 1 & 2: 0
4. FEELING TIRED OR HAVING LITTLE ENERGY: 0
SUM OF ALL RESPONSES TO PHQ QUESTIONS 1-9: 0
5. POOR APPETITE OR OVEREATING: 0
1. LITTLE INTEREST OR PLEASURE IN DOING THINGS: 0
3. TROUBLE FALLING OR STAYING ASLEEP: 0
10. IF YOU CHECKED OFF ANY PROBLEMS, HOW DIFFICULT HAVE THESE PROBLEMS MADE IT FOR YOU TO DO YOUR WORK, TAKE CARE OF THINGS AT HOME, OR GET ALONG WITH OTHER PEOPLE: 0
9. THOUGHTS THAT YOU WOULD BE BETTER OFF DEAD, OR OF HURTING YOURSELF: 0
2. FEELING DOWN, DEPRESSED OR HOPELESS: 0
SUM OF ALL RESPONSES TO PHQ QUESTIONS 1-9: 0
8. MOVING OR SPEAKING SO SLOWLY THAT OTHER PEOPLE COULD HAVE NOTICED. OR THE OPPOSITE, BEING SO FIGETY OR RESTLESS THAT YOU HAVE BEEN MOVING AROUND A LOT MORE THAN USUAL: 0

## 2022-08-01 ASSESSMENT — ENCOUNTER SYMPTOMS
COUGH: 0
COLOR CHANGE: 0
CONSTIPATION: 0
SORE THROAT: 0
SHORTNESS OF BREATH: 0
WHEEZING: 0
ABDOMINAL PAIN: 0
DIARRHEA: 0
BLOOD IN STOOL: 0

## 2022-08-01 NOTE — TELEPHONE ENCOUNTER
Rachel [facility] Transitions Initial Follow Up Call    Call within 2 business days of discharge: Yes     Patient: Adarsh Salter Patient : 1975 MRN: [id number]    [unfilled]    RARS: Readmission Risk Score: 10.1       Spoke with: patient    Discharge department/facility: home    Non-face-to-face services provided:  Scheduled appointment with PCP-today    Spoke to patient for hospital follow up. Pt states she is doing well. Denies any needs.   Saw PCP today    Follow Up  Future Appointments   Date Time Provider Alexis Arndt   2022  8:45 AM Tia Manual, APRN - CNP [facility] MARLNO Quiroz RN

## 2022-08-01 NOTE — TELEPHONE ENCOUNTER
2nd Attempt; No Answer- Left HIPAA compliant voicemail with Non-Urgent Heart Failure Resource Line number for call back.     Jessica Powers RN

## 2022-08-01 NOTE — TELEPHONE ENCOUNTER
1st Attempt; No Answer- Left HIPAA compliant voicemail with Non-Urgent Heart Failure Resource Line number for call back.      Jessica Powers RN

## 2022-08-01 NOTE — PROGRESS NOTES
Post-Discharge Transitional Care  Follow Up      Salomón Mazariegos   YOB: 1975    Date of Office Visit:  8/1/2022  Date of Hospital Admission: 7/25/22  Date of Hospital Discharge: 7/29/22  Risk of hospital readmission (high >=14%. Medium >=10%) :Readmission Risk Score: 10.1      Care management risk score Rising risk (score 2-5) and Complex Care (Scores >=6): No Risk Score On File     Non face to face  following discharge, date last encounter closed (first attempt may have been earlier): *No documented post hospital discharge outreach found in the last 14 days    Call initiated 2 business days of discharge: *No response recorded in the last 14 days    178.6 pounds basline  110/70 at home. No lightheadness     ASSESSMENT/PLAN:   Moderate episode of recurrent major depressive disorder (HCC)  The following orders have not been finalized:  -     escitalopram (LEXAPRO) 20 MG tablet      Medical Decision Making: moderate complexity  No follow-ups on file. Subjective:   HPI:  Follow up of Hospital problems/diagnosis(es): Patient presents to the office today for hospital follow-up regarding her recent admission for chronic diastolic heart failure. Patient recently underwent stent revision on 7-. Patient was then readmitted on 7-. She stated in the hospital they stopped her Lasix due to her low blood pressure when she went home she subsequently gained 11 pounds and was in heart failure and was readmitted for further evaluation. Since discharge patient reports that she is feeling well. Patient states her weight is remained stable. Patient has made active changes to her diet. Patient's blood pressure was slightly hypotensive in office today. Patient does denies having any symptoms of headache fatigue or shortness of breath. Inpatient course: Discharge summary reviewed-patient was primarily admitted to the hospital on 7- for chest pain.   During this admission patient patient underwent and had a stent placed in her RCA. During this time they stopped her furosemide. Patient was discharged home and regained 11 pounds and was having difficulty breathing patient was again readmitted on 7- for heart failure and elevated BNP. Patient was restarted on her Lasix and was having bouts of hypotension. Cardiology was consulted. Patient was discouraged on Lasix 10 mg as needed for weight gain of 3 or more pounds her metoprolol was also decreased to 12-1/2 mg. Patient is dry weight is reportedly 178.6 pounds. Patient Active Problem List   Diagnosis    Chest pain    Syncope and collapse    Palpitations    CAD (coronary artery disease)    ICD (implantable cardioverter-defibrillator) in place    Sarcoidosis    Chronic diastolic CHF (congestive heart failure) [address]    Primary hypertension    Abnormal cardiovascular stress test    Stented coronary artery    Obesity    Weight gain    Acute on chronic combined systolic and diastolic congestive heart failure ([facility]    Ischemic cardiomyopathy    Hypotension    Pure hypercholesterolemia       Medications listed as ordered at the time of discharge from hospital     Medication List            Accurate as of August 1, 2022 12:57 PM. If you have any questions, ask your nurse or doctor. CONTINUE taking these medications      aspirin 81 MG chewable tablet     clopidogrel 75 MG tablet  Commonly known as: PLAVIX  Take 1 tablet by mouth in the morning. enoxaparin 80 MG/0.8ML  Commonly known as: LOVENOX  Inject 0.8 mLs into the skin daily     escitalopram 20 MG tablet  Commonly known as: LEXAPRO  Take 1 tablet by mouth daily     furosemide 20 MG tablet  Commonly known as: LASIX  Take 0.5 tablets by mouth daily as needed (weight gain of 3 lbs over baseline weight)     gabapentin 300 MG capsule  Commonly known as: NEURONTIN  Take 2 capsules by mouth in the morning for 10 days.      loratadine 10 MG capsule  Commonly known as: CLARITIN     metoprolol succinate 25 MG extended release tablet  Commonly known as: TOPROL XL  Take 0.5 tablets by mouth in the morning. nitroGLYCERIN 0.4 MG SL tablet  Commonly known as: NITROSTAT  Place 1 tablet under the tongue every 5 minutes as needed for Chest pain     omeprazole 40 MG delayed release capsule  Commonly known as: PRILOSEC  Take 1 capsule by mouth twice daily     simvastatin 40 MG tablet  Commonly known as: ZOCOR  Take 1 tablet by mouth nightly     tiZANidine 2 MG tablet  Commonly known as: ZANAFLEX  TAKE 1 TABLET BY MOUTH THREE TIMES DAILY AS NEEDED (SARCODOISIS)     vitamin D 400 units Tabs tablet  Commonly known as: CHOLECALCIFEROL                Medications marked \"taking\" at this time  Outpatient Medications Marked as Taking for the 8/1/22 encounter (Office Visit) with LESLI Chacon CNP   Medication Sig Dispense Refill    metoprolol succinate (TOPROL XL) 25 MG extended release tablet Take 0.5 tablets by mouth in the morning. 30 tablet 1    furosemide (LASIX) 20 MG tablet Take 0.5 tablets by mouth daily as needed (weight gain of 3 lbs over baseline weight) 30 tablet 3    gabapentin (NEURONTIN) 300 MG capsule Take 2 capsules by mouth in the morning for 10 days. 20 capsule 0    clopidogrel (PLAVIX) 75 MG tablet Take 1 tablet by mouth in the morning. 30 tablet 11    loratadine (CLARITIN) 10 MG capsule Take 10 mg by mouth in the morning.       enoxaparin (LOVENOX) 80 MG/0.8ML Inject 0.8 mLs into the skin daily 30 mL 0    omeprazole (PRILOSEC) 40 MG delayed release capsule Take 1 capsule by mouth twice daily 30 capsule 0    simvastatin (ZOCOR) 40 MG tablet Take 1 tablet by mouth nightly 30 tablet 1    escitalopram (LEXAPRO) 20 MG tablet Take 1 tablet by mouth daily 30 tablet 0    tiZANidine (ZANAFLEX) 2 MG tablet TAKE 1 TABLET BY MOUTH THREE TIMES DAILY AS NEEDED (SARCODOISIS) 90 tablet 0    aspirin 81 MG chewable tablet Take by mouth      nitroGLYCERIN (NITROSTAT) 0.4 MG SL tablet Place 1 tablet under the tongue every 5 minutes as needed for Chest pain 25 tablet 1    vitamin D (CHOLECALCIFEROL) 400 UNIT TABS tablet Take 2,000 Units by mouth daily. Medications patient taking as of now reconciled against medications ordered at time of hospital discharge: Yes        Objective:    Review of Systems   HENT:  Negative for congestion and sore throat. Respiratory:  Negative for cough, shortness of breath and wheezing. Cardiovascular:  Negative for chest pain and leg swelling. Gastrointestinal:  Negative for abdominal pain, blood in stool, constipation and diarrhea. Endocrine: Negative for cold intolerance and heat intolerance. Genitourinary:  Negative for difficulty urinating, hematuria and urgency. Musculoskeletal:  Negative for arthralgias and gait problem. Skin:  Negative for color change. Neurological:  Negative for dizziness, seizures, light-headedness and headaches. Psychiatric/Behavioral:  Negative for agitation and confusion. The patient is not nervous/anxious. Physical Exam  Constitutional:       General: She is not in acute distress. Appearance: Normal appearance. HENT:      Right Ear: Tympanic membrane normal.      Left Ear: Tympanic membrane normal.   Eyes:      Pupils: Pupils are equal, round, and reactive to light. Cardiovascular:      Rate and Rhythm: Normal rate and regular rhythm. Pulmonary:      Effort: Pulmonary effort is normal.      Breath sounds: Normal breath sounds. Abdominal:      General: Abdomen is flat. Bowel sounds are normal.      Palpations: Abdomen is soft. Musculoskeletal:         General: Normal range of motion. Skin:     General: Skin is warm. Comments: Patient has small draining cyst from her sarcoid in both axilla and groin area. No active signs of gross infection noted. Neurological:      General: No focal deficit present. Mental Status: She is alert.    Psychiatric:         Mood and Affect: Mood normal. Behavior: Behavior normal.         Judgment: Judgment normal.          An electronic signature was used to authenticate this note.   --Sandy Sheppard, LESLI - CNP

## 2022-08-05 ENCOUNTER — TELEPHONE (OUTPATIENT)
Dept: CARDIOLOGY CLINIC | Age: 47
End: 2022-08-05

## 2022-08-05 NOTE — TELEPHONE ENCOUNTER
Small dose lasix. Inc to 20 mg once daily (full tab) next 3 days (starting this PM) until weights come down to dry weight which I see is 179 or so. After 3 days, may go back to 10 mg lasix once daily (1/2 tab) if weights back at goal. If worsening chest pain, she should proceed to ED.  If issues over the weekend, call our office to get on call MD. Ana Maria Naqvi

## 2022-08-05 NOTE — TELEPHONE ENCOUNTER
Called pt. Left VM asking for return call to office to inform her of RJM (covering NPLR) advice/recommendation for pt in regards to edema and weight gain.

## 2022-08-05 NOTE — TELEPHONE ENCOUNTER
Patient called back [facility] instuctions provided to patient she will take 10 mg lasix today (half tablet) and starting tomorrow and for next two days take 20 mg lasix (full tablet) daily. Patient states she was concerned of weight gain reports 4 lbs in one one day. Denies SOB and states swelling minimal. I did instruct patient if swelling and weight gain increase or she becomes short of breath to seek ER for evaluation and further treatment, she VU.

## 2022-08-05 NOTE — TELEPHONE ENCOUNTER
NPLR OOT. Pt called in and sts that yesterday AM her weight was 183# and then this AM was 187#. Pt's noted weight was 179.5#. Pt also sts that she has a little heaviness in the chest and some ankle swelling. [facility] did CATH on 7/18/22, [facility] consulted on 7/27/22. Pt has an appt on 8/9/22 w/NPQUIN for hosp f-up. Pt is also taking Lasix 10mg qd. STACY, would you be able to advise?  Thank you

## 2022-08-07 ENCOUNTER — APPOINTMENT (OUTPATIENT)
Dept: GENERAL RADIOLOGY | Age: 47
End: 2022-08-07
Payer: COMMERCIAL

## 2022-08-07 ENCOUNTER — HOSPITAL ENCOUNTER (EMERGENCY)
Age: 47
Discharge: HOME | End: 2022-08-07
Attending: EMERGENCY MEDICINE
Payer: COMMERCIAL

## 2022-08-07 VITALS
OXYGEN SATURATION: 98 % | WEIGHT: 188 LBS | SYSTOLIC BLOOD PRESSURE: 101 MMHG | TEMPERATURE: 98.2 F | RESPIRATION RATE: 18 BRPM | DIASTOLIC BLOOD PRESSURE: 69 MMHG | BODY MASS INDEX: 36.72 KG/M2 | HEART RATE: 71 BPM

## 2022-08-07 DIAGNOSIS — I50.9 CHRONIC CONGESTIVE HEART FAILURE, UNSPECIFIED HEART FAILURE TYPE (HCC): Primary | ICD-10-CM

## 2022-08-07 LAB
A/G RATIO: 1.4 (ref 1.1–2.2)
ALBUMIN SERPL-MCNC: 4.2 G/DL (ref 3.4–5)
ALP BLD-CCNC: 142 U/L (ref 40–129)
ALT SERPL-CCNC: 15 U/L (ref 10–40)
ANION GAP SERPL CALCULATED.3IONS-SCNC: 10 MMOL/L (ref 3–16)
AST SERPL-CCNC: 19 U/L (ref 15–37)
BASOPHILS ABSOLUTE: 0 K/UL (ref 0–0.2)
BASOPHILS RELATIVE PERCENT: 0.7 %
BILIRUB SERPL-MCNC: 0.4 MG/DL (ref 0–1)
BUN BLDV-MCNC: 8 MG/DL (ref 7–20)
CALCIUM SERPL-MCNC: 9.3 MG/DL (ref 8.3–10.6)
CHLORIDE BLD-SCNC: 101 MMOL/L (ref 99–110)
CO2: 31 MMOL/L (ref 21–32)
CREAT SERPL-MCNC: 0.7 MG/DL (ref 0.6–1.1)
EOSINOPHILS ABSOLUTE: 0.2 K/UL (ref 0–0.6)
EOSINOPHILS RELATIVE PERCENT: 3 %
GFR AFRICAN AMERICAN: >60
GFR NON-AFRICAN AMERICAN: >60
GLUCOSE BLD-MCNC: 99 MG/DL (ref 70–99)
HCT VFR BLD CALC: 36.7 % (ref 36–48)
HEMOGLOBIN: 12.6 G/DL (ref 12–16)
LYMPHOCYTES ABSOLUTE: 1.9 K/UL (ref 1–5.1)
LYMPHOCYTES RELATIVE PERCENT: 29.5 %
MAGNESIUM: 2.3 MG/DL (ref 1.8–2.4)
MCH RBC QN AUTO: 31.1 PG (ref 26–34)
MCHC RBC AUTO-ENTMCNC: 34.2 G/DL (ref 31–36)
MCV RBC AUTO: 90.9 FL (ref 80–100)
MONOCYTES ABSOLUTE: 0.5 K/UL (ref 0–1.3)
MONOCYTES RELATIVE PERCENT: 7 %
NEUTROPHILS ABSOLUTE: 3.9 K/UL (ref 1.7–7.7)
NEUTROPHILS RELATIVE PERCENT: 59.8 %
PDW BLD-RTO: 12.7 % (ref 12.4–15.4)
PLATELET # BLD: 392 K/UL (ref 135–450)
PMV BLD AUTO: 7.7 FL (ref 5–10.5)
POTASSIUM REFLEX MAGNESIUM: 3.5 MMOL/L (ref 3.5–5.1)
PRO-BNP: 426 PG/ML (ref 0–124)
RBC # BLD: 4.04 M/UL (ref 4–5.2)
SODIUM BLD-SCNC: 142 MMOL/L (ref 136–145)
TOTAL PROTEIN: 7.2 G/DL (ref 6.4–8.2)
TROPONIN: <0.01 NG/ML
WBC # BLD: 6.5 K/UL (ref 4–11)

## 2022-08-07 PROCEDURE — 80053 COMPREHEN METABOLIC PANEL: CPT

## 2022-08-07 PROCEDURE — 83880 ASSAY OF NATRIURETIC PEPTIDE: CPT

## 2022-08-07 PROCEDURE — 71045 X-RAY EXAM CHEST 1 VIEW: CPT

## 2022-08-07 PROCEDURE — 99285 EMERGENCY DEPT VISIT HI MDM: CPT

## 2022-08-07 PROCEDURE — 85025 COMPLETE CBC W/AUTO DIFF WBC: CPT

## 2022-08-07 PROCEDURE — 84484 ASSAY OF TROPONIN QUANT: CPT

## 2022-08-07 PROCEDURE — 93005 ELECTROCARDIOGRAM TRACING: CPT | Performed by: PHYSICIAN ASSISTANT

## 2022-08-07 PROCEDURE — 83735 ASSAY OF MAGNESIUM: CPT

## 2022-08-07 NOTE — ED PROVIDER NOTES
I independently performed a history and physical on [facility]. All diagnostic, treatment, and disposition decisions were made by myself in conjunction with the advanced practice provider. For further details of Juaquin Flores's emergency department encounter, please see KAMILLA Tirado's documentation. Patient reports that he is here because of increased swelling and weight gain. She has recent stenting of right coronary artery. She now has some heart failure symptoms and edema. She is here because she gained 5 pounds in the last week. She denies any chest pain or shortness of breath. On exam she has no rales and heart regular in rhythm and no significant peripheral edema. EKG  The Ekg interpreted by me shows  normal sinus rhythm with a rate of 65  Axis is   Normal  QTc is  normal  Intervals and Durations are unremarkable. ST Segments: no acute change  No significant change from prior EKG dated 25 jul 2022    Labs Reviewed   COMPREHENSIVE METABOLIC PANEL W/ REFLEX TO MG FOR LOW K - Abnormal; Notable for the following components:       Result Value    Alkaline Phosphatase 142 (*)     All other components within normal limits   BRAIN NATRIURETIC PEPTIDE - Abnormal; Notable for the following components:    Pro- (*)     All other components within normal limits   CBC WITH AUTO DIFFERENTIAL   TROPONIN   MAGNESIUM     XR CHEST PORTABLE   Final Result   No acute cardiopulmonary process identified. I personally saw this patient and performed a substantive portion of the visit including all aspects of the medical decision making. MDM  Patient has no signs at this time of acute CHF exacerbation. Advised to take additional Lasix and return for new or worsening symptoms.      Reji Miguel MD  08/07/22 [id number]

## 2022-08-08 LAB
EKG ATRIAL RATE: 65 BPM
EKG DIAGNOSIS: NORMAL
EKG P AXIS: 16 DEGREES
EKG P-R INTERVAL: 148 MS
EKG Q-T INTERVAL: 432 MS
EKG QRS DURATION: 94 MS
EKG QTC CALCULATION (BAZETT): 449 MS
EKG R AXIS: -18 DEGREES
EKG T AXIS: -1 DEGREES
EKG VENTRICULAR RATE: 65 BPM

## 2022-08-08 PROCEDURE — 93010 ELECTROCARDIOGRAM REPORT: CPT | Performed by: INTERNAL MEDICINE

## 2022-08-08 NOTE — ED NOTES
Ambulated through halls without assist. HR up to 83, O2 sat 97-98% RA. No s/s of distress noted.       Natacha Rooney RN  08/07/22 2033

## 2022-08-09 ENCOUNTER — CLINICAL DOCUMENTATION (OUTPATIENT)
Dept: SPIRITUAL SERVICES | Age: 47
End: 2022-08-09

## 2022-08-09 ENCOUNTER — TELEPHONE (OUTPATIENT)
Dept: CARDIOLOGY CLINIC | Age: 47
End: 2022-08-09

## 2022-08-09 NOTE — PROGRESS NOTES
Follow up call for pt after discharge as requested while inpatient. Pt busy at the moment, requested call back. Writer will follow up as requested.

## 2022-08-09 NOTE — TELEPHONE ENCOUNTER
Pt called 8/8 and left message on [facility] voicemail they needed to cancel appt on 8/9. Daughter has been called into to work and pt has to baby sit, pt would like to RS.     Pls advise thank you

## 2022-08-11 RX ORDER — FUROSEMIDE 20 MG/1
10 TABLET ORAL DAILY PRN
Qty: 30 TABLET | Refills: 3 | Status: SHIPPED | OUTPATIENT
Start: 2022-08-11

## 2022-08-12 NOTE — ED PROVIDER NOTES
[facility] Emergency Department    CHIEF COMPLAINT  Leg Swelling (Pt comes from home for leg swelling hx of chf sent by pcp 5lb emelia in one [facility])      SHARED SERVICE VISIT  I have seen and evaluated this patient with my supervising physician, Dr. Josefina Carlton. HISTORY OF PRESENT ILLNESS  Cleve Gleason is a 40 y.o. female who presents to the ED complaining of extremity swelling and 5 pound weight gain in the past week. Patient states over the past few days she has been experiencing lower extremity swelling as well as 5 pound weight gain in 1 week. But states she is also experiencing some shortness of breath which is unchanged compared to her normal state. Patient was recently hospitalized for congestive heart failure exacerbation. Patient contacted her PCP was instructed to take 2 doses of her Lasix report to the ED should she develop any changes. Patient states that p.o. Lasix causes her blood pressure to bottom out and make her lightheaded. Patient states she did take a double dose of her medication last night and lower extremity swelling has resolved. Patient denies any headaches, bone aches, fevers, or chills. Patient denies any chest pain, dyspnea on exertion or shortness of breath. Patient denies any sore throat or vision changes. Patient denies any cough. Patient denies any nausea, vomiting, diarrhea, or abdominal pain. Patient denies any new onset back pain. Patient denies any urinary symptoms. She denies any recent travel or sick contacts. No other complaints, modifying factors or associated symptoms. Nursing notes reviewed.    Past Medical History:   Diagnosis Date    CHF (congestive heart failure) (HCC)     COPD (chronic obstructive pulmonary disease) (HCC)     DVT     Hx of blood clots     Hypercholesterolemia     Myocardial infarct ([facility] [address]     Pulmonary embolism (HCC)     Unspecified cerebral artery occlusion with cerebral infarction      Past Surgical History:   Procedure Laterality Date    CARDIAC DEFIBRILLATOR PLACEMENT  09/15/2020    gen change, original device implanted 8/25/2012    CARDIAC SURGERY      catherization x 4    CHEST TUBE INSERTION      at birth    [address] [facility]  03/2010    BMS to pRCA 4x28 [facility]    DILATION AND CURETTAGE OF UTERUS      KNEE ARTHROSCOPY      bilat knees    LAPAROSCOPY      x 2 for ovaries    TONSILLECTOMY AND ADENOIDECTOMY      UPPER GASTROINTESTINAL ENDOSCOPY  10/18/2010    with biopsies     Family History   Problem Relation Age of Onset    Heart Disease Mother     High Blood Pressure Mother     Diabetes Mother     Depression Mother     High Cholesterol Mother     Heart Disease Father     High Blood Pressure Father     High Cholesterol Father     Asthma Sister      Social History     Socioeconomic History    Marital status: Single     Spouse name: Not on file    Number of children: Not on file    Years of education: Not on file    Highest education level: Not on file   Occupational History    Not on file   Tobacco Use    Smoking status: Every Day     Packs/day: 0.25     Types: Cigarettes     Start date: 3/8/2007    Smokeless tobacco: Never   Vaping Use    Vaping Use: Never used   Substance and Sexual Activity    Alcohol use: No    Drug use: Yes     Types: Marijuana Lizzy Amble)     Comment: rarely    Sexual activity: Not Currently     Partners: Male   Other Topics Concern    Not on file   Social History Narrative    Not on file     Social Determinants of Health     Financial Resource Strain: Low Risk     Difficulty of Paying Living Expenses: Not very hard   Food Insecurity: No Food Insecurity    Worried About Running Out of Food in the Last Year: Never true    Ran Out of Food in the Last Year: Never true   Transportation Needs: Not on file   Physical Activity: Not on file   Stress: Not on file   Social Connections: Not on file   Intimate Partner Violence: Not on file   Housing Stability: Not on reaction(s): vomitting  nausea  Nausea, vomiting and rapid heart rate       Baclofen      Increased spasticity and tightening of her muscles. Losartan Other (See Comments)     \"lose potassium\"  Skin turned red   Skin turned red. Bisoprolol Fumarate Other (See Comments)     Hypotension  hypotension      Bupropion     Hydrocodone     Hydrocodone-Acetaminophen Itching    Spironolactone Other (See Comments)     Nausea loss of appetite  Nausea loss of appetite  Nausea and loss of appetite         REVIEW OF SYSTEMS  10 systems reviewed, pertinent positives per HPI otherwise noted to be negative    PHYSICAL EXAM  /69   Pulse 71   Temp 98.2 °F (36.8 °C) (Oral)   Resp 18   Wt 188 lb (85.3 kg)   SpO2 98%   BMI 36.72 kg/m²   GENERAL APPEARANCE: Awake and alert. Cooperative. No acute distress. Nontoxic in appearance  HEAD: Normocephalic. Atraumatic. No raccoon eyes or wellington signs. EYES: PERRL. EOM's grossly intact. ENT: Mucous membranes are pink and moist.   NECK: Supple. Full range of motion. No nuchal rigidity. No tracheal tenderness or deviation. No stridor. HEART: RRR. No murmurs, rubs or gallops. Normal S1-S2. No S3 or S4.  LUNGS: Respirations unlabored. CTAB. Good air exchange. Speaking comfortably in full sentences. ABDOMEN: Soft. Non-distended. Non-tender. No guarding or rebound. No masses. No organomegaly. EXTREMITIES: No peripheral edema. Moves all extremities equally. All extremities neurovascularly intact. SKIN: Warm and dry. No acute rashes. NEUROLOGICAL: Alert and oriented. CN's 2-12 intact. No gross facial drooping. Strength 5/5, sensation intact. PSYCHIATRIC: Normal mood and affect.     RADIOLOGY  Echo Complete    Result Date: 7/15/2022  Transthoracic Echocardiography Report (TTE)  Demographics   Patient Name       Rod Patel   Date of Study      07/15/2022         Gender              Female   Patient Number     [id number]         Date of Birth 1975   Visit Number       [id number]          Age                 40 year(s)   Accession Number   [id number]         Room Number         9771   Corporate ID       [id number]           Melissanikki Pacheco   Ordering Physician Len Cordon MD         Physician           Nicole Nation MD, [facility] [place], [facility]  Procedure Type of Study   TTE procedure:ECHOCARDIOGRAM COMPLETE 2D W DOPPLER W COLOR. Procedure Date Date: 07/15/2022 Start: 09:50 AM Study Location: [id number] [facility] - Echo Lab Technical Quality: Adequate visualization Indications:Chest pain. Patient Status: Routine Height: 60 inches Weight: 178 pounds BSA: 1.78 m2 BMI: 34.76 kg/m2 BP: 91/54 mmHg  Conclusions   Summary  Normal left ventricle systolic function with an estimated ejection fraction  of 50%. There is mild hypokinesis of the inferior wall segments. Normal left ventricular diastolic filling pressures. The right ventricle is normal in size and function. The right atrium is mildly dilated. Mild posterior mitral annular calcification is present. Systolic pulmonary artery pressure (sPAP) is normal and estimated at 35 mmHg  (right atrial pressure 8 mmHg)   Signature   ------------------------------------------------------------------  Electronically signed by Nicole Nation MD, Kenneth Amaral  (Interpreting physician) on 07/15/2022 at 01:45 PM  ------------------------------------------------------------------   Findings   Left Ventricle  Normal left ventricle size, wall thickness, and systolic function with an  estimated ejection fraction of 50%. Hypokinesis of the inferior wall segments. Normal left ventricular diastolic filling pressures. Pre-mature beats throughout the study. Mitral Valve  Mild posterior mitral annular calcification is present. Trace mitral regurgitation.    Left Atrium  The left atrium is normal in size. Aortic Valve  The aortic valve is normal in structure and function. There is no evidence of aortic valve regurgitation. Aorta  The aortic root is normal in size. Right Ventricle  The right ventricle is normal in size and function. TAPSE is measured at 21 mm. S\" Prime Velocity is measured at 12.4 cm/s. Pacer / ICD wire is visualized in the right ventricle. Tricuspid Valve  The tricuspid valve is normal in structure  Mild tricuspid valve regurgitation. Systolic pulmonary artery pressure (sPAP) is normal and estimated at 35 mmHg  (right atrial pressure 8 mmHg)   Right Atrium  The right atrium is mildly dilated. Right atrial area is 22.1 cm2. Pulmonic Valve  The pulmonic valve is normal in structure and function. No evidence of pulmonic regurgitation. Pericardial Effusion  No pericardial effusion noted. Pleural Effusion  No pleural effusion. Miscellaneous  No obvious masses, thrombi or vegetations are noted. IVC is normal in size (< 2.1 cm) and collapses < 50% with respiration  consistent with elevated right atrial pressure (8 mmHg).   M-Mode/2D Measurements (cm)   LV Diastolic Dimension: 7.65 cm LV Systolic Dimension: 2.26 cm  LV Septum Diastolic: 9.95 cm  LV PW Diastolic: 9.97 cm        AO Root Dimension: 3 cm                                  AV Cusp Separation: 1.8 cm                                  LA Dimension: 2.8 cm  LVOT: 1.7 cm                    LA Area: 19.2 cm2                                  LA volume/Index: 53.5 ml /30 ml/m2  Doppler Measurements   AV Peak Velocity: 135 cm/s    MV Peak E-Wave: 47.9 cm/s  AV Peak Gradient: 7.29 mmHg   MV Peak A-Wave: 56.3 cm/s  LVOT Peak Velocity: 83.9 cm/s MV E/A Ratio: 0.85   TR Velocity:258 cm/s  TR Gradient:26.63 mmHg  Estimated RAP:8 mmHg  Estimated RVSP: 35 mmHg  E' Septal Velocity: 7.35 cm/s  E' Lateral Velocity: 11 cm/s  E/E' ratio: 5.5   Aortic Valve   Peak Velocity: 135 cm/s  Peak Gradient: 7.29 mmHg   Cusp Separation: 1.8 cm  Aorta   Aortic Root: 3 cm  Ascending Aorta: 3 cm  LVOT Diameter: 1.7 cm      Echo Limited    Result Date: 7/28/2022  Transthoracic Echocardiography Report (TTE)  Demographics   Patient Name       Gina Monahan   Date of Study      07/28/2022        Gender                Female   Patient Number     [id number]        Date of Birth         1975   Visit Number       [id number]         Age                   40 year(s)   Accession Number   [id number]        Room Number           9975   Corporate ID       [id number]          Sonographer           RT Akhil   Ordering Physician Tabitha GODDARD MD  Interpreting          [address] [place] [address].                                       Physician             Pratibha Whitten DO  Procedure Type of Study   TTE procedure:ECHOCARDIOGRAM LIMITED. Procedure Date Date: 07/28/2022 Start: 06:56 AM Study Location: [facility] - Echo Lab Technical Quality: Adequate visualization Additional Indications:lvf. Patient Status: Routine Contrast Medium: Definity. Height: 60 inches Weight: 180 pounds BSA: 1.78 m2 BMI: 35.15 kg/m2 BP: 110/76 mmHg  Conclusions   Summary  Limited only f/u for LVEF. The left ventricular systolic function is mildly reduced with an ejection  fraction of 45-50%  There is hypokinesis of the inferior wall. Normal left ventricular diastolic filling pressure. Signature   ------------------------------------------------------------------  Electronically signed by Pratibha Whitten DO (Interpreting  physician) on 07/28/2022 at 03:34 PM  ------------------------------------------------------------------   Findings   Left Ventricle  The left ventricular systolic function is mildly reduced with an ejection  fraction of 45-50%  There is hypokinesis of the inferior wall. Normal left ventricular diastolic filling pressure. Left Atrium  Left atrium is normal in size.    Miscellaneous  IVC size is normal (<2.1cm) and collapses > 50% with respiration consistent  with normal RA pressure (3mmHg). M-Mode/2D Measurements (cm)    LA Dimension: 3 cm   LA Area: 17.2 cm2   LA volume/Index: 50.8 ml /29 ml/m2  Doppler Measurements                                  MV Peak E-Wave: 43.3 cm/s                                 MV Peak A-Wave: 75.4 cm/s                                 MV E/A Ratio: 0.57   E' Septal Velocity: 5.55 cm/s  E' Lateral Velocity: 5.87 cm/s  E/E' ratio: 7.6      XR CHEST PORTABLE    Result Date: 8/7/2022  EXAMINATION: ONE XRAY VIEW OF THE CHEST 8/7/2022 6:43 pm COMPARISON: 07/25/2022 HISTORY: ORDERING SYSTEM PROVIDED HISTORY: shortness of breath TECHNOLOGIST PROVIDED HISTORY: Reason for exam:->shortness of breath Reason for Exam: leg swelling FINDINGS: The mediastinal and cardiac contours are stable. A left pacemaker/AICD is unchanged. Multiple calcified granulomata are again noted. There is no focal consolidation, pleural effusion or pneumothorax evident. The bones are unremarkable. No acute cardiopulmonary process identified. XR CHEST PORTABLE    Result Date: 7/25/2022  EXAMINATION: ONE XRAY VIEW OF THE CHEST 7/25/2022 12:38 am COMPARISON: 07/15/2022 HISTORY: ORDERING SYSTEM PROVIDED HISTORY: cp TECHNOLOGIST PROVIDED HISTORY: Reason for exam:->cp Reason for Exam: CP starting this AM, recent stent placements FINDINGS: The heart is mildly enlarged but unchanged. The pulmonary vessels are top-normal.  There are small calcified granulomas scattered in both lungs which are unchanged. No consolidation or effusion is seen. There is a left pacemaker in place which is unchanged. Stable chest with no acute abnormality seen. XR CHEST PORTABLE    Result Date: 7/15/2022  EXAMINATION: ONE XRAY VIEW OF THE CHEST 7/15/2022 4:16 am COMPARISON: 02/24/2022 HISTORY: ORDERING SYSTEM PROVIDED HISTORY: fall/ syncope TECHNOLOGIST PROVIDED HISTORY: Reason for exam:->fall/ syncope FINDINGS: Cardiac and mediastinal silhouettes appear stable.   Calcified There is a mild fixed defect within the basal chris-septal wall. The estimated left ventricular function is 57%. Recommendation  There is extra-cardiac activity within the GI tract that reduces  sensitivity. Stress Protocols   Resting ECG  Paced atrial rhythm. Resting HR:60 bpm  Resting BP:91/60 mmHg  Stress Protocol:Pharmacologic - Lexiscan's  Peak HR:74 bpm                   HR/BP product:7918  Peak BP:107/77 mmHg  Predicted HR: 175 bpm  % of predicted HR: 42  Test duration: 4 min  Reason for termination:Completed   ECG Findings  <1mm ST depression noted during peak stress. Arrhythmias  There is no arrhythmia noted. Symptoms  Patient developed shortness of breath and nasuea ,  likely related to lexiscan. Symptoms resolved with  aminophylline. Complications  Procedure complication was none. Stress Interpretation  Normal Lexiscan response on EKG. Procedure Medications   - Lexiscan I.V. 0.4 mg.   - Aminophylline I.V. 75 mg. Imaging Protocols   - One Day   Rest                          Stress   Isotope:Myoview/Tetrofosmin   Isotope: Myoview/Tetrofosmin  Isotope dose:10.9 mCi         Isotope dose:30 mCi  Administration Route:I.V. Administration Route:I.V.  Date:07/15/2022 12:40         Date:07/15/2022 14:00                                 Technique:      Gated  Imaging Results   Applied corrections   - Attenuation correction  applied     Stress ejection    Ejection fraction:57 %    EDV :94 ml    ESV :40 ml    Stroke volume :54 ml    LV mass :131 gr  Medical History   Additional Medical History   ppm   Signatures   ------------------------------------------------------------------  Electronically signed by Tyrone Newton MD, Josephus Harada  (Interpreting physician) on 07/15/2022 at 16:03  ------------------------------------------------------------------        ED COURSE  81-year-old female presents to the ED for evaluation of lower extremity swelling and 5 pound weight gain over the past week. She did have a recent stent of the right coronary artery. She is now experiencing heart failure symptoms with some lower extremity edema. No significant peripheral edema noted on physical exam.  No abnormalities noted on CBC. Alk phos is elevated but no other abnormalities on CMP. Troponin was negative. BNP was elevated 426. Magnesium was 2.3. Chest x-ray shows no acute cardiopulmonary processes. EKG interpreted by Dr. Tata Pandya shows normal sinus rhythm with a ventricular rate 65 QT interval 433. I had patient ambulate with pulse ox with slight desaturation to 95% on room air. Patient has no signs at this time of acute CHF exacerbation. Advised patient to take additional Lasix and return for new or worsening symptoms. Feel comfortable discharging patient with follow-up with primary care provider. Triage vitals /71, pulse of 70, respirations 18, temperature 98.2 °F, SPO2 97% on room air. Vital signs remained stable during course of ED stay. Risk management discussed and shared decision making had with patient and/or surrogate. All questions were answered. Patient will follow up with primary care provider for further evaluation/treatment. All questions answered. Patient will return to ED for new/worsening symptoms. CRITICAL CARE TIME  0 minutes of critical care time spent not including separately billable procedures.     MDM  Results for orders placed or performed during the hospital encounter of 08/07/22   CBC with Auto Differential   Result Value Ref Range    WBC 6.5 4.0 - 11.0 K/uL    RBC 4.04 4.00 - 5.20 M/uL    Hemoglobin 12.6 12.0 - 16.0 g/dL    Hematocrit 36.7 36.0 - 48.0 %    MCV 90.9 80.0 - 100.0 fL    MCH 31.1 26.0 - 34.0 pg    MCHC 34.2 31.0 - 36.0 g/dL    RDW 12.7 12.4 - 15.4 %    Platelets 746 803 - 215 K/uL    MPV 7.7 5.0 - 10.5 fL    Neutrophils % 59.8 %    Lymphocytes % 29.5 %    Monocytes % 7.0 %    Eosinophils % 3.0 %    Basophils % 0.7 %    Neutrophils Absolute 3.9 1.7 - 7.7 K/uL    Lymphocytes Absolute 1.9 1.0 - 5.1 K/uL    Monocytes Absolute 0.5 0.0 - 1.3 K/uL    Eosinophils Absolute 0.2 0.0 - 0.6 K/uL    Basophils Absolute 0.0 0.0 - 0.2 K/uL   Comprehensive Metabolic Panel w/ Reflex to MG   Result Value Ref Range    Sodium 142 136 - 145 mmol/L    Potassium reflex Magnesium 3.5 3.5 - 5.1 mmol/L    Chloride 101 99 - 110 mmol/L    CO2 31 21 - 32 mmol/L    Anion Gap 10 3 - 16    Glucose 99 70 - 99 mg/dL    BUN 8 7 - 20 mg/dL    Creatinine 0.7 0.6 - 1.1 mg/dL    GFR Non-African American >60 >60    GFR African American >60 >60    Calcium 9.3 8.3 - 10.6 mg/dL    Total Protein 7.2 6.4 - 8.2 g/dL    Albumin 4.2 3.4 - 5.0 g/dL    Albumin/Globulin Ratio 1.4 1.1 - 2.2    Total Bilirubin 0.4 0.0 - 1.0 mg/dL    Alkaline Phosphatase 142 (H) 40 - 129 U/L    ALT 15 10 - 40 U/L    AST 19 15 - 37 U/L   Troponin   Result Value Ref Range    Troponin <0.01 <0.01 ng/mL   Brain Natriuretic Peptide   Result Value Ref Range    Pro- (H) 0 - 124 pg/mL   Magnesium   Result Value Ref Range    Magnesium 2.30 1.80 - 2.40 mg/dL   EKG 12 Lead   Result Value Ref Range    Ventricular Rate 65 BPM    Atrial Rate 65 BPM    P-R Interval 148 ms    QRS Duration 94 ms    Q-T Interval 432 ms    QTc Calculation (Bazett) 449 ms    P Axis 16 degrees    R Axis -18 degrees    T Axis -1 degrees    Diagnosis       Normal sinus rhythminferoposterior infarct  (cited on or before 16-MAY-2011)Anterolateral infarct (cited on or before 16-MAY-2011)Abnormal ECGWhen compared with ECG of 25-JUL-2022 00:25,No significant change was foundConfirmed by Germaine Bell MD, Sonido Read ([id number]) on 8/8/2022 7:15:36 PM         I estimate there is LOW risk for PULMONARY EMBOLISM, ACUTE CORONARY SYNDROME, OR THORACIC AORTIC DISSECTION, thus I consider the discharge disposition reasonable. Jay Simms and I have discussed the diagnosis and risks, and we agree with discharging home to follow-up with their primary doctor.  We also discussed returning to the Emergency Department immediately if new or worsening symptoms occur. We have discussed the symptoms which are most concerning (e.g., bloody sputum, fever, worsening pain or shortness of breath, vomiting) that necessitate immediate return. FINAL Impression    1. Chronic congestive heart failure, unspecified heart failure type (HCC)        Blood pressure 101/69, pulse 71, temperature 98.2 °F (36.8 °C), temperature source Oral, resp. rate 18, weight 188 lb (85.3 kg), SpO2 98 %. DISPOSITION  Patient was discharged to home in good condition.          Sandra James PA-C  08/11/22 [id number]

## 2022-09-02 DIAGNOSIS — F33.1 MODERATE EPISODE OF RECURRENT MAJOR DEPRESSIVE DISORDER (HCC): ICD-10-CM

## 2022-09-02 DIAGNOSIS — K21.9 GASTROESOPHAGEAL REFLUX DISEASE WITHOUT ESOPHAGITIS: ICD-10-CM

## 2022-09-02 RX ORDER — OMEPRAZOLE 40 MG/1
CAPSULE, DELAYED RELEASE ORAL
Qty: 30 CAPSULE | Refills: 0 | Status: SHIPPED | OUTPATIENT
Start: 2022-09-02 | End: 2022-10-17

## 2022-09-02 RX ORDER — SIMVASTATIN 40 MG
40 TABLET ORAL NIGHTLY
Qty: 30 TABLET | Refills: 0 | Status: SHIPPED | OUTPATIENT
Start: 2022-09-02 | End: 2022-10-13

## 2022-09-02 RX ORDER — METOPROLOL SUCCINATE 25 MG/1
TABLET, EXTENDED RELEASE ORAL
Qty: 30 TABLET | Refills: 0 | Status: SHIPPED | OUTPATIENT
Start: 2022-09-02

## 2022-09-02 RX ORDER — ESCITALOPRAM OXALATE 20 MG/1
TABLET ORAL
Qty: 30 TABLET | Refills: 0 | Status: SHIPPED | OUTPATIENT
Start: 2022-09-02 | End: 2022-10-13

## 2022-09-02 RX ORDER — ENOXAPARIN SODIUM 100 MG/ML
INJECTION SUBCUTANEOUS
Qty: 30 ML | Refills: 0 | Status: SHIPPED | OUTPATIENT
Start: 2022-09-02 | End: 2022-10-17

## 2022-09-02 RX ORDER — GABAPENTIN 600 MG/1
TABLET ORAL
Qty: 90 TABLET | Refills: 0 | OUTPATIENT
Start: 2022-09-02

## 2022-09-02 NOTE — TELEPHONE ENCOUNTER
Refill Request         Last Seen: Last Seen Department: 8/1/2022  Last Seen by PCP: 6/14/2022    Last Written: 07/14/2022    Next Appointment:   Future Appointments   Date Time Provider Alexis Yris   9/12/2022 11:00 AM LESLI Eubanks CNP P [facility]         Requested Prescriptions     Pending Prescriptions Disp Refills    enoxaparin (LOVENOX) 80 MG/0.8ML [Pharmacy Med Name: Enoxaparin Sodium 80 MG/0.8ML Subcutaneous Solution] 30 mL 0     Sig: INJECT 1 SYRINGE SUBCUTANEOUSLY ONCE DAILY    escitalopram (LEXAPRO) 20 MG tablet [Pharmacy Med Name: Escitalopram Oxalate 20 MG Oral Tablet] 30 tablet 0     Sig: Take 1 tablet by mouth once daily    gabapentin (NEURONTIN) 600 MG tablet [Pharmacy Med Name: Gabapentin 600 MG Oral Tablet] 90 tablet 0     Sig: TAKE 1 TABLET BY MOUTH THREE TIMES DAILY    simvastatin (ZOCOR) 40 MG tablet [Pharmacy Med Name: Simvastatin 40 MG Oral Tablet] 30 tablet 0     Sig: Take 1 tablet by mouth nightly    metoprolol succinate (TOPROL XL) 25 MG extended release tablet [Pharmacy Med Name: Metoprolol Succinate ER 25 MG Oral Tablet Extended Release 24 Hour] 30 tablet 0     Sig: Take 1 tablet by mouth once daily

## 2022-09-02 NOTE — TELEPHONE ENCOUNTER
Refill Request       Last Seen: Last Seen Department: 8/1/2022  Last Seen by PCP: Visit date not found    Last Written: 06/16/2022    Next Appointment:   Future Appointments   Date Time Provider Alexis Arndt   9/12/2022 11:00 AM LESLI Rodrigues CNPP [facility]         Requested Prescriptions     Pending Prescriptions Disp Refills    omeprazole (PRILOSEC) 40 MG delayed release capsule [Pharmacy Med Name: Omeprazole 40 MG Oral Capsule Delayed Release] 30 capsule 0     Sig: Take 1 capsule by mouth twice daily

## 2022-09-06 ENCOUNTER — CLINICAL DOCUMENTATION (OUTPATIENT)
Dept: SPIRITUAL SERVICES | Age: 47
End: 2022-09-06

## 2022-09-06 NOTE — PROGRESS NOTES
Attempted to call pt as requested, no answer, left voice message. Will attempt another call at a later date.

## 2022-09-27 ENCOUNTER — CLINICAL DOCUMENTATION (OUTPATIENT)
Dept: SPIRITUAL SERVICES | Age: 47
End: 2022-09-27

## 2022-09-27 PROBLEM — I42.9 CARDIOMYOPATHY (HCC): Status: ACTIVE | Noted: 2020-02-10

## 2022-09-27 PROBLEM — I50.9 CHF (CONGESTIVE HEART FAILURE) (HCC): Status: ACTIVE | Noted: 2017-01-16

## 2022-09-27 PROBLEM — I63.9 CEREBROVASCULAR ACCIDENT (CVA) (HCC): Status: ACTIVE | Noted: 2022-09-27

## 2022-09-27 PROBLEM — S52.602A CLOSED FRACTURE OF LEFT DISTAL RADIUS AND ULNA, INITIAL ENCOUNTER: Status: ACTIVE | Noted: 2020-06-29

## 2022-09-27 PROBLEM — H21.561 AFFERENT PUPILLARY DEFECT OF RIGHT EYE: Status: ACTIVE | Noted: 2020-02-19

## 2022-09-27 PROBLEM — E03.9 ACQUIRED HYPOTHYROIDISM: Status: ACTIVE | Noted: 2017-01-16

## 2022-09-27 PROBLEM — S52.502A CLOSED FRACTURE OF LEFT DISTAL RADIUS AND ULNA, INITIAL ENCOUNTER: Status: ACTIVE | Noted: 2020-06-29

## 2022-09-27 RX ORDER — GABAPENTIN 300 MG/1
600 CAPSULE ORAL 3 TIMES DAILY
Qty: 180 CAPSULE | Refills: 0 | Status: SHIPPED | OUTPATIENT
Start: 2022-09-27 | End: 2022-10-27

## 2022-09-27 NOTE — PROGRESS NOTES
Attempted call, pt said she was \"visiting with friends, not a good time right now. \" Will attempt another call at a later date.

## 2022-09-27 NOTE — TELEPHONE ENCOUNTER
Patient called to schedule her 3 month follow up, she is scheduled november 1st at 1pm    She wants to know why her gabapentin was denied on 09/02/2022?

## 2022-10-04 ENCOUNTER — CLINICAL DOCUMENTATION (OUTPATIENT)
Dept: SPIRITUAL SERVICES | Age: 47
End: 2022-10-04

## 2022-10-13 DIAGNOSIS — F33.1 MODERATE EPISODE OF RECURRENT MAJOR DEPRESSIVE DISORDER (HCC): ICD-10-CM

## 2022-10-13 DIAGNOSIS — D86.9 SARCOIDOSIS: ICD-10-CM

## 2022-10-13 RX ORDER — ESCITALOPRAM OXALATE 20 MG/1
TABLET ORAL
Qty: 30 TABLET | Refills: 0 | Status: SHIPPED | OUTPATIENT
Start: 2022-10-13

## 2022-10-13 RX ORDER — TIZANIDINE 2 MG/1
TABLET ORAL
Qty: 90 TABLET | Refills: 0 | Status: SHIPPED | OUTPATIENT
Start: 2022-10-13

## 2022-10-13 RX ORDER — SIMVASTATIN 40 MG
40 TABLET ORAL NIGHTLY
Qty: 30 TABLET | Refills: 0 | Status: SHIPPED | OUTPATIENT
Start: 2022-10-13 | End: 2022-11-12

## 2022-10-13 NOTE — TELEPHONE ENCOUNTER
Refill Request        Last Seen: Last Seen Department: 8/1/2022  Last Seen by PCP: 8/1/2022    Last Written: 08/01/2022 and 09/02/2022        Next Appointment:   Future Appointments   Date Time Provider Alexis Arndt   11/1/2022  1:00 PM Perlita Hernandez. [address] [facility] [place] [facility]           Requested Prescriptions     Pending Prescriptions Disp Refills    tiZANidine (ZANAFLEX) 2 MG tablet [Pharmacy Med Name: tiZANidine HCl 2 MG Oral Tablet] 90 tablet 0     Sig: Take 1 tablet by mouth three times daily as needed    simvastatin (ZOCOR) 40 MG tablet [Pharmacy Med Name: Simvastatin 40 MG Oral Tablet] 30 tablet 0     Sig: Take 1 tablet by mouth nightly    escitalopram (LEXAPRO) 20 MG tablet [Pharmacy Med Name: Escitalopram Oxalate 20 MG Oral Tablet] 30 tablet 0     Sig: Take 1 tablet by mouth once daily

## 2022-10-15 DIAGNOSIS — K21.9 GASTROESOPHAGEAL REFLUX DISEASE WITHOUT ESOPHAGITIS: ICD-10-CM

## 2022-10-17 RX ORDER — ENOXAPARIN SODIUM 100 MG/ML
INJECTION SUBCUTANEOUS
Qty: 30 ML | Refills: 0 | Status: SHIPPED | OUTPATIENT
Start: 2022-10-17

## 2022-10-17 RX ORDER — OMEPRAZOLE 40 MG/1
CAPSULE, DELAYED RELEASE ORAL
Qty: 30 CAPSULE | Refills: 0 | Status: SHIPPED | OUTPATIENT
Start: 2022-10-17

## 2022-10-17 NOTE — TELEPHONE ENCOUNTER
Refill Request       Last Seen: Last Seen Department: 8/1/2022  Last Seen by PCP: 8/1/2022    Last Written: 09/02/2022      Next Appointment:   Future Appointments   Date Time Provider Alexis Arndt   11/1/2022  1:00 PM Perlita Harrison. [address] [facility] [place] [facility]             Requested Prescriptions     Pending Prescriptions Disp Refills    enoxaparin (LOVENOX) 80 MG/0.8ML [Pharmacy Med Name: Enoxaparin Sodium 80 MG/0.8ML Subcutaneous Solution] 30 mL 0     Sig: INJECT 1 SOLUTION SUBCUTANEOUSLY ONCE DAILY    omeprazole (PRILOSEC) 40 MG delayed release capsule [Pharmacy Med Name: Omeprazole 40 MG Oral Capsule Delayed Release] 30 capsule 0     Sig: Take 1 capsule by mouth twice daily

## 2022-11-23 DIAGNOSIS — K21.9 GASTROESOPHAGEAL REFLUX DISEASE WITHOUT ESOPHAGITIS: ICD-10-CM

## 2022-11-23 DIAGNOSIS — F33.1 MODERATE EPISODE OF RECURRENT MAJOR DEPRESSIVE DISORDER (HCC): ICD-10-CM

## 2022-11-28 ENCOUNTER — OFFICE VISIT (OUTPATIENT)
Dept: FAMILY MEDICINE CLINIC | Age: 47
End: 2022-11-28
Payer: COMMERCIAL

## 2022-11-28 VITALS
OXYGEN SATURATION: 98 % | BODY MASS INDEX: 35.93 KG/M2 | WEIGHT: 183 LBS | HEIGHT: 60 IN | DIASTOLIC BLOOD PRESSURE: 74 MMHG | TEMPERATURE: 92 F | SYSTOLIC BLOOD PRESSURE: 114 MMHG

## 2022-11-28 DIAGNOSIS — E03.9 ACQUIRED HYPOTHYROIDISM: ICD-10-CM

## 2022-11-28 DIAGNOSIS — K21.9 GASTROESOPHAGEAL REFLUX DISEASE WITHOUT ESOPHAGITIS: ICD-10-CM

## 2022-11-28 DIAGNOSIS — E78.5 HYPERLIPIDEMIA, UNSPECIFIED HYPERLIPIDEMIA TYPE: ICD-10-CM

## 2022-11-28 DIAGNOSIS — Z23 NEED FOR PNEUMOCOCCAL VACCINATION: ICD-10-CM

## 2022-11-28 DIAGNOSIS — E66.01 CLASS 2 SEVERE OBESITY DUE TO EXCESS CALORIES WITH SERIOUS COMORBIDITY AND BODY MASS INDEX (BMI) OF 35.0 TO 35.9 IN ADULT (HCC): ICD-10-CM

## 2022-11-28 DIAGNOSIS — Z01.419 ENCOUNTER FOR CERVICAL PAP SMEAR WITH PELVIC EXAM: ICD-10-CM

## 2022-11-28 DIAGNOSIS — D86.9 SARCOIDOSIS: ICD-10-CM

## 2022-11-28 DIAGNOSIS — F33.1 MODERATE EPISODE OF RECURRENT MAJOR DEPRESSIVE DISORDER (HCC): Primary | ICD-10-CM

## 2022-11-28 DIAGNOSIS — I50.32 CHRONIC DIASTOLIC CHF (CONGESTIVE HEART FAILURE) (HCC): ICD-10-CM

## 2022-11-28 DIAGNOSIS — F33.1 MODERATE EPISODE OF RECURRENT MAJOR DEPRESSIVE DISORDER (HCC): ICD-10-CM

## 2022-11-28 PROCEDURE — G8484 FLU IMMUNIZE NO ADMIN: HCPCS | Performed by: NURSE PRACTITIONER

## 2022-11-28 PROCEDURE — 36415 COLL VENOUS BLD VENIPUNCTURE: CPT | Performed by: NURSE PRACTITIONER

## 2022-11-28 PROCEDURE — G8427 DOCREV CUR MEDS BY ELIG CLIN: HCPCS | Performed by: NURSE PRACTITIONER

## 2022-11-28 PROCEDURE — 99214 OFFICE O/P EST MOD 30 MIN: CPT | Performed by: NURSE PRACTITIONER

## 2022-11-28 PROCEDURE — 4004F PT TOBACCO SCREEN RCVD TLK: CPT | Performed by: NURSE PRACTITIONER

## 2022-11-28 PROCEDURE — 3078F DIAST BP <80 MM HG: CPT | Performed by: NURSE PRACTITIONER

## 2022-11-28 PROCEDURE — G0009 ADMIN PNEUMOCOCCAL VACCINE: HCPCS | Performed by: NURSE PRACTITIONER

## 2022-11-28 PROCEDURE — 90677 PCV20 VACCINE IM: CPT | Performed by: NURSE PRACTITIONER

## 2022-11-28 PROCEDURE — 3074F SYST BP LT 130 MM HG: CPT | Performed by: NURSE PRACTITIONER

## 2022-11-28 PROCEDURE — G8417 CALC BMI ABV UP PARAM F/U: HCPCS | Performed by: NURSE PRACTITIONER

## 2022-11-28 RX ORDER — OMEPRAZOLE 40 MG/1
CAPSULE, DELAYED RELEASE ORAL
Qty: 30 CAPSULE | Refills: 0 | Status: SHIPPED | OUTPATIENT
Start: 2022-11-28

## 2022-11-28 RX ORDER — MELATONIN
1000 DAILY
Qty: 90 TABLET | Refills: 1 | Status: SHIPPED | OUTPATIENT
Start: 2022-11-28

## 2022-11-28 RX ORDER — GABAPENTIN 300 MG/1
600 CAPSULE ORAL 3 TIMES DAILY
Qty: 180 CAPSULE | Refills: 0 | Status: SHIPPED | OUTPATIENT
Start: 2022-11-28 | End: 2022-12-28

## 2022-11-28 RX ORDER — FUROSEMIDE 20 MG/1
10 TABLET ORAL DAILY PRN
Qty: 30 TABLET | Refills: 3 | Status: SHIPPED | OUTPATIENT
Start: 2022-11-28

## 2022-11-28 RX ORDER — ESCITALOPRAM OXALATE 20 MG/1
20 TABLET ORAL DAILY
Qty: 90 TABLET | Refills: 1 | Status: SHIPPED | OUTPATIENT
Start: 2022-11-28

## 2022-11-28 RX ORDER — METOPROLOL SUCCINATE 25 MG/1
25 TABLET, EXTENDED RELEASE ORAL DAILY
Qty: 90 TABLET | Refills: 1 | Status: SHIPPED | OUTPATIENT
Start: 2022-11-28

## 2022-11-28 RX ORDER — ESCITALOPRAM OXALATE 20 MG/1
TABLET ORAL
Qty: 30 TABLET | Refills: 0 | Status: SHIPPED | OUTPATIENT
Start: 2022-11-28 | End: 2022-11-28 | Stop reason: SDUPTHER

## 2022-11-28 RX ORDER — CLOPIDOGREL BISULFATE 75 MG/1
75 TABLET ORAL DAILY
Qty: 30 TABLET | Refills: 11 | Status: SHIPPED | OUTPATIENT
Start: 2022-11-28

## 2022-11-28 RX ORDER — SIMVASTATIN 40 MG
40 TABLET ORAL NIGHTLY
Qty: 30 TABLET | Refills: 0 | Status: SHIPPED | OUTPATIENT
Start: 2022-11-28 | End: 2022-12-28

## 2022-11-28 RX ORDER — OMEPRAZOLE 40 MG/1
CAPSULE, DELAYED RELEASE ORAL
Qty: 30 CAPSULE | Refills: 0 | Status: SHIPPED | OUTPATIENT
Start: 2022-11-28 | End: 2022-11-28

## 2022-11-28 RX ORDER — TIZANIDINE 2 MG/1
TABLET ORAL
Qty: 90 TABLET | Refills: 0 | Status: SHIPPED | OUTPATIENT
Start: 2022-11-28

## 2022-11-28 RX ORDER — SIMVASTATIN 40 MG
40 TABLET ORAL NIGHTLY
Qty: 30 TABLET | Refills: 0 | Status: SHIPPED | OUTPATIENT
Start: 2022-11-28 | End: 2022-11-28 | Stop reason: SDUPTHER

## 2022-11-28 RX ORDER — ENOXAPARIN SODIUM 100 MG/ML
40 INJECTION SUBCUTANEOUS DAILY
Qty: 30 ML | Refills: 0 | Status: SHIPPED | OUTPATIENT
Start: 2022-11-28

## 2022-11-28 RX ORDER — ESCITALOPRAM OXALATE 20 MG/1
TABLET ORAL
Qty: 30 TABLET | Refills: 0 | Status: SHIPPED | OUTPATIENT
Start: 2022-11-28 | End: 2022-11-28

## 2022-11-28 RX ORDER — ENOXAPARIN SODIUM 100 MG/ML
INJECTION SUBCUTANEOUS
Qty: 30 ML | Refills: 0 | Status: SHIPPED | OUTPATIENT
Start: 2022-11-28 | End: 2022-11-28 | Stop reason: SDUPTHER

## 2022-11-28 RX ORDER — METOPROLOL SUCCINATE 25 MG/1
TABLET, EXTENDED RELEASE ORAL
Qty: 30 TABLET | Refills: 0 | Status: SHIPPED | OUTPATIENT
Start: 2022-11-28 | End: 2022-11-28 | Stop reason: SDUPTHER

## 2022-11-28 ASSESSMENT — ENCOUNTER SYMPTOMS
GASTROINTESTINAL NEGATIVE: 1
RESPIRATORY NEGATIVE: 1
EYES NEGATIVE: 1

## 2022-11-28 NOTE — TELEPHONE ENCOUNTER
Last Seen: Last Seen Department: 8/1/2022  Last Seen by PCP: 6/14/2022    Last Written: 10/13/2022        Next Appointment:   Future Appointments   Date Time Provider Alexis Arndt   11/28/2022 12:30 PM LESLI Villanueva - [place]           Requested Prescriptions     Pending Prescriptions Disp Refills    escitalopram (LEXAPRO) 20 MG tablet 30 tablet 0     Sig: Take 1 tablet by mouth once daily    simvastatin (ZOCOR) 40 MG tablet 30 tablet 0     Sig: Take 1 tablet by mouth nightly    omeprazole (PRILOSEC) 40 MG delayed release capsule 30 capsule 0     Sig: Take 1 capsule by mouth twice daily

## 2022-11-28 NOTE — TELEPHONE ENCOUNTER
Refill Request        Last Seen: Last Seen Department: 8/1/2022  Last Seen by PCP: 8/1/2022    Last Written: 10/17/2022      Next Appointment:   Future Appointments   Date Time Provider Alexis Arndt   11/28/2022 12:30 PM LESLI Guerra CNP [place]         Requested Prescriptions     Pending Prescriptions Disp Refills    escitalopram (LEXAPRO) 20 MG tablet [Pharmacy Med Name: Escitalopram Oxalate 20 MG Oral Tablet] 30 tablet 0     Sig: Take 1 tablet by mouth once daily    omeprazole (PRILOSEC) 40 MG delayed release capsule [Pharmacy Med Name: Omeprazole 40 MG Oral Capsule Delayed Release] 30 capsule 0     Sig: Take 1 capsule by mouth twice daily    enoxaparin (LOVENOX) 80 MG/0.8ML [Pharmacy Med Name: Enoxaparin Sodium 80 MG/0.8ML Subcutaneous Solution] 30 mL 0     Sig: INJECT 1 SOLUTION SUBCUTANEOUSLY ONCE DAILY    metoprolol succinate (TOPROL XL) 25 MG extended release tablet [Pharmacy Med Name: Metoprolol Succinate ER 25 MG Oral Tablet Extended Release 24 Hour] 30 tablet 0     Sig: Take 1 tablet by mouth once daily

## 2022-11-28 NOTE — TELEPHONE ENCOUNTER
----- Message from [facility] sent at 11/23/2022  3:43 PM EST -----  Subject: Message to Provider    QUESTIONS  Information for Provider? Patient is calling because she needs a refill   for her anti-depressant. ECC was unable to reach the office for the   patient and she is out of her medication and does not feel that she can   make it until Monday to have this medication filled. Please call the   patient as soon as the medication is filled or if it cannot be filled   until Monday. Thank you.   ---------------------------------------------------------------------------  --------------  Darby COELHO  [phone]; OK to leave message on voicemail  ---------------------------------------------------------------------------  --------------  SCRIPT ANSWERS  Relationship to Patient?  Self

## 2022-11-28 NOTE — PROGRESS NOTES
Rocio Ivan (:  1975) is a 56 y.o. female,Established patient, here for evaluation of the following chief complaint(s):  Follow-up (Need refills /)         ASSESSMENT/PLAN:  1. Moderate episode of recurrent major depressive disorder (HCC)  -     escitalopram (LEXAPRO) 20 MG tablet; Take 1 tablet by mouth daily, Disp-90 tablet, R-1Normal  2. Acquired hypothyroidism  -     TSH  -     T4, Free  3. Sarcoidosis  -     tiZANidine (ZANAFLEX) 2 MG tablet; Take 1 tablet by mouth three times daily as needed, Disp-90 tablet, R-0Normal  4. Need for pneumococcal vaccination  -     Pneumococcal, PCV20, PREVNAR 20, (age 25 yrs+), IM, PF  5. Encounter for cervical Pap smear with pelvic exam  -     [address], Jessica Minaya, DO, Gynecology, [facility]  6. Chronic diastolic CHF (congestive heart failure) (HCC)  -     CBC with Auto Differential  -     Comprehensive Metabolic Panel  7. Class 2 severe obesity due to excess calories with serious comorbidity and body mass index (BMI) of 35.0 to 35.9 in adult ([facility] [address]  8. Hyperlipidemia, unspecified hyperlipidemia type  -     Lipid Panel      No follow-ups on file. Subjective   SUBJECTIVE/OBJECTIVE:  HPI  Patient presents with medication refills and follow-up for depression. Stated she used to be on Synthroid but stopped when her thyroid became normal.  Has not had it checked in a while. Stated she had heart attack this summer and have stents put in. Encourage patient to quit smoking especially due to to her sarcoidosis  Review of Systems   Constitutional: Negative. HENT: Negative. Eyes: Negative. Respiratory: Negative. Cardiovascular: Negative. Gastrointestinal: Negative. Endocrine: Negative. Genitourinary: Negative. Musculoskeletal: Negative. Skin: Negative. Neurological: Negative. Hematological: Negative. Psychiatric/Behavioral:  Positive for behavioral problems.          Objective   Physical Exam  HENT:      Right Ear: Tympanic membrane and ear canal normal.      Left Ear: Tympanic membrane and ear canal normal.      Nose: Nose normal.      Mouth/Throat:      Mouth: Mucous membranes are moist.   Eyes:      Extraocular Movements: Extraocular movements intact. Cardiovascular:      Rate and Rhythm: Normal rate. Pulses: Normal pulses. Heart sounds: Normal heart sounds. Pulmonary:      Effort: Pulmonary effort is normal.      Breath sounds: Normal breath sounds. Abdominal:      General: Bowel sounds are normal.      Palpations: Abdomen is soft. Musculoskeletal:         General: Normal range of motion. Cervical back: Normal range of motion. Skin:     General: Skin is warm. Neurological:      General: No focal deficit present. Mental Status: She is alert and oriented to person, place, and time. Psychiatric:         Mood and Affect: Mood normal.         Behavior: Behavior normal.         Thought Content: Thought content normal.         Judgment: Judgment normal.          On this date 11/28/2022 I have spent 20 minutes reviewing previous notes, test results and face to face with the patient discussing the diagnosis and importance of compliance with the treatment plan as well as documenting on the day of the visit. An electronic signature was used to authenticate this note.     --LESLI Klein - CNP

## 2022-11-29 LAB
A/G RATIO: 1.4 (ref 1.1–2.2)
ALBUMIN SERPL-MCNC: 4.3 G/DL (ref 3.4–5)
ALP BLD-CCNC: 140 U/L (ref 40–129)
ALT SERPL-CCNC: 15 U/L (ref 10–40)
ANION GAP SERPL CALCULATED.3IONS-SCNC: 15 MMOL/L (ref 3–16)
AST SERPL-CCNC: 18 U/L (ref 15–37)
BASOPHILS ABSOLUTE: 0.1 K/UL (ref 0–0.2)
BASOPHILS RELATIVE PERCENT: 1.2 %
BILIRUB SERPL-MCNC: 0.5 MG/DL (ref 0–1)
BUN BLDV-MCNC: 14 MG/DL (ref 7–20)
CALCIUM SERPL-MCNC: 9.3 MG/DL (ref 8.3–10.6)
CHLORIDE BLD-SCNC: 105 MMOL/L (ref 99–110)
CHOLESTEROL, TOTAL: 294 MG/DL (ref 0–199)
CO2: 21 MMOL/L (ref 21–32)
CREAT SERPL-MCNC: 0.7 MG/DL (ref 0.6–1.1)
EOSINOPHILS ABSOLUTE: 0.1 K/UL (ref 0–0.6)
EOSINOPHILS RELATIVE PERCENT: 2.9 %
GFR SERPL CREATININE-BSD FRML MDRD: >60 ML/MIN/{1.73_M2}
GLUCOSE BLD-MCNC: 90 MG/DL (ref 70–99)
HCT VFR BLD CALC: 42.8 % (ref 36–48)
HDLC SERPL-MCNC: 73 MG/DL (ref 40–60)
HEMOGLOBIN: 14 G/DL (ref 12–16)
LDL CHOLESTEROL CALCULATED: 198 MG/DL
LYMPHOCYTES ABSOLUTE: 1.6 K/UL (ref 1–5.1)
LYMPHOCYTES RELATIVE PERCENT: 33.7 %
MCH RBC QN AUTO: 30.6 PG (ref 26–34)
MCHC RBC AUTO-ENTMCNC: 32.8 G/DL (ref 31–36)
MCV RBC AUTO: 93.2 FL (ref 80–100)
MONOCYTES ABSOLUTE: 0.2 K/UL (ref 0–1.3)
MONOCYTES RELATIVE PERCENT: 5.1 %
NEUTROPHILS ABSOLUTE: 2.7 K/UL (ref 1.7–7.7)
NEUTROPHILS RELATIVE PERCENT: 57.1 %
PDW BLD-RTO: 13.3 % (ref 12.4–15.4)
PLATELET # BLD: 338 K/UL (ref 135–450)
PMV BLD AUTO: 10 FL (ref 5–10.5)
POTASSIUM SERPL-SCNC: 4.7 MMOL/L (ref 3.5–5.1)
RBC # BLD: 4.59 M/UL (ref 4–5.2)
SODIUM BLD-SCNC: 141 MMOL/L (ref 136–145)
T4 FREE: 1.1 NG/DL (ref 0.9–1.8)
TOTAL PROTEIN: 7.3 G/DL (ref 6.4–8.2)
TRIGL SERPL-MCNC: 115 MG/DL (ref 0–150)
TSH SERPL DL<=0.05 MIU/L-ACNC: 2.22 UIU/ML (ref 0.27–4.2)
VLDLC SERPL CALC-MCNC: 23 MG/DL
WBC # BLD: 4.8 K/UL (ref 4–11)

## 2023-01-10 DIAGNOSIS — D86.9 SARCOIDOSIS: ICD-10-CM

## 2023-01-11 RX ORDER — GABAPENTIN 300 MG/1
300 CAPSULE ORAL 3 TIMES DAILY
Qty: 180 CAPSULE | Refills: 0 | Status: SHIPPED | OUTPATIENT
Start: 2023-01-11 | End: 2023-02-10

## 2023-01-11 RX ORDER — BENZOYL PEROXIDE
KIT TOPICAL
Qty: 30 TABLET | Refills: 0 | Status: SHIPPED | OUTPATIENT
Start: 2023-01-11

## 2023-01-11 RX ORDER — TIZANIDINE 2 MG/1
TABLET ORAL
Qty: 90 TABLET | Refills: 0 | Status: SHIPPED | OUTPATIENT
Start: 2023-01-11

## 2023-01-11 NOTE — TELEPHONE ENCOUNTER
Refill Request         Last Seen: Last Seen Department: 11/28/2022  Last Seen by PCP: 11/28/2022    Last Written: 11/28/2022        Next Appointment:   Future Appointments   Date Time Provider Alexis Arndt   3/8/2023  1:00 PM Perlita Talbot. [address] [place] - [place]             Requested Prescriptions     Pending Prescriptions Disp Refills    gabapentin (NEURONTIN) 300 MG capsule [Pharmacy Med Name: Gabapentin 300 MG Oral Capsule] 180 capsule 0     Sig: TAKE 2 CAPSULES BY MOUTH THREE TIMES DAILY FOR 30 DAYS    EQ ALLERGY RELIEF 10 MG tablet [Pharmacy Med Name: EQ Allergy Relief 10 MG Oral Tablet] 30 tablet 0     Sig: Take 1 tablet by mouth once daily    tiZANidine (ZANAFLEX) 2 MG tablet [Pharmacy Med Name: tiZANidine HCl 2 MG Oral Tablet] 90 tablet 0     Sig: Take 1 tablet by mouth three times daily as needed

## 2023-02-15 DIAGNOSIS — K21.9 GASTROESOPHAGEAL REFLUX DISEASE WITHOUT ESOPHAGITIS: ICD-10-CM

## 2023-02-15 NOTE — TELEPHONE ENCOUNTER
Refill Request       Last Seen: Last Seen Department: 11/28/2022  Last Seen by PCP: 11/28/2022    Last Written: 11/28/2022        Next Appointment:   Future Appointments   Date Time Provider Alexis Arndt   3/8/2023  1:00 PM Perlita Best. [address] [place] - [place]             Requested Prescriptions     Pending Prescriptions Disp Refills    omeprazole (PRILOSEC) 40 MG delayed release capsule [Pharmacy Med Name: Omeprazole 40 MG Oral Capsule Delayed Release] 30 capsule 0     Sig: Take 1 capsule by mouth twice daily    enoxaparin (LOVENOX) 80 MG/0.8ML [Pharmacy Med Name: Enoxaparin Sodium 80 MG/0.8ML Subcutaneous Solution] 30 mL 0     Sig: INJECT 0.4 MLS SUBCUTANEOUSLY ONCE DAILY

## 2023-02-16 RX ORDER — ENOXAPARIN SODIUM 100 MG/ML
INJECTION SUBCUTANEOUS
Qty: 30 ML | Refills: 0 | Status: SHIPPED | OUTPATIENT
Start: 2023-02-16

## 2023-02-16 RX ORDER — OMEPRAZOLE 40 MG/1
CAPSULE, DELAYED RELEASE ORAL
Qty: 30 CAPSULE | Refills: 0 | Status: SHIPPED | OUTPATIENT
Start: 2023-02-16

## 2023-02-18 ENCOUNTER — HOSPITAL ENCOUNTER (EMERGENCY)
Age: 48
Discharge: HOME | End: 2023-02-18
Payer: COMMERCIAL

## 2023-02-18 VITALS
HEART RATE: 81 BPM | BODY MASS INDEX: 35.15 KG/M2 | DIASTOLIC BLOOD PRESSURE: 75 MMHG | WEIGHT: 180 LBS | RESPIRATION RATE: 16 BRPM | SYSTOLIC BLOOD PRESSURE: 138 MMHG | OXYGEN SATURATION: 95 % | TEMPERATURE: 98.4 F

## 2023-02-18 DIAGNOSIS — K08.89 PAIN, DENTAL: Primary | ICD-10-CM

## 2023-02-18 PROCEDURE — 99283 EMERGENCY DEPT VISIT LOW MDM: CPT

## 2023-02-18 PROCEDURE — 6370000000 HC RX 637 (ALT 250 FOR IP): Performed by: PHYSICIAN ASSISTANT

## 2023-02-18 RX ORDER — CLINDAMYCIN HYDROCHLORIDE 300 MG/1
300 CAPSULE ORAL 3 TIMES DAILY
Qty: 30 CAPSULE | Refills: 0 | Status: SHIPPED | OUTPATIENT
Start: 2023-02-18 | End: 2023-02-28

## 2023-02-18 RX ORDER — CLINDAMYCIN HYDROCHLORIDE 150 MG/1
300 CAPSULE ORAL ONCE
Status: COMPLETED | OUTPATIENT
Start: 2023-02-18 | End: 2023-02-18

## 2023-02-18 RX ORDER — OXYCODONE HYDROCHLORIDE AND ACETAMINOPHEN 5; 325 MG/1; MG/1
1 TABLET ORAL ONCE
Status: COMPLETED | OUTPATIENT
Start: 2023-02-18 | End: 2023-02-18

## 2023-02-18 RX ORDER — TRAMADOL HYDROCHLORIDE 50 MG/1
50 TABLET ORAL EVERY 6 HOURS PRN
Qty: 8 TABLET | Refills: 0 | Status: SHIPPED | OUTPATIENT
Start: 2023-02-18 | End: 2023-02-21

## 2023-02-18 RX ORDER — NAPROXEN 500 MG/1
500 TABLET ORAL 2 TIMES DAILY
Qty: 30 TABLET | Refills: 0 | Status: SHIPPED | OUTPATIENT
Start: 2023-02-18

## 2023-02-18 RX ORDER — ONDANSETRON 4 MG/1
4 TABLET, FILM COATED ORAL DAILY PRN
Qty: 30 TABLET | Refills: 0 | Status: SHIPPED | OUTPATIENT
Start: 2023-02-18

## 2023-02-18 RX ORDER — ONDANSETRON 4 MG/1
4 TABLET, ORALLY DISINTEGRATING ORAL ONCE
Status: COMPLETED | OUTPATIENT
Start: 2023-02-18 | End: 2023-02-18

## 2023-02-18 RX ADMIN — ONDANSETRON 4 MG: 4 TABLET, ORALLY DISINTEGRATING ORAL at 18:30

## 2023-02-18 RX ADMIN — OXYCODONE AND ACETAMINOPHEN 1 TABLET: 5; 325 TABLET ORAL at 18:31

## 2023-02-18 RX ADMIN — CLINDAMYCIN HYDROCHLORIDE 300 MG: 150 CAPSULE ORAL at 18:31

## 2023-02-18 ASSESSMENT — PAIN - FUNCTIONAL ASSESSMENT: PAIN_FUNCTIONAL_ASSESSMENT: 0-10

## 2023-02-18 ASSESSMENT — PAIN SCALES - GENERAL: PAINLEVEL_OUTOF10: 9

## 2023-02-18 NOTE — ED PROVIDER NOTES
[address] [facility]  ED  Emergency Department Encounter    Patient Name: Adarsh Toussaint  MRN: [id number]  [place]: 1975  Date of Evaluation: 2/18/2023  Provider: LESLI Hagan CNP  Note Started: 6:23 PM EST 2/18/23    CHIEF COMPLAINT  Dental Pain (Pt with tooth abscess and left side face swelling )    SHARED SERVICE VISIT  Evaluated by ANATOLY. My supervising physician was available for consultation. HISTORY OF PRESENT ILLNESS  Adarsh Toussaint is a 56 y.o. female who presents to the ED several day history of dental pain with 1 day history of facial swelling. Patient dropped off by coworker today for evaluation. States that she has history of poor dentition. States that she has had some pain and swelling to the upper left jaw for the past several days significantly worse this morning. Reports that she tastes foul-smelling drainage. Has been nauseous without vomiting. Denies fevers chills. No chest pain or shortness of breath. No headaches or dizziness. No other complaints, modifying factors or associated symptoms. Nursing notes reviewed were all reviewed and agreed with or any disagreements were addressed in the HPI.     PMH:  Past Medical History:   Diagnosis Date    CHF (congestive heart failure) (HCC)     COPD (chronic obstructive pulmonary disease) (HCC)     DVT     Hx of blood clots     Hypercholesterolemia     Myocardial infarct (HCC)     Pulmonary embolism (HCC)     Unspecified cerebral artery occlusion with cerebral infarction      Surgical History:  Past Surgical History:   Procedure Laterality Date    CARDIAC DEFIBRILLATOR PLACEMENT  09/15/2020    gen change, original device implanted 8/25/2012    CARDIAC SURGERY      catherization x 4    CHEST TUBE INSERTION      at birth    CORONARY ANGIOPLASTY WITH STENT PLACEMENT  03/2010    BMS to pRCA 4x28 [employer]    DILATION AND CURETTAGE OF UTERUS      KNEE ARTHROSCOPY      bilat knees    LAPAROSCOPY      x 2 for ovaries    TONSILLECTOMY AND ADENOIDECTOMY      UPPER GASTROINTESTINAL ENDOSCOPY  10/18/2010    with biopsies     Family History:  Family History   Problem Relation Age of Onset    Heart Disease Mother     High Blood Pressure Mother     Diabetes Mother     Depression Mother     High Cholesterol Mother     Heart Disease Father     High Blood Pressure Father     High Cholesterol Father     Asthma Sister      Social History:  Social History     Socioeconomic History    Marital status: Single     Spouse name: Not on file    Number of children: Not on file    Years of education: Not on file    Highest education level: Not on file   Occupational History    Not on file   Tobacco Use    Smoking status: Every Day     Packs/day: 0.25     Types: Cigarettes     Start date: 3/8/2007    Smokeless tobacco: Never   Vaping Use    Vaping Use: Never used   Substance and Sexual Activity    Alcohol use: No    Drug use: Yes     Types: Marijuana Vilma Sarkis)     Comment: rarely    Sexual activity: Not Currently     Partners: Male   Other Topics Concern    Not on file   Social History Narrative    Not on file     Social Determinants of Health     Financial Resource Strain: Low Risk     Difficulty of Paying Living Expenses: Not very hard   Food Insecurity: No Food Insecurity    Worried About Running Out of Food in the Last Year: Never true    Ran Out of Food in the Last Year: Never true   Transportation Needs: Not on file   Physical Activity: Not on file   Stress: Not on file   Social Connections: Not on file   Intimate Partner Violence: Not on file   Housing Stability: Not on file     Current Medications:  Current Facility-Administered Medications   Medication Dose Route Frequency Provider Last Rate Last Admin    clindamycin (CLEOCIN) capsule 300 mg  300 mg Oral Once KAMILLA Morrison        oxyCODONE-acetaminophen (PERCOCET) 5-325 MG per tablet 1 tablet  1 tablet Oral Once KAMILLA Morrison        ondansetron (ZOFRAN-ODT) disintegrating tablet 4 mg  4 mg Oral Once [place], [place]         Current Outpatient Medications   Medication Sig Dispense Refill    clindamycin (CLEOCIN) 300 MG capsule Take 1 capsule by mouth 3 times daily for 10 days 30 capsule 0    ondansetron (ZOFRAN) 4 MG tablet Take 1 tablet by mouth daily as needed for Nausea or Vomiting 30 tablet 0    naproxen (NAPROSYN) 500 MG tablet Take 1 tablet by mouth 2 times daily 30 tablet 0    traMADol (ULTRAM) 50 MG tablet Take 1 tablet by mouth every 6 hours as needed for Pain for up to 3 days. Max Daily Amount: 200 mg 8 tablet 0    omeprazole (PRILOSEC) 40 MG delayed release capsule Take 1 capsule by mouth twice daily 30 capsule 0    enoxaparin (LOVENOX) 80 MG/0.8ML INJECT 0.4 MLS SUBCUTANEOUSLY ONCE DAILY 30 mL 0    gabapentin (NEURONTIN) 300 MG capsule Take 1 capsule by mouth 3 times daily for 30 days. 180 capsule 0    EQ ALLERGY RELIEF 10 MG tablet Take 1 tablet by mouth once daily 30 tablet 0    tiZANidine (ZANAFLEX) 2 MG tablet Take 1 tablet by mouth three times daily as needed 90 tablet 0    escitalopram (LEXAPRO) 20 MG tablet Take 1 tablet by mouth daily 90 tablet 1    metoprolol succinate (TOPROL XL) 25 MG extended release tablet Take 1 tablet by mouth daily 90 tablet 1    simvastatin (ZOCOR) 40 MG tablet Take 1 tablet by mouth nightly 30 tablet 0    furosemide (LASIX) 20 MG tablet Take 0.5 tablets by mouth daily as needed (weight gain of 3 lbs over baseline weight) 30 tablet 3    clopidogrel (PLAVIX) 75 MG tablet Take 1 tablet by mouth daily 30 tablet 11    vitamin D3 (CHOLECALCIFEROL) 25 MCG (1000 UT) TABS tablet Take 1 tablet by mouth daily 90 tablet 1    loratadine (CLARITIN) 10 MG capsule Take 10 mg by mouth in the morning.       aspirin 81 MG chewable tablet Take by mouth      nitroGLYCERIN (NITROSTAT) 0.4 MG SL tablet Place 1 tablet under the tongue every 5 minutes as needed for Chest pain 25 tablet 1    vitamin D (CHOLECALCIFEROL) 400 UNIT TABS tablet Take 2,000 Units by mouth daily. Allergies: Allergies   Allergen Reactions    Coumadin [Warfarin]      Other reaction(s): states \"bled out from by bowels'    Duloxetine      Increased depression and feelings of hopelessness. Fluoxetine      Increased depression and feelings of hopelessness. Lisinopril      Other reaction(s): severe bp drop  \"bottom out\"  Per patient req      Morphine      Other reaction(s): SEVERELY ALTERS PATIENTS MOOD  Pt states it makes her \"mentally go crazy\"  States that it makes her feel \"crazy\". Pregabalin Nausea And Vomiting     Other reaction(s): vomitting  nausea  Nausea, vomiting and rapid heart rate       Baclofen      Increased spasticity and tightening of her muscles. Losartan Other (See Comments)     \"lose potassium\"  Skin turned red   Skin turned red. Bisoprolol Fumarate Other (See Comments)     Hypotension  hypotension      Bupropion     Hydrocodone     Hydrocodone-Acetaminophen Itching    Spironolactone Other (See Comments)     Nausea loss of appetite  Nausea loss of appetite  Nausea and loss of appetite       Screenings:     [place] Coma Scale  Eye Opening: Spontaneous  Best Verbal Response: Oriented  Best Motor Response: Obeys commands  [place] Coma Scale Score: 15           CIWA Assessment  BP: 138/75  Heart Rate: 81          REVIEW OF SYSTEMS  Positives and Pertinent Negatives as per HPI. PHYSICAL EXAM  /75   Pulse 81   Temp 98.4 °F (36.9 °C) (Oral)   Resp 16   Wt 180 lb (81.6 kg)   SpO2 95%   BMI 35.15 kg/m²     GENERAL APPEARANCE: Awake and alert. Cooperative. No acute distress. HEAD: Normocephalic. Atraumatic. EYES:  EOM's grossly intact. ENT: Mucous membranes are moist.  Poor dentition throughout. Swelling to gum lines with active drainage present to left upper gum. No trismus appreciated. No vesicles, blistering or sloughing. Floor the mouth soft and nonraised.   No trismus appreciated and patient controlling secretions appropriately  LYMPH: No periauricular, submental, or cervical lymphadenopathy. NECK: Supple. No tracheal tenderness or deviation. No crepitus. EXTREMITIES: No peripheral edema. Moves all extremities equally. All extremities neurovascularly intact. SKIN: Warm and dry. No acute rashes. NEUROLOGICAL: Alert and oriented. No gross facial drooping. Strength 5/5, sensation intact. EKG  When ordered, EKG's are interpreted by the ED Physician in the absence of a Cardiologist.  Please see their note for interpretation of EKG. LABS  Labs Reviewed - No data to display  When ordered, only abnormal lab results are displayed. All other labs were within normal range or not returned as of this dictation. RADIOLOGY  Non-plain film images such as CT, U/S, and MRI are read by the radiologist.  Plain radiographic images are visualized and preliminarily interpreted by the ED Provider with the below findings:     Interpretation per the Radiologist below, if available at the time of this note:  No orders to display     PROCEDURES  Unless otherwise noted below, none. ED COURSE/DDx/MDM  History obtained from:  Patient    Vitals:  Vitals:    02/18/23 1653   BP: 138/75   Pulse: 81   Resp: 16   Temp: 98.4 °F (36.9 °C)   TempSrc: Oral   SpO2: 95%   Weight: 180 lb (81.6 kg)     Patient received following medications in ED:  Medications   clindamycin (CLEOCIN) capsule 300 mg (has no administration in time range)   oxyCODONE-acetaminophen (PERCOCET) 5-325 MG per tablet 1 tablet (has no administration in time range)   ondansetron (ZOFRAN-ODT) disintegrating tablet 4 mg (has no administration in time range)     Sepsis Consideration:  Is this patient to be included in the SEP-1 Core Measure due to severe sepsis or septic shock? No   Exclusion criteria - the patient is NOT to be included for SEP-1 Core Measure due to:  2+ SIRS criteria are not met    Records Reviewed:   None.     Chronic conditions affecting care:   None.   has a past medical history of CHF (congestive heart failure) ([facility] [address] COPD (chronic obstructive pulmonary disease) ([facility] [address] DVT, blood clots, Hypercholesterolemia, Myocardial infarct ([facility] [address] Pulmonary embolism ([facility] [address] and Unspecified cerebral artery occlusion with cerebral infarction. Social Determinants:   Does not have a dentist, dental list provided    Consults:   None    Reassessment:      None    MDM/Disposition Considerations:   Briefly Ryan Simons presents for dental pain and facial swelling. Patient initiated on clindamycin. Considered dental block and I&D although with active drainage do not feel that this is necessary. She will be discharged with a dental list, oral antibiotics, antiemetics and analgesics with recommendations for close and continued outpatient dental follow-up. We have discussed return precautions as well and patient is in agreement and comfortable at discharge. Critical Care Time:   0 Minutes of critical care time spent not including separately billable procedures. DDx:  I estimate there is LOW risk for a ANAPHYLAXIS, DEEP SPACE INFECTION (e.g., IDRISS ANGINA OR RETROPHARYNGEAL ABSCESS), EPIGLOTTITIS, MENINGITIS, or AIRWAY COMPROMISE, thus I consider the discharge disposition reasonable. Also, there is no evidence or peritonitis, sepsis, or toxicity. Ryan Simons and I have also discussed returning to the Emergency Department immediately if new or worsening symptoms occur. We have discussed the symptoms which are most concerning (e.g., changing or worsening pain, trouble swallowing or breathing, neck stiffness or fever) that necessitate immediate return. FINAL IMPRESSION  1. Pain, dental      Patient referred to:  Dentist of your choice  Dental list provided.   Schedule an appointment as soon as possible for a visit       [facility]  ED  [facility] [address]  [phone]    If symptoms worsen    Discharge Medications:   New Prescriptions    CLINDAMYCIN (CLEOCIN) 300 MG CAPSULE    Take 1 capsule by mouth 3 times daily for 10 days    NAPROXEN (NAPROSYN) 500 MG TABLET    Take 1 tablet by mouth 2 times daily    ONDANSETRON (ZOFRAN) 4 MG TABLET    Take 1 tablet by mouth daily as needed for Nausea or Vomiting    TRAMADOL (ULTRAM) 50 MG TABLET    Take 1 tablet by mouth every 6 hours as needed for Pain for up to 3 days. Max Daily Amount: 200 mg     Discontinued Medications:  Discontinued Medications    No medications on file     Risk management discussed and shared decision making had with patient and/or surrogate. All questions were answered. Patient will follow up with PCP within 2 to 3 days for further evaluation/treatment. All questions answered. Patient will return to ED for new/worsening symptoms. DISPOSITION:  Patient was discharged home in stable condition. (Please note that portions of this note were completed with a voice recognition program.  Efforts were made to edit the dictations but occasionally words are mis-transcribed).           Mariely Barlow  02/18/23 1828

## 2023-02-18 NOTE — DISCHARGE INSTRUCTIONS
Toothache    Toothaches are generally caused by tooth decay. If you have severe pain or swelling around a tooth, you may have a deep tooth infection. Tooth decay and infections require evaluation and treatment by a dentist or an oral surgeon. The emergency department will provide you with the best possible care available for your dental problem. Unfortunately, there is not a dentist or dental clinic available in the department. Routine dental care, such as fillings, tooth extractions, or brian canals are not available in the emergency department. However, we are avaialbe for emergencies including abscesses, fractures of the jaw and other oral trauma such as a tooth that is knocked out. Any other dental problem is best treated by a dentist.  Please see the list of dental clinics in the area if you do not currently have a dentist.      Treatment That Can Be Provided for Toothache in the Emergency Department    If you have a severe toothache, medication may be prescribed for you until you are able to see a dentist.  If you are given an antibiotic, take it as prescribed and continue to take it until gone. Even if you start to feel better, your toothache will need to be treated by a dentist or an oral surgeon. Pain medication may be prescribed for you. As is the policy of the [facility] Department, the emergency department uses nonsteroidal anti-inflammatory medication (NSAIDs) as the primary medication for pain. These may include ibuprofen (Motrin®) or naproxyn sodium (Naprosyn®). Patients who are unable to take nonsteroidal anti-inflammatory medications will generally be advised to take acetaminophen (Tylenol®) for dental pain. As is the policy of the [address] [place] [address], the hospitals emergency department policy strongly discourages the use of the narcotic medications. (Tylenol #3®, Vicodin®, etc) are restricted by specific, strict guidelines.     If you have any questions concerning these instructions, call the emergency department at [facility] @ [phone]. Consult your care giver regarding these instructions and seek your physicians advice regarding medical care. Dental Referrals  Following is a list of local clinics that treat dental problems. Many also have specific emergency hours. For an appointment or for hours of operation, contact the clinic. [address] 86  [phone]     The [facility] [place]  [address]    [facility] (referral agency)  [address] [facility], [facility], [address] [facility]  [phone] or [phone]  (Application Process, Must Qualify)     [facility] [address] Amy Ariza. [address]  [phone]  ([facility] Medicaid and Insurance with Payment plans for Self-Insured)     Clinics:    [address] [facility]   [address]  [place], [address]  4200 [facility]  [address]  [place] [address]. [place], [address] [facility]  6 [facility], [address] [place] [address] Street:    [address]  Appointment only  [address]. [place], [address] [facility]  Aurelia-Sinhala speaking  [address]  [address]  [place], 201 [facility]  1924 [facility]  81 [facility]  Juventino Modi [facility] 119  [facility] 4777  [address]  [place]  [facility] 58  [facility] 167.   [place], [address]  4455  [facility]  [address], [place] [address]  Carrie Vazquez 61  5950 [facility], 50 [place],[address]Th Floor     [facility]   [address] [facility], [address] Julia [address]repo  [phone] ext.  [address]anna [address] 19  10 [facility], 1100 [facility]  [phone] or [phone]  (Must qualify)

## 2023-02-18 NOTE — ED NOTES
Writer reviewed discharge instructions, medications, and follow-up with dentist; patient verbalized understanding. Patient stable, ambulatory with steady gait, GCS 15, no signs/ symptoms of acute distress, respirations unlabored, and with family.         Charis Sandoval RN  02/18/23 6307

## 2023-03-20 DIAGNOSIS — K21.9 GASTROESOPHAGEAL REFLUX DISEASE WITHOUT ESOPHAGITIS: ICD-10-CM

## 2023-03-20 DIAGNOSIS — D86.9 SARCOIDOSIS: ICD-10-CM

## 2023-03-20 RX ORDER — OMEPRAZOLE 40 MG/1
CAPSULE, DELAYED RELEASE ORAL
Qty: 30 CAPSULE | Refills: 0 | OUTPATIENT
Start: 2023-03-20

## 2023-03-20 RX ORDER — ENOXAPARIN SODIUM 100 MG/ML
INJECTION SUBCUTANEOUS
Qty: 30 ML | Refills: 0 | OUTPATIENT
Start: 2023-03-20

## 2023-03-20 RX ORDER — TIZANIDINE 2 MG/1
TABLET ORAL
Qty: 90 TABLET | Refills: 0 | OUTPATIENT
Start: 2023-03-20

## 2023-03-20 RX ORDER — GABAPENTIN 300 MG/1
CAPSULE ORAL
Qty: 180 CAPSULE | Refills: 0 | OUTPATIENT
Start: 2023-03-20

## 2023-03-20 RX ORDER — BENZOYL PEROXIDE
KIT TOPICAL
Qty: 30 TABLET | Refills: 0 | OUTPATIENT
Start: 2023-03-20

## 2023-03-24 ENCOUNTER — OFFICE VISIT (OUTPATIENT)
Dept: FAMILY MEDICINE CLINIC | Age: 48
End: 2023-03-24
Payer: COMMERCIAL

## 2023-03-24 ENCOUNTER — TELEPHONE (OUTPATIENT)
Dept: FAMILY MEDICINE CLINIC | Age: 48
End: 2023-03-24

## 2023-03-24 VITALS
WEIGHT: 180 LBS | SYSTOLIC BLOOD PRESSURE: 100 MMHG | HEART RATE: 68 BPM | HEIGHT: 60 IN | RESPIRATION RATE: 16 BRPM | DIASTOLIC BLOOD PRESSURE: 74 MMHG | OXYGEN SATURATION: 99 % | BODY MASS INDEX: 35.34 KG/M2

## 2023-03-24 DIAGNOSIS — K21.9 GASTROESOPHAGEAL REFLUX DISEASE WITHOUT ESOPHAGITIS: ICD-10-CM

## 2023-03-24 DIAGNOSIS — I50.32 CHRONIC DIASTOLIC CHF (CONGESTIVE HEART FAILURE) (HCC): ICD-10-CM

## 2023-03-24 DIAGNOSIS — D66 FACTOR VIII DEFICIENCY (HCC): Primary | ICD-10-CM

## 2023-03-24 DIAGNOSIS — D86.9 SARCOIDOSIS: ICD-10-CM

## 2023-03-24 DIAGNOSIS — E78.00 PURE HYPERCHOLESTEROLEMIA: ICD-10-CM

## 2023-03-24 LAB
ALBUMIN SERPL-MCNC: 4.3 G/DL (ref 3.4–5)
ALBUMIN/GLOB SERPL: 1.3 {RATIO} (ref 1.1–2.2)
ALP SERPL-CCNC: 152 U/L (ref 40–129)
ALT SERPL-CCNC: 11 U/L (ref 10–40)
ANION GAP SERPL CALCULATED.3IONS-SCNC: 16 MMOL/L (ref 3–16)
AST SERPL-CCNC: 14 U/L (ref 15–37)
BASOPHILS # BLD: 0.1 K/UL (ref 0–0.2)
BASOPHILS NFR BLD: 1 %
BILIRUB SERPL-MCNC: 0.4 MG/DL (ref 0–1)
BUN SERPL-MCNC: 7 MG/DL (ref 7–20)
CALCIUM SERPL-MCNC: 9.8 MG/DL (ref 8.3–10.6)
CHLORIDE SERPL-SCNC: 103 MMOL/L (ref 99–110)
CHOLEST SERPL-MCNC: 337 MG/DL (ref 0–199)
CO2 SERPL-SCNC: 23 MMOL/L (ref 21–32)
CREAT SERPL-MCNC: 0.7 MG/DL (ref 0.6–1.1)
DEPRECATED RDW RBC AUTO: 13 % (ref 12.4–15.4)
EOSINOPHIL # BLD: 0.2 K/UL (ref 0–0.6)
EOSINOPHIL NFR BLD: 3.8 %
GFR SERPLBLD CREATININE-BSD FMLA CKD-EPI: >60 ML/MIN/{1.73_M2}
GLUCOSE SERPL-MCNC: 96 MG/DL (ref 70–99)
HCT VFR BLD AUTO: 42.3 % (ref 36–48)
HDLC SERPL-MCNC: 51 MG/DL (ref 40–60)
HGB BLD-MCNC: 14.2 G/DL (ref 12–16)
INR PPP: 1 (ref 0.87–1.14)
LDLC SERPL CALC-MCNC: 255 MG/DL
LYMPHOCYTES # BLD: 1.8 K/UL (ref 1–5.1)
LYMPHOCYTES NFR BLD: 33.7 %
MCH RBC QN AUTO: 31 PG (ref 26–34)
MCHC RBC AUTO-ENTMCNC: 33.6 G/DL (ref 31–36)
MCV RBC AUTO: 92.2 FL (ref 80–100)
MONOCYTES # BLD: 0.4 K/UL (ref 0–1.3)
MONOCYTES NFR BLD: 7 %
NEUTROPHILS # BLD: 3 K/UL (ref 1.7–7.7)
NEUTROPHILS NFR BLD: 54.5 %
PLATELET # BLD AUTO: 363 K/UL (ref 135–450)
PMV BLD AUTO: 9.9 FL (ref 5–10.5)
POTASSIUM SERPL-SCNC: 5.5 MMOL/L (ref 3.5–5.1)
PROT SERPL-MCNC: 7.5 G/DL (ref 6.4–8.2)
PROTHROMBIN TIME: 13.1 SEC (ref 11.7–14.5)
RBC # BLD AUTO: 4.59 M/UL (ref 4–5.2)
SODIUM SERPL-SCNC: 142 MMOL/L (ref 136–145)
TRIGL SERPL-MCNC: 153 MG/DL (ref 0–150)
VLDLC SERPL CALC-MCNC: 31 MG/DL
WBC # BLD AUTO: 5.4 K/UL (ref 4–11)

## 2023-03-24 PROCEDURE — G8417 CALC BMI ABV UP PARAM F/U: HCPCS

## 2023-03-24 PROCEDURE — G8427 DOCREV CUR MEDS BY ELIG CLIN: HCPCS

## 2023-03-24 PROCEDURE — G8484 FLU IMMUNIZE NO ADMIN: HCPCS

## 2023-03-24 PROCEDURE — 3074F SYST BP LT 130 MM HG: CPT

## 2023-03-24 PROCEDURE — 3078F DIAST BP <80 MM HG: CPT

## 2023-03-24 PROCEDURE — 36415 COLL VENOUS BLD VENIPUNCTURE: CPT

## 2023-03-24 PROCEDURE — 99214 OFFICE O/P EST MOD 30 MIN: CPT

## 2023-03-24 PROCEDURE — 4004F PT TOBACCO SCREEN RCVD TLK: CPT

## 2023-03-24 RX ORDER — OMEPRAZOLE 40 MG/1
40 CAPSULE, DELAYED RELEASE ORAL 2 TIMES DAILY
Qty: 30 CAPSULE | Refills: 0 | Status: SHIPPED | OUTPATIENT
Start: 2023-03-24

## 2023-03-24 RX ORDER — LORATADINE 10 MG/1
10 CAPSULE, LIQUID FILLED ORAL DAILY
Qty: 30 CAPSULE | Refills: 0 | Status: SHIPPED | OUTPATIENT
Start: 2023-03-24

## 2023-03-24 RX ORDER — SIMVASTATIN 40 MG
40 TABLET ORAL NIGHTLY
Qty: 30 TABLET | Refills: 0 | Status: SHIPPED | OUTPATIENT
Start: 2023-03-24 | End: 2023-04-23

## 2023-03-24 RX ORDER — TIZANIDINE 2 MG/1
2 TABLET ORAL EVERY 8 HOURS PRN
Qty: 90 TABLET | Refills: 0 | Status: SHIPPED | OUTPATIENT
Start: 2023-03-24

## 2023-03-24 RX ORDER — ENOXAPARIN SODIUM 100 MG/ML
INJECTION SUBCUTANEOUS
Qty: 30 ML | Refills: 0 | Status: SHIPPED | OUTPATIENT
Start: 2023-03-24

## 2023-03-24 RX ORDER — GABAPENTIN 300 MG/1
300 CAPSULE ORAL 3 TIMES DAILY
Qty: 90 CAPSULE | Refills: 0 | Status: SHIPPED | OUTPATIENT
Start: 2023-03-24 | End: 2023-04-23

## 2023-03-24 ASSESSMENT — ENCOUNTER SYMPTOMS
SHORTNESS OF BREATH: 0
COUGH: 0
COLOR CHANGE: 0
BLOOD IN STOOL: 0
DIARRHEA: 0
CONSTIPATION: 0
ABDOMINAL PAIN: 0
SORE THROAT: 0
WHEEZING: 0

## 2023-03-24 ASSESSMENT — PATIENT HEALTH QUESTIONNAIRE - PHQ9
2. FEELING DOWN, DEPRESSED OR HOPELESS: 0
SUM OF ALL RESPONSES TO PHQ QUESTIONS 1-9: 0
7. TROUBLE CONCENTRATING ON THINGS, SUCH AS READING THE NEWSPAPER OR WATCHING TELEVISION: 0
6. FEELING BAD ABOUT YOURSELF - OR THAT YOU ARE A FAILURE OR HAVE LET YOURSELF OR YOUR FAMILY DOWN: 0
10. IF YOU CHECKED OFF ANY PROBLEMS, HOW DIFFICULT HAVE THESE PROBLEMS MADE IT FOR YOU TO DO YOUR WORK, TAKE CARE OF THINGS AT HOME, OR GET ALONG WITH OTHER PEOPLE: 0
9. THOUGHTS THAT YOU WOULD BE BETTER OFF DEAD, OR OF HURTING YOURSELF: 0
1. LITTLE INTEREST OR PLEASURE IN DOING THINGS: 0
8. MOVING OR SPEAKING SO SLOWLY THAT OTHER PEOPLE COULD HAVE NOTICED. OR THE OPPOSITE, BEING SO FIGETY OR RESTLESS THAT YOU HAVE BEEN MOVING AROUND A LOT MORE THAN USUAL: 0
5. POOR APPETITE OR OVEREATING: 0
3. TROUBLE FALLING OR STAYING ASLEEP: 0
SUM OF ALL RESPONSES TO PHQ QUESTIONS 1-9: 0
SUM OF ALL RESPONSES TO PHQ QUESTIONS 1-9: 0
SUM OF ALL RESPONSES TO PHQ9 QUESTIONS 1 & 2: 0
4. FEELING TIRED OR HAVING LITTLE ENERGY: 0
SUM OF ALL RESPONSES TO PHQ QUESTIONS 1-9: 0

## 2023-03-24 NOTE — TELEPHONE ENCOUNTER
[address] called to clarify the quantity of the patients Lovenox that was sent over today. They said it states 30mg but did not know if that meant 30 syringes?

## 2023-03-24 NOTE — PROGRESS NOTES
Mariana Cochran MD, Cardiology, [facility]  -     CBC with Auto Differential; Future  -Patient does have an extensive cardiac history. Patient has not followed up with cardiology since her last admission in the hospital and when she ultimately had stents placed. She was last seen in the hospital 8-7-2022. It was encouraged that she follow-up outpatient. Patient is on Lasix for heart failure. Patient's weight today is currently within a good range. She is at her previous discharge weight after diuresis in office today. No gross swelling noted or signs of acute exacerbation at this time. Return in about 3 months (around 6/24/2023) for Follow up . Subjective   SUBJECTIVE/OBJECTIVE:  HPI  Patient presents the office today for medication refills. This is the first time that I have seen patient. Patient has an extensive past medical history including CVA heart failure as well as stents recently placed. 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 Patient has not followed up with cardiology since having her stents placed or hematology in approximately 12 years. Patient ultimately states that she was placed on Lovenox that she was unable to tolerate any other oral anticoagulation. Patient has no acute concerns in office today just requesting refills. Review of Systems   HENT:  Negative for congestion and sore throat. Respiratory:  Negative for cough, shortness of breath and wheezing. Cardiovascular:  Negative for chest pain and leg swelling. Gastrointestinal:  Negative for abdominal pain, blood in stool, constipation and diarrhea. Endocrine: Negative for cold intolerance and heat intolerance. Genitourinary:  Negative for difficulty urinating, hematuria and urgency. Musculoskeletal:  Negative for arthralgias and gait problem. Skin:  Negative for color change. Neurological:  Negative for dizziness, seizures, light-headedness and headaches.

## 2023-03-27 DIAGNOSIS — E87.5 HYPERKALEMIA: Primary | ICD-10-CM

## 2023-03-27 DIAGNOSIS — E78.00 PURE HYPERCHOLESTEROLEMIA: ICD-10-CM

## 2023-03-27 DIAGNOSIS — D66 FACTOR VIII DEFICIENCY (HCC): ICD-10-CM

## 2023-03-27 RX ORDER — ICOSAPENT ETHYL 1000 MG/1
2 CAPSULE ORAL 2 TIMES DAILY
Qty: 60 CAPSULE | Refills: 3 | Status: SHIPPED | OUTPATIENT
Start: 2023-03-27

## 2023-03-27 RX ORDER — ENOXAPARIN SODIUM 100 MG/ML
1 INJECTION SUBCUTANEOUS 2 TIMES DAILY
Qty: 48 ML | Refills: 0 | Status: SHIPPED | OUTPATIENT
Start: 2023-03-27 | End: 2023-03-27 | Stop reason: SDUPTHER

## 2023-05-08 DIAGNOSIS — D86.9 SARCOIDOSIS: ICD-10-CM

## 2023-05-08 DIAGNOSIS — K21.9 GASTROESOPHAGEAL REFLUX DISEASE WITHOUT ESOPHAGITIS: ICD-10-CM

## 2023-05-08 RX ORDER — OMEPRAZOLE 40 MG/1
CAPSULE, DELAYED RELEASE ORAL
Qty: 30 CAPSULE | Refills: 0 | Status: SHIPPED | OUTPATIENT
Start: 2023-05-08

## 2023-05-08 RX ORDER — GABAPENTIN 300 MG/1
300 CAPSULE ORAL 3 TIMES DAILY
Qty: 90 CAPSULE | Refills: 0 | Status: SHIPPED | OUTPATIENT
Start: 2023-05-08 | End: 2023-06-07

## 2023-05-08 NOTE — TELEPHONE ENCOUNTER
Refill Request     CONFIRM preferred pharmacy with the patient. If Mail Order Rx - Pend for 90 day refill.       Last Seen: Last Seen Department: 3/24/2023  Last Seen by PCP: 3/24/2023    Last Written:   1) gabapentin - 03/24/2023, 90 capsules, 0 refills  2) omeprazole - 03/24/2023, 30 capsules, 0 refills          Next Appointment:   Future Appointments   Date Time Provider Alexis Arndt   6/26/2023 10:30 AM LESLI Taylor - HELENE [place] charles MADISON           Requested Prescriptions     Pending Prescriptions Disp Refills    omeprazole (PRILOSEC) 40 MG delayed release capsule [Pharmacy Med Name: Omeprazole 40 MG Oral Capsule Delayed Release] 30 capsule 0     Sig: Take 1 capsule by mouth twice daily    gabapentin (NEURONTIN) 300 MG capsule [Pharmacy Med Name: Gabapentin 300 MG Oral Capsule] 90 capsule 0     Sig: TAKE 1 CAPSULE BY MOUTH THREE TIMES DAILY

## 2023-05-21 ENCOUNTER — HOSPITAL ENCOUNTER (EMERGENCY)
Age: 48
Discharge: HOME | End: 2023-05-21
Attending: EMERGENCY MEDICINE
Payer: COMMERCIAL

## 2023-05-21 ENCOUNTER — APPOINTMENT (OUTPATIENT)
Dept: GENERAL RADIOLOGY | Age: 48
End: 2023-05-21
Payer: COMMERCIAL

## 2023-05-21 ENCOUNTER — APPOINTMENT (OUTPATIENT)
Dept: CT IMAGING | Age: 48
End: 2023-05-21
Payer: COMMERCIAL

## 2023-05-21 VITALS
HEIGHT: 60 IN | SYSTOLIC BLOOD PRESSURE: 93 MMHG | OXYGEN SATURATION: 90 % | DIASTOLIC BLOOD PRESSURE: 50 MMHG | TEMPERATURE: 97.6 F | HEART RATE: 61 BPM | RESPIRATION RATE: 19 BRPM | BODY MASS INDEX: 36.32 KG/M2 | WEIGHT: 185 LBS

## 2023-05-21 DIAGNOSIS — Z86.2 HISTORY OF SARCOIDOSIS: ICD-10-CM

## 2023-05-21 DIAGNOSIS — M25.552 BILATERAL HIP PAIN: Primary | ICD-10-CM

## 2023-05-21 DIAGNOSIS — M25.551 BILATERAL HIP PAIN: Primary | ICD-10-CM

## 2023-05-21 LAB
ALBUMIN SERPL-MCNC: 3.9 G/DL (ref 3.4–5)
ALBUMIN/GLOB SERPL: 1.2 {RATIO} (ref 1.1–2.2)
ALP SERPL-CCNC: 127 U/L (ref 40–129)
ALT SERPL-CCNC: 13 U/L (ref 10–40)
ANION GAP SERPL CALCULATED.3IONS-SCNC: 14 MMOL/L (ref 3–16)
AST SERPL-CCNC: 19 U/L (ref 15–37)
BASOPHILS # BLD: 0.1 K/UL (ref 0–0.2)
BASOPHILS NFR BLD: 0.9 %
BILIRUB SERPL-MCNC: 0.4 MG/DL (ref 0–1)
BILIRUB UR QL STRIP.AUTO: NEGATIVE
BUN SERPL-MCNC: 12 MG/DL (ref 7–20)
CALCIUM SERPL-MCNC: 8.8 MG/DL (ref 8.3–10.6)
CHLORIDE SERPL-SCNC: 99 MMOL/L (ref 99–110)
CLARITY UR: CLEAR
CO2 SERPL-SCNC: 20 MMOL/L (ref 21–32)
COLOR UR: ABNORMAL
CREAT SERPL-MCNC: 0.8 MG/DL (ref 0.6–1.1)
DEPRECATED RDW RBC AUTO: 13.2 % (ref 12.4–15.4)
EKG ATRIAL RATE: 60 BPM
EKG DIAGNOSIS: NORMAL
EKG P AXIS: 4 DEGREES
EKG P-R INTERVAL: 106 MS
EKG Q-T INTERVAL: 458 MS
EKG QRS DURATION: 98 MS
EKG QTC CALCULATION (BAZETT): 458 MS
EKG R AXIS: -8 DEGREES
EKG T AXIS: 10 DEGREES
EKG VENTRICULAR RATE: 60 BPM
EOSINOPHIL # BLD: 0.2 K/UL (ref 0–0.6)
EOSINOPHIL NFR BLD: 2 %
EPI CELLS #/AREA URNS HPF: NORMAL /HPF (ref 0–5)
GFR SERPLBLD CREATININE-BSD FMLA CKD-EPI: >60 ML/MIN/{1.73_M2}
GLUCOSE SERPL-MCNC: 110 MG/DL (ref 70–99)
GLUCOSE UR STRIP.AUTO-MCNC: NEGATIVE MG/DL
HCT VFR BLD AUTO: 39.4 % (ref 36–48)
HGB BLD-MCNC: 13.3 G/DL (ref 12–16)
HGB UR QL STRIP.AUTO: ABNORMAL
KETONES UR STRIP.AUTO-MCNC: NEGATIVE MG/DL
LEUKOCYTE ESTERASE UR QL STRIP.AUTO: NEGATIVE
LYMPHOCYTES # BLD: 2.6 K/UL (ref 1–5.1)
LYMPHOCYTES NFR BLD: 26.7 %
MCH RBC QN AUTO: 31.1 PG (ref 26–34)
MCHC RBC AUTO-ENTMCNC: 33.8 G/DL (ref 31–36)
MCV RBC AUTO: 91.8 FL (ref 80–100)
MONOCYTES # BLD: 0.7 K/UL (ref 0–1.3)
MONOCYTES NFR BLD: 7.1 %
NEUTROPHILS # BLD: 6.2 K/UL (ref 1.7–7.7)
NEUTROPHILS NFR BLD: 63.3 %
NITRITE UR QL STRIP.AUTO: NEGATIVE
NT-PROBNP SERPL-MCNC: 222 PG/ML (ref 0–124)
PH UR STRIP.AUTO: 5.5 [PH] (ref 5–8)
PLATELET # BLD AUTO: 275 K/UL (ref 135–450)
PMV BLD AUTO: 8.7 FL (ref 5–10.5)
POTASSIUM SERPL-SCNC: 4.7 MMOL/L (ref 3.5–5.1)
PROT SERPL-MCNC: 7.2 G/DL (ref 6.4–8.2)
PROT UR STRIP.AUTO-MCNC: NEGATIVE MG/DL
RBC # BLD AUTO: 4.29 M/UL (ref 4–5.2)
RBC #/AREA URNS HPF: NORMAL /HPF (ref 0–4)
SODIUM SERPL-SCNC: 133 MMOL/L (ref 136–145)
SP GR UR STRIP.AUTO: <=1.005 (ref 1–1.03)
TROPONIN, HIGH SENSITIVITY: <6 NG/L (ref 0–14)
UA COMPLETE W REFLEX CULTURE PNL UR: ABNORMAL
UA DIPSTICK W REFLEX MICRO PNL UR: YES
URN SPEC COLLECT METH UR: ABNORMAL
UROBILINOGEN UR STRIP-ACNC: 0.2 E.U./DL
WBC # BLD AUTO: 9.9 K/UL (ref 4–11)
WBC #/AREA URNS HPF: NORMAL /HPF (ref 0–5)

## 2023-05-21 PROCEDURE — 71046 X-RAY EXAM CHEST 2 VIEWS: CPT

## 2023-05-21 PROCEDURE — 83880 ASSAY OF NATRIURETIC PEPTIDE: CPT

## 2023-05-21 PROCEDURE — 74176 CT ABD & PELVIS W/O CONTRAST: CPT

## 2023-05-21 PROCEDURE — 93010 ELECTROCARDIOGRAM REPORT: CPT | Performed by: INTERNAL MEDICINE

## 2023-05-21 PROCEDURE — 96375 TX/PRO/DX INJ NEW DRUG ADDON: CPT

## 2023-05-21 PROCEDURE — 72170 X-RAY EXAM OF PELVIS: CPT

## 2023-05-21 PROCEDURE — 96374 THER/PROPH/DIAG INJ IV PUSH: CPT

## 2023-05-21 PROCEDURE — 6360000002 HC RX W HCPCS: Performed by: EMERGENCY MEDICINE

## 2023-05-21 PROCEDURE — 85025 COMPLETE CBC W/AUTO DIFF WBC: CPT

## 2023-05-21 PROCEDURE — 84484 ASSAY OF TROPONIN QUANT: CPT

## 2023-05-21 PROCEDURE — 80053 COMPREHEN METABOLIC PANEL: CPT

## 2023-05-21 PROCEDURE — 93005 ELECTROCARDIOGRAM TRACING: CPT | Performed by: EMERGENCY MEDICINE

## 2023-05-21 PROCEDURE — 6370000000 HC RX 637 (ALT 250 FOR IP): Performed by: EMERGENCY MEDICINE

## 2023-05-21 PROCEDURE — 99285 EMERGENCY DEPT VISIT HI MDM: CPT

## 2023-05-21 PROCEDURE — 81001 URINALYSIS AUTO W/SCOPE: CPT

## 2023-05-21 RX ORDER — WATER 1000 ML/1000ML
INJECTION, SOLUTION INTRAVENOUS
Status: DISCONTINUED
Start: 2023-05-21 | End: 2023-05-21 | Stop reason: HOSPADM

## 2023-05-21 RX ORDER — METHYLPREDNISOLONE SODIUM SUCCINATE 125 MG/2ML
125 INJECTION, POWDER, LYOPHILIZED, FOR SOLUTION INTRAMUSCULAR; INTRAVENOUS ONCE
Status: COMPLETED | OUTPATIENT
Start: 2023-05-21 | End: 2023-05-21

## 2023-05-21 RX ORDER — MORPHINE SULFATE 4 MG/ML
4 INJECTION, SOLUTION INTRAMUSCULAR; INTRAVENOUS ONCE
Status: COMPLETED | OUTPATIENT
Start: 2023-05-21 | End: 2023-05-21

## 2023-05-21 RX ORDER — METHOCARBAMOL 750 MG/1
750 TABLET, FILM COATED ORAL ONCE
Status: COMPLETED | OUTPATIENT
Start: 2023-05-21 | End: 2023-05-21

## 2023-05-21 RX ORDER — ONDANSETRON 2 MG/ML
4 INJECTION INTRAMUSCULAR; INTRAVENOUS ONCE
Status: COMPLETED | OUTPATIENT
Start: 2023-05-21 | End: 2023-05-21

## 2023-05-21 RX ORDER — OXYCODONE HYDROCHLORIDE 5 MG/1
5 TABLET ORAL ONCE
Status: COMPLETED | OUTPATIENT
Start: 2023-05-21 | End: 2023-05-21

## 2023-05-21 RX ORDER — METHOCARBAMOL 750 MG/1
750 TABLET, FILM COATED ORAL 4 TIMES DAILY
Qty: 40 TABLET | Refills: 0 | Status: SHIPPED | OUTPATIENT
Start: 2023-05-21 | End: 2023-05-31

## 2023-05-21 RX ORDER — KETOROLAC TROMETHAMINE 30 MG/ML
30 INJECTION, SOLUTION INTRAMUSCULAR; INTRAVENOUS ONCE
Status: COMPLETED | OUTPATIENT
Start: 2023-05-21 | End: 2023-05-21

## 2023-05-21 RX ORDER — PREDNISONE 50 MG/1
50 TABLET ORAL DAILY
Qty: 5 TABLET | Refills: 0 | Status: SHIPPED | OUTPATIENT
Start: 2023-05-21 | End: 2023-05-26

## 2023-05-21 RX ORDER — OXYCODONE HYDROCHLORIDE 5 MG/1
5 TABLET ORAL EVERY 6 HOURS PRN
Qty: 12 TABLET | Refills: 0 | Status: SHIPPED | OUTPATIENT
Start: 2023-05-21 | End: 2023-05-24

## 2023-05-21 RX ORDER — ONDANSETRON 2 MG/ML
4 INJECTION INTRAMUSCULAR; INTRAVENOUS ONCE
Status: DISCONTINUED | OUTPATIENT
Start: 2023-05-21 | End: 2023-05-21

## 2023-05-21 RX ORDER — ONDANSETRON 2 MG/ML
INJECTION INTRAMUSCULAR; INTRAVENOUS
Status: DISCONTINUED
Start: 2023-05-21 | End: 2023-05-21 | Stop reason: HOSPADM

## 2023-05-21 RX ADMIN — MORPHINE SULFATE 4 MG: 4 INJECTION, SOLUTION INTRAMUSCULAR; INTRAVENOUS at 06:59

## 2023-05-21 RX ADMIN — KETOROLAC TROMETHAMINE 30 MG: 30 INJECTION, SOLUTION INTRAMUSCULAR at 06:59

## 2023-05-21 RX ADMIN — OXYCODONE 5 MG: 5 TABLET ORAL at 09:40

## 2023-05-21 RX ADMIN — METHOCARBAMOL TABLETS 750 MG: 750 TABLET, COATED ORAL at 06:59

## 2023-05-21 RX ADMIN — ONDANSETRON 4 MG: 2 INJECTION INTRAMUSCULAR; INTRAVENOUS at 07:02

## 2023-05-21 RX ADMIN — METHYLPREDNISOLONE SODIUM SUCCINATE 125 MG: 125 INJECTION INTRAMUSCULAR; INTRAVENOUS at 06:58

## 2023-05-21 ASSESSMENT — ENCOUNTER SYMPTOMS
CHEST TIGHTNESS: 1
VOMITING: 0
BACK PAIN: 0
SHORTNESS OF BREATH: 0
ABDOMINAL PAIN: 0
COUGH: 1
RHINORRHEA: 0
DIARRHEA: 0

## 2023-05-21 ASSESSMENT — PAIN DESCRIPTION - FREQUENCY
FREQUENCY: CONTINUOUS
FREQUENCY: CONTINUOUS

## 2023-05-21 ASSESSMENT — PAIN DESCRIPTION - DESCRIPTORS
DESCRIPTORS: BURNING
DESCRIPTORS: BURNING
DESCRIPTORS: BURNING;PRESSURE
DESCRIPTORS: BURNING;PRESSURE

## 2023-05-21 ASSESSMENT — PAIN SCALES - GENERAL
PAINLEVEL_OUTOF10: 3
PAINLEVEL_OUTOF10: 6
PAINLEVEL_OUTOF10: 7
PAINLEVEL_OUTOF10: 4
PAINLEVEL_OUTOF10: 7

## 2023-05-21 ASSESSMENT — PAIN DESCRIPTION - ONSET
ONSET: PROGRESSIVE
ONSET: PROGRESSIVE

## 2023-05-21 ASSESSMENT — PAIN DESCRIPTION - ORIENTATION
ORIENTATION: RIGHT;LEFT
ORIENTATION: RIGHT;LEFT;MID
ORIENTATION: RIGHT;LEFT
ORIENTATION: RIGHT;LEFT;MID

## 2023-05-21 ASSESSMENT — PAIN DESCRIPTION - LOCATION
LOCATION: HIP
LOCATION: HIP
LOCATION: BACK;HIP
LOCATION: HIP

## 2023-05-21 ASSESSMENT — PAIN - FUNCTIONAL ASSESSMENT
PAIN_FUNCTIONAL_ASSESSMENT: 0-10
PAIN_FUNCTIONAL_ASSESSMENT: 0-10

## 2023-05-21 ASSESSMENT — PAIN DESCRIPTION - PAIN TYPE
TYPE: ACUTE PAIN
TYPE: ACUTE PAIN

## 2023-05-31 ENCOUNTER — TELEPHONE (OUTPATIENT)
Dept: FAMILY MEDICINE CLINIC | Age: 48
End: 2023-05-31

## 2023-05-31 NOTE — TELEPHONE ENCOUNTER
Patient advised that Josh Calzada is no longer at this practice and she would have to establish care with another [facility] Provider.  Pt needs MD/DO

## 2023-06-15 ENCOUNTER — HOSPITAL ENCOUNTER (EMERGENCY)
Age: 48
Discharge: HOME | End: 2023-06-16
Attending: EMERGENCY MEDICINE
Payer: COMMERCIAL

## 2023-06-15 DIAGNOSIS — M25.552 BILATERAL HIP PAIN: ICD-10-CM

## 2023-06-15 DIAGNOSIS — R20.0 NUMBNESS AND TINGLING OF BOTH LEGS: ICD-10-CM

## 2023-06-15 DIAGNOSIS — G89.29 CHRONIC LEFT-SIDED LOW BACK PAIN WITH BILATERAL SCIATICA: Primary | ICD-10-CM

## 2023-06-15 DIAGNOSIS — R20.2 NUMBNESS AND TINGLING OF BOTH LEGS: ICD-10-CM

## 2023-06-15 DIAGNOSIS — M25.551 BILATERAL HIP PAIN: ICD-10-CM

## 2023-06-15 DIAGNOSIS — M54.42 CHRONIC LEFT-SIDED LOW BACK PAIN WITH BILATERAL SCIATICA: Primary | ICD-10-CM

## 2023-06-15 DIAGNOSIS — M54.41 CHRONIC LEFT-SIDED LOW BACK PAIN WITH BILATERAL SCIATICA: Primary | ICD-10-CM

## 2023-06-15 DIAGNOSIS — R31.29 MICROSCOPIC HEMATURIA: ICD-10-CM

## 2023-06-15 DIAGNOSIS — D86.9 SARCOIDOSIS: ICD-10-CM

## 2023-06-15 PROCEDURE — 99284 EMERGENCY DEPT VISIT MOD MDM: CPT

## 2023-06-15 PROCEDURE — 51798 US URINE CAPACITY MEASURE: CPT

## 2023-06-15 RX ORDER — ONDANSETRON 2 MG/ML
4 INJECTION INTRAMUSCULAR; INTRAVENOUS ONCE
Status: COMPLETED | OUTPATIENT
Start: 2023-06-16 | End: 2023-06-16

## 2023-06-15 RX ORDER — HYDROMORPHONE HYDROCHLORIDE 1 MG/ML
0.5 INJECTION, SOLUTION INTRAMUSCULAR; INTRAVENOUS; SUBCUTANEOUS ONCE
Status: COMPLETED | OUTPATIENT
Start: 2023-06-16 | End: 2023-06-16

## 2023-06-15 RX ORDER — DEXAMETHASONE SODIUM PHOSPHATE 4 MG/ML
10 INJECTION, SOLUTION INTRA-ARTICULAR; INTRALESIONAL; INTRAMUSCULAR; INTRAVENOUS; SOFT TISSUE ONCE
Status: COMPLETED | OUTPATIENT
Start: 2023-06-16 | End: 2023-06-16

## 2023-06-16 ENCOUNTER — APPOINTMENT (OUTPATIENT)
Dept: CT IMAGING | Age: 48
End: 2023-06-16
Payer: COMMERCIAL

## 2023-06-16 VITALS
HEART RATE: 70 BPM | TEMPERATURE: 98.3 F | DIASTOLIC BLOOD PRESSURE: 59 MMHG | OXYGEN SATURATION: 92 % | SYSTOLIC BLOOD PRESSURE: 113 MMHG | RESPIRATION RATE: 18 BRPM

## 2023-06-16 LAB
ALBUMIN SERPL-MCNC: 4.1 G/DL (ref 3.4–5)
ALBUMIN/GLOB SERPL: 1.3 {RATIO} (ref 1.1–2.2)
ALP SERPL-CCNC: 146 U/L (ref 40–129)
ALT SERPL-CCNC: 17 U/L (ref 10–40)
ANION GAP SERPL CALCULATED.3IONS-SCNC: 14 MMOL/L (ref 3–16)
AST SERPL-CCNC: 18 U/L (ref 15–37)
BASOPHILS # BLD: 0.1 K/UL (ref 0–0.2)
BASOPHILS NFR BLD: 0.8 %
BILIRUB SERPL-MCNC: 0.5 MG/DL (ref 0–1)
BILIRUB UR QL STRIP.AUTO: NEGATIVE
BUN SERPL-MCNC: 9 MG/DL (ref 7–20)
CALCIUM SERPL-MCNC: 9 MG/DL (ref 8.3–10.6)
CHLORIDE SERPL-SCNC: 100 MMOL/L (ref 99–110)
CK SERPL-CCNC: 55 U/L (ref 26–192)
CLARITY UR: CLEAR
CO2 SERPL-SCNC: 22 MMOL/L (ref 21–32)
COLOR UR: ABNORMAL
CREAT SERPL-MCNC: 0.7 MG/DL (ref 0.6–1.1)
CRP SERPL-MCNC: 6.2 MG/L (ref 0–5.1)
DEPRECATED RDW RBC AUTO: 12.6 % (ref 12.4–15.4)
EKG ATRIAL RATE: 60 BPM
EKG DIAGNOSIS: NORMAL
EKG P AXIS: 71 DEGREES
EKG P-R INTERVAL: 178 MS
EKG Q-T INTERVAL: 422 MS
EKG QRS DURATION: 96 MS
EKG QTC CALCULATION (BAZETT): 422 MS
EKG R AXIS: -15 DEGREES
EKG T AXIS: 2 DEGREES
EKG VENTRICULAR RATE: 60 BPM
EOSINOPHIL # BLD: 0.1 K/UL (ref 0–0.6)
EOSINOPHIL NFR BLD: 2 %
EPI CELLS #/AREA URNS HPF: NORMAL /HPF (ref 0–5)
ERYTHROCYTE [SEDIMENTATION RATE] IN BLOOD BY WESTERGREN METHOD: 48 MM/HR (ref 0–20)
GFR SERPLBLD CREATININE-BSD FMLA CKD-EPI: >60 ML/MIN/{1.73_M2}
GLUCOSE SERPL-MCNC: 94 MG/DL (ref 70–99)
GLUCOSE UR STRIP.AUTO-MCNC: NEGATIVE MG/DL
HCT VFR BLD AUTO: 41.2 % (ref 36–48)
HGB BLD-MCNC: 14.3 G/DL (ref 12–16)
HGB UR QL STRIP.AUTO: ABNORMAL
KETONES UR STRIP.AUTO-MCNC: NEGATIVE MG/DL
LEUKOCYTE ESTERASE UR QL STRIP.AUTO: ABNORMAL
LYMPHOCYTES # BLD: 2.7 K/UL (ref 1–5.1)
LYMPHOCYTES NFR BLD: 39 %
MCH RBC QN AUTO: 31.3 PG (ref 26–34)
MCHC RBC AUTO-ENTMCNC: 34.7 G/DL (ref 31–36)
MCV RBC AUTO: 90.4 FL (ref 80–100)
MONOCYTES # BLD: 0.6 K/UL (ref 0–1.3)
MONOCYTES NFR BLD: 8.2 %
NEUTROPHILS # BLD: 3.5 K/UL (ref 1.7–7.7)
NEUTROPHILS NFR BLD: 50 %
NITRITE UR QL STRIP.AUTO: NEGATIVE
PH UR STRIP.AUTO: 6 [PH] (ref 5–8)
PLATELET # BLD AUTO: 293 K/UL (ref 135–450)
PMV BLD AUTO: 8.2 FL (ref 5–10.5)
POTASSIUM SERPL-SCNC: 4 MMOL/L (ref 3.5–5.1)
PROT SERPL-MCNC: 7.3 G/DL (ref 6.4–8.2)
PROT UR STRIP.AUTO-MCNC: NEGATIVE MG/DL
RBC # BLD AUTO: 4.56 M/UL (ref 4–5.2)
RBC #/AREA URNS HPF: NORMAL /HPF (ref 0–4)
SODIUM SERPL-SCNC: 136 MMOL/L (ref 136–145)
SP GR UR STRIP.AUTO: <=1.005 (ref 1–1.03)
TROPONIN, HIGH SENSITIVITY: <6 NG/L (ref 0–14)
UA COMPLETE W REFLEX CULTURE PNL UR: ABNORMAL
UA DIPSTICK W REFLEX MICRO PNL UR: YES
URN SPEC COLLECT METH UR: ABNORMAL
UROBILINOGEN UR STRIP-ACNC: 0.2 E.U./DL
WBC # BLD AUTO: 7 K/UL (ref 4–11)
WBC #/AREA URNS HPF: NORMAL /HPF (ref 0–5)

## 2023-06-16 PROCEDURE — 74176 CT ABD & PELVIS W/O CONTRAST: CPT

## 2023-06-16 PROCEDURE — 93005 ELECTROCARDIOGRAM TRACING: CPT | Performed by: EMERGENCY MEDICINE

## 2023-06-16 PROCEDURE — 80053 COMPREHEN METABOLIC PANEL: CPT

## 2023-06-16 PROCEDURE — 82550 ASSAY OF CK (CPK): CPT

## 2023-06-16 PROCEDURE — 84484 ASSAY OF TROPONIN QUANT: CPT

## 2023-06-16 PROCEDURE — 3209999900 CT LUMBAR SPINE TRAUMA RECONSTRUCTION

## 2023-06-16 PROCEDURE — 96374 THER/PROPH/DIAG INJ IV PUSH: CPT

## 2023-06-16 PROCEDURE — 96375 TX/PRO/DX INJ NEW DRUG ADDON: CPT

## 2023-06-16 PROCEDURE — 86140 C-REACTIVE PROTEIN: CPT

## 2023-06-16 PROCEDURE — 93010 ELECTROCARDIOGRAM REPORT: CPT | Performed by: INTERNAL MEDICINE

## 2023-06-16 PROCEDURE — 85025 COMPLETE CBC W/AUTO DIFF WBC: CPT

## 2023-06-16 PROCEDURE — 85652 RBC SED RATE AUTOMATED: CPT

## 2023-06-16 PROCEDURE — 6360000002 HC RX W HCPCS: Performed by: EMERGENCY MEDICINE

## 2023-06-16 PROCEDURE — 2500000003 HC RX 250 WO HCPCS: Performed by: EMERGENCY MEDICINE

## 2023-06-16 PROCEDURE — 81001 URINALYSIS AUTO W/SCOPE: CPT

## 2023-06-16 RX ORDER — METHYLPREDNISOLONE 4 MG/1
TABLET ORAL
Qty: 1 KIT | Refills: 0 | Status: SHIPPED | OUTPATIENT
Start: 2023-06-16 | End: 2023-06-22

## 2023-06-16 RX ORDER — OXYCODONE HYDROCHLORIDE AND ACETAMINOPHEN 5; 325 MG/1; MG/1
1 TABLET ORAL EVERY 8 HOURS PRN
Qty: 5 TABLET | Refills: 0 | Status: SHIPPED | OUTPATIENT
Start: 2023-06-16 | End: 2023-06-18

## 2023-06-16 RX ADMIN — ONDANSETRON 4 MG: 2 INJECTION INTRAMUSCULAR; INTRAVENOUS at 00:37

## 2023-06-16 RX ADMIN — HYDROMORPHONE HYDROCHLORIDE 0.5 MG: 1 INJECTION, SOLUTION INTRAMUSCULAR; INTRAVENOUS; SUBCUTANEOUS at 00:37

## 2023-06-16 RX ADMIN — DEXAMETHASONE SODIUM PHOSPHATE 10 MG: 4 INJECTION, SOLUTION INTRAMUSCULAR; INTRAVENOUS at 00:39

## 2023-06-16 ASSESSMENT — PAIN DESCRIPTION - LOCATION: LOCATION: HIP;LEG

## 2023-06-16 ASSESSMENT — PAIN SCALES - GENERAL
PAINLEVEL_OUTOF10: 6
PAINLEVEL_OUTOF10: 4

## 2023-06-16 NOTE — ED PROVIDER NOTES
SpO2: 96% 92%       Patient was given the following medications:  Medications   HYDROmorphone HCl PF (DILAUDID) injection 0.5 mg (0.5 mg IntraVENous Given 6/16/23 0037)   ondansetron (ZOFRAN) injection 4 mg (4 mg IntraVENous Given 6/16/23 0037)   Dexamethasone Sodium Phosphate injection 10 mg (10 mg IntraVENous Given 6/16/23 0039)     Patient was evaluated due to concern for increasing bilateral hip and knee pain along with low back discomfort with concern for her sarcoid causing worsening issues. She initially did have some weakness although was in obvious discomfort and therefore I did medicate her with IV Dilaudid along with IV Decadron. She ultimately was able to urinate and nurses initially reported she had urine in the bladder but following urination they reported that it 0 on repeat check when I asked. She does report chronic back pain and hip and knee problems and I do believe this is related to this. After receiving the pain medication, her numbness and weakness greatly improved and she was able to walk without any issues and had good strength and sensation in both legs. At this time, I do not suspect cauda equina syndrome or spinal epidural abscess or hematoma based on her exam greatly improving and reporting feeling much better, along with having chronic issues with her back. Since she has more chronic issues related to her pain, I do believe following up as an outpatient with orthopedics is reasonable and did give her a referral for this. She denies any chest pain currently and troponin was negative and story not suggestive of acute coronary syndrome. Urinalysis was negative for infection. Her ESR was elevated but I do believe this is more likely related to her sarcoid and again do not suspect abscess.   With that being said, the patient is aware if she truly has any worsening back issues over the next 24 hours associated with return of numbness or weakness, significant trouble walking, return

## 2023-06-18 ASSESSMENT — ENCOUNTER SYMPTOMS
SHORTNESS OF BREATH: 0
ABDOMINAL PAIN: 0
BACK PAIN: 1
VOMITING: 0

## 2023-07-06 ENCOUNTER — APPOINTMENT (OUTPATIENT)
Dept: GENERAL RADIOLOGY | Age: 48
End: 2023-07-06
Payer: COMMERCIAL

## 2023-07-06 ENCOUNTER — HOSPITAL ENCOUNTER (EMERGENCY)
Age: 48
Discharge: HOME | End: 2023-07-06
Attending: STUDENT IN AN ORGANIZED HEALTH CARE EDUCATION/TRAINING PROGRAM
Payer: COMMERCIAL

## 2023-07-06 VITALS
BODY MASS INDEX: 34.36 KG/M2 | RESPIRATION RATE: 14 BRPM | WEIGHT: 175 LBS | HEIGHT: 60 IN | DIASTOLIC BLOOD PRESSURE: 56 MMHG | SYSTOLIC BLOOD PRESSURE: 99 MMHG | HEART RATE: 67 BPM | OXYGEN SATURATION: 95 % | TEMPERATURE: 97.9 F

## 2023-07-06 DIAGNOSIS — R07.89 ATYPICAL CHEST PAIN: Primary | ICD-10-CM

## 2023-07-06 LAB
ALBUMIN SERPL-MCNC: 3.8 G/DL (ref 3.4–5)
ALBUMIN/GLOB SERPL: 1.4 {RATIO} (ref 1.1–2.2)
ALP SERPL-CCNC: 111 U/L (ref 40–129)
ALT SERPL-CCNC: 12 U/L (ref 10–40)
ANION GAP SERPL CALCULATED.3IONS-SCNC: 11 MMOL/L (ref 3–16)
AST SERPL-CCNC: 20 U/L (ref 15–37)
BASOPHILS # BLD: 0.1 K/UL (ref 0–0.2)
BASOPHILS NFR BLD: 1.1 %
BILIRUB SERPL-MCNC: 0.3 MG/DL (ref 0–1)
BUN SERPL-MCNC: 5 MG/DL (ref 7–20)
CALCIUM SERPL-MCNC: 8.7 MG/DL (ref 8.3–10.6)
CHLORIDE SERPL-SCNC: 105 MMOL/L (ref 99–110)
CO2 SERPL-SCNC: 22 MMOL/L (ref 21–32)
CREAT SERPL-MCNC: 0.6 MG/DL (ref 0.6–1.1)
DEPRECATED RDW RBC AUTO: 13.3 % (ref 12.4–15.4)
EKG ATRIAL RATE: 66 BPM
EKG DIAGNOSIS: NORMAL
EKG P AXIS: 41 DEGREES
EKG P-R INTERVAL: 158 MS
EKG Q-T INTERVAL: 404 MS
EKG QRS DURATION: 98 MS
EKG QTC CALCULATION (BAZETT): 423 MS
EKG R AXIS: -9 DEGREES
EKG T AXIS: 19 DEGREES
EKG VENTRICULAR RATE: 66 BPM
EOSINOPHIL # BLD: 0.3 K/UL (ref 0–0.6)
EOSINOPHIL NFR BLD: 3.3 %
GFR SERPLBLD CREATININE-BSD FMLA CKD-EPI: >60 ML/MIN/{1.73_M2}
GLUCOSE SERPL-MCNC: 117 MG/DL (ref 70–99)
HCT VFR BLD AUTO: 37.2 % (ref 36–48)
HGB BLD-MCNC: 12.7 G/DL (ref 12–16)
LYMPHOCYTES # BLD: 3 K/UL (ref 1–5.1)
LYMPHOCYTES NFR BLD: 34.2 %
MCH RBC QN AUTO: 31 PG (ref 26–34)
MCHC RBC AUTO-ENTMCNC: 34.2 G/DL (ref 31–36)
MCV RBC AUTO: 90.5 FL (ref 80–100)
MONOCYTES # BLD: 0.5 K/UL (ref 0–1.3)
MONOCYTES NFR BLD: 6.2 %
NEUTROPHILS # BLD: 4.8 K/UL (ref 1.7–7.7)
NEUTROPHILS NFR BLD: 55.2 %
NT-PROBNP SERPL-MCNC: 255 PG/ML (ref 0–124)
PLATELET # BLD AUTO: 265 K/UL (ref 135–450)
PMV BLD AUTO: 8.7 FL (ref 5–10.5)
POTASSIUM SERPL-SCNC: 3.8 MMOL/L (ref 3.5–5.1)
PROT SERPL-MCNC: 6.5 G/DL (ref 6.4–8.2)
RBC # BLD AUTO: 4.11 M/UL (ref 4–5.2)
SODIUM SERPL-SCNC: 138 MMOL/L (ref 136–145)
TROPONIN, HIGH SENSITIVITY: <6 NG/L (ref 0–14)
TROPONIN, HIGH SENSITIVITY: <6 NG/L (ref 0–14)
WBC # BLD AUTO: 8.7 K/UL (ref 4–11)

## 2023-07-06 PROCEDURE — 93005 ELECTROCARDIOGRAM TRACING: CPT | Performed by: STUDENT IN AN ORGANIZED HEALTH CARE EDUCATION/TRAINING PROGRAM

## 2023-07-06 PROCEDURE — 93010 ELECTROCARDIOGRAM REPORT: CPT | Performed by: INTERNAL MEDICINE

## 2023-07-06 PROCEDURE — 71045 X-RAY EXAM CHEST 1 VIEW: CPT

## 2023-07-06 PROCEDURE — 84484 ASSAY OF TROPONIN QUANT: CPT

## 2023-07-06 PROCEDURE — 99284 EMERGENCY DEPT VISIT MOD MDM: CPT

## 2023-07-06 PROCEDURE — 80053 COMPREHEN METABOLIC PANEL: CPT

## 2023-07-06 PROCEDURE — 6370000000 HC RX 637 (ALT 250 FOR IP): Performed by: STUDENT IN AN ORGANIZED HEALTH CARE EDUCATION/TRAINING PROGRAM

## 2023-07-06 PROCEDURE — 96374 THER/PROPH/DIAG INJ IV PUSH: CPT

## 2023-07-06 PROCEDURE — 6360000002 HC RX W HCPCS: Performed by: STUDENT IN AN ORGANIZED HEALTH CARE EDUCATION/TRAINING PROGRAM

## 2023-07-06 PROCEDURE — 83880 ASSAY OF NATRIURETIC PEPTIDE: CPT

## 2023-07-06 PROCEDURE — 85025 COMPLETE CBC W/AUTO DIFF WBC: CPT

## 2023-07-06 RX ORDER — OXYCODONE HYDROCHLORIDE AND ACETAMINOPHEN 5; 325 MG/1; MG/1
1 TABLET ORAL ONCE
Status: COMPLETED | OUTPATIENT
Start: 2023-07-06 | End: 2023-07-06

## 2023-07-06 RX ORDER — ASPIRIN 81 MG/1
324 TABLET, CHEWABLE ORAL ONCE
Status: DISCONTINUED | OUTPATIENT
Start: 2023-07-06 | End: 2023-07-06 | Stop reason: HOSPADM

## 2023-07-06 RX ORDER — ONDANSETRON 2 MG/ML
8 INJECTION INTRAMUSCULAR; INTRAVENOUS ONCE
Status: COMPLETED | OUTPATIENT
Start: 2023-07-06 | End: 2023-07-06

## 2023-07-06 RX ORDER — ASPIRIN 81 MG/1
81 TABLET, CHEWABLE ORAL DAILY
Qty: 30 TABLET | Refills: 0 | Status: SHIPPED | OUTPATIENT
Start: 2023-07-06

## 2023-07-06 RX ADMIN — OXYCODONE AND ACETAMINOPHEN 1 TABLET: 5; 325 TABLET ORAL at 02:52

## 2023-07-06 RX ADMIN — ONDANSETRON 8 MG: 2 INJECTION INTRAMUSCULAR; INTRAVENOUS at 03:54

## 2023-07-06 ASSESSMENT — PAIN SCALES - GENERAL
PAINLEVEL_OUTOF10: 4
PAINLEVEL_OUTOF10: 4
PAINLEVEL_OUTOF10: 6
PAINLEVEL_OUTOF10: 4

## 2023-07-06 ASSESSMENT — PAIN DESCRIPTION - LOCATION: LOCATION: CHEST

## 2023-07-06 ASSESSMENT — PAIN - FUNCTIONAL ASSESSMENT: PAIN_FUNCTIONAL_ASSESSMENT: 0-10

## 2023-07-06 NOTE — ED PROVIDER NOTES
available at the time of this note:    XR CHEST PORTABLE   Final Result   No radiographic evidence of acute pulmonary abnormality seen. Overall, no   significant changes from the prior study are identified. XR CHEST PORTABLE    Result Date: 7/6/2023  EXAMINATION: ONE XRAY VIEW OF THE CHEST 7/6/2023 2:00 am COMPARISON: 05/21/2023. HISTORY: ORDERING SYSTEM PROVIDED HISTORY: shortness of breath, chest tightness TECHNOLOGIST PROVIDED HISTORY: Reason for exam:->shortness of breath, chest tightness Reason for Exam: Chest Pain (At midnight woke up with bilateral leg swelling and tightness in her chest; EMS gave 324mg of ASA and Nitro x 1) FINDINGS: There is left chest wall cardiac conduction device, appearing similar to the prior study. Cardiac silhouette appears magnified. No confluent airspace opacity, pleural effusion or pneumothorax seen. There is redemonstration of changes of prior granulomatous disease. No radiographic evidence of acute pulmonary abnormality seen. Overall, no significant changes from the prior study are identified. PAST MEDICAL HISTORY      has a past medical history of CHF (congestive heart failure) ([address]), COPD (chronic obstructive pulmonary disease) ([address]), DVT, blood clots, Hypercholesterolemia, Myocardial infarct ([address]), Pulmonary embolism ([address]), and Unspecified cerebral artery occlusion with cerebral infarction.      EMERGENCY DEPARTMENT COURSE and DIFFERENTIAL DIAGNOSIS/MDM:   Vitals:    Vitals:    07/06/23 0232 07/06/23 0303 07/06/23 0332 07/06/23 0403   BP: 103/81 104/66 103/67 (!) 99/56   Pulse: 67 69 60 67   Resp: 19 18 14 14   Temp:       TempSrc:       SpO2: 93% 96% 96% 95%   Weight:       Height:           Patient was given the following medications:  Medications   aspirin chewable tablet 324 mg (has no administration in time range)   oxyCODONE-acetaminophen (PERCOCET) 5-325 MG per tablet 1 tablet (1 tablet Oral Given 7/6/23 0252)   ondansetron (ZOFRAN)

## 2023-07-17 DIAGNOSIS — E78.00 PURE HYPERCHOLESTEROLEMIA: ICD-10-CM

## 2023-07-17 DIAGNOSIS — F33.1 MODERATE EPISODE OF RECURRENT MAJOR DEPRESSIVE DISORDER (HCC): ICD-10-CM

## 2023-07-17 DIAGNOSIS — D86.9 SARCOIDOSIS: ICD-10-CM

## 2023-07-17 DIAGNOSIS — K21.9 GASTROESOPHAGEAL REFLUX DISEASE WITHOUT ESOPHAGITIS: ICD-10-CM

## 2023-07-17 RX ORDER — OMEPRAZOLE 40 MG/1
40 CAPSULE, DELAYED RELEASE ORAL 2 TIMES DAILY
Qty: 30 CAPSULE | Refills: 0 | Status: SHIPPED | OUTPATIENT
Start: 2023-07-17

## 2023-07-17 RX ORDER — FUROSEMIDE 20 MG/1
10 TABLET ORAL DAILY PRN
Qty: 30 TABLET | Refills: 0 | Status: SHIPPED | OUTPATIENT
Start: 2023-07-17

## 2023-07-17 RX ORDER — LORATADINE 10 MG/1
10 CAPSULE, LIQUID FILLED ORAL DAILY
Qty: 30 CAPSULE | Refills: 0 | Status: SHIPPED | OUTPATIENT
Start: 2023-07-17

## 2023-07-17 RX ORDER — METOPROLOL SUCCINATE 25 MG/1
25 TABLET, EXTENDED RELEASE ORAL DAILY
Qty: 90 TABLET | Refills: 0 | Status: SHIPPED | OUTPATIENT
Start: 2023-07-17

## 2023-07-17 RX ORDER — ESCITALOPRAM OXALATE 20 MG/1
20 TABLET ORAL DAILY
Qty: 90 TABLET | Refills: 0 | Status: SHIPPED | OUTPATIENT
Start: 2023-07-17

## 2023-07-17 RX ORDER — SIMVASTATIN 40 MG
40 TABLET ORAL NIGHTLY
Qty: 30 TABLET | Refills: 0 | Status: SHIPPED | OUTPATIENT
Start: 2023-07-17 | End: 2023-08-16

## 2023-07-17 RX ORDER — CLOPIDOGREL BISULFATE 75 MG/1
75 TABLET ORAL DAILY
Qty: 30 TABLET | Refills: 0 | Status: SHIPPED | OUTPATIENT
Start: 2023-07-17

## 2023-07-17 RX ORDER — MELATONIN
1000 DAILY
Qty: 90 TABLET | Refills: 1 | Status: SHIPPED | OUTPATIENT
Start: 2023-07-17

## 2023-07-17 RX ORDER — GABAPENTIN 300 MG/1
300 CAPSULE ORAL 3 TIMES DAILY
Qty: 90 CAPSULE | Refills: 0 | Status: SHIPPED | OUTPATIENT
Start: 2023-07-17 | End: 2023-08-16

## 2023-07-17 RX ORDER — ICOSAPENT ETHYL 1000 MG/1
2 CAPSULE ORAL 2 TIMES DAILY
Qty: 60 CAPSULE | Refills: 0 | Status: SHIPPED | OUTPATIENT
Start: 2023-07-17

## 2023-07-17 RX ORDER — TIZANIDINE 2 MG/1
2 TABLET ORAL EVERY 8 HOURS PRN
Qty: 90 TABLET | Refills: 0 | Status: SHIPPED | OUTPATIENT
Start: 2023-07-17

## 2023-07-17 RX ORDER — ASPIRIN 81 MG/1
81 TABLET, CHEWABLE ORAL DAILY
Qty: 30 TABLET | Refills: 0 | Status: SHIPPED | OUTPATIENT
Start: 2023-07-17

## 2023-07-17 NOTE — TELEPHONE ENCOUNTER
Refill Request         Last Seen: Last Seen Department: 3/24/2023  Last Seen by PCP: Visit date not found    Last Written: Pt has an appt on 08/10/2023 with a new provider      Next Appointment:   No future appointments. Requested Prescriptions     Pending Prescriptions Disp Refills    escitalopram (LEXAPRO) 20 MG tablet 90 tablet 1     Sig: Take 1 tablet by mouth daily    simvastatin (ZOCOR) 40 MG tablet 30 tablet 0     Sig: Take 1 tablet by mouth nightly    loratadine (CLARITIN) 10 MG capsule 30 capsule 0     Sig: Take 1 capsule by mouth daily    tiZANidine (ZANAFLEX) 2 MG tablet 90 tablet 0     Sig: Take 1 tablet by mouth every 8 hours as needed (muscle spams)    Icosapent Ethyl (VASCEPA) 1 g CAPS capsule 60 capsule 3     Sig: Take 2 capsules by mouth 2 times daily    gabapentin (NEURONTIN) 300 MG capsule 90 capsule 0     Sig: Take 1 capsule by mouth 3 times daily for 30 days.     omeprazole (PRILOSEC) 40 MG delayed release capsule 30 capsule 0     Sig: Take 1 capsule by mouth 2 times daily    aspirin 81 MG chewable tablet 30 tablet 0     Sig: Take 1 tablet by mouth daily    metoprolol succinate (TOPROL XL) 25 MG extended release tablet 90 tablet 1     Sig: Take 1 tablet by mouth daily    furosemide (LASIX) 20 MG tablet 30 tablet 3     Sig: Take 0.5 tablets by mouth daily as needed (weight gain of 3 lbs over baseline weight)    clopidogrel (PLAVIX) 75 MG tablet 30 tablet 11     Sig: Take 1 tablet by mouth daily    vitamin D3 (CHOLECALCIFEROL) 25 MCG (1000 UT) TABS tablet 90 tablet 1     Sig: Take 1 tablet by mouth daily

## 2023-07-31 ENCOUNTER — APPOINTMENT (OUTPATIENT)
Dept: GENERAL RADIOLOGY | Age: 48
End: 2023-07-31
Payer: COMMERCIAL

## 2023-07-31 ENCOUNTER — APPOINTMENT (OUTPATIENT)
Dept: CT IMAGING | Age: 48
End: 2023-07-31
Payer: COMMERCIAL

## 2023-07-31 ENCOUNTER — HOSPITAL ENCOUNTER (OUTPATIENT)
Age: 48
Setting detail: OBSERVATION
Discharge: HOME | End: 2023-08-02
Attending: STUDENT IN AN ORGANIZED HEALTH CARE EDUCATION/TRAINING PROGRAM | Admitting: INTERNAL MEDICINE
Payer: COMMERCIAL

## 2023-07-31 DIAGNOSIS — R55 SYNCOPE AND COLLAPSE: Primary | ICD-10-CM

## 2023-07-31 DIAGNOSIS — R42 LIGHTHEADEDNESS: ICD-10-CM

## 2023-07-31 LAB
BASOPHILS # BLD: 0.1 K/UL (ref 0–0.2)
BASOPHILS NFR BLD: 1 %
DEPRECATED RDW RBC AUTO: 13.5 % (ref 12.4–15.4)
EOSINOPHIL # BLD: 0.2 K/UL (ref 0–0.6)
EOSINOPHIL NFR BLD: 2.4 %
HCT VFR BLD AUTO: 39.8 % (ref 36–48)
HGB BLD-MCNC: 13.7 G/DL (ref 12–16)
INR PPP: 1 (ref 0.84–1.16)
LYMPHOCYTES # BLD: 2.7 K/UL (ref 1–5.1)
LYMPHOCYTES NFR BLD: 28.9 %
MCH RBC QN AUTO: 31.4 PG (ref 26–34)
MCHC RBC AUTO-ENTMCNC: 34.3 G/DL (ref 31–36)
MCV RBC AUTO: 91.4 FL (ref 80–100)
MONOCYTES # BLD: 0.5 K/UL (ref 0–1.3)
MONOCYTES NFR BLD: 5.3 %
NEUTROPHILS # BLD: 5.8 K/UL (ref 1.7–7.7)
NEUTROPHILS NFR BLD: 62.4 %
PLATELET # BLD AUTO: 301 K/UL (ref 135–450)
PMV BLD AUTO: 9 FL (ref 5–10.5)
PROTHROMBIN TIME: 13.2 SEC (ref 11.5–14.8)
RBC # BLD AUTO: 4.36 M/UL (ref 4–5.2)
WBC # BLD AUTO: 9.3 K/UL (ref 4–11)

## 2023-07-31 PROCEDURE — 70450 CT HEAD/BRAIN W/O DYE: CPT

## 2023-07-31 PROCEDURE — 80048 BASIC METABOLIC PNL TOTAL CA: CPT

## 2023-07-31 PROCEDURE — 6360000004 HC RX CONTRAST MEDICATION: Performed by: STUDENT IN AN ORGANIZED HEALTH CARE EDUCATION/TRAINING PROGRAM

## 2023-07-31 PROCEDURE — 85576 BLOOD PLATELET AGGREGATION: CPT

## 2023-07-31 PROCEDURE — 70498 CT ANGIOGRAPHY NECK: CPT

## 2023-07-31 PROCEDURE — 71046 X-RAY EXAM CHEST 2 VIEWS: CPT

## 2023-07-31 PROCEDURE — 70496 CT ANGIOGRAPHY HEAD: CPT

## 2023-07-31 PROCEDURE — 85610 PROTHROMBIN TIME: CPT

## 2023-07-31 PROCEDURE — 83735 ASSAY OF MAGNESIUM: CPT

## 2023-07-31 PROCEDURE — 72125 CT NECK SPINE W/O DYE: CPT

## 2023-07-31 PROCEDURE — 73502 X-RAY EXAM HIP UNI 2-3 VIEWS: CPT

## 2023-07-31 PROCEDURE — 99285 EMERGENCY DEPT VISIT HI MDM: CPT

## 2023-07-31 PROCEDURE — 93005 ELECTROCARDIOGRAM TRACING: CPT | Performed by: STUDENT IN AN ORGANIZED HEALTH CARE EDUCATION/TRAINING PROGRAM

## 2023-07-31 PROCEDURE — 85025 COMPLETE CBC W/AUTO DIFF WBC: CPT

## 2023-07-31 PROCEDURE — 36415 COLL VENOUS BLD VENIPUNCTURE: CPT

## 2023-07-31 PROCEDURE — 2580000003 HC RX 258: Performed by: STUDENT IN AN ORGANIZED HEALTH CARE EDUCATION/TRAINING PROGRAM

## 2023-07-31 PROCEDURE — 96374 THER/PROPH/DIAG INJ IV PUSH: CPT

## 2023-07-31 PROCEDURE — 96361 HYDRATE IV INFUSION ADD-ON: CPT

## 2023-07-31 PROCEDURE — 84484 ASSAY OF TROPONIN QUANT: CPT

## 2023-07-31 RX ORDER — SODIUM CHLORIDE, SODIUM LACTATE, POTASSIUM CHLORIDE, AND CALCIUM CHLORIDE .6; .31; .03; .02 G/100ML; G/100ML; G/100ML; G/100ML
1000 INJECTION, SOLUTION INTRAVENOUS ONCE
Status: COMPLETED | OUTPATIENT
Start: 2023-07-31 | End: 2023-08-01

## 2023-07-31 RX ADMIN — SODIUM CHLORIDE, POTASSIUM CHLORIDE, SODIUM LACTATE AND CALCIUM CHLORIDE 1000 ML: 600; 310; 30; 20 INJECTION, SOLUTION INTRAVENOUS at 23:41

## 2023-07-31 RX ADMIN — IOPAMIDOL 75 ML: 755 INJECTION, SOLUTION INTRAVENOUS at 23:13

## 2023-07-31 ASSESSMENT — PAIN - FUNCTIONAL ASSESSMENT: PAIN_FUNCTIONAL_ASSESSMENT: 0-10

## 2023-07-31 ASSESSMENT — PAIN SCALES - GENERAL: PAINLEVEL_OUTOF10: 5

## 2023-07-31 ASSESSMENT — PAIN DESCRIPTION - LOCATION: LOCATION: HEAD

## 2023-08-01 LAB
ANION GAP SERPL CALCULATED.3IONS-SCNC: 12 MMOL/L (ref 3–16)
ANION GAP SERPL CALCULATED.3IONS-SCNC: 14 MMOL/L (ref 3–16)
BUN SERPL-MCNC: 5 MG/DL (ref 7–20)
BUN SERPL-MCNC: 6 MG/DL (ref 7–20)
CALCIUM SERPL-MCNC: 8.8 MG/DL (ref 8.3–10.6)
CALCIUM SERPL-MCNC: 8.9 MG/DL (ref 8.3–10.6)
CHLORIDE SERPL-SCNC: 102 MMOL/L (ref 99–110)
CHLORIDE SERPL-SCNC: 104 MMOL/L (ref 99–110)
CLOSURE TME BLD-IMP: NORMAL
CLOSURE TME COLL+ADP BLD: 95 SEC (ref 56–110)
CLOSURE TME COLL+EPINEP BLD: 129 SEC (ref 86–194)
CO2 SERPL-SCNC: 23 MMOL/L (ref 21–32)
CO2 SERPL-SCNC: 24 MMOL/L (ref 21–32)
CREAT SERPL-MCNC: 0.6 MG/DL (ref 0.6–1.1)
CREAT SERPL-MCNC: 0.6 MG/DL (ref 0.6–1.1)
EKG ATRIAL RATE: 63 BPM
EKG DIAGNOSIS: NORMAL
EKG P AXIS: 42 DEGREES
EKG P-R INTERVAL: 138 MS
EKG Q-T INTERVAL: 394 MS
EKG QRS DURATION: 100 MS
EKG QTC CALCULATION (BAZETT): 403 MS
EKG R AXIS: 76 DEGREES
EKG T AXIS: 5 DEGREES
EKG VENTRICULAR RATE: 63 BPM
GFR SERPLBLD CREATININE-BSD FMLA CKD-EPI: >60 ML/MIN/{1.73_M2}
GFR SERPLBLD CREATININE-BSD FMLA CKD-EPI: >60 ML/MIN/{1.73_M2}
GLUCOSE SERPL-MCNC: 102 MG/DL (ref 70–99)
GLUCOSE SERPL-MCNC: 102 MG/DL (ref 70–99)
LV EF: 43 %
LVEF MODALITY: NORMAL
MAGNESIUM SERPL-MCNC: 2.3 MG/DL (ref 1.8–2.4)
MAGNESIUM SERPL-MCNC: 2.3 MG/DL (ref 1.8–2.4)
POTASSIUM SERPL-SCNC: 2.9 MMOL/L (ref 3.5–5.1)
POTASSIUM SERPL-SCNC: 3.3 MMOL/L (ref 3.5–5.1)
SODIUM SERPL-SCNC: 139 MMOL/L (ref 136–145)
SODIUM SERPL-SCNC: 140 MMOL/L (ref 136–145)
TROPONIN, HIGH SENSITIVITY: 7 NG/L (ref 0–14)
TROPONIN, HIGH SENSITIVITY: <6 NG/L (ref 0–14)
TSH SERPL DL<=0.005 MIU/L-ACNC: 3.28 UIU/ML (ref 0.27–4.2)

## 2023-08-01 PROCEDURE — 95816 EEG AWAKE AND DROWSY: CPT

## 2023-08-01 PROCEDURE — 6360000002 HC RX W HCPCS: Performed by: STUDENT IN AN ORGANIZED HEALTH CARE EDUCATION/TRAINING PROGRAM

## 2023-08-01 PROCEDURE — 6370000000 HC RX 637 (ALT 250 FOR IP): Performed by: INTERNAL MEDICINE

## 2023-08-01 PROCEDURE — 6360000002 HC RX W HCPCS: Performed by: INTERNAL MEDICINE

## 2023-08-01 PROCEDURE — 36415 COLL VENOUS BLD VENIPUNCTURE: CPT

## 2023-08-01 PROCEDURE — 93010 ELECTROCARDIOGRAM REPORT: CPT | Performed by: INTERNAL MEDICINE

## 2023-08-01 PROCEDURE — 83735 ASSAY OF MAGNESIUM: CPT

## 2023-08-01 PROCEDURE — 6370000000 HC RX 637 (ALT 250 FOR IP): Performed by: STUDENT IN AN ORGANIZED HEALTH CARE EDUCATION/TRAINING PROGRAM

## 2023-08-01 PROCEDURE — G0378 HOSPITAL OBSERVATION PER HR: HCPCS

## 2023-08-01 PROCEDURE — 84443 ASSAY THYROID STIM HORMONE: CPT

## 2023-08-01 PROCEDURE — 84484 ASSAY OF TROPONIN QUANT: CPT

## 2023-08-01 PROCEDURE — 99223 1ST HOSP IP/OBS HIGH 75: CPT | Performed by: PSYCHIATRY & NEUROLOGY

## 2023-08-01 PROCEDURE — 80048 BASIC METABOLIC PNL TOTAL CA: CPT

## 2023-08-01 PROCEDURE — 96372 THER/PROPH/DIAG INJ SC/IM: CPT

## 2023-08-01 PROCEDURE — 96374 THER/PROPH/DIAG INJ IV PUSH: CPT

## 2023-08-01 PROCEDURE — 93306 TTE W/DOPPLER COMPLETE: CPT

## 2023-08-01 PROCEDURE — 2580000003 HC RX 258: Performed by: INTERNAL MEDICINE

## 2023-08-01 RX ORDER — GABAPENTIN 300 MG/1
300 CAPSULE ORAL 3 TIMES DAILY
Status: DISCONTINUED | OUTPATIENT
Start: 2023-08-01 | End: 2023-08-02 | Stop reason: HOSPADM

## 2023-08-01 RX ORDER — ENOXAPARIN SODIUM 100 MG/ML
40 INJECTION SUBCUTANEOUS DAILY
Status: DISCONTINUED | OUTPATIENT
Start: 2023-08-01 | End: 2023-08-02 | Stop reason: HOSPADM

## 2023-08-01 RX ORDER — TIZANIDINE 4 MG/1
2 TABLET ORAL EVERY 8 HOURS PRN
Status: DISCONTINUED | OUTPATIENT
Start: 2023-08-01 | End: 2023-08-02 | Stop reason: HOSPADM

## 2023-08-01 RX ORDER — ATORVASTATIN CALCIUM 40 MG/1
40 TABLET, FILM COATED ORAL DAILY
Status: DISCONTINUED | OUTPATIENT
Start: 2023-08-01 | End: 2023-08-02

## 2023-08-01 RX ORDER — VITAMIN B COMPLEX
1000 TABLET ORAL DAILY
Status: DISCONTINUED | OUTPATIENT
Start: 2023-08-01 | End: 2023-08-02 | Stop reason: HOSPADM

## 2023-08-01 RX ORDER — OXYCODONE HYDROCHLORIDE AND ACETAMINOPHEN 5; 325 MG/1; MG/1
1 TABLET ORAL ONCE
Status: COMPLETED | OUTPATIENT
Start: 2023-08-01 | End: 2023-08-01

## 2023-08-01 RX ORDER — POTASSIUM CHLORIDE 750 MG/1
40 TABLET, EXTENDED RELEASE ORAL 2 TIMES DAILY WITH MEALS
Status: COMPLETED | OUTPATIENT
Start: 2023-08-01 | End: 2023-08-02

## 2023-08-01 RX ORDER — SODIUM CHLORIDE 0.9 % (FLUSH) 0.9 %
5-40 SYRINGE (ML) INJECTION PRN
Status: DISCONTINUED | OUTPATIENT
Start: 2023-08-01 | End: 2023-08-02 | Stop reason: HOSPADM

## 2023-08-01 RX ORDER — PANTOPRAZOLE SODIUM 40 MG/1
40 TABLET, DELAYED RELEASE ORAL
Status: DISCONTINUED | OUTPATIENT
Start: 2023-08-01 | End: 2023-08-02 | Stop reason: HOSPADM

## 2023-08-01 RX ORDER — CETIRIZINE HYDROCHLORIDE 10 MG/1
5 TABLET ORAL DAILY
Status: DISCONTINUED | OUTPATIENT
Start: 2023-08-01 | End: 2023-08-02 | Stop reason: HOSPADM

## 2023-08-01 RX ORDER — SODIUM CHLORIDE 0.9 % (FLUSH) 0.9 %
5-40 SYRINGE (ML) INJECTION EVERY 12 HOURS SCHEDULED
Status: DISCONTINUED | OUTPATIENT
Start: 2023-08-01 | End: 2023-08-02 | Stop reason: HOSPADM

## 2023-08-01 RX ORDER — ONDANSETRON 4 MG/1
4 TABLET, ORALLY DISINTEGRATING ORAL EVERY 8 HOURS PRN
Status: DISCONTINUED | OUTPATIENT
Start: 2023-08-01 | End: 2023-08-02 | Stop reason: HOSPADM

## 2023-08-01 RX ORDER — ONDANSETRON 2 MG/ML
4 INJECTION INTRAMUSCULAR; INTRAVENOUS ONCE
Status: COMPLETED | OUTPATIENT
Start: 2023-08-01 | End: 2023-08-01

## 2023-08-01 RX ORDER — ESCITALOPRAM OXALATE 10 MG/1
20 TABLET ORAL DAILY
Status: DISCONTINUED | OUTPATIENT
Start: 2023-08-01 | End: 2023-08-02 | Stop reason: HOSPADM

## 2023-08-01 RX ORDER — POTASSIUM CHLORIDE 20 MEQ/1
40 TABLET, EXTENDED RELEASE ORAL ONCE
Status: COMPLETED | OUTPATIENT
Start: 2023-08-01 | End: 2023-08-01

## 2023-08-01 RX ORDER — POLYETHYLENE GLYCOL 3350 17 G/17G
17 POWDER, FOR SOLUTION ORAL DAILY PRN
Status: DISCONTINUED | OUTPATIENT
Start: 2023-08-01 | End: 2023-08-02 | Stop reason: HOSPADM

## 2023-08-01 RX ORDER — METOPROLOL SUCCINATE 25 MG/1
25 TABLET, EXTENDED RELEASE ORAL DAILY
Status: DISCONTINUED | OUTPATIENT
Start: 2023-08-01 | End: 2023-08-02 | Stop reason: HOSPADM

## 2023-08-01 RX ORDER — SODIUM CHLORIDE 9 MG/ML
INJECTION, SOLUTION INTRAVENOUS PRN
Status: DISCONTINUED | OUTPATIENT
Start: 2023-08-01 | End: 2023-08-02 | Stop reason: HOSPADM

## 2023-08-01 RX ORDER — ONDANSETRON 2 MG/ML
4 INJECTION INTRAMUSCULAR; INTRAVENOUS EVERY 6 HOURS PRN
Status: DISCONTINUED | OUTPATIENT
Start: 2023-08-01 | End: 2023-08-02 | Stop reason: HOSPADM

## 2023-08-01 RX ORDER — ASPIRIN 81 MG/1
81 TABLET, CHEWABLE ORAL DAILY
Status: DISCONTINUED | OUTPATIENT
Start: 2023-08-01 | End: 2023-08-02 | Stop reason: HOSPADM

## 2023-08-01 RX ORDER — CLOPIDOGREL BISULFATE 75 MG/1
75 TABLET ORAL DAILY
Status: DISCONTINUED | OUTPATIENT
Start: 2023-08-01 | End: 2023-08-02 | Stop reason: HOSPADM

## 2023-08-01 RX ORDER — ACETAMINOPHEN 325 MG/1
650 TABLET ORAL EVERY 6 HOURS PRN
Status: DISCONTINUED | OUTPATIENT
Start: 2023-08-01 | End: 2023-08-02 | Stop reason: HOSPADM

## 2023-08-01 RX ORDER — ACETAMINOPHEN 650 MG/1
650 SUPPOSITORY RECTAL EVERY 6 HOURS PRN
Status: DISCONTINUED | OUTPATIENT
Start: 2023-08-01 | End: 2023-08-02 | Stop reason: HOSPADM

## 2023-08-01 RX ADMIN — ASPIRIN 81 MG: 81 TABLET, CHEWABLE ORAL at 10:45

## 2023-08-01 RX ADMIN — POTASSIUM CHLORIDE 40 MEQ: 750 TABLET, EXTENDED RELEASE ORAL at 10:45

## 2023-08-01 RX ADMIN — PANTOPRAZOLE SODIUM 40 MG: 40 TABLET, DELAYED RELEASE ORAL at 10:45

## 2023-08-01 RX ADMIN — ONDANSETRON 4 MG: 2 INJECTION INTRAMUSCULAR; INTRAVENOUS at 00:10

## 2023-08-01 RX ADMIN — MUPIROCIN: 20 OINTMENT TOPICAL at 22:13

## 2023-08-01 RX ADMIN — ATORVASTATIN CALCIUM 40 MG: 40 TABLET, FILM COATED ORAL at 10:44

## 2023-08-01 RX ADMIN — Medication 1000 UNITS: at 10:45

## 2023-08-01 RX ADMIN — ESCITALOPRAM OXALATE 20 MG: 10 TABLET ORAL at 10:45

## 2023-08-01 RX ADMIN — MUPIROCIN: 20 OINTMENT TOPICAL at 10:46

## 2023-08-01 RX ADMIN — ENOXAPARIN SODIUM 40 MG: 100 INJECTION SUBCUTANEOUS at 10:45

## 2023-08-01 RX ADMIN — SODIUM CHLORIDE, PRESERVATIVE FREE 10 ML: 5 INJECTION INTRAVENOUS at 12:00

## 2023-08-01 RX ADMIN — OXYCODONE HYDROCHLORIDE AND ACETAMINOPHEN 1 TABLET: 5; 325 TABLET ORAL at 00:10

## 2023-08-01 RX ADMIN — GABAPENTIN 300 MG: 300 CAPSULE ORAL at 20:25

## 2023-08-01 RX ADMIN — GABAPENTIN 300 MG: 300 CAPSULE ORAL at 10:44

## 2023-08-01 RX ADMIN — POTASSIUM CHLORIDE 40 MEQ: 1500 TABLET, EXTENDED RELEASE ORAL at 02:17

## 2023-08-01 RX ADMIN — CLOPIDOGREL BISULFATE 75 MG: 75 TABLET ORAL at 10:45

## 2023-08-01 RX ADMIN — CETIRIZINE HYDROCHLORIDE 5 MG: 10 TABLET ORAL at 10:45

## 2023-08-01 RX ADMIN — POTASSIUM CHLORIDE 40 MEQ: 750 TABLET, EXTENDED RELEASE ORAL at 18:53

## 2023-08-01 RX ADMIN — SODIUM CHLORIDE, PRESERVATIVE FREE 10 ML: 5 INJECTION INTRAVENOUS at 20:25

## 2023-08-01 ASSESSMENT — PAIN DESCRIPTION - LOCATION
LOCATION: HIP
LOCATION: HIP

## 2023-08-01 ASSESSMENT — PAIN DESCRIPTION - ORIENTATION: ORIENTATION: LEFT

## 2023-08-01 ASSESSMENT — PAIN SCALES - GENERAL
PAINLEVEL_OUTOF10: 1
PAINLEVEL_OUTOF10: 8

## 2023-08-01 ASSESSMENT — PAIN - FUNCTIONAL ASSESSMENT: PAIN_FUNCTIONAL_ASSESSMENT: 0-10

## 2023-08-01 NOTE — ED NOTES
2305 Code stroke called CT ready     2305 call placed to [facility] Stroke Team      Jordy Manzano  07/31/23 2307    2301 Consult completed with call back from Dr. Agnes Coronado speaking with Dr. Sam Martinez  07/31/23 8963

## 2023-08-01 NOTE — H&P
V2.0  History and Physical      Name:  Tamiko Ozuna /Age/Sex: 1975  (56 y.o. female)   MRN & CSN:  [id number] & [id number] Encounter Date/Time: 2023 6:25 AM EDT   Location:   PCP: No primary care provider on file. Hospital Day: 2    Assessment and Plan:   Tamiko Ozuna is a 56 y.o. female with a pmh of CAD, CHF who presents with Syncope and collapse    Hospital Problems             Last Modified POA    * (Principal) Syncope and collapse 2023 Yes    CAD (coronary artery disease) 2023 Yes    ICD (implantable cardioverter-defibrillator) in place 2023 Yes    Sarcoidosis 2023 Yes    Chronic diastolic CHF (congestive heart failure) ([address]) 2023 Yes    Primary hypertension 2023 Yes    Pure hypercholesterolemia 2023 Yes    Acquired hypothyroidism 2023 Yes   Hypokalemia    Plan:  Admit to medical floor with telemetry monitoring  CT head, c-spine and CTA head and neck nonacute. Will request neurology to evaluate  Potassium replenished. Monitor  Continue home DAPT and metoprolol. May need cardiology evaluation  Check TSH. Disposition:   Current Living situation: home  Expected Disposition: home  Estimated D/C: tbd    Diet No diet orders on file   DVT Prophylaxis [x] Lovenox, []  Heparin, [] SCDs, [] Ambulation,  [] Eliquis, [] Xarelto, [] Coumadin   Code Status Prior   Surrogate Decision Maker/ POA      Personally reviewed Lab Studies and Imaging           History from:     patient, electronic medical record    History of Present Illness:     Chief Complaint: syncope    Tamiko Ozuna is a 56 y.o. female who presents with syncope and a sensation of lightheadedness/dizziness. She also has a history of CAD status post MI and CABG as well as stents. she was resting on her couch this evening and got up to walk down the [place] of her home when she developed a sensation of dizziness and lightheadedness.   She denies that the room was spinning but

## 2023-08-01 NOTE — PROGRESS NOTES
4 Eyes Skin Assessment     The patient is being assess for   Admission    I agree that 2 RN's have performed a thorough Head to Toe Skin Assessment on the patient. ALL assessment sites listed below have been assessed. Areas assessed by both nurses:   [x]   Head, Face, and Ears   [x]   Shoulders, Back, and Chest, Abdomen  [x]   Arms, Elbows, and Hands   [x]   Coccyx, Sacrum, and Ischium  [x]   Legs, Feet, and Heels        ****    **SHARE this note so that the co-signing nurse is able to place an eSignature**    Co-signer eSignature: {Esignature:[id number]}    Does the Patient have Skin Breakdown?   No          Shiv Prevention initiated:  Yes   Wound Care Orders initiated:  NA      WOC nurse consulted for Pressure Injury (Stage 3,4, Unstageable, DTI, NWPT, Complex wounds)and New or Established Ostomies:  NA      Primary Nurse eSignature: Electronically signed by Tiffanie Madrid RN on 8/1/23 at 6:59 PM EDT

## 2023-08-01 NOTE — ED NOTES
Pt is sp02 dropping to 88-89% on RA. Pt asleep at present; woke up pt and sp02 slowlycame back to 95-96% RA. Placed pt on 2l o2 while asleep.      Marita Aranda RN  08/01/23 7962

## 2023-08-01 NOTE — CONSULTS
Neurology consultation note    Patient name: Terry Salazar      Chief Complaint:  Syncopal episode. History of present illness: This is a 56years old right-handed female. The patient was brought to the hospital last night due to syncope. The patient developed sudden onset of loss of consciousness in the evening. At that time, the patient was trying to get up and then the patient felt lightheadedness and off-balance without spinning sensation. The patient then passed out down to the floor with head concussion. The patient was by herself at that time. The patient woke up about 15 minutes later and then called 911. The patient reported significant headache and generalized fatigue after this episode. The patient denies focal weakness or numbness. The patient denies similar problem in the past.  The patient also denies seizure history or CVA. The patient is noted for underlying CAD with AICD. Upon admission, the patient has borderline bradycardia.     Past medical history:    Past Medical History:   Diagnosis Date    CHF (congestive heart failure) ([facility])     COPD (chronic obstructive pulmonary disease) ([facility])     DVT     Hx of blood clots     Hypercholesterolemia     Myocardial infarct ([address])     Pulmonary embolism ([facility])     Unspecified cerebral artery occlusion with cerebral infarction        Past surgical history:    Past Surgical History:   Procedure Laterality Date    CARDIAC DEFIBRILLATOR PLACEMENT  09/15/2020    gen change, original device implanted 8/25/2012    CARDIAC SURGERY      catherization x 4    CHEST TUBE INSERTION      at birth    [facility]  03/2010    BMS to pRCA 4x28 [employer]    DILATION AND CURETTAGE OF UTERUS      KNEE ARTHROSCOPY      bilat knees    LAPAROSCOPY      x 2 for ovaries    TONSILLECTOMY AND ADENOIDECTOMY      UPPER GASTROINTESTINAL ENDOSCOPY  10/18/2010    with biopsies        Medication:    Current Facility-Administered Medications Medication Dose Route Frequency Provider Last Rate Last Admin    escitalopram (LEXAPRO) tablet 20 mg  20 mg Oral Daily Darcie García MD   20 mg at 08/01/23 1045    tiZANidine (ZANAFLEX) tablet 2 mg  2 mg Oral Q8H PRN Stephanie Acuña MD        gabapentin (NEURONTIN) capsule 300 mg  300 mg Oral TID Stephanie Acuña MD   300 mg at 08/01/23 1044    aspirin chewable tablet 81 mg  81 mg Oral Daily Darcie Barragan MD   81 mg at 08/01/23 1045    metoprolol succinate (TOPROL XL) extended release tablet 25 mg  25 mg Oral Daily Darcie Barragan MD        clopidogrel (PLAVIX) tablet 75 mg  75 mg Oral Daily Darcie Barragan MD   75 mg at 08/01/23 1045    Vitamin D (CHOLECALCIFEROL) tablet 1,000 Units  1,000 Units Oral Daily Stephanie Acuña MD   1,000 Units at 08/01/23 1045    atorvastatin (LIPITOR) tablet 40 mg  40 mg Oral Daily Darcie Barragan MD   40 mg at 08/01/23 1044    cetirizine (ZYRTEC) tablet 5 mg  5 mg Oral Daily Darcie García MD   5 mg at 08/01/23 1045    pantoprazole (PROTONIX) tablet 40 mg  40 mg Oral QAM AC Darcie García MD   40 mg at 08/01/23 1045    sodium chloride flush 0.9 % injection 5-40 mL  5-40 mL IntraVENous 2 times per day Stephanie Acuña MD        sodium chloride flush 0.9 % injection 5-40 mL  5-40 mL IntraVENous PRN Stephanie Acuña MD        0.9 % sodium chloride infusion   IntraVENous PRN Stephanie Acuña MD        enoxaparin (LOVENOX) injection 40 mg  40 mg SubCUTAneous Daily Darcie García MD   40 mg at 08/01/23 1045    ondansetron (ZOFRAN-ODT) disintegrating tablet 4 mg  4 mg Oral Q8H PRN Darcie García MD        Or    ondansetron (ZOFRAN) injection 4 mg  4 mg IntraVENous Q6H PRN Darcie García MD        polyethylene glycol (GLYCOLAX) packet 17 g  17 g Oral Daily PRN Stephanie Acuña MD        acetaminophen (TYLENOL) tablet 650 mg  650 mg Oral Q6H PRN Darcie García MD        Or    acetaminophen (TYLENOL) suppository 650 mg  650 mg Rectal Q6H PRN Stephanie Acuña MD        mupirocin (BACTROBAN) 2 % ointment

## 2023-08-01 NOTE — CARE COORDINATION
Case Management Assessment  Initial Evaluation    Date/Time of Evaluation: 8/1/2023 12:26 PM  Assessment Completed by: Austin Delacruz    If patient is discharged prior to next notation, then this note serves as note for discharge by case management. Patient Name: Ivania Ford                   YOB: 1975  Diagnosis: Syncope and collapse [R55]  Lightheadedness [R42]                   Date / Time: 7/31/2023 10:18 PM    Patient Admission Status: Observation   Readmission Risk (Low < 19, Mod (19-27), High > 27): No data recorded  Current PCP: No primary care provider on file. PCP verified by CM? Yes Cristal Orellana apt 8/10, was with [facility])    Chart Reviewed: Yes      History Provided by: Patient  Patient Orientation: Alert and Oriented    Patient Cognition: Alert    Hospitalization in the last 30 days (Readmission):  No    If yes, Readmission Assessment in CM Navigator will be completed. Advance Directives:      Code Status: Full Code   Patient's Primary Decision Maker is: Legal Next of Kin    Primary Decision MakeJay Kasper Child - [phone]    Discharge Planning:    Patient lives with: Alone Type of Home: Trailer/Mobile Home  Primary Care Giver: Self  Patient Support Systems include: Children   Current Financial resources: Medicaid  Current community resources: None  Current services prior to admission: None            Current DME:              Type of Home Care services:  None    ADLS  Prior functional level: Independent in ADLs/IADLs  Current functional level: Independent in ADLs/IADLs    PT AM-PAC:   /24  OT AM-PAC:   /24    Family can provide assistance at DC: Yes  Would you like Case Management to discuss the discharge plan with any other family members/significant others, and if so, who?  No  Plans to Return to Present Housing: Yes  Other Identified Issues/Barriers to RETURNING to current housing: none  Potential Assistance needed at discharge: N/A            Potential

## 2023-08-01 NOTE — PROGRESS NOTES
Pt remains awake a&o x4, VSS reassessment unchanged and as charted Neurologist saw pt today, echo and EEG completed today as well

## 2023-08-01 NOTE — ACP (ADVANCE CARE PLANNING)
Advance Care Planning     General Advance Care Planning (ACP) Conversation    Date of Conversation: 7/31/2023  Conducted with: Patient with Decision Making Capacity    Healthcare Decision Maker:    Primary Decision Maker: Gumaro Vaughn - [phone]  Click here to complete Healthcare Decision Makers including selection of the Healthcare Decision Maker Relationship (ie \"Primary\"). Today we documented Decision Maker(s) consistent with Legal Next of Kin hierarchy.     Content/Action Overview:  DECLINED ACP Conversation - will revisit periodically  Reviewed DNR/DNI and patient elects Full Code (Attempt Resuscitation)        Length of Voluntary ACP Conversation in minutes:  <16 minutes (Non-Billable)    Ollie Del Rio

## 2023-08-01 NOTE — PROGRESS NOTES
1.  Cataracts OU -- Observe for now without intervention. The patient was advised to contact us if any change or worsening of vision. 2. Dry Eyes OU -- Recommend the frequent use of OTC AT's BID-QID OU. 3. Anterior Blepharitis OU - Daily Hot compresses and lid scrubs were recommended. 4. Narrow Angles OU -- Symptoms of angle closure. 5.  H/o Amblyopic OD -- Observe. Finalized Glasses MRx & given to patient today. Return for an appointment in 1 YR for a 30 OU with Dr. Thomas. Neurology consult sent via perfect serve, Electronically signed by Armin Thomas on 8/1/2023 at 9:19 AM

## 2023-08-02 VITALS
TEMPERATURE: 97 F | BODY MASS INDEX: 34.67 KG/M2 | HEART RATE: 60 BPM | SYSTOLIC BLOOD PRESSURE: 116 MMHG | DIASTOLIC BLOOD PRESSURE: 73 MMHG | WEIGHT: 176.6 LBS | OXYGEN SATURATION: 95 % | HEIGHT: 60 IN | RESPIRATION RATE: 22 BRPM

## 2023-08-02 PROBLEM — R42 LIGHTHEADEDNESS: Status: ACTIVE | Noted: 2023-08-02

## 2023-08-02 PROBLEM — E87.6 HYPOKALEMIA: Status: ACTIVE | Noted: 2023-08-02

## 2023-08-02 LAB
ANION GAP SERPL CALCULATED.3IONS-SCNC: 13 MMOL/L (ref 3–16)
BASOPHILS # BLD: 0 K/UL (ref 0–0.2)
BASOPHILS NFR BLD: 0.7 %
BUN SERPL-MCNC: 6 MG/DL (ref 7–20)
CALCIUM SERPL-MCNC: 9.2 MG/DL (ref 8.3–10.6)
CHLORIDE SERPL-SCNC: 106 MMOL/L (ref 99–110)
CO2 SERPL-SCNC: 23 MMOL/L (ref 21–32)
CREAT SERPL-MCNC: 0.6 MG/DL (ref 0.6–1.1)
DEPRECATED RDW RBC AUTO: 13.6 % (ref 12.4–15.4)
EOSINOPHIL # BLD: 0.2 K/UL (ref 0–0.6)
EOSINOPHIL NFR BLD: 3.9 %
GFR SERPLBLD CREATININE-BSD FMLA CKD-EPI: >60 ML/MIN/{1.73_M2}
GLUCOSE SERPL-MCNC: 118 MG/DL (ref 70–99)
HCT VFR BLD AUTO: 39.4 % (ref 36–48)
HGB BLD-MCNC: 13.5 G/DL (ref 12–16)
LYMPHOCYTES # BLD: 2.4 K/UL (ref 1–5.1)
LYMPHOCYTES NFR BLD: 37.1 %
MCH RBC QN AUTO: 31.5 PG (ref 26–34)
MCHC RBC AUTO-ENTMCNC: 34.3 G/DL (ref 31–36)
MCV RBC AUTO: 91.8 FL (ref 80–100)
MONOCYTES # BLD: 0.3 K/UL (ref 0–1.3)
MONOCYTES NFR BLD: 5.3 %
NEUTROPHILS # BLD: 3.4 K/UL (ref 1.7–7.7)
NEUTROPHILS NFR BLD: 53 %
PLATELET # BLD AUTO: 298 K/UL (ref 135–450)
PMV BLD AUTO: 9.3 FL (ref 5–10.5)
POTASSIUM SERPL-SCNC: 4.8 MMOL/L (ref 3.5–5.1)
RBC # BLD AUTO: 4.29 M/UL (ref 4–5.2)
SODIUM SERPL-SCNC: 142 MMOL/L (ref 136–145)
WBC # BLD AUTO: 6.3 K/UL (ref 4–11)

## 2023-08-02 PROCEDURE — 36415 COLL VENOUS BLD VENIPUNCTURE: CPT

## 2023-08-02 PROCEDURE — 99233 SBSQ HOSP IP/OBS HIGH 50: CPT | Performed by: PSYCHIATRY & NEUROLOGY

## 2023-08-02 PROCEDURE — 80048 BASIC METABOLIC PNL TOTAL CA: CPT

## 2023-08-02 PROCEDURE — G0378 HOSPITAL OBSERVATION PER HR: HCPCS

## 2023-08-02 PROCEDURE — 6360000002 HC RX W HCPCS: Performed by: INTERNAL MEDICINE

## 2023-08-02 PROCEDURE — 6370000000 HC RX 637 (ALT 250 FOR IP): Performed by: INTERNAL MEDICINE

## 2023-08-02 PROCEDURE — 2580000003 HC RX 258: Performed by: INTERNAL MEDICINE

## 2023-08-02 PROCEDURE — 85025 COMPLETE CBC W/AUTO DIFF WBC: CPT

## 2023-08-02 PROCEDURE — 99223 1ST HOSP IP/OBS HIGH 75: CPT | Performed by: INTERNAL MEDICINE

## 2023-08-02 PROCEDURE — 95816 EEG AWAKE AND DROWSY: CPT | Performed by: PSYCHIATRY & NEUROLOGY

## 2023-08-02 PROCEDURE — 96372 THER/PROPH/DIAG INJ SC/IM: CPT

## 2023-08-02 PROCEDURE — 99238 HOSP IP/OBS DSCHRG MGMT 30/<: CPT | Performed by: INTERNAL MEDICINE

## 2023-08-02 RX ORDER — SPIRONOLACTONE 25 MG/1
12.5 TABLET ORAL DAILY
Status: DISCONTINUED | OUTPATIENT
Start: 2023-08-02 | End: 2023-08-02 | Stop reason: HOSPADM

## 2023-08-02 RX ORDER — ATORVASTATIN CALCIUM 40 MG/1
80 TABLET, FILM COATED ORAL DAILY
Status: DISCONTINUED | OUTPATIENT
Start: 2023-08-03 | End: 2023-08-02 | Stop reason: HOSPADM

## 2023-08-02 RX ADMIN — GABAPENTIN 300 MG: 300 CAPSULE ORAL at 08:11

## 2023-08-02 RX ADMIN — CLOPIDOGREL BISULFATE 75 MG: 75 TABLET ORAL at 08:11

## 2023-08-02 RX ADMIN — SODIUM CHLORIDE, PRESERVATIVE FREE 10 ML: 5 INJECTION INTRAVENOUS at 08:12

## 2023-08-02 RX ADMIN — ESCITALOPRAM OXALATE 20 MG: 10 TABLET ORAL at 08:11

## 2023-08-02 RX ADMIN — PANTOPRAZOLE SODIUM 40 MG: 40 TABLET, DELAYED RELEASE ORAL at 08:11

## 2023-08-02 RX ADMIN — POTASSIUM CHLORIDE 40 MEQ: 750 TABLET, EXTENDED RELEASE ORAL at 08:11

## 2023-08-02 RX ADMIN — MUPIROCIN: 20 OINTMENT TOPICAL at 08:11

## 2023-08-02 RX ADMIN — Medication 1000 UNITS: at 08:11

## 2023-08-02 RX ADMIN — ATORVASTATIN CALCIUM 40 MG: 40 TABLET, FILM COATED ORAL at 08:11

## 2023-08-02 RX ADMIN — METOPROLOL SUCCINATE 25 MG: 25 TABLET, EXTENDED RELEASE ORAL at 08:11

## 2023-08-02 RX ADMIN — ASPIRIN 81 MG: 81 TABLET, CHEWABLE ORAL at 08:11

## 2023-08-02 RX ADMIN — CETIRIZINE HYDROCHLORIDE 5 MG: 10 TABLET ORAL at 08:11

## 2023-08-02 RX ADMIN — ENOXAPARIN SODIUM 40 MG: 100 INJECTION SUBCUTANEOUS at 08:11

## 2023-08-02 NOTE — PROGRESS NOTES
Pt requesting to leave AMA. Pt states, \"she did not appreciate how she was treated by the cardiologist and would not be a patient of his\". This RN spoke with pt and tried to reason with her to stay. Pt remained steadfast on her decision to leave. She was nice and respectful, but no longer wished to be a patient her. IV's and monitors removed. All belongings accounted for. AMA paperwork signed and in chart. Charge nurse and Clinical notified.

## 2023-08-02 NOTE — PROGRESS NOTES
Patient report given to Irais Dick RN. Patient has rested comfortably overnight without acute changes in assessment noted. Care transferred.

## 2023-08-02 NOTE — PROGRESS NOTES
Patient care assumed, assessment completed as charted. Patient resting in bed, in no apparent distress. States no needs at this time. Will monitor.

## 2023-08-02 NOTE — CONSULTS
[address] [place] [address]  [phone]        Reason for Consultation/Chief Complaint: \"I have been having syncope on 7.31.23.\"    History of Present Illness:  Shaila Conti is a 56 y.o. patient who presented to the hospital with complaints of syncope. History from patient. On 7.31.23 she got up and as she walked in [place] she felt dizzy and fell. Hit her head in front. She thinks she passed out. She was dizzy afterwards when she got up. Denies chest pain, shortness of breath, edema,  palpitations. No prior syncope. She has CAD had MI and stent x1 at [address] in [place]. H/O CHF. She has h/o Sarcoidosis of heart. She has AICD since 2012 and had new generator in 2013 at [facility] where she is followed remotely. It is [facility] [employer] device and has Merlin remote monitoring device at home. 2022 she had two stents in RCA at [address] [facility]. She is on medications which also include metoprolol. Patient says she is not able to get transportation for doctor visits. \" No body told her how to arrange transportation\". She follows at [facility] family medicine by Demi Siegel. She is on chronic anticoagulation with Lovenox. I have been asked to provide consultation regarding further management and testing. Past Medical History:   has a past medical history of CHF (congestive heart failure) ([address]), COPD (chronic obstructive pulmonary disease) ([address]), CVA (cerebral vascular accident) ([address]), DVT, Factor VIII inhibitor disorder ([address]), Hx of blood clots, Hypercholesterolemia, ICD (implantable cardioverter-defibrillator) in place, MI (myocardial infarction) ([address]), Myocardial infarct ([address]), Pacemaker, Pulmonary embolism ([address]), Smoker, and Unspecified cerebral artery occlusion with cerebral infarction. Surgical History:   has a past surgical history that includes laparoscopy; Knee arthroscopy;  Tonsillectomy and adenoidectomy; chest tube insertion; Dilation and curettage of uterus; Cardiac surgery; physician) on 08/01/2023 at 05:25 PM    Chest xray 7.31.23  no acute findings    EKG:  I have reviewed EKG with the following interpretation:  Impression:    Normal sinus rhythmInferior infarct (cited on or before 16-MAY-2011)Anterolateral infarct , age undeterminedAbnormal ECGWhen compared with ECG of 06-JUL-2023 01:28,Anterolateral infarct is now PresentNonspecific T wave abnormality now evident in Anterolateral leadsConfirmed by Liana Ventura MD, [facility] ([id number]) on 8/1/2023 12:22:36 PM  Normal sinus rhythmInferior infarct (cited on or before 16-MAY-2011)Abnormal ECGWhen compared with ECG of 16-JUN-2023 00:24,Sinus rhythm has replaced Electronic atrial pacemakerBorderline criteria for Lateral infarct are no longer PresentConfirmed by [id number] ([id number]) on 7/6/2023 6:00:51 PM        Assessment  Syncope possible orthostatic hypotension since it happened soon after she stood up  and was dizzy before and  after the event. Hypokalemia  CAD with MI and stents in right coronary artery  Ischemic cardiomyopathy  Hypercholesterolemia    Poor compliance for follow up  6 smoker   7. ICD    Sony RN will call [facility] find out if any recorded event on her device at the time of syncope  Monitor in ICU does not show heart rate below 60  Will send home on two week event monitor and set up follow up at [facility] AT [place] office. Jacobo Stoddard will talk to  to get phone number of [place] transport services  Continue asa plavix and high dose statin. If LDL cholesterol remains higher than 70 Zetia can be added  PCSK9 if above does not work  Continue beta blockers at low dose  I will add spironolactone for low EF and hypokalemia  Avoid Tizanidine as it causes orthostatic hypotension. I had the opportunity to review the clinical symptoms and presentation of Alesha Lopez. I will address the patient's cardiac risk factors and adjusted pharmacologic treatment as needed.  In addition, I have reinforced the need for patient

## 2023-08-02 NOTE — PROGRESS NOTES
Neurology Progress Note    ID: Alesha Lopez is a 56 y.o. female    : 1975     LOS: 0 days     Assessment:     Principal Problem:    Syncope and collapse  Active Problems:    CAD (coronary artery disease)    ICD (implantable cardioverter-defibrillator) in place    Sarcoidosis    Chronic diastolic CHF (congestive heart failure) ([address])    Primary hypertension    Pure hypercholesterolemia    Acquired hypothyroidism  Resolved Problems:    * No resolved hospital problems. *     The patient has no focal neurological deficit during my assessment. The CT brain showed no evidence of ischemic injury or intracerebral hemorrhage. The CTA of head and neck showed no evidence of hemodynamic stenosis. The EKG showed borderline sinus bradycardia at 60 bpm.  There was no major metabolic derangements upon admission. The EEG showed no evidence of seizure tendency. TTE showed EF at 40 to 45%. From neurology perspective, the event is not typical for epileptic disorder or TIA. The other possibilities are including bradycardia arrhythmia or vagovagal syncope     Plan:     1. Consider cardiology evaluation. The patient was seen by cardiology in the past but the patient has not had any establish care with cardiology since the patient just moved in to the city. 2.  Cardiac telemetry and echocardiogram.  3.  Target blood pressure below 140/90.  4. Avoid hypotension.     Medications:  Scheduled Meds:    escitalopram  20 mg Oral Daily    gabapentin  300 mg Oral TID    aspirin  81 mg Oral Daily    metoprolol succinate  25 mg Oral Daily    clopidogrel  75 mg Oral Daily    Vitamin D  1,000 Units Oral Daily    atorvastatin  40 mg Oral Daily    cetirizine  5 mg Oral Daily    pantoprazole  40 mg Oral QAM AC    sodium chloride flush  5-40 mL IntraVENous 2 times per day    enoxaparin  40 mg SubCUTAneous Daily    mupirocin   Each Nostril BID     Continuous Infusions:    sodium chloride       PRN Meds: tiZANidine, sodium chloride

## 2023-08-02 NOTE — PROGRESS NOTES
AM assessment complete, see flowsheet. Medications given per MAR. Pt A&O x4, resting in bed eating breakfast. Denies any needs. IV lines and monitors checked and tightened. No other needs noted. Awaiting MD rounds.

## 2023-08-02 NOTE — PROGRESS NOTES
HEART FAILURE CARE PLAN:    Comorbidities Reviewed: Yes   Patient has a past medical history of CHF (congestive heart failure) ([address]), COPD (chronic obstructive pulmonary disease) ([address]), CVA (cerebral vascular accident) ([address]), DVT, Factor VIII inhibitor disorder ([address]), Hx of blood clots, Hypercholesterolemia, ICD (implantable cardioverter-defibrillator) in place, MI (myocardial infarction) ([address]), Myocardial infarct ([address]), Pacemaker, Pulmonary embolism ([address]), Smoker, and Unspecified cerebral artery occlusion with cerebral infarction. ECHOCARDIOGRAM Reviewed: Yes   Patient's Ejection Fraction (EF) is greater than 40%    Weights Reviewed: Yes   Admission weight: 183 lb 4.8 oz (83.1 kg)   Wt Readings from Last 3 Encounters:   08/01/23 183 lb 8 oz (83.2 kg)   07/06/23 175 lb (79.4 kg)   05/21/23 185 lb (83.9 kg)     Intake & Output Reviewed: Yes     Intake/Output Summary (Last 24 hours) at 8/1/2023 2306  Last data filed at 8/1/2023 2025  Gross per 24 hour   Intake 430 ml   Output 1000 ml   Net -570 ml     Medications Reviewed: Yes   SCHEDULED HOSPITAL MEDICATIONS:   escitalopram  20 mg Oral Daily    gabapentin  300 mg Oral TID    aspirin  81 mg Oral Daily    metoprolol succinate  25 mg Oral Daily    clopidogrel  75 mg Oral Daily    Vitamin D  1,000 Units Oral Daily    atorvastatin  40 mg Oral Daily    cetirizine  5 mg Oral Daily    pantoprazole  40 mg Oral QAM AC    sodium chloride flush  5-40 mL IntraVENous 2 times per day    enoxaparin  40 mg SubCUTAneous Daily    mupirocin   Each Nostril BID    potassium chloride  40 mEq Oral BID WC     ACE/ARB/ARNI is REQUIRED for EF </= 17% SYSTOLIC FAILURE:   ACE[de-identified] None  ARB[de-identified] None  ARNI[de-identified] None    Evidenced-Based Beta Blocker is REQUIRED for EF </= 76% SYSTOLIC FAILURE:   [de-identified] Metoprolol SUCCinate- Toprol XL    Diuretics:  [de-identified] None    Diet Reviewed: Yes   ADULT DIET; Regular;  Low Sodium (2 gm)    Goal of Care Reviewed: Yes   Patient and/or Family's stated Goal of Care this

## 2023-08-02 NOTE — PROCEDURES
INTERPRETATION:  This 25-minute, computer-assisted video EEG recording is normal.  No potentially epileptiform activity was present during the recording. CLASSIFICATION:  Normal (awake and drowsy). Sleep - unsuccessful. EKG channel. DESCRIPTION:    BACKGROUND:  The awake recording revealed 9 Hz alpha activity over the posterior head region. Given the extensive study, the patient still did not sleep. The EEG showed normal V waves during drowsiness. There was no significant change on the EEG background with photic stimulation or hyperventilation. INTERICTAL DISCHARGES: None    CLINICAL EVENTS:  None    The EKG channel was unremarkable.

## 2023-08-03 ENCOUNTER — FOLLOWUP TELEPHONE ENCOUNTER (OUTPATIENT)
Dept: ADMINISTRATIVE | Age: 48
End: 2023-08-03

## 2023-08-03 NOTE — TELEPHONE ENCOUNTER
Heart Failure Follow-up Call:     1st Attempt; No Answer number rang for 5 minutes and then went to busy sound. Unable to leave voicemail.      Electronically signed by Jessica Costello MSN, RN  on 8/3/2023 at 2:49 PM

## 2023-08-04 ENCOUNTER — FOLLOWUP TELEPHONE ENCOUNTER (OUTPATIENT)
Dept: ADMINISTRATIVE | Age: 48
End: 2023-08-04

## 2023-08-04 NOTE — TELEPHONE ENCOUNTER
Heart Failure Follow-up Call:     2nd Attempt at [phone]; No Answer- Left HIPAA compliant voicemail with Non-Urgent Heart Failure Resource Line number for call back.     Electronically signed by ROGELIO Diane, RN  on 8/4/2023 at 10:31 AM

## 2023-08-04 NOTE — TELEPHONE ENCOUNTER
Heart Failure Follow-up Call:     3rd Attempt at [phone] listed; No Answer- Left HIPAA compliant voicemail with Non-Urgent Heart Failure Resource Line number for call back.     Electronically signed by Batool Au MSN, RN  on 8/4/2023 at 10:31 AM

## 2023-08-14 ENCOUNTER — TELEPHONE (OUTPATIENT)
Dept: CARDIOLOGY CLINIC | Age: 48
End: 2023-08-14

## 2023-08-14 NOTE — TELEPHONE ENCOUNTER
8/14 Called [phone].  LVM and call back number in case interested in scheduling appt with cardio

## 2023-10-03 ENCOUNTER — HOSPITAL ENCOUNTER (OUTPATIENT)
Dept: GENERAL RADIOLOGY | Age: 48
Discharge: HOME | End: 2023-10-03
Payer: COMMERCIAL

## 2023-10-03 ENCOUNTER — HOSPITAL ENCOUNTER (OUTPATIENT)
Age: 48
Discharge: HOME | End: 2023-10-03
Payer: COMMERCIAL

## 2023-10-03 DIAGNOSIS — M25.471 PAIN AND SWELLING OF RIGHT ANKLE: ICD-10-CM

## 2023-10-03 DIAGNOSIS — M25.571 PAIN AND SWELLING OF RIGHT ANKLE: ICD-10-CM

## 2023-10-03 PROCEDURE — 73610 X-RAY EXAM OF ANKLE: CPT

## 2023-10-18 ENCOUNTER — HOSPITAL ENCOUNTER (EMERGENCY)
Age: 48
Discharge: LEFT BEFORE DISCHARGE | End: 2023-10-18
Payer: COMMERCIAL

## 2023-10-18 ENCOUNTER — APPOINTMENT (OUTPATIENT)
Dept: GENERAL RADIOLOGY | Age: 48
End: 2023-10-18
Payer: COMMERCIAL

## 2023-10-18 VITALS
HEART RATE: 75 BPM | RESPIRATION RATE: 15 BRPM | OXYGEN SATURATION: 94 % | TEMPERATURE: 97.8 F | DIASTOLIC BLOOD PRESSURE: 62 MMHG | SYSTOLIC BLOOD PRESSURE: 108 MMHG

## 2023-10-18 DIAGNOSIS — Z53.21 ELOPED FROM EMERGENCY DEPARTMENT: Primary | ICD-10-CM

## 2023-10-18 PROCEDURE — 99283 EMERGENCY DEPT VISIT LOW MDM: CPT

## 2023-10-18 PROCEDURE — 71046 X-RAY EXAM CHEST 2 VIEWS: CPT

## 2023-10-18 RX ORDER — IBUPROFEN 800 MG/1
800 TABLET ORAL ONCE
Status: DISCONTINUED | OUTPATIENT
Start: 2023-10-18 | End: 2023-10-18 | Stop reason: HOSPADM

## 2023-10-18 RX ORDER — ONDANSETRON 2 MG/ML
4 INJECTION INTRAMUSCULAR; INTRAVENOUS ONCE
Status: DISCONTINUED | OUTPATIENT
Start: 2023-10-18 | End: 2023-10-18 | Stop reason: HOSPADM

## 2023-10-18 RX ORDER — 0.9 % SODIUM CHLORIDE 0.9 %
500 INTRAVENOUS SOLUTION INTRAVENOUS ONCE
Status: DISCONTINUED | OUTPATIENT
Start: 2023-10-18 | End: 2023-10-18 | Stop reason: HOSPADM

## 2023-10-18 ASSESSMENT — ENCOUNTER SYMPTOMS
WHEEZING: 0
ABDOMINAL PAIN: 0
TROUBLE SWALLOWING: 1
NAUSEA: 1
COUGH: 1
STRIDOR: 0
SORE THROAT: 1
SHORTNESS OF BREATH: 0
CHEST TIGHTNESS: 1
VOICE CHANGE: 0

## 2023-10-18 ASSESSMENT — PAIN SCALES - GENERAL: PAINLEVEL_OUTOF10: 7

## 2023-10-18 ASSESSMENT — PAIN - FUNCTIONAL ASSESSMENT: PAIN_FUNCTIONAL_ASSESSMENT: 0-10

## 2023-10-18 ASSESSMENT — PAIN DESCRIPTION - LOCATION: LOCATION: BACK;CHEST

## 2023-10-19 NOTE — ED NOTES
Went to go start IV and get blood work on pt. Pt no longer in room. Unable to locate pt. Pt eloped.      Melissa Fernandez RN  10/18/23 2120

## 2023-10-19 NOTE — ED PROVIDER NOTES
significant PMHx of history of pacemaker, implantable cardioverter defibrillator, CVA, MI, heart failure, COPD, history of DVT and factor VIII, hypercholesterolemia, history of PE, sarcoidosis. They take aspirin, Plavix, Lexapro, Lasix, gabapentin, Vascepa, Claritin, metoprolol, omeprazole, tizanidine, vitamin D.     Nursing Notes were all reviewed and agreed with or any disagreements were addressed  in the HPI. REVIEW OF SYSTEMS    (2-9 systems for level 4, 10 or more for level 5)     Review of Systems   Constitutional:  Negative for chills and fever. HENT:  Positive for sore throat and trouble swallowing. Negative for congestion and voice change. Respiratory:  Positive for cough and chest tightness. Negative for shortness of breath, wheezing and stridor. Cardiovascular:  Negative for chest pain, palpitations and leg swelling. Gastrointestinal:  Positive for nausea. Negative for abdominal pain. Genitourinary:  Negative for dysuria. Musculoskeletal:  Negative for neck pain and neck stiffness. Neurological:  Negative for dizziness and light-headedness. Positives and Pertinent negatives as per HPI. Except as noted abovein the ROS, all other systems were reviewed and negative.        PAST MEDICAL HISTORY     Past Medical History:   Diagnosis Date    CHF (congestive heart failure) (HCC)     COPD (chronic obstructive pulmonary disease) (HCC)     CVA (cerebral vascular accident) ([address]) 04/2010    DVT     Factor VIII inhibitor disorder (HCC)     Hx of blood clots     Hypercholesterolemia     ICD (implantable cardioverter-defibrillator) in place 2012    MI (myocardial infarction) ([address]) 03/2010    Myocardial infarct [facility])     Pacemaker 2012    Pulmonary embolism ([address])     Smoker     Unspecified cerebral artery occlusion with cerebral infarction          SURGICAL HISTORY     Past Surgical History:   Procedure Laterality Date    CARDIAC DEFIBRILLATOR PLACEMENT  09/15/2020    gen change, original

## 2023-11-08 ENCOUNTER — HOSPITAL ENCOUNTER (OUTPATIENT)
Age: 48
Discharge: HOME | End: 2023-11-08
Payer: COMMERCIAL

## 2023-11-08 ENCOUNTER — HOSPITAL ENCOUNTER (OUTPATIENT)
Dept: GENERAL RADIOLOGY | Age: 48
Discharge: HOME | End: 2023-11-08
Payer: COMMERCIAL

## 2023-11-08 DIAGNOSIS — S99.911A INJURY OF RIGHT ANKLE, INITIAL ENCOUNTER: ICD-10-CM

## 2023-11-08 DIAGNOSIS — M79.601 PAIN OF RIGHT UPPER EXTREMITY: ICD-10-CM

## 2023-11-08 PROCEDURE — 73060 X-RAY EXAM OF HUMERUS: CPT

## 2023-11-08 PROCEDURE — 73610 X-RAY EXAM OF ANKLE: CPT

## 2023-11-14 ENCOUNTER — HOSPITAL ENCOUNTER (EMERGENCY)
Age: 48
Discharge: HOME | End: 2023-11-14
Payer: COMMERCIAL

## 2023-11-14 VITALS
TEMPERATURE: 98.2 F | SYSTOLIC BLOOD PRESSURE: 104 MMHG | BODY MASS INDEX: 34.36 KG/M2 | OXYGEN SATURATION: 96 % | RESPIRATION RATE: 20 BRPM | DIASTOLIC BLOOD PRESSURE: 74 MMHG | HEART RATE: 78 BPM | HEIGHT: 60 IN | WEIGHT: 175 LBS

## 2023-11-14 DIAGNOSIS — M25.571 ACUTE RIGHT ANKLE PAIN: Primary | ICD-10-CM

## 2023-11-14 PROCEDURE — 99283 EMERGENCY DEPT VISIT LOW MDM: CPT

## 2023-11-14 RX ORDER — DICLOFENAC SODIUM 75 MG/1
75 TABLET, DELAYED RELEASE ORAL 2 TIMES DAILY PRN
Qty: 20 TABLET | Refills: 0 | Status: SHIPPED | OUTPATIENT
Start: 2023-11-14

## 2023-11-14 ASSESSMENT — PAIN SCALES - GENERAL: PAINLEVEL_OUTOF10: 5

## 2023-11-15 NOTE — ED NOTES
Reviewed discharge information. Patient verbalized understanding of paperwork, medication, and care instructions. Patient denied any questions.      Faraz Sanchez RN  11/14/23 2468

## 2023-11-21 ENCOUNTER — OFFICE VISIT (OUTPATIENT)
Dept: ORTHOPEDIC SURGERY | Age: 48
End: 2023-11-21

## 2023-11-21 VITALS — WEIGHT: 175 LBS | HEIGHT: 60 IN | BODY MASS INDEX: 34.36 KG/M2

## 2023-11-21 DIAGNOSIS — S63.502A SPRAIN OF LEFT WRIST, INITIAL ENCOUNTER: ICD-10-CM

## 2023-11-21 DIAGNOSIS — S93.401A SPRAIN OF RIGHT ANKLE, UNSPECIFIED LIGAMENT, INITIAL ENCOUNTER: Primary | ICD-10-CM

## 2023-11-21 DIAGNOSIS — M25.532 LEFT WRIST PAIN: ICD-10-CM

## 2023-11-21 RX ORDER — METHYLPREDNISOLONE 4 MG/1
TABLET ORAL
Qty: 1 KIT | Refills: 0 | Status: SHIPPED | OUTPATIENT
Start: 2023-11-21

## 2024-01-10 ENCOUNTER — APPOINTMENT (OUTPATIENT)
Dept: CT IMAGING | Age: 49
DRG: 302 | End: 2024-01-10
Payer: COMMERCIAL

## 2024-01-10 ENCOUNTER — HOSPITAL ENCOUNTER (INPATIENT)
Age: 49
LOS: 2 days | Discharge: HOME | DRG: 302 | End: 2024-01-12
Attending: STUDENT IN AN ORGANIZED HEALTH CARE EDUCATION/TRAINING PROGRAM | Admitting: INTERNAL MEDICINE
Payer: COMMERCIAL

## 2024-01-10 ENCOUNTER — ANCILLARY PROCEDURE (OUTPATIENT)
Dept: EMERGENCY DEPT | Age: 49
DRG: 302 | End: 2024-01-10
Attending: STUDENT IN AN ORGANIZED HEALTH CARE EDUCATION/TRAINING PROGRAM
Payer: COMMERCIAL

## 2024-01-10 ENCOUNTER — APPOINTMENT (OUTPATIENT)
Dept: GENERAL RADIOLOGY | Age: 49
DRG: 302 | End: 2024-01-10
Payer: COMMERCIAL

## 2024-01-10 DIAGNOSIS — R07.9 CHEST PAIN, UNSPECIFIED TYPE: Primary | ICD-10-CM

## 2024-01-10 DIAGNOSIS — I95.9 HYPOTENSION, UNSPECIFIED HYPOTENSION TYPE: ICD-10-CM

## 2024-01-10 DIAGNOSIS — R09.02 HYPOXIA: ICD-10-CM

## 2024-01-10 DIAGNOSIS — R55 PRE-SYNCOPE: ICD-10-CM

## 2024-01-10 PROBLEM — J96.01 ACUTE RESPIRATORY FAILURE WITH HYPOXIA (HCC): Status: ACTIVE | Noted: 2024-01-10

## 2024-01-10 LAB
ALBUMIN SERPL-MCNC: 4 G/DL (ref 3.4–5)
ALBUMIN/GLOB SERPL: 1.6 {RATIO} (ref 1.1–2.2)
ALP SERPL-CCNC: 100 U/L (ref 40–129)
ALT SERPL-CCNC: 12 U/L (ref 10–40)
ANION GAP SERPL CALCULATED.3IONS-SCNC: 13 MMOL/L (ref 3–16)
AST SERPL-CCNC: 16 U/L (ref 15–37)
BASE EXCESS BLDV CALC-SCNC: -2.2 MMOL/L (ref -3–3)
BASOPHILS # BLD: 0.1 K/UL (ref 0–0.2)
BASOPHILS NFR BLD: 0.4 %
BILIRUB SERPL-MCNC: 0.5 MG/DL (ref 0–1)
BUN SERPL-MCNC: 9 MG/DL (ref 7–20)
CALCIUM SERPL-MCNC: 7.7 MG/DL (ref 8.3–10.6)
CHLORIDE SERPL-SCNC: 99 MMOL/L (ref 99–110)
CO2 BLDV-SCNC: 21 MMOL/L
CO2 SERPL-SCNC: 24 MMOL/L (ref 21–32)
COHGB MFR BLDV: 8 % (ref 0–1.5)
CREAT SERPL-MCNC: 0.8 MG/DL (ref 0.6–1.1)
CRP SERPL-MCNC: 3.4 MG/L (ref 0–5.1)
DEPRECATED RDW RBC AUTO: 13.8 % (ref 12.4–15.4)
EKG ATRIAL RATE: 67 BPM
EKG DIAGNOSIS: NORMAL
EKG P AXIS: 26 DEGREES
EKG P-R INTERVAL: 146 MS
EKG Q-T INTERVAL: 418 MS
EKG QRS DURATION: 94 MS
EKG QTC CALCULATION (BAZETT): 441 MS
EKG R AXIS: -3 DEGREES
EKG T AXIS: 11 DEGREES
EKG VENTRICULAR RATE: 67 BPM
EOSINOPHIL # BLD: 0.3 K/UL (ref 0–0.6)
EOSINOPHIL NFR BLD: 2.1 %
ERYTHROCYTE [SEDIMENTATION RATE] IN BLOOD BY WESTERGREN METHOD: 26 MM/HR (ref 0–20)
ETHANOLAMINE SERPL-MCNC: NORMAL MG/DL (ref 0–0.08)
FLUAV RNA RESP QL NAA+PROBE: NOT DETECTED
FLUBV RNA RESP QL NAA+PROBE: NOT DETECTED
GFR SERPLBLD CREATININE-BSD FMLA CKD-EPI: >60 ML/MIN/{1.73_M2}
GLUCOSE SERPL-MCNC: 111 MG/DL (ref 70–99)
HCO3 BLDV-SCNC: 19.9 MMOL/L (ref 23–29)
HCT VFR BLD AUTO: 37.9 % (ref 36–48)
HGB BLD-MCNC: 12.7 G/DL (ref 12–16)
LACTATE BLDV-SCNC: 1 MMOL/L (ref 0.4–2)
LYMPHOCYTES # BLD: 3.5 K/UL (ref 1–5.1)
LYMPHOCYTES NFR BLD: 27.4 %
MAGNESIUM SERPL-MCNC: 2.1 MG/DL (ref 1.8–2.4)
MCH RBC QN AUTO: 30.7 PG (ref 26–34)
MCHC RBC AUTO-ENTMCNC: 33.4 G/DL (ref 31–36)
MCV RBC AUTO: 91.9 FL (ref 80–100)
METHGB MFR BLDV: 0.3 %
MONOCYTES # BLD: 0.6 K/UL (ref 0–1.3)
MONOCYTES NFR BLD: 4.9 %
NEUTROPHILS # BLD: 8.3 K/UL (ref 1.7–7.7)
NEUTROPHILS NFR BLD: 65.2 %
NT-PROBNP SERPL-MCNC: 179 PG/ML (ref 0–124)
O2 CT VFR BLDV CALC: 17 VOL %
O2 THERAPY: ABNORMAL
PCO2 BLDV: 26.9 MMHG (ref 40–50)
PH BLDV: 7.49 [PH] (ref 7.35–7.45)
PLATELET # BLD AUTO: 313 K/UL (ref 135–450)
PMV BLD AUTO: 9.7 FL (ref 5–10.5)
PO2 BLDV: 193.3 MMHG (ref 25–40)
POTASSIUM SERPL-SCNC: 3.4 MMOL/L (ref 3.5–5.1)
PROCALCITONIN SERPL IA-MCNC: 0.03 NG/ML (ref 0–0.15)
PROT SERPL-MCNC: 6.5 G/DL (ref 6.4–8.2)
RBC # BLD AUTO: 4.12 M/UL (ref 4–5.2)
RSV AG NOSE QL: NEGATIVE
SAO2 % BLDV: 99 %
SARS-COV-2 RNA RESP QL NAA+PROBE: NOT DETECTED
SODIUM SERPL-SCNC: 136 MMOL/L (ref 136–145)
T4 FREE SERPL-MCNC: 1.3 NG/DL (ref 0.9–1.8)
TROPONIN, HIGH SENSITIVITY: 6 NG/L (ref 0–14)
TROPONIN, HIGH SENSITIVITY: 6 NG/L (ref 0–14)
TROPONIN, HIGH SENSITIVITY: 7 NG/L (ref 0–14)
TROPONIN, HIGH SENSITIVITY: 7 NG/L (ref 0–14)
TSH SERPL DL<=0.005 MIU/L-ACNC: 6.56 UIU/ML (ref 0.27–4.2)
WBC # BLD AUTO: 12.8 K/UL (ref 4–11)

## 2024-01-10 PROCEDURE — 84439 ASSAY OF FREE THYROXINE: CPT

## 2024-01-10 PROCEDURE — 84443 ASSAY THYROID STIM HORMONE: CPT

## 2024-01-10 PROCEDURE — 6370000000 HC RX 637 (ALT 250 FOR IP): Performed by: INTERNAL MEDICINE

## 2024-01-10 PROCEDURE — 96375 TX/PRO/DX INJ NEW DRUG ADDON: CPT

## 2024-01-10 PROCEDURE — 82077 ASSAY SPEC XCP UR&BREATH IA: CPT

## 2024-01-10 PROCEDURE — G0378 HOSPITAL OBSERVATION PER HR: HCPCS

## 2024-01-10 PROCEDURE — 6370000000 HC RX 637 (ALT 250 FOR IP)

## 2024-01-10 PROCEDURE — 96374 THER/PROPH/DIAG INJ IV PUSH: CPT

## 2024-01-10 PROCEDURE — 85652 RBC SED RATE AUTOMATED: CPT

## 2024-01-10 PROCEDURE — 71045 X-RAY EXAM CHEST 1 VIEW: CPT

## 2024-01-10 PROCEDURE — 6360000002 HC RX W HCPCS: Performed by: INTERNAL MEDICINE

## 2024-01-10 PROCEDURE — 6370000000 HC RX 637 (ALT 250 FOR IP): Performed by: STUDENT IN AN ORGANIZED HEALTH CARE EDUCATION/TRAINING PROGRAM

## 2024-01-10 PROCEDURE — 83735 ASSAY OF MAGNESIUM: CPT

## 2024-01-10 PROCEDURE — 2580000003 HC RX 258: Performed by: INTERNAL MEDICINE

## 2024-01-10 PROCEDURE — 96372 THER/PROPH/DIAG INJ SC/IM: CPT

## 2024-01-10 PROCEDURE — 71260 CT THORAX DX C+: CPT

## 2024-01-10 PROCEDURE — 85025 COMPLETE CBC W/AUTO DIFF WBC: CPT

## 2024-01-10 PROCEDURE — 80053 COMPREHEN METABOLIC PANEL: CPT

## 2024-01-10 PROCEDURE — 99285 EMERGENCY DEPT VISIT HI MDM: CPT

## 2024-01-10 PROCEDURE — 87807 RSV ASSAY W/OPTIC: CPT

## 2024-01-10 PROCEDURE — 84703 CHORIONIC GONADOTROPIN ASSAY: CPT

## 2024-01-10 PROCEDURE — 86140 C-REACTIVE PROTEIN: CPT

## 2024-01-10 PROCEDURE — 6360000004 HC RX CONTRAST MEDICATION: Performed by: STUDENT IN AN ORGANIZED HEALTH CARE EDUCATION/TRAINING PROGRAM

## 2024-01-10 PROCEDURE — 96376 TX/PRO/DX INJ SAME DRUG ADON: CPT

## 2024-01-10 PROCEDURE — 2060000000 HC ICU INTERMEDIATE R&B

## 2024-01-10 PROCEDURE — 82803 BLOOD GASES ANY COMBINATION: CPT

## 2024-01-10 PROCEDURE — 93308 TTE F-UP OR LMTD: CPT

## 2024-01-10 PROCEDURE — 99223 1ST HOSP IP/OBS HIGH 75: CPT | Performed by: INTERNAL MEDICINE

## 2024-01-10 PROCEDURE — 83605 ASSAY OF LACTIC ACID: CPT

## 2024-01-10 PROCEDURE — 70450 CT HEAD/BRAIN W/O DYE: CPT

## 2024-01-10 PROCEDURE — 83880 ASSAY OF NATRIURETIC PEPTIDE: CPT

## 2024-01-10 PROCEDURE — 36415 COLL VENOUS BLD VENIPUNCTURE: CPT

## 2024-01-10 PROCEDURE — 84145 PROCALCITONIN (PCT): CPT

## 2024-01-10 PROCEDURE — 2500000003 HC RX 250 WO HCPCS

## 2024-01-10 PROCEDURE — 87636 SARSCOV2 & INF A&B AMP PRB: CPT

## 2024-01-10 PROCEDURE — 93005 ELECTROCARDIOGRAM TRACING: CPT | Performed by: STUDENT IN AN ORGANIZED HEALTH CARE EDUCATION/TRAINING PROGRAM

## 2024-01-10 PROCEDURE — 93010 ELECTROCARDIOGRAM REPORT: CPT | Performed by: INTERNAL MEDICINE

## 2024-01-10 PROCEDURE — 96365 THER/PROPH/DIAG IV INF INIT: CPT

## 2024-01-10 PROCEDURE — 6360000002 HC RX W HCPCS

## 2024-01-10 PROCEDURE — 6360000002 HC RX W HCPCS: Performed by: STUDENT IN AN ORGANIZED HEALTH CARE EDUCATION/TRAINING PROGRAM

## 2024-01-10 PROCEDURE — 84484 ASSAY OF TROPONIN QUANT: CPT

## 2024-01-10 RX ORDER — POTASSIUM CHLORIDE 20 MEQ/1
40 TABLET, EXTENDED RELEASE ORAL PRN
Status: DISCONTINUED | OUTPATIENT
Start: 2024-01-10 | End: 2024-01-12 | Stop reason: HOSPADM

## 2024-01-10 RX ORDER — ASPIRIN 81 MG/1
324 TABLET, CHEWABLE ORAL ONCE
Status: DISCONTINUED | OUTPATIENT
Start: 2024-01-10 | End: 2024-01-12 | Stop reason: HOSPADM

## 2024-01-10 RX ORDER — PROMETHAZINE HYDROCHLORIDE 25 MG/1
12.5 TABLET ORAL EVERY 6 HOURS PRN
Status: DISCONTINUED | OUTPATIENT
Start: 2024-01-10 | End: 2024-01-12 | Stop reason: HOSPADM

## 2024-01-10 RX ORDER — ONDANSETRON 2 MG/ML
4 INJECTION INTRAMUSCULAR; INTRAVENOUS ONCE
Status: COMPLETED | OUTPATIENT
Start: 2024-01-10 | End: 2024-01-10

## 2024-01-10 RX ORDER — POTASSIUM CHLORIDE 7.45 MG/ML
10 INJECTION INTRAVENOUS PRN
Status: DISCONTINUED | OUTPATIENT
Start: 2024-01-10 | End: 2024-01-12 | Stop reason: HOSPADM

## 2024-01-10 RX ORDER — ATORVASTATIN CALCIUM 10 MG/1
20 TABLET, FILM COATED ORAL DAILY
Status: DISCONTINUED | OUTPATIENT
Start: 2024-01-10 | End: 2024-01-12 | Stop reason: HOSPADM

## 2024-01-10 RX ORDER — 0.9 % SODIUM CHLORIDE 0.9 %
250 INTRAVENOUS SOLUTION INTRAVENOUS ONCE
Status: DISCONTINUED | OUTPATIENT
Start: 2024-01-10 | End: 2024-01-12 | Stop reason: HOSPADM

## 2024-01-10 RX ORDER — CLOPIDOGREL BISULFATE 75 MG/1
75 TABLET ORAL DAILY
Status: DISCONTINUED | OUTPATIENT
Start: 2024-01-10 | End: 2024-01-10

## 2024-01-10 RX ORDER — KETOROLAC TROMETHAMINE 15 MG/ML
15 INJECTION, SOLUTION INTRAMUSCULAR; INTRAVENOUS ONCE
Status: COMPLETED | OUTPATIENT
Start: 2024-01-10 | End: 2024-01-10

## 2024-01-10 RX ORDER — ACETAMINOPHEN 650 MG/1
650 SUPPOSITORY RECTAL EVERY 6 HOURS PRN
Status: DISCONTINUED | OUTPATIENT
Start: 2024-01-10 | End: 2024-01-12 | Stop reason: HOSPADM

## 2024-01-10 RX ORDER — METOPROLOL SUCCINATE 25 MG/1
25 TABLET, EXTENDED RELEASE ORAL DAILY
Status: DISCONTINUED | OUTPATIENT
Start: 2024-01-10 | End: 2024-01-10

## 2024-01-10 RX ORDER — MORPHINE SULFATE 2 MG/ML
1 INJECTION, SOLUTION INTRAMUSCULAR; INTRAVENOUS EVERY 4 HOURS PRN
Status: DISCONTINUED | OUTPATIENT
Start: 2024-01-10 | End: 2024-01-11

## 2024-01-10 RX ORDER — ASPIRIN 81 MG/1
81 TABLET, CHEWABLE ORAL DAILY
Status: DISCONTINUED | OUTPATIENT
Start: 2024-01-10 | End: 2024-01-12 | Stop reason: HOSPADM

## 2024-01-10 RX ORDER — MAGNESIUM SULFATE IN WATER 40 MG/ML
2000 INJECTION, SOLUTION INTRAVENOUS PRN
Status: DISCONTINUED | OUTPATIENT
Start: 2024-01-10 | End: 2024-01-12 | Stop reason: HOSPADM

## 2024-01-10 RX ORDER — SODIUM CHLORIDE 0.9 % (FLUSH) 0.9 %
10 SYRINGE (ML) INJECTION EVERY 12 HOURS SCHEDULED
Status: DISCONTINUED | OUTPATIENT
Start: 2024-01-10 | End: 2024-01-12 | Stop reason: HOSPADM

## 2024-01-10 RX ORDER — LANOLIN ALCOHOL/MO/W.PET/CERES
3 CREAM (GRAM) TOPICAL NIGHTLY
Status: DISCONTINUED | OUTPATIENT
Start: 2024-01-10 | End: 2024-01-12 | Stop reason: HOSPADM

## 2024-01-10 RX ORDER — NICOTINE 21 MG/24HR
1 PATCH, TRANSDERMAL 24 HOURS TRANSDERMAL DAILY
Status: DISCONTINUED | OUTPATIENT
Start: 2024-01-10 | End: 2024-01-12 | Stop reason: HOSPADM

## 2024-01-10 RX ORDER — GABAPENTIN 300 MG/1
300 CAPSULE ORAL 3 TIMES DAILY
Status: DISCONTINUED | OUTPATIENT
Start: 2024-01-10 | End: 2024-01-12 | Stop reason: HOSPADM

## 2024-01-10 RX ORDER — LIDOCAINE 4 G/G
1 PATCH TOPICAL DAILY
Status: DISCONTINUED | OUTPATIENT
Start: 2024-01-10 | End: 2024-01-12 | Stop reason: HOSPADM

## 2024-01-10 RX ORDER — SODIUM CHLORIDE 9 MG/ML
INJECTION, SOLUTION INTRAVENOUS PRN
Status: DISCONTINUED | OUTPATIENT
Start: 2024-01-10 | End: 2024-01-12 | Stop reason: HOSPADM

## 2024-01-10 RX ORDER — ONDANSETRON 2 MG/ML
4 INJECTION INTRAMUSCULAR; INTRAVENOUS EVERY 6 HOURS PRN
Status: DISCONTINUED | OUTPATIENT
Start: 2024-01-10 | End: 2024-01-12 | Stop reason: HOSPADM

## 2024-01-10 RX ORDER — ENOXAPARIN SODIUM 100 MG/ML
40 INJECTION SUBCUTANEOUS DAILY
Status: DISCONTINUED | OUTPATIENT
Start: 2024-01-10 | End: 2024-01-10

## 2024-01-10 RX ORDER — PANTOPRAZOLE SODIUM 40 MG/1
40 TABLET, DELAYED RELEASE ORAL
Status: DISCONTINUED | OUTPATIENT
Start: 2024-01-10 | End: 2024-01-12 | Stop reason: HOSPADM

## 2024-01-10 RX ORDER — FENTANYL CITRATE 50 UG/ML
50 INJECTION, SOLUTION INTRAMUSCULAR; INTRAVENOUS ONCE
Status: COMPLETED | OUTPATIENT
Start: 2024-01-10 | End: 2024-01-10

## 2024-01-10 RX ORDER — ACETAMINOPHEN 325 MG/1
650 TABLET ORAL EVERY 6 HOURS PRN
Status: DISCONTINUED | OUTPATIENT
Start: 2024-01-10 | End: 2024-01-12 | Stop reason: HOSPADM

## 2024-01-10 RX ORDER — ESCITALOPRAM OXALATE 10 MG/1
20 TABLET ORAL DAILY
Status: DISCONTINUED | OUTPATIENT
Start: 2024-01-10 | End: 2024-01-12 | Stop reason: HOSPADM

## 2024-01-10 RX ORDER — TIZANIDINE 4 MG/1
2 TABLET ORAL EVERY 8 HOURS PRN
Status: DISCONTINUED | OUTPATIENT
Start: 2024-01-10 | End: 2024-01-12 | Stop reason: HOSPADM

## 2024-01-10 RX ORDER — SODIUM CHLORIDE 0.9 % (FLUSH) 0.9 %
10 SYRINGE (ML) INJECTION PRN
Status: DISCONTINUED | OUTPATIENT
Start: 2024-01-10 | End: 2024-01-12 | Stop reason: HOSPADM

## 2024-01-10 RX ORDER — CALCIUM GLUCONATE 20 MG/ML
1000 INJECTION, SOLUTION INTRAVENOUS ONCE
Status: COMPLETED | OUTPATIENT
Start: 2024-01-10 | End: 2024-01-10

## 2024-01-10 RX ADMIN — SODIUM CHLORIDE, PRESERVATIVE FREE 10 ML: 5 INJECTION INTRAVENOUS at 10:49

## 2024-01-10 RX ADMIN — GABAPENTIN 300 MG: 300 CAPSULE ORAL at 12:57

## 2024-01-10 RX ADMIN — ONDANSETRON 4 MG: 2 INJECTION INTRAMUSCULAR; INTRAVENOUS at 01:29

## 2024-01-10 RX ADMIN — ENOXAPARIN SODIUM 40 MG: 40 INJECTION SUBCUTANEOUS at 10:48

## 2024-01-10 RX ADMIN — IOPAMIDOL 75 ML: 755 INJECTION, SOLUTION INTRAVENOUS at 05:45

## 2024-01-10 RX ADMIN — ASPIRIN 81 MG: 81 TABLET, CHEWABLE ORAL at 11:41

## 2024-01-10 RX ADMIN — FENTANYL CITRATE 50 MCG: 50 INJECTION INTRAMUSCULAR; INTRAVENOUS at 01:48

## 2024-01-10 RX ADMIN — PANTOPRAZOLE SODIUM 40 MG: 40 TABLET, DELAYED RELEASE ORAL at 10:49

## 2024-01-10 RX ADMIN — SODIUM CHLORIDE, PRESERVATIVE FREE 10 ML: 5 INJECTION INTRAVENOUS at 20:40

## 2024-01-10 RX ADMIN — MORPHINE SULFATE 1 MG: 2 INJECTION, SOLUTION INTRAMUSCULAR; INTRAVENOUS at 18:33

## 2024-01-10 RX ADMIN — Medication 3 MG: at 20:40

## 2024-01-10 RX ADMIN — ONDANSETRON 4 MG: 2 INJECTION INTRAMUSCULAR; INTRAVENOUS at 10:48

## 2024-01-10 RX ADMIN — GABAPENTIN 300 MG: 300 CAPSULE ORAL at 20:40

## 2024-01-10 RX ADMIN — ESCITALOPRAM OXALATE 20 MG: 10 TABLET ORAL at 10:49

## 2024-01-10 RX ADMIN — KETOROLAC TROMETHAMINE 15 MG: 15 INJECTION, SOLUTION INTRAMUSCULAR; INTRAVENOUS at 12:57

## 2024-01-10 RX ADMIN — APIXABAN 5 MG: 5 TABLET, FILM COATED ORAL at 18:04

## 2024-01-10 RX ADMIN — CALCIUM GLUCONATE 1000 MG: 20 INJECTION, SOLUTION INTRAVENOUS at 11:48

## 2024-01-10 RX ADMIN — ATORVASTATIN CALCIUM 20 MG: 10 TABLET, FILM COATED ORAL at 20:40

## 2024-01-10 RX ADMIN — POTASSIUM BICARBONATE 40 MEQ: 782 TABLET, EFFERVESCENT ORAL at 06:50

## 2024-01-10 ASSESSMENT — PAIN DESCRIPTION - LOCATION
LOCATION: CHEST

## 2024-01-10 ASSESSMENT — PAIN DESCRIPTION - DESCRIPTORS
DESCRIPTORS: SQUEEZING

## 2024-01-10 ASSESSMENT — PAIN DESCRIPTION - PAIN TYPE: TYPE: ACUTE PAIN

## 2024-01-10 ASSESSMENT — PAIN DESCRIPTION - ONSET: ONSET: SUDDEN

## 2024-01-10 ASSESSMENT — PAIN SCALES - GENERAL
PAINLEVEL_OUTOF10: 4
PAINLEVEL_OUTOF10: 4
PAINLEVEL_OUTOF10: 5

## 2024-01-10 ASSESSMENT — PAIN DESCRIPTION - FREQUENCY: FREQUENCY: INTERMITTENT

## 2024-01-10 ASSESSMENT — PAIN - FUNCTIONAL ASSESSMENT: PAIN_FUNCTIONAL_ASSESSMENT: 0-10

## 2024-01-10 ASSESSMENT — PAIN DESCRIPTION - ORIENTATION: ORIENTATION: MID

## 2024-01-10 NOTE — H&P
[facility] Medicine History & Physical      PCP: Teri Garcia, APRN - CNP    Date of Admission: 1/10/2024    Date of Service: Pt seen/examined on 01/10/24       Chief Complaint:    Chief Complaint   Patient presents with    Chest Pain     Pt arrives via EMS with chest pain that has been going on approx 30 mins. 2 nitro given and ASA taken at home. Pt became hypotensive fluids started en route          History Of Present Illness:      The patient is a 48 y.o. female with CAD, CHF, CVA, sarcoidosis COPD, ICD in place, Hx of VTE and Factor VIII inhibitor disorder who presented to [facility] ED with complaint of chest pain, diaphoresis, and near syncope.  Around 930 pm last night she began to feeling a squeezing feeling in her chest, right sided, waxing and waning. when she got up out of bed she felt light-headed and dizzy so she sat down and vomited once and felt diaphoretic. Did not pass out She took one nitroglycerin before EMS arrived and CP did improve.  CP is improving but is still present and is now radiating through her back and down her spine. No injury or fall. Patient attributes this episode of CP to a sarcoid flare. She is currently seeing a new Sarcoid specialist at  that she saw yesterday who recommended getting a PET scan. Cannot tolerate steroids, discussing possibly going back on methotrexate.   Does have hx of VTE, factor VIII deficiency and is on eliquis, has been compliant with this.   Also on lasix and has been taking this as well, weight is baseline.   BP normally runs 100 systolic     Past Medical History:        Diagnosis Date    CHF (congestive heart failure) ([facility])     COPD (chronic obstructive pulmonary disease) ([facility])     CVA (cerebral vascular accident) ([facility]) 04/2010    DVT     Factor VIII inhibitor disorder ([facility])     Hx of blood clots     Hypercholesterolemia     ICD (implantable cardioverter-defibrillator) in place 2012    MI (myocardial infarction) ([facility]) 03/2010    Myocardial  echo as above EF 40-45%  -chest CT without evidence of overt fluid overload  -daily weights, intake and output  -limited echo pending  -on toprol XL and lasix-holding for now    -does not appear acutely decompensated  -baseline dry weight   -cardiology consulted     #Back pain  #Fibromyalgia  -no injury or fall   -on gabapentin, flexeril   -lidocaine patch     #CAD s/p stents   -ASA, statin    #Leukocytosis  -could be reactive   -afebrile, procalc neg  -ESR and CRP pending  -monitor CBC     #Hypocalcemia  -replace     #Hypokalemia  -replace    #Hx of CVA   -on ASA, statin, eliquis     #Hx of VTE   #Factor VIII inhibitor disorder   -has been on xarelto, coumadin and lovenox in the past  On eliquis   -follows with hem/onc     #Tobacco use  -recommend cessation     #Elevated TSH   T4 pending --> WNLs     #Depression   -on lexapro        Note CP, sarcoidosis, CHF makes patient higher risk for morbidity and mortality requiring testing and treatment.       DVT Prophylaxis: Eliquis  Diet: ADULT DIET; Regular; 3 carb choices (45 gm/meal); Low Fat/Low Chol/High Fiber/2 gm Na; Low Sodium (2 gm)  Code Status: Full Code    KAMILLA Parada  01/10/24  11:06 AM        STEPHANIE WATSON MD 1/10/2024 2:34 PM

## 2024-01-10 NOTE — PROGRESS NOTES
Lab called regarding stat troponin -  patient continues w/ chest squeezing of 4  -  states she can tolerate Morphine  -  MD qureshi

## 2024-01-10 NOTE — ED NOTES
Writer entered pt room after receiving report from ALLISON Schmid.   Pt resting with easy, unlabored respirations.   Pt remains on 2L NC.

## 2024-01-10 NOTE — ED NOTES
Spoke with MD at about hypotension and fentanyl orders. MD states to give it with a 500 mL bolus

## 2024-01-10 NOTE — CARE COORDINATION
Case Management Assessment  Initial Evaluation    Date/Time of Evaluation: 1/10/2024 11:15 AM  Assessment Completed by: YOLI Monte    If patient is discharged prior to next notation, then this note serves as note for discharge by case management.    Patient Name: Valeria Flores                   YOB: 1975  Diagnosis: Acute respiratory failure with hypoxia (HCC) [J96.01]                   Date / Time: 1/10/2024 12:18 AM    Patient Admission Status: Inpatient   Readmission Risk (Low < 19, Mod (19-27), High > 27): Readmission Risk Score: 12.3    Current PCP: Teri Garcia APRN - CNP  PCP verified by CM? (P) Yes    Chart Reviewed: Yes      History Provided by: (P) Patient  Patient Orientation: (P) Alert and Oriented    Patient Cognition: (P) Alert    Hospitalization in the last 30 days (Readmission):  No    If yes, Readmission Assessment in CM Navigator will be completed.    Advance Directives:      Code Status: Full Code   Patient's Primary Decision Maker is: (P) Legal Next of Kin    Primary Decision Maker: RAKEL YANES - Child - [phone]    Discharge Planning:    Patient lives with: (P) Children Type of Home: (P) Trailer/Mobile Home  Primary Care Giver: (P) Self  Patient Support Systems include: (P) Children   Current Financial resources: (P)  (NONE)  Current community resources: (P) None  Current services prior to admission: (P) None            Current DME:              Type of Home Care services:  (P) None    ADLS  Prior functional level: (P) Independent in ADLs/IADLs  Current functional level: (P) Independent in ADLs/IADLs    PT AM-PAC:   /24  OT AM-PAC:   /24    Family can provide assistance at DC: (P) Yes  Would you like Case Management to discuss the discharge plan with any other family members/significant others, and if so, who? (P) No  Plans to Return to Present Housing: (P) Yes  Other Identified Issues/Barriers to RETURNING to current housing: NONE  Potential

## 2024-01-10 NOTE — PROGRESS NOTES
Patient admitted to room 324 from ed. Patient oriented to room, call light, bed rails, phone, lights and bathroom. Patient instructed about the schedule of the day including: vital sign frequency, lab draws, possible tests, frequency of MD and staff rounds, daily weights, I &O's and prescribed diet. Mxtel #[phone]  Telemetry box in place in addition to contino patient aware of placement and reason. Bed locked, in lowest position, side rails up 2/4, call light within reach.  Denying complaints of chest pain, discomfort or shortness of breath.         Recliner Assessment  Patient is not able to demonstrated the ability to move from a reclining position to an upright position within the recliner. however patient is alert, oriented and able to provide informed consent       4 Eyes Skin Assessment     NAME:  Valeria Flores  YOB: 1975  MEDICAL RECORD NUMBER:  [id number]    The patient is being assessed for  Admission    I agree that at least one RN has performed a thorough Head to Toe Skin Assessment on the patient. ALL assessment sites listed below have been assessed.      Areas assessed by both nurses:    Head, Face, Ears, Shoulders, Back, Chest, Arms, Elbows, Hands, Sacrum. Buttock, Coccyx, Ischium, and Legs. Feet and Heels        Does the Patient have a Wound? No noted wound(s)       Shiv Prevention initiated by RN: No  Wound Care Orders initiated by RN: No    Pressure Injury (Stage 3,4, Unstageable, DTI, NWPT, and Complex wounds) if present, place Wound referral order by RN under : No    New Ostomies, if present place, Ostomy referral order under : No     Nurse 1 eSignature: Electronically signed by Ivania Al RN on 1/10/24 at 3:24 PM EST    **SHARE this note so that the co-signing nurse can place an eSignature**    Nurse 2 eSignature: Electronically signed by Ya Nice RN on 1/10/24 at 7:52 PM EST

## 2024-01-10 NOTE — PROGRESS NOTES
Inpatient Occupational Therapy/Physical Therapy    Hold PT/OT eval for low BP, will follow up as schedule allows.   Sara Oh OTR/L #[id number]  Bhumi CEDILLOT

## 2024-01-10 NOTE — PROGRESS NOTES
Lab at bedside to draw troponin  -  administering 1mg morphine slowly.  Will further monitor patient for resolution of pain.

## 2024-01-10 NOTE — CONSULTS
Reason for referral and CC: Abnormal chest ct and hypoxia    HISTORY OF PRESENT ILLNESS: 47 yo with a h/o Sarcoidosis (biopsy data not available today), PE with Factor VIII inhibitor disorder, CABG, ICD presented to the emergency room yesterday with chest pain sweating and near syncope.  She relates that at this point the chest pain is coming and going and somewhat centralized and is not related to effort.  Seen by cardiology who is planning further testing pending echo results.  The patient has a CT that shows calcified granulomas and some bibasilar bronchiectasis but no signs of active sarcoidosis in the lungs.  The patient notes intermittent cough but not particularly bothersome or worse at this time and she does have chronic dyspnea on exertion.  No shortness of breath at rest.  She recently established with Dr. Mallory in the allergy clinic at .  The patient used to be on methotrexate from Dr. Casanova in the past.  The patient was initially on 2 L but has been on room air today.  Past Surgical History:   Procedure Laterality Date    CARDIAC DEFIBRILLATOR PLACEMENT  09/15/2020    gen change, original device implanted 8/25/2012    CARDIAC SURGERY      catherization x 4    CHEST TUBE INSERTION      at birth    CORONARY ANGIOPLASTY WITH STENT PLACEMENT  03/2010    BMS to pRCA 4x28 [employer]    DILATION AND CURETTAGE OF UTERUS      KNEE ARTHROSCOPY      bilat knees    LAPAROSCOPY      x 2 for ovaries    TONSILLECTOMY AND ADENOIDECTOMY      UPPER GASTROINTESTINAL ENDOSCOPY  10/18/2010    with biopsies     Family History  family history includes Asthma in her sister; Depression in her mother; Diabetes in her mother; Heart Disease in her father and mother; High Blood Pressure in her father and mother; High Cholesterol in her father and mother.    Social History:  reports that she has been smoking cigarettes. She started smoking about 16 years ago. She has a 4.2 pack-year smoking history. She has never used smokeless

## 2024-01-10 NOTE — PROGRESS NOTES
Patient call out stating she was starting to have that squeezing pain again.  Denying nausea or diaphoresis.  States she was feeling fine, ate her evening meal, laid back to rest, then the pain began.  Rating of 4.  Bp 102/66, HR 66, resp 14.  Spo2 91%. Denied shortness of breath.  Applied oxygen 2L/min NC will notify MD

## 2024-01-10 NOTE — CONSULTS
[facility]   CONSULTATION  [phone]        Reason for Consultation/Chief Complaint: \"I have been having chest pain.\"  Cardiology consulted for chest pain, pre-syncope and systolic CHF per Dixie Hogan NP  Consulted by Dr. Stephenson 8/23  Last seen by Dr. Juan Briseno MD 12/18/2020    History of Present Illness:    Valeria Flores is a 48 y.o. patient who presented to [facility] 1/10/2024 with c/o CP.  She has PMH sarcoidosis, CAD s/p stent RCA, hx reported MI, PE/DVT, hypercoagulable d/o on eliquis, HFrEF, s/p ICD, COPD, CHF, cerebral infarct. Echo 2/11/2020 EF=50-55%; +inferior WMA; mild-mod MR; RV mildly dilated; mod-severe TR.  LHC/RHC 2/10/2020 Right coronary: Prior intervention #1: stent, proximal RCA mild ISR at site of prior intervention #1 .Xiomara nuc 7/15/22 EF=57%; defect inferior wall c/w prior infarction and mild terry-infarct ischemia; mild ischemia defect apical lateral wall; mild fixed defect basal chris-septal wall. Echo 8/1/23 EF=40-45%; HK basal-mid inferior wall; mild cLVH; RA mildly dilated; mild to mod MR; mod TR.    She presented to [facility] ER 1/4/2024 with chest pain. EKG was unremarkable. Negative Troponin, Device interrogation with no abnormal activity. Felt like sarcoid flareup and took 2 days steroids but \"went crazy\" and pulm doc d/c'd.   Now arrived by EMS with c/o CP. Reports laying in bed last night when had squeezing right CP like prior to MI in past. She got up to get SL NTG and felt dizzy and diaphoretic and presyncopal. She layed down on floor and took NTG which relieved pain. Total 2 NTG and ASA taken at home.  Patient became hypotensive en route and fluids started.  Admission testing: CXR noted bilateral basilar heterogenous pulmonary opacities; cardiomegaly; CT chest negative PE or acute pulm abnl; scattered calcified granulomas; CT head nothing acute. EKG noted NSR; inferior infarct; cannot r/o anterolateral infarct; rate 67 bpm (no change 7/22 EKG).

## 2024-01-10 NOTE — SIGNIFICANT EVENT
Paged for admission.    Patient hypoxic with unclear if pulm embolism, edema, pneumonia. Hemodynamically unstable but responsive to fluid. Requested CT to rule out emergencies then hospitalist to be repaged

## 2024-01-10 NOTE — ED PROVIDER NOTES
[facility] ED     EMERGENCY DEPARTMENT ENCOUNTER         Pt Name: Valeria Flores   MRN: [id number]   Birthdate 1975   Date of evaluation: 1/10/2024   Provider: Jose L Enciso MD   PCP: Teri Gacria APRN - CNP   Note Started: 1:23 AM EST 1/10/24       Chief Complaint     Chest Pain (Pt arrives via EMS with chest pain that has been going on approx 30 mins. 2 nitro given and ASA taken at home. Pt became hypotensive fluids started en route )      History of Present Illness     Valeria Flores is a 48 y.o. female who presents with concern for chest pain as well and has episodes of diaphoresis and near syncope.  The patient has history of coronary artery disease as well as congestive heart failure.  She takes a diuretic states that she has generally been in baseline health however tonight experienced roughly 30 minutes of chest pain with some associated symptoms including diaphoresis and near syncope stating she had to lay on the floor to avoid passing out.  No vertigo.  She did administer 1 nitroglycerin which did not improve her condition therefore she called for paramedics who evaluated her and found her hypotensive though had also administered an additional nitroglycerin therefore they provided IV fluids and brought her to the emerged department for evaluation.  On arrival here she remains relatively hypotensive and with some ongoing right-sided chest pain.  No other acute symptoms.  The patient also took aspirin prior to arrival.      I have reviewed the nursing notes and agree unless otherwise noted.    Review of Systems     Positives and pertinent negatives as per HPI.    Past Medical, Surgical, Family, and Social History     She has a past medical history of CHF (congestive heart failure) ([facility]), COPD (chronic obstructive pulmonary disease) ([facility]), CVA (cerebral vascular accident) ([facility]), DVT, Factor VIII inhibitor disorder ([facility]), Hx of blood clots, Hypercholesterolemia,  Protein 6.5 6.4 - 8.2 g/dL    Albumin 4.0 3.4 - 5.0 g/dL    Albumin/Globulin Ratio 1.6 1.1 - 2.2    Total Bilirubin 0.5 0.0 - 1.0 mg/dL    Alkaline Phosphatase 100 40 - 129 U/L    ALT 12 10 - 40 U/L    AST 16 15 - 37 U/L   Blood Gas, Venous   Result Value Ref Range    pH, Jefferson 7.486 (H) 7.350 - 7.450    pCO2, [place] 26.9 (L) 40.0 - 50.0 mmHg    pO2, [place] 193.3 (H) 25.0 - 40.0 mmHg    HCO3, Venous 19.9 (L) 23.0 - 29.0 mmol/L    Base Excess, [place] -2.2 -3.0 - 3.0 mmol/L    O2 Sat, [place] 99 Not Established %    Carboxyhemoglobin 8.0 (H) 0.0 - 1.5 %    MetHgb, [place] 0.3 <1.5 %    TC02 (Calc), [place] 21 Not Established mmol/L    O2 Content, [place] 17 Not Established VOL %    O2 Therapy Unknown    BNP   Result Value Ref Range    Pro- (H) 0 - 124 pg/mL   ETOH   Result Value Ref Range    Ethanol Lvl None Detected mg/dL   Lactic Acid   Result Value Ref Range    Lactic Acid 1.0 0.4 - 2.0 mmol/L   TSH with Reflex   Result Value Ref Range    TSH 6.56 (H) 0.27 - 4.20 uIU/mL   Magnesium   Result Value Ref Range    Magnesium 2.10 1.80 - 2.40 mg/dL       ED BEDSIDE ULTRASOUND:  POC US ECHOCARDIO TRANSTHORACIC LTD    Result Date: 1/10/2024  POCUS_Cardiac     Exam Information:          Exam type:  Clinically indicated     Indication(s) for Exam:          The exam was performed with the following indications::  Dyspnea         Other Indication(s):  chest pain hx of CHF     Views Obtained & Images Saved for These Views:          The pericardial sac, myocardium, 4 chambers, and IVC were identified using the following views::          Subxiphoid         Parasternal long-axis         Parasternal short-axis         Apical 4-chamber         IVC view         ALL OF THE VIEWS ABOVE WERE OBTAINED     Findings:          Pericardial Effusion:  Small pericardial effusion         Cardiac Activity:  Cardiac activity normal         LV function:  Normal (> 50% EF)         RV diameter:  Normal         IVC collapsibility:  High collapsibility (>50%)

## 2024-01-11 ENCOUNTER — APPOINTMENT (OUTPATIENT)
Dept: NUCLEAR MEDICINE | Age: 49
DRG: 302 | End: 2024-01-11
Payer: COMMERCIAL

## 2024-01-11 LAB
ALBUMIN SERPL-MCNC: 3.8 G/DL (ref 3.4–5)
ALBUMIN/GLOB SERPL: 1.6 {RATIO} (ref 1.1–2.2)
ALP SERPL-CCNC: 99 U/L (ref 40–129)
ALT SERPL-CCNC: 11 U/L (ref 10–40)
ANION GAP SERPL CALCULATED.3IONS-SCNC: 7 MMOL/L (ref 3–16)
AST SERPL-CCNC: 14 U/L (ref 15–37)
BASOPHILS # BLD: 0 K/UL (ref 0–0.2)
BASOPHILS NFR BLD: 0.6 %
BILIRUB SERPL-MCNC: 0.5 MG/DL (ref 0–1)
BUN SERPL-MCNC: 13 MG/DL (ref 7–20)
CALCIUM SERPL-MCNC: 8.6 MG/DL (ref 8.3–10.6)
CHLORIDE SERPL-SCNC: 108 MMOL/L (ref 99–110)
CO2 SERPL-SCNC: 27 MMOL/L (ref 21–32)
CREAT SERPL-MCNC: 0.6 MG/DL (ref 0.6–1.1)
DEPRECATED RDW RBC AUTO: 13.6 % (ref 12.4–15.4)
EOSINOPHIL # BLD: 0.3 K/UL (ref 0–0.6)
EOSINOPHIL NFR BLD: 4 %
GFR SERPLBLD CREATININE-BSD FMLA CKD-EPI: >60 ML/MIN/{1.73_M2}
GLUCOSE SERPL-MCNC: 101 MG/DL (ref 70–99)
HCG SERPL QL: NEGATIVE
HCT VFR BLD AUTO: 39.2 % (ref 36–48)
HGB BLD-MCNC: 13.3 G/DL (ref 12–16)
LYMPHOCYTES # BLD: 2.9 K/UL (ref 1–5.1)
LYMPHOCYTES NFR BLD: 44.6 %
MCH RBC QN AUTO: 31.1 PG (ref 26–34)
MCHC RBC AUTO-ENTMCNC: 33.8 G/DL (ref 31–36)
MCV RBC AUTO: 91.9 FL (ref 80–100)
MONOCYTES # BLD: 0.4 K/UL (ref 0–1.3)
MONOCYTES NFR BLD: 6.7 %
NEUTROPHILS # BLD: 2.9 K/UL (ref 1.7–7.7)
NEUTROPHILS NFR BLD: 44.1 %
PLATELET # BLD AUTO: 268 K/UL (ref 135–450)
PMV BLD AUTO: 9.3 FL (ref 5–10.5)
POTASSIUM SERPL-SCNC: 4.2 MMOL/L (ref 3.5–5.1)
PROT SERPL-MCNC: 6.2 G/DL (ref 6.4–8.2)
RBC # BLD AUTO: 4.27 M/UL (ref 4–5.2)
SODIUM SERPL-SCNC: 142 MMOL/L (ref 136–145)
WBC # BLD AUTO: 6.6 K/UL (ref 4–11)

## 2024-01-11 PROCEDURE — 94761 N-INVAS EAR/PLS OXIMETRY MLT: CPT

## 2024-01-11 PROCEDURE — 36415 COLL VENOUS BLD VENIPUNCTURE: CPT

## 2024-01-11 PROCEDURE — 6360000002 HC RX W HCPCS: Performed by: INTERNAL MEDICINE

## 2024-01-11 PROCEDURE — 78452 HT MUSCLE IMAGE SPECT MULT: CPT

## 2024-01-11 PROCEDURE — 6370000000 HC RX 637 (ALT 250 FOR IP): Performed by: INTERNAL MEDICINE

## 2024-01-11 PROCEDURE — 99233 SBSQ HOSP IP/OBS HIGH 50: CPT | Performed by: INTERNAL MEDICINE

## 2024-01-11 PROCEDURE — 2060000000 HC ICU INTERMEDIATE R&B

## 2024-01-11 PROCEDURE — 3430000000 HC RX DIAGNOSTIC RADIOPHARMACEUTICAL: Performed by: INTERNAL MEDICINE

## 2024-01-11 PROCEDURE — G0378 HOSPITAL OBSERVATION PER HR: HCPCS

## 2024-01-11 PROCEDURE — 93005 ELECTROCARDIOGRAM TRACING: CPT | Performed by: INTERNAL MEDICINE

## 2024-01-11 PROCEDURE — 6370000000 HC RX 637 (ALT 250 FOR IP)

## 2024-01-11 PROCEDURE — 96376 TX/PRO/DX INJ SAME DRUG ADON: CPT

## 2024-01-11 PROCEDURE — 2580000003 HC RX 258: Performed by: INTERNAL MEDICINE

## 2024-01-11 PROCEDURE — 85025 COMPLETE CBC W/AUTO DIFF WBC: CPT

## 2024-01-11 PROCEDURE — 2700000000 HC OXYGEN THERAPY PER DAY

## 2024-01-11 PROCEDURE — 99232 SBSQ HOSP IP/OBS MODERATE 35: CPT | Performed by: INTERNAL MEDICINE

## 2024-01-11 PROCEDURE — 6360000002 HC RX W HCPCS: Performed by: REGISTERED NURSE

## 2024-01-11 PROCEDURE — 80053 COMPREHEN METABOLIC PANEL: CPT

## 2024-01-11 PROCEDURE — 93017 CV STRESS TEST TRACING ONLY: CPT

## 2024-01-11 PROCEDURE — A9502 TC99M TETROFOSMIN: HCPCS | Performed by: INTERNAL MEDICINE

## 2024-01-11 RX ORDER — KETOROLAC TROMETHAMINE 30 MG/ML
30 INJECTION, SOLUTION INTRAMUSCULAR; INTRAVENOUS ONCE
Status: COMPLETED | OUTPATIENT
Start: 2024-01-11 | End: 2024-01-11

## 2024-01-11 RX ORDER — MORPHINE SULFATE 2 MG/ML
2 INJECTION, SOLUTION INTRAMUSCULAR; INTRAVENOUS EVERY 4 HOURS PRN
Status: DISCONTINUED | OUTPATIENT
Start: 2024-01-11 | End: 2024-01-12 | Stop reason: HOSPADM

## 2024-01-11 RX ORDER — PROCHLORPERAZINE EDISYLATE 5 MG/ML
10 INJECTION INTRAMUSCULAR; INTRAVENOUS EVERY 6 HOURS PRN
Status: DISCONTINUED | OUTPATIENT
Start: 2024-01-11 | End: 2024-01-12 | Stop reason: HOSPADM

## 2024-01-11 RX ORDER — AMINOPHYLLINE 25 MG/ML
100 INJECTION, SOLUTION INTRAVENOUS ONCE
Status: COMPLETED | OUTPATIENT
Start: 2024-01-11 | End: 2024-01-11

## 2024-01-11 RX ORDER — METOPROLOL SUCCINATE 25 MG/1
12.5 TABLET, EXTENDED RELEASE ORAL DAILY
Status: DISCONTINUED | OUTPATIENT
Start: 2024-01-11 | End: 2024-01-12 | Stop reason: HOSPADM

## 2024-01-11 RX ORDER — REGADENOSON 0.08 MG/ML
0.4 INJECTION, SOLUTION INTRAVENOUS
Status: COMPLETED | OUTPATIENT
Start: 2024-01-11 | End: 2024-01-11

## 2024-01-11 RX ADMIN — METOPROLOL SUCCINATE 12.5 MG: 25 TABLET, EXTENDED RELEASE ORAL at 13:56

## 2024-01-11 RX ADMIN — SODIUM CHLORIDE, PRESERVATIVE FREE 10 ML: 5 INJECTION INTRAVENOUS at 08:17

## 2024-01-11 RX ADMIN — TETROFOSMIN 33.2 MILLICURIE: 1.38 INJECTION, POWDER, LYOPHILIZED, FOR SOLUTION INTRAVENOUS at 11:28

## 2024-01-11 RX ADMIN — Medication 3 MG: at 20:43

## 2024-01-11 RX ADMIN — MORPHINE SULFATE 2 MG: 2 INJECTION, SOLUTION INTRAMUSCULAR; INTRAVENOUS at 22:42

## 2024-01-11 RX ADMIN — TETROFOSMIN 11 MILLICURIE: 1.38 INJECTION, POWDER, LYOPHILIZED, FOR SOLUTION INTRAVENOUS at 10:00

## 2024-01-11 RX ADMIN — KETOROLAC TROMETHAMINE 30 MG: 30 INJECTION, SOLUTION INTRAMUSCULAR; INTRAVENOUS at 00:53

## 2024-01-11 RX ADMIN — REGADENOSON 0.4 MG: 0.08 INJECTION, SOLUTION INTRAVENOUS at 11:28

## 2024-01-11 RX ADMIN — ASPIRIN 81 MG: 81 TABLET, CHEWABLE ORAL at 08:13

## 2024-01-11 RX ADMIN — ONDANSETRON 4 MG: 2 INJECTION INTRAMUSCULAR; INTRAVENOUS at 22:43

## 2024-01-11 RX ADMIN — GABAPENTIN 300 MG: 300 CAPSULE ORAL at 13:56

## 2024-01-11 RX ADMIN — AMINOPHYLLINE 100 MG: 25 INJECTION, SOLUTION INTRAVENOUS at 11:32

## 2024-01-11 RX ADMIN — APIXABAN 5 MG: 5 TABLET, FILM COATED ORAL at 08:14

## 2024-01-11 RX ADMIN — ATORVASTATIN CALCIUM 20 MG: 10 TABLET, FILM COATED ORAL at 20:44

## 2024-01-11 RX ADMIN — ESCITALOPRAM OXALATE 20 MG: 10 TABLET ORAL at 08:14

## 2024-01-11 RX ADMIN — GABAPENTIN 300 MG: 300 CAPSULE ORAL at 08:14

## 2024-01-11 RX ADMIN — GABAPENTIN 300 MG: 300 CAPSULE ORAL at 20:43

## 2024-01-11 RX ADMIN — SODIUM CHLORIDE, PRESERVATIVE FREE 10 ML: 5 INJECTION INTRAVENOUS at 20:44

## 2024-01-11 RX ADMIN — APIXABAN 5 MG: 5 TABLET, FILM COATED ORAL at 20:44

## 2024-01-11 ASSESSMENT — PAIN SCALES - GENERAL
PAINLEVEL_OUTOF10: 3
PAINLEVEL_OUTOF10: 7
PAINLEVEL_OUTOF10: 3
PAINLEVEL_OUTOF10: 2
PAINLEVEL_OUTOF10: 6
PAINLEVEL_OUTOF10: 2

## 2024-01-11 ASSESSMENT — PAIN DESCRIPTION - LOCATION
LOCATION: CHEST

## 2024-01-11 ASSESSMENT — PAIN - FUNCTIONAL ASSESSMENT: PAIN_FUNCTIONAL_ASSESSMENT: ACTIVITIES ARE NOT PREVENTED

## 2024-01-11 ASSESSMENT — PAIN DESCRIPTION - ORIENTATION
ORIENTATION: MID

## 2024-01-11 ASSESSMENT — PAIN DESCRIPTION - DESCRIPTORS
DESCRIPTORS: ACHING;DISCOMFORT
DESCRIPTORS: SQUEEZING

## 2024-01-11 NOTE — PROGRESS NOTES
Shift assessment complete, morning medications given, patient resting with complaints of pain 2 out of 10 in chest, denies need for pain medication awaiting stress test orders, off O2 on room air at 94% at this time, will continue to monitor. Kaylyn Park RN

## 2024-01-11 NOTE — PROGRESS NOTES
Patient taken off unit to stress lab at this time no S/S of distress when leaving the unit via transport. Kaylyn Park RN

## 2024-01-11 NOTE — PROGRESS NOTES
Progress Note    Admit Date:  1/10/2024    Subjective:  Ms. Flores had multiple episodes of minor chest pain.  No chest pain this morning.  Scheduled for stress test this morning.  She has chronic systolic heart failure with plans to start goal-directed medical treatment.    Objective:   /69   Pulse 60   Temp 97 °F (36.1 °C) (Oral)   Resp 16   Ht 1.524 m (5')   Wt 81 kg (178 lb 8 oz)   SpO2 99%   BMI 34.86 kg/m²        Intake/Output Summary (Last 24 hours) at 1/11/2024 1234  Last data filed at 1/11/2024 0954  Gross per 24 hour   Intake 804 ml   Output 600 ml   Net 204 ml       Physical Exam:    General appearance: alert, appears stated age and cooperative  Head: Normocephalic, without obvious abnormality, atraumatic  Eyes: conjunctivae/corneas clear. PERRL, EOM's intact.  Neck: no adenopathy, no carotid bruit, no JVD, supple, symmetrical, trachea midline and thyroid not enlarged, symmetric, no tenderness/mass/nodules  Lungs: clear to auscultation bilaterally  Heart: regular rate and rhythm, S1, S2 normal, no murmur, click, rub or gallop  Abdomen: soft, non-tender; bowel sounds normal; no masses,  no organomegaly  Extremities: extremities normal, atraumatic, no cyanosis or edema  Pulses: 2+ and symmetric  Skin: Skin color, texture, turgor normal. No rashes or lesions  Neurologic: Grossly normal    Scheduled Meds:   metoprolol succinate  12.5 mg Oral Daily    aspirin  324 mg Oral Once    escitalopram  20 mg Oral Daily    pantoprazole  40 mg Oral QAM AC    sodium chloride flush  10 mL IntraVENous 2 times per day    melatonin  3 mg Oral Nightly    nicotine  1 patch TransDERmal Daily    sodium chloride  250 mL IntraVENous Once    aspirin  81 mg Oral Daily    atorvastatin  20 mg Oral Daily    apixaban  5 mg Oral BID    gabapentin  300 mg Oral TID    lidocaine  1 patch TransDERmal Daily       Continuous Infusions:   sodium chloride         PRN Meds:  morphine, sodium chloride flush, sodium chloride,

## 2024-01-11 NOTE — PLAN OF CARE
Problem: Chronic Conditions and Co-morbidities  Goal: Patient's chronic conditions and co-morbidity symptoms are monitored and maintained or improved  Note:   HEART FAILURE CARE PLAN:    Comorbidities Reviewed: Yes   Patient has a past medical history of CHF (congestive heart failure) ([facility]), COPD (chronic obstructive pulmonary disease) ([facility]), CVA (cerebral vascular accident) ([facility]), DVT, Factor VIII inhibitor disorder ([facility]), Hx of blood clots, Hypercholesterolemia, ICD (implantable cardioverter-defibrillator) in place, MI (myocardial infarction) ([facility]), Myocardial infarct ([facility]), Pacemaker, Pulmonary embolism ([facility]), Smoker, and Unspecified cerebral artery occlusion with cerebral infarction.     ECHOCARDIOGRAM Reviewed: Yes   Patient's Ejection Fraction (EF) is greater than 40%    Weights Reviewed: Yes   Admission weight: 80.7 kg (178 lb)   Wt Readings from Last 3 Encounters:   01/10/24 80.7 kg (178 lb)   11/21/23 79.4 kg (175 lb)   11/14/23 79.4 kg (175 lb)     Intake & Output Reviewed: Yes     Intake/Output Summary (Last 24 hours) at 1/10/2024 2020  Last data filed at 1/10/2024 1753  Gross per 24 hour   Intake 300 ml   Output 300 ml   Net 0 ml     Medications Reviewed: Yes   SCHEDULED HOSPITAL MEDICATIONS:   aspirin  324 mg Oral Once    escitalopram  20 mg Oral Daily    pantoprazole  40 mg Oral QAM AC    sodium chloride flush  10 mL IntraVENous 2 times per day    melatonin  3 mg Oral Nightly    nicotine  1 patch TransDERmal Daily    sodium chloride  250 mL IntraVENous Once    aspirin  81 mg Oral Daily    atorvastatin  20 mg Oral Daily    apixaban  5 mg Oral BID    gabapentin  300 mg Oral TID    lidocaine  1 patch TransDERmal Daily     ACE/ARB/ARNI is REQUIRED for EF </= 40% SYSTOLIC FAILURE:   ACE:: None  ARB:: None  ARNI:: None    Evidenced-Based Beta Blocker is REQUIRED for EF </= 40% SYSTOLIC FAILURE:   :: N/A    Diuretics:  :: None    Diet Reviewed: Yes   ADULT DIET; Regular; Low Fat/Low Chol/High

## 2024-01-11 NOTE — PROGRESS NOTES
Pulmonary Progress Note  CC: CP    Subjective:  Had CP last night but none today      EXAM: /84   Pulse 69   Temp 98.1 °F (36.7 °C) (Oral)   Resp 16   Ht 1.524 m (5')   Wt 81 kg (178 lb 8 oz)   SpO2 94%   BMI 34.86 kg/m²  on ra  Constitutional:  No acute distress.   Eyes: PERRL. Conjunctivae anicteric.   ENT: Normal nose. Normal tongue.    Neck:  Trachea is midline.   Respiratory: No accessory muscle usage.   No wheezes. No rales. +Rhonchi.  Cardiovascular: Normal S1S2. No digit clubbing. No digit cyanosis. No LE edema.   Psychiatric: No anxiety or Agitation. Alert and Oriented to person, place and time.    Scheduled Meds:   metoprolol succinate  12.5 mg Oral Daily    aspirin  324 mg Oral Once    escitalopram  20 mg Oral Daily    pantoprazole  40 mg Oral QAM AC    sodium chloride flush  10 mL IntraVENous 2 times per day    melatonin  3 mg Oral Nightly    nicotine  1 patch TransDERmal Daily    sodium chloride  250 mL IntraVENous Once    aspirin  81 mg Oral Daily    atorvastatin  20 mg Oral Daily    apixaban  5 mg Oral BID    gabapentin  300 mg Oral TID    lidocaine  1 patch TransDERmal Daily     Continuous Infusions:   sodium chloride       PRN Meds:  morphine, sodium chloride flush, sodium chloride, potassium chloride **OR** potassium alternative oral replacement **OR** potassium chloride, potassium chloride, magnesium sulfate, promethazine **OR** ondansetron, acetaminophen **OR** acetaminophen, tiZANidine    Labs:  CBC:   Recent Labs     01/10/24  0037 01/11/24  0537   WBC 12.8* 6.6   HGB 12.7 13.3   HCT 37.9 39.2   MCV 91.9 91.9    268     BMP:   Recent Labs     01/10/24  0037 01/11/24  0537    142   K 3.4* 4.2   CL 99 108   CO2 24 27   BUN 9 13   CREATININE 0.8 0.6       Chest imaging was reviewed by me and showed CTPA 1/10/24: IMPRESSION:  1. No evidence of pulmonary embolism or acute pulmonary abnormality.  2. Reflux of contrast into the IVC and hepatic veins, which is nonspecific but

## 2024-01-11 NOTE — PROGRESS NOTES
A lexiscan stress test was completed on this patient as ordered. The patient complained of shortness of breath, nausea, and 6/10 chest discomfort described as \"squeezing\" after lexiscan. All symptoms resolved after aminophylline 100mg IV. Awaiting stress imaging in nuclear medicine department at this time.

## 2024-01-11 NOTE — FLOWSHEET NOTE
01/11/24 1630   Vital Signs   Temp 97.4 °F (36.3 °C)   Temp Source Oral   Pulse 72   Heart Rate Source Monitor   Respirations 15   /80   MAP (Calculated) 94   BP Location Left upper arm   BP Method Automatic   Pain Assessment   Pain Assessment 0-10   Pain Level 3   Patient's Stated Pain Goal 3   Pain Location Chest   Pain Orientation Mid   Oxygen Therapy   SpO2 93 %   Pulse Oximetry Type Intermittent   O2 Device None (Room air)     Patient wanting to get up and walk the halls but this RN recommended waiting until stress test results are in.  Patient self reports pain at level 3 in chest.  No pain medication required. Kaylyn Park, RN

## 2024-01-11 NOTE — PROGRESS NOTES
End of shift report given to Julia RN  -  care transferred  -  patient resting w/ no complaints at present time.

## 2024-01-11 NOTE — PROGRESS NOTES
Pt educated on cardiac stress testing. Pt verbalizes understanding to cardiac stress testing. Questions and concerns addressed. Pt is agreeable to proceed with stress testing.

## 2024-01-11 NOTE — PROGRESS NOTES
Occupational/Physical Therapy  Orders received, chart reviewed. Patient off floor for stress test this am. Will reattempt as patient availability/therapy schedules allow.    Pt back to floor. Per RN pt is a selfer. Pt is up ad chuy within room. No PT/OT needs at this time. Will discontinue order. Please re consult if status changes.       Ramila Crouch, OTR/L [id number]  Ama Murphy, PT, DPT PT [id number]

## 2024-01-11 NOTE — PROGRESS NOTES
Physician Progress Note      PATIENT:               NILSON SANTOYO  CSN #:                  [id number]  :                       1975  ADMIT DATE:       1/10/2024 12:18 AM  DISCH DATE:  RESPONDING  PROVIDER #:        Cathie Tuttle MD          QUERY TEXT:    Pt admitted with atypical chest pain.  Pt noted to have acute hypoxia per H&P.   Requiring 2L NC, PO2 193.3, tachypneic.  If possible, please document in the   progress notes and discharge summary if you are evaluating and/or treating any   of the following:        The medical record reflects the following:  Risk Factors: COPD, CHF, CAD, current smoker  Clinical Indicators: Tachypneic, PO2 193.3, Requiring supplemental O2, Hypoxia  Treatment: CTA, chest x-ray, BG, ECHO, PULM consult  Options provided:  -- Acute respiratory failure with hypoxia  -- Other - I will add my own diagnosis  -- Disagree - Not applicable / Not valid  -- Disagree - Clinically unable to determine / Unknown  -- Refer to Clinical Documentation Reviewer    PROVIDER RESPONSE TEXT:    This patient is in acute respiratory failure with hypoxia.    Query created by: Lanette Haile on 2024 8:49 AM      Electronically signed by:  Cathie Tuttle MD 2024 9:12 AM

## 2024-01-11 NOTE — DISCHARGE INSTRUCTIONS
Heart Failure Resources:  Heart Failure Interactive Workbook:  Go to https://WootocracyitalSportilia.Comic Reply/publication/?w=895211 for a Free Heart Failure Interactive Workbook provided by The American [employer] Association. This interactive workbook will provide information on Healthier Living with Heart Failure. Please copy and paste link into search bar. Use your mouse to scroll through the pages.    HF [place] selma:   Heart Failure Free smart phone selma available for iPhone and Android download. Use your phone to track sodium intake, fluid intake, symptoms, and weight.     Low Sodium Diet / Recipes:  Go to www.[employer].Swype website for “renal” diet which is Low Sodium! [employer] is a dialysis company, but this website offers free seasonal cookbooks. Each quarter, they will release 25-30 new recipes with a breakdown of calories, sodium, and glucose. You can also go to wwwKeegy/recipes website for free recipes.     Discharge Instruction Video:  Scan the QR code below with your camera and click the canva.com link to open the video and watch educational information on Heart Failure and Medications from one of our nurses.   [email or web address].HealthSpring/design/DAFZnsH_JRk/[email or web address]/edit    Home Exercise Program:   Identification of Green/Yellow/Red zones:  You should be able to identify when you feel good (green zone), if you have 1-2 symptoms of HF (yellow zone), or if you are in need of medical attention (red zone).  In your CHF education folder you were provided a “stop light tool” to outline this information.     We want to you to rate your exertion levels:    Our therapy team has discussed means of identification with you such as the \"Ponce scale.\"  The Ponce rating scale ranges from 6 to 20, where 6 means \"no exertion at all\" and 20 means \"maximal exertion.\" The goal is to use this to gauge how much effort it is taking for you to do your normal daily tasks.   You should be able to recognize when too much exertion is

## 2024-01-11 NOTE — PROGRESS NOTES
[facility]   Progress Note  Cardiology      HPI: Seeing Ms. Flores today for f/u CP, hx CAD, and ICM. She c/o 2 episodes CP right side; squeezing; lasting 30 secs; no radiation or assoc symptoms overnight. Tele NSR      Physical Examination:    Vitals:    01/11/24 0537   BP: 120/64   Pulse: 60   Resp: 16   Temp: 97 °F (36.1 °C)   SpO2: 97%          Constitutional and General Appearance: NAD   Respiratory:  Normal excursion and expansion without use of accessory muscles  Resp Auscultation: Normal breath sounds without dullness  Cardiovascular:  The apical impulses not displaced  Heart tones are crisp and normal  Cervical veins are not engorged  The carotid upstroke is normal in amplitude and contour without delay or bruit  Normal S1S2, No S3, No Murmur  Peripheral pulses are symmetrical and full  There is no clubbing, cyanosis of the extremities.  No edema  Pedal Pulses: 2+ and equal   Abdomen:  No masses or tenderness  Liver/Spleen: No Abnormalities Noted  Neurological/Psychiatric:  Alert and oriented in all spheres  Moves all extremities well  No abnormalities of mood, affect, memory, mentation, or behavior are noted  Skin: warm and dry        Lab Results   Component Value Date    WBC 6.6 01/11/2024    HGB 13.3 01/11/2024    HCT 39.2 01/11/2024    MCV 91.9 01/11/2024     01/11/2024     Lab Results   Component Value Date    CREATININE 0.6 01/11/2024    BUN 13 01/11/2024     01/11/2024    K 4.2 01/11/2024     01/11/2024    CO2 27 01/11/2024     Lab Results   Component Value Date    INR 1.00 07/31/2023    PROTIME 13.2 07/31/2023        ECHO 1/10/24 Summary   This is a limited study for heart failure.   LV systolic function is mildly reduced with ejection fraction estimated at 40-45%.   There is more prominent HK of the inferior wall.   There is mild concentric left ventricular hypertrophy.   Pacemaker / ICD lead is visualized in the right-sided chambers.   8-1-2023 40-45% Ef, Basal-mid

## 2024-01-11 NOTE — PROGRESS NOTES
Pt in bed, awake, A/O X4. Shift assessment complete, night meds given. No C/o pain at this time. Night time snack given.  . No other needs expressed. Call light in reach. Will monitor.  Julia Franco RN

## 2024-01-11 NOTE — PROGRESS NOTES
Pt C/o 6/10 squeezing CP. BP to low to given  any meds at this time for pain. NP updated . Julia Franco, RN

## 2024-01-11 NOTE — PROGRESS NOTES
Pt in bed, eyes closed. No distress noted. Call light in reach. Will continue to monitor.  Julia Franco RN

## 2024-01-12 VITALS
RESPIRATION RATE: 18 BRPM | BODY MASS INDEX: 34.5 KG/M2 | HEART RATE: 70 BPM | OXYGEN SATURATION: 95 % | SYSTOLIC BLOOD PRESSURE: 116 MMHG | DIASTOLIC BLOOD PRESSURE: 81 MMHG | TEMPERATURE: 97.1 F | HEIGHT: 60 IN | WEIGHT: 175.7 LBS

## 2024-01-12 LAB
ALBUMIN SERPL-MCNC: 4.1 G/DL (ref 3.4–5)
ALBUMIN/GLOB SERPL: 1.4 {RATIO} (ref 1.1–2.2)
ALP SERPL-CCNC: 109 U/L (ref 40–129)
ALT SERPL-CCNC: 12 U/L (ref 10–40)
ANION GAP SERPL CALCULATED.3IONS-SCNC: 13 MMOL/L (ref 3–16)
AST SERPL-CCNC: 16 U/L (ref 15–37)
BASOPHILS # BLD: 0 K/UL (ref 0–0.2)
BASOPHILS NFR BLD: 0.6 %
BILIRUB SERPL-MCNC: 0.7 MG/DL (ref 0–1)
BUN SERPL-MCNC: 13 MG/DL (ref 7–20)
CALCIUM SERPL-MCNC: 9.3 MG/DL (ref 8.3–10.6)
CHLORIDE SERPL-SCNC: 105 MMOL/L (ref 99–110)
CO2 SERPL-SCNC: 22 MMOL/L (ref 21–32)
CREAT SERPL-MCNC: 0.6 MG/DL (ref 0.6–1.1)
DEPRECATED RDW RBC AUTO: 13.7 % (ref 12.4–15.4)
EKG ATRIAL RATE: 60 BPM
EKG DIAGNOSIS: NORMAL
EKG P AXIS: 87 DEGREES
EKG P-R INTERVAL: 182 MS
EKG Q-T INTERVAL: 448 MS
EKG QRS DURATION: 92 MS
EKG QTC CALCULATION (BAZETT): 448 MS
EKG R AXIS: -18 DEGREES
EKG T AXIS: -4 DEGREES
EKG VENTRICULAR RATE: 60 BPM
EOSINOPHIL # BLD: 0.3 K/UL (ref 0–0.6)
EOSINOPHIL NFR BLD: 3.5 %
GFR SERPLBLD CREATININE-BSD FMLA CKD-EPI: >60 ML/MIN/{1.73_M2}
GLUCOSE SERPL-MCNC: 99 MG/DL (ref 70–99)
HCT VFR BLD AUTO: 43 % (ref 36–48)
HGB BLD-MCNC: 14.2 G/DL (ref 12–16)
LYMPHOCYTES # BLD: 2.7 K/UL (ref 1–5.1)
LYMPHOCYTES NFR BLD: 38 %
MCH RBC QN AUTO: 30.8 PG (ref 26–34)
MCHC RBC AUTO-ENTMCNC: 33.1 G/DL (ref 31–36)
MCV RBC AUTO: 93.2 FL (ref 80–100)
MONOCYTES # BLD: 0.5 K/UL (ref 0–1.3)
MONOCYTES NFR BLD: 7 %
NEUTROPHILS # BLD: 3.6 K/UL (ref 1.7–7.7)
NEUTROPHILS NFR BLD: 50.9 %
PLATELET # BLD AUTO: 302 K/UL (ref 135–450)
PMV BLD AUTO: 9.2 FL (ref 5–10.5)
POTASSIUM SERPL-SCNC: 4.7 MMOL/L (ref 3.5–5.1)
PROT SERPL-MCNC: 7.1 G/DL (ref 6.4–8.2)
RBC # BLD AUTO: 4.61 M/UL (ref 4–5.2)
SODIUM SERPL-SCNC: 140 MMOL/L (ref 136–145)
WBC # BLD AUTO: 7.2 K/UL (ref 4–11)

## 2024-01-12 PROCEDURE — 99232 SBSQ HOSP IP/OBS MODERATE 35: CPT | Performed by: INTERNAL MEDICINE

## 2024-01-12 PROCEDURE — 94761 N-INVAS EAR/PLS OXIMETRY MLT: CPT

## 2024-01-12 PROCEDURE — 6370000000 HC RX 637 (ALT 250 FOR IP): Performed by: INTERNAL MEDICINE

## 2024-01-12 PROCEDURE — 36415 COLL VENOUS BLD VENIPUNCTURE: CPT

## 2024-01-12 PROCEDURE — G0378 HOSPITAL OBSERVATION PER HR: HCPCS

## 2024-01-12 PROCEDURE — 80053 COMPREHEN METABOLIC PANEL: CPT

## 2024-01-12 PROCEDURE — 99233 SBSQ HOSP IP/OBS HIGH 50: CPT | Performed by: INTERNAL MEDICINE

## 2024-01-12 PROCEDURE — 2700000000 HC OXYGEN THERAPY PER DAY

## 2024-01-12 PROCEDURE — 2580000003 HC RX 258: Performed by: INTERNAL MEDICINE

## 2024-01-12 PROCEDURE — 93010 ELECTROCARDIOGRAM REPORT: CPT | Performed by: INTERNAL MEDICINE

## 2024-01-12 PROCEDURE — 99238 HOSP IP/OBS DSCHRG MGMT 30/<: CPT | Performed by: INTERNAL MEDICINE

## 2024-01-12 PROCEDURE — 85025 COMPLETE CBC W/AUTO DIFF WBC: CPT

## 2024-01-12 PROCEDURE — 6370000000 HC RX 637 (ALT 250 FOR IP)

## 2024-01-12 RX ORDER — NICOTINE 21 MG/24HR
1 PATCH, TRANSDERMAL 24 HOURS TRANSDERMAL EVERY 24 HOURS
Qty: 30 PATCH | Refills: 3 | Status: SHIPPED | OUTPATIENT
Start: 2024-01-12

## 2024-01-12 RX ORDER — METOPROLOL SUCCINATE 25 MG/1
12.5 TABLET, EXTENDED RELEASE ORAL DAILY
Qty: 30 TABLET | Refills: 3 | Status: SHIPPED | OUTPATIENT
Start: 2024-01-12 | End: 2024-01-13 | Stop reason: HOSPADM

## 2024-01-12 RX ORDER — LISINOPRIL 5 MG/1
2.5 TABLET ORAL DAILY
Status: DISCONTINUED | OUTPATIENT
Start: 2024-01-12 | End: 2024-01-12 | Stop reason: HOSPADM

## 2024-01-12 RX ORDER — LISINOPRIL 2.5 MG/1
2.5 TABLET ORAL DAILY
Qty: 30 TABLET | Refills: 1 | Status: SHIPPED | OUTPATIENT
Start: 2024-01-12 | End: 2024-01-31 | Stop reason: SINTOL

## 2024-01-12 RX ADMIN — PANTOPRAZOLE SODIUM 40 MG: 40 TABLET, DELAYED RELEASE ORAL at 08:29

## 2024-01-12 RX ADMIN — SODIUM CHLORIDE, PRESERVATIVE FREE 10 ML: 5 INJECTION INTRAVENOUS at 08:36

## 2024-01-12 RX ADMIN — METOPROLOL SUCCINATE 12.5 MG: 25 TABLET, EXTENDED RELEASE ORAL at 10:29

## 2024-01-12 RX ADMIN — LISINOPRIL 2.5 MG: 5 TABLET ORAL at 08:34

## 2024-01-12 RX ADMIN — APIXABAN 5 MG: 5 TABLET, FILM COATED ORAL at 08:29

## 2024-01-12 RX ADMIN — ESCITALOPRAM OXALATE 20 MG: 10 TABLET ORAL at 08:29

## 2024-01-12 RX ADMIN — GABAPENTIN 300 MG: 300 CAPSULE ORAL at 08:30

## 2024-01-12 RX ADMIN — ASPIRIN 81 MG: 81 TABLET, CHEWABLE ORAL at 08:29

## 2024-01-12 NOTE — FLOWSHEET NOTE
01/11/24 2250   Vital Signs   Temp 97.2 °F (36.2 °C)   Temp Source Oral   Pulse 61   Heart Rate Source Monitor   Respirations 18   /63   MAP (Calculated) 80   BP Location Left upper arm   BP Method Automatic   Patient Position Semi fowlers     Pt c/o squeezing pain in her chest.  Stat EKG ordered.  PRN morphine and zofran given.  NP updated.

## 2024-01-12 NOTE — CARE COORDINATION
CM spoke with pt at d/c.  No CM needs identified.  RN to provide cab voucher for transportation home.
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
## 2024-01-12 NOTE — CONSULTS
[facility]   HEART FAILURE PROGRAM      NAME:  Valeria Flores  AGE: 48 y.o.   GENDER: female  : 1975  TODAY'S DATE:  2024    Subjective:     VISIT TYPE: Evaluation / Consult    ADMIT DATE: 1/10/2024    PAST MEDICAL HISTORY:      Diagnosis Date    CHF (congestive heart failure) ([facility])     COPD (chronic obstructive pulmonary disease) ([facility])     CVA (cerebral vascular accident) ([facility]) 2010    DVT     Factor VIII inhibitor disorder ([facility])     Hx of blood clots     Hypercholesterolemia     ICD (implantable cardioverter-defibrillator) in place     MI (myocardial infarction) ([facility]) 2010    Myocardial infarct ([facility])     Pacemaker     Pulmonary embolism ([facility])     Smoker     Unspecified cerebral artery occlusion with cerebral infarction      HOME MEDICATIONS:  Prior to Admission medications    Medication Sig Start Date End Date Taking? Authorizing Provider   metoprolol succinate (TOPROL XL) 25 MG extended release tablet Take 0.5 tablets by mouth daily 24  Yes Cathie Tuttle MD   lisinopril (PRINIVIL;ZESTRIL) 2.5 MG tablet Take 1 tablet by mouth daily 24  Yes Cathie Tuttle MD   nicotine (NICODERM CQ) 14 MG/24HR Place 1 patch onto the skin every 24 hours 24  Yes Cathie Tuttle MD   apixaban (ELIQUIS) 5 MG TABS tablet Take 1 tablet by mouth 2 times daily   Yes Provider, MD Mela   escitalopram (LEXAPRO) 20 MG tablet Take 1 tablet by mouth daily 23   David Pierson APRN - CNP   simvastatin (ZOCOR) 40 MG tablet Take 1 tablet by mouth nightly  Patient taking differently: Take 1 tablet by mouth nightly Patient is taking 23  David Pierson APRN - CNP   loratadine (CLARITIN) 10 MG capsule Take 1 capsule by mouth daily 23   David Pierson APRN - CNP   tiZANidine (ZANAFLEX) 2 MG tablet Take 1 tablet by mouth every 8 hours as needed (muscle spams) 23   David Pierson APRN - CNP   gabapentin (NEURONTIN) 300  MG capsule Take 1 capsule by mouth 3 times daily for 30 days.  Patient taking differently: Take 1 capsule by mouth 3 times daily. Patient is taking 7/17/23 8/16/23  David Pierson APRN - CNP   omeprazole (PRILOSEC) 40 MG delayed release capsule Take 1 capsule by mouth 2 times daily 7/17/23   David Pierson APRN - CNP   aspirin 81 MG chewable tablet Take 1 tablet by mouth daily 7/17/23   David Pierson APRN - CNP   furosemide (LASIX) 20 MG tablet Take 0.5 tablets by mouth daily as needed (weight gain of 3 lbs over baseline weight) 7/17/23   David Pierson APRN - CNP   vitamin D3 (CHOLECALCIFEROL) 25 MCG (1000 UT) TABS tablet Take 1 tablet by mouth daily 7/17/23   David Pierson APRN - CNP   nitroGLYCERIN (NITROSTAT) 0.4 MG SL tablet Place 1 tablet under the tongue every 5 minutes as needed for Chest pain 3/8/22   Lily Damico APRN - CNP      Objective:     ADMISSION DIAGNOSIS:   Hypoxia [R09.02]  Pre-syncope [R55]  Acute respiratory failure with hypoxia (HCC) [J96.01]  Hypotension, unspecified hypotension type [I95.9]  Chest pain, unspecified type [R07.9]    WEIGHTS:    Admission weight: 80.7 kg (178 lb)   Wt Readings from Last 3 Encounters:   01/12/24 79.7 kg (175 lb 11.2 oz)   11/21/23 79.4 kg (175 lb)   11/14/23 79.4 kg (175 lb)     INTAKE & OUTPUT:   Intake/Output Summary (Last 24 hours) at 1/12/2024 0917  Last data filed at 1/12/2024 0905  Gross per 24 hour   Intake 2950 ml   Output 3400 ml   Net -450 ml     ECHOCARDIOGRAM: EF 40-45% 1/24    Assessment:     Patient up in chair at this time on room air.  Pt denies shortness of breath; no complaints of chest pain. Patient was having chest pain at home for 30 minutes and took 2 nitros with no relief. Provided patient education based on history of sCHF. Patient lives with children at home. Patient able to take care of self, +, and attend follow-up appointments. Denies any HHC.     Reinforced Heart Failure education on: signs/symptoms to monitor,

## 2024-01-12 NOTE — FLOWSHEET NOTE
01/11/24 2037   Vital Signs   Temp 97.8 °F (36.6 °C)   Temp Source Oral   Pulse 67   Heart Rate Source Monitor   Respirations 16   /70   MAP (Calculated) 89   BP Location Left upper arm   BP Method Automatic   Patient Position Semi fowlers     Pt assessment complete.  Pt lying in bed quietly.  No s/s of distress noted.  Lung sounds clear.  Pt on room air.  Pt NSR with HR of 67.  Nightly medications given.  Pt to be NPO after midnight for cardiology consult.  Pt verbalizes understanding.  Denies needs at this time.  Call light within reach.

## 2024-01-12 NOTE — FLOWSHEET NOTE
01/12/24 0752   Vital Signs   Temp 97.3 °F (36.3 °C)   Temp Source Oral   Pulse 62   Heart Rate Source Monitor   Respirations 17   BP 97/67   MAP (Calculated) 77   BP Location Left upper arm   BP Method Automatic   Patient Position Semi fowlers   Oxygen Therapy   SpO2 95 %   O2 Device Nasal cannula   O2 Flow Rate (L/min) 2 L/min     Pt. Resting in bed. Call light in reach. Shift assessment completed see flow sheet. Denies any needs at this time. Will continue to monitor.

## 2024-01-12 NOTE — PLAN OF CARE
Problem: Discharge Planning  Goal: Discharge to home or other facility with appropriate resources  1/12/2024 1148 by Puma Schmid RN  Outcome: Completed  1/12/2024 0828 by Puma Schmid RN  Outcome: Progressing     Problem: Chronic Conditions and Co-morbidities  Goal: Patient's chronic conditions and co-morbidity symptoms are monitored and maintained or improved  1/12/2024 1148 by Puma Schmid RN  Outcome: Completed  1/12/2024 0828 by Puma Schmid RN  Outcome: Progressing     Problem: Pain  Goal: Verbalizes/displays adequate comfort level or baseline comfort level  1/12/2024 1148 by Puma Schmid RN  Outcome: Completed  1/12/2024 0828 by Puma Schmid RN  Outcome: Progressing     Problem: Respiratory - Adult  Goal: Achieves optimal ventilation and oxygenation  1/12/2024 1148 by Puma Schmid RN  Outcome: Completed  1/12/2024 0828 by Puma Schmid RN  Outcome: Progressing

## 2024-01-12 NOTE — PROGRESS NOTES
Pt is lying in bed with their eyes closed. Respirations are easy and even. Call light within reach bed in lowest position with the wheels locked. Will continue to monitor. Adelita Woods RN

## 2024-01-12 NOTE — PROGRESS NOTES
Shift report given to nurse Reyes  at bedside. Patient care handed off in stable condition at this time. Kaylyn Park RN

## 2024-01-12 NOTE — PROGRESS NOTES
Patient educated on discharge instructions as well as new medications use, dosage, administration and possible side effects.  Patient verified knowledge. IV removed without difficulty and dry dressing in place. Telemetry monitor removed and returned to [facility]. Pt left facility in stable condition to Home with all of their personal belongings.

## 2024-01-12 NOTE — PLAN OF CARE
Problem: Discharge Planning  Goal: Discharge to home or other facility with appropriate resources  Outcome: Progressing     Problem: Chronic Conditions and Co-morbidities  Goal: Patient's chronic conditions and co-morbidity symptoms are monitored and maintained or improved  Outcome: Progressing     Problem: Pain  Goal: Verbalizes/displays adequate comfort level or baseline comfort level  Outcome: Progressing     Problem: Respiratory - Adult  Goal: Achieves optimal ventilation and oxygenation  Outcome: Progressing

## 2024-01-12 NOTE — DISCHARGE SUMMARY
Name:  Valeria Flores  Room:  /0324-01  MRN:    [id number]    Discharge Summary      This discharge summary is in conjunction with a complete physical exam done on the day of discharge.      Discharging Physician: STEPHANIE WATSON MD      Admit: 1/10/2024  Discharge:  1/12/2024     Diagnoses this Admission    Principal Problem:    Chest pain  Active Problems:    Near syncope    CAD (coronary artery disease)    ICD (implantable cardioverter-defibrillator) in place    Sarcoidosis    Acquired hypothyroidism    Cannabis use disorder, mild, in sustained remission    Cardiomyopathy (HCC)    Chronic obstructive pulmonary disease (HCC)    Hypoxia  Resolved Problems:    * No resolved hospital problems. *      Procedures (Please Review Full Report for Details)  none    Consults    IP CONSULT TO PULMONOLOGY  IP CONSULT TO CARDIOLOGY  IP CONSULT TO HEART FAILURE NURSE/COORDINATOR      HPI:    48 year old white female who came to the ER for chest pain. It happened a 9 pm. She was dizzy and sweaty. She had nausea.      She has history of sarcoidosis. She has known CAD, CVA, COPD, ICD in place, history of VTE, cardiac sarcoidosis, CVA and CHF.     No chest pain at present. CP responded to NTG. She has had a MI 14 years ago. She is a smoker.    Physical Exam at Discharge:  BP 97/67   Pulse 62   Temp 97.3 °F (36.3 °C) (Oral)   Resp 17   Ht 1.524 m (5')   Wt 79.7 kg (175 lb 11.2 oz)   SpO2 95%   BMI 34.31 kg/m²     General appearance: alert, appears stated age and cooperative  Head: Normocephalic, without obvious abnormality, atraumatic  Eyes: conjunctivae/corneas clear. PERRL, EOM's intact.  Neck: no adenopathy, no carotid bruit, no JVD, supple, symmetrical, trachea midline and thyroid not enlarged, symmetric, no tenderness/mass/nodules  Lungs: clear to auscultation bilaterally  Heart: regular rate and rhythm, S1, S2 normal, no murmur, click, rub or gallop  Abdomen: soft, non-tender; bowel sounds normal; no  masses,  no organomegaly  Extremities: extremities normal, atraumatic, no cyanosis or edema  Pulses: 2+ and symmetric  Skin: Skin color, texture, turgor normal. No rashes or lesions  Neurologic: Grossly normal    Hospital Course    #Chest pain -atypical   -unclear etiology, cardiac vs sarcoid flare   -trop neg x 2   -EKG without acute ischemic changes   -took nitro prior to arrival   -echo   -attempt to get ICD report   -CTA negative for acute pathology   -ASA, statin   -patient will not do steroids   -cardiology and pulmonology consulted   -Stress test showed no reversible ischemia. Medical management.     #Hypotension-acute on chronic   #Pre-syncope  -CT head neg-. Without acute pathology  -orthostatics ok  -seems to be recurrent issue in the past   -may need to consider midodrine long term   -IVF   -holding lasix but can resume  -was taken off toprol XL for hypotension. Start lower dose.     #Acute respiratory failure. Resolved.  -RSV, COVID, flu negative  -no baseline oxygen requirements, currently on 2 L   -wean as tolerated   -CXR and CTA PE as above   -could be 2/2 to sarcoidosis flare?   -pulmonology consulted      #Cardiac sarcoidosis   With concern for neurosarcoidosis   -s/p ICD   -has been on methotrexate in the past   -follows with pulmonology, saw yesterday at   -ESR/CRP pending   -has also followed with neurology outpatient     #Chronic systolic HF   -last echo as above EF 40-45%  -chest CT without evidence of overt fluid overload  -daily weights, intake and output  -limited echo noted.  EF 40%.  -Cardiology will start GDMT. On Toprol XL 12.5 mg daily and add Lisinopril 2.5 mg daily.   -does not appear acutely decompensated  -baseline dry weight   -cardiology consulted      #Back pain  #Fibromyalgia  -no injury or fall   -on gabapentin, flexeril   -lidocaine patch      #CAD s/p stents   -ASA, statin     #Leukocytosis  -could be reactive   -afebrile, procalc neg  -ESR and CRP pending  -monitor CBC     only.    Patient Fact Sheet:  https://www.fda.gov/media/862945/download  Provider Fact Sheet: https://www.fda.gov/media/458348/download  EUA: https://www.fda.gov/media/114491/download  IFU: https://www.fda.gov/media/893168/download    Methodology:  RT-PCR          INFLUENZA A NOT DETECTED     INFLUENZA B NOT DETECTED           ECHO 1/10/23  Conclusions      Summary   This is a limited study for heart failure.   LV systolic function is mildly reduced with ejection fraction estimated at   40-45%.   There is more prominent HK of the inferior wall.   There is mild concentric left ventricular hypertrophy.   Pacemaker / ICD lead is visualized in the right-sided chambers.   8-1-2023 40-45% Ef, Basal-mid inferior hypo, LVH, JAYLYN, mild to mod MR, mod   TR SPAP 31 mmHg.      Signature      ------------------------------------------------------------------   Electronically signed by Feliciano Novoa MD, [facility]   (Interpreting physician) on 01/10/2024 at 03:45 PM    NM Cardiac Stress Test Nuclear Imaging   Final Result      CT HEAD WO CONTRAST   Final Result   Stable appearance of the brain without acute intracranial process identified.         CT CHEST PULMONARY EMBOLISM W CONTRAST   Final Result   1. No evidence of pulmonary embolism or acute pulmonary abnormality.   2. Reflux of contrast into the IVC and hepatic veins, which is nonspecific   but can be seen in the setting of right heart dysfunction.  Consider   correlation with cardiac echo.   3. Scattered bilateral calcified granulomas accounting for the opacities seen   on recent chest radiograph.         POC US ECHOCARDIO TRANSTHORACIC LTD   Final Result      XR CHEST PORTABLE   Final Result   1.  Bilateral basilar heterogeneous pulmonary opacities may represent   atelectasis, mild pulmonary edema or a developing infectious/inflammatory   process.  Short-term follow-up PA and lateral chest radiographs may be   helpful for further evaluation.      2.  Cardiomegaly.

## 2024-01-12 NOTE — PROGRESS NOTES
Pulmonary Progress Note  CC: CP    Subjective:  Had CP last night but none today      EXAM: BP 97/67   Pulse 62   Temp 97.3 °F (36.3 °C) (Oral)   Resp 17   Ht 1.524 m (5')   Wt 79.7 kg (175 lb 11.2 oz)   SpO2 95%   BMI 34.31 kg/m²  on ra  Constitutional:  No acute distress.   Eyes: PERRL. Conjunctivae anicteric.   ENT: Normal nose. Normal tongue.    Neck:  Trachea is midline.   Respiratory: No accessory muscle usage.   No wheezes. No rales. +Rhonchi.  Cardiovascular: Normal S1S2. No digit clubbing. No digit cyanosis. No LE edema.   Psychiatric: No anxiety or Agitation. Alert and Oriented to person, place and time.    Scheduled Meds:   metoprolol succinate  12.5 mg Oral Daily    aspirin  324 mg Oral Once    escitalopram  20 mg Oral Daily    pantoprazole  40 mg Oral QAM AC    sodium chloride flush  10 mL IntraVENous 2 times per day    melatonin  3 mg Oral Nightly    nicotine  1 patch TransDERmal Daily    sodium chloride  250 mL IntraVENous Once    aspirin  81 mg Oral Daily    atorvastatin  20 mg Oral Daily    apixaban  5 mg Oral BID    gabapentin  300 mg Oral TID    lidocaine  1 patch TransDERmal Daily     Continuous Infusions:   sodium chloride       PRN Meds:  morphine, prochlorperazine, sodium chloride flush, sodium chloride, potassium chloride **OR** potassium alternative oral replacement **OR** potassium chloride, potassium chloride, magnesium sulfate, promethazine **OR** ondansetron, acetaminophen **OR** acetaminophen, tiZANidine    Labs:  CBC:   Recent Labs     01/10/24  0037 01/11/24  0537 01/12/24  0517   WBC 12.8* 6.6 7.2   HGB 12.7 13.3 14.2   HCT 37.9 39.2 43.0   MCV 91.9 91.9 93.2    268 302       BMP:   Recent Labs     01/10/24  0037 01/11/24  0537 01/12/24  0517    142 140   K 3.4* 4.2 4.7   CL 99 108 105   CO2 24 27 22   BUN 9 13 13   CREATININE 0.8 0.6 0.6         Chest imaging was reviewed by me and showed CTPA 1/10/24: IMPRESSION:  1. No evidence of pulmonary embolism or acute  pulmonary abnormality.  2. Reflux of contrast into the IVC and hepatic veins, which is nonspecific but can be seen in the setting of right heart dysfunction.  Consider correlation with cardiac echo.  3. Scattered bilateral calcified granulomas accounting for the opacities seen on recent chest radiograph.     ---Also basilar bronchiectasis     ASSESSMENT:  Sarcoidosis: Biopsy data not available.  There is no sign of active sarcoidosis in her lungs.  Mild bibasilar scarring with associated bronchiectasis.  Bilateral pulmonary calcified granulomas  H/o PE with Factor VIII inhibitor disorder   CP -atypical for coronary ischemia.    Patient is undergoing a reevaluation including a PET/CT at   Chronic systolic CHF - r/o cardiac sarcoid involvement underway at   H/o CVA     PLAN:  On room air  NM Stress test negative  No objection to discharge once her cardiac evaluation is complete  Eliquis  F/u soon at   Walk test before dc to see if she qualifies for home O2

## 2024-01-13 ENCOUNTER — APPOINTMENT (OUTPATIENT)
Dept: GENERAL RADIOLOGY | Age: 49
End: 2024-01-13
Payer: COMMERCIAL

## 2024-01-13 ENCOUNTER — HOSPITAL ENCOUNTER (OUTPATIENT)
Age: 49
Setting detail: OBSERVATION
Discharge: HOME | End: 2024-01-13
Attending: STUDENT IN AN ORGANIZED HEALTH CARE EDUCATION/TRAINING PROGRAM | Admitting: INTERNAL MEDICINE
Payer: COMMERCIAL

## 2024-01-13 VITALS
WEIGHT: 175 LBS | HEART RATE: 67 BPM | SYSTOLIC BLOOD PRESSURE: 100 MMHG | BODY MASS INDEX: 34.36 KG/M2 | HEIGHT: 60 IN | OXYGEN SATURATION: 94 % | TEMPERATURE: 98.6 F | DIASTOLIC BLOOD PRESSURE: 71 MMHG | RESPIRATION RATE: 24 BRPM

## 2024-01-13 DIAGNOSIS — J96.01 ACUTE RESPIRATORY FAILURE WITH HYPOXIA (HCC): ICD-10-CM

## 2024-01-13 DIAGNOSIS — I95.9 HYPOTENSION, UNSPECIFIED HYPOTENSION TYPE: Primary | ICD-10-CM

## 2024-01-13 DIAGNOSIS — E78.00 PURE HYPERCHOLESTEROLEMIA: ICD-10-CM

## 2024-01-13 LAB
ALBUMIN SERPL-MCNC: 4 G/DL (ref 3.4–5)
ALBUMIN SERPL-MCNC: 4.5 G/DL (ref 3.4–5)
ALBUMIN/GLOB SERPL: 1.5 {RATIO} (ref 1.1–2.2)
ALBUMIN/GLOB SERPL: 1.6 {RATIO} (ref 1.1–2.2)
ALP SERPL-CCNC: 100 U/L (ref 40–129)
ALP SERPL-CCNC: 108 U/L (ref 40–129)
ALT SERPL-CCNC: 12 U/L (ref 10–40)
ALT SERPL-CCNC: 14 U/L (ref 10–40)
ANION GAP SERPL CALCULATED.3IONS-SCNC: 11 MMOL/L (ref 3–16)
ANION GAP SERPL CALCULATED.3IONS-SCNC: 11 MMOL/L (ref 3–16)
AST SERPL-CCNC: 14 U/L (ref 15–37)
AST SERPL-CCNC: 16 U/L (ref 15–37)
BASOPHILS # BLD: 0 K/UL (ref 0–0.2)
BASOPHILS # BLD: 0.1 K/UL (ref 0–0.2)
BASOPHILS NFR BLD: 0.6 %
BASOPHILS NFR BLD: 0.7 %
BILIRUB SERPL-MCNC: 0.6 MG/DL (ref 0–1)
BILIRUB SERPL-MCNC: 0.6 MG/DL (ref 0–1)
BUN SERPL-MCNC: 14 MG/DL (ref 7–20)
BUN SERPL-MCNC: 18 MG/DL (ref 7–20)
CALCIUM SERPL-MCNC: 8.6 MG/DL (ref 8.3–10.6)
CALCIUM SERPL-MCNC: 9.3 MG/DL (ref 8.3–10.6)
CHLORIDE SERPL-SCNC: 102 MMOL/L (ref 99–110)
CHLORIDE SERPL-SCNC: 109 MMOL/L (ref 99–110)
CO2 SERPL-SCNC: 22 MMOL/L (ref 21–32)
CO2 SERPL-SCNC: 26 MMOL/L (ref 21–32)
CREAT SERPL-MCNC: 0.7 MG/DL (ref 0.6–1.1)
CREAT SERPL-MCNC: 0.8 MG/DL (ref 0.6–1.1)
DEPRECATED RDW RBC AUTO: 13.6 % (ref 12.4–15.4)
DEPRECATED RDW RBC AUTO: 13.6 % (ref 12.4–15.4)
EKG ATRIAL RATE: 72 BPM
EKG DIAGNOSIS: NORMAL
EKG P AXIS: 25 DEGREES
EKG P-R INTERVAL: 144 MS
EKG Q-T INTERVAL: 440 MS
EKG QRS DURATION: 92 MS
EKG QTC CALCULATION (BAZETT): 481 MS
EKG R AXIS: -27 DEGREES
EKG T AXIS: -1 DEGREES
EKG VENTRICULAR RATE: 72 BPM
EOSINOPHIL # BLD: 0.2 K/UL (ref 0–0.6)
EOSINOPHIL # BLD: 0.2 K/UL (ref 0–0.6)
EOSINOPHIL NFR BLD: 2 %
EOSINOPHIL NFR BLD: 2.3 %
FLUAV RNA RESP QL NAA+PROBE: NOT DETECTED
FLUBV RNA RESP QL NAA+PROBE: NOT DETECTED
GFR SERPLBLD CREATININE-BSD FMLA CKD-EPI: >60 ML/MIN/{1.73_M2}
GFR SERPLBLD CREATININE-BSD FMLA CKD-EPI: >60 ML/MIN/{1.73_M2}
GLUCOSE SERPL-MCNC: 104 MG/DL (ref 70–99)
GLUCOSE SERPL-MCNC: 92 MG/DL (ref 70–99)
HCT VFR BLD AUTO: 37.8 % (ref 36–48)
HCT VFR BLD AUTO: 40.3 % (ref 36–48)
HGB BLD-MCNC: 12.7 G/DL (ref 12–16)
HGB BLD-MCNC: 13.7 G/DL (ref 12–16)
LACTATE BLDV-SCNC: 0.6 MMOL/L (ref 0.4–1.9)
LACTATE BLDV-SCNC: 0.7 MMOL/L (ref 0.4–1.9)
LYMPHOCYTES # BLD: 2.7 K/UL (ref 1–5.1)
LYMPHOCYTES # BLD: 3.1 K/UL (ref 1–5.1)
LYMPHOCYTES NFR BLD: 25.6 %
LYMPHOCYTES NFR BLD: 33 %
MCH RBC QN AUTO: 30.9 PG (ref 26–34)
MCH RBC QN AUTO: 31.5 PG (ref 26–34)
MCHC RBC AUTO-ENTMCNC: 33.7 G/DL (ref 31–36)
MCHC RBC AUTO-ENTMCNC: 34 G/DL (ref 31–36)
MCV RBC AUTO: 90.9 FL (ref 80–100)
MCV RBC AUTO: 93.6 FL (ref 80–100)
MONOCYTES # BLD: 0.7 K/UL (ref 0–1.3)
MONOCYTES # BLD: 0.8 K/UL (ref 0–1.3)
MONOCYTES NFR BLD: 6.9 %
MONOCYTES NFR BLD: 8.5 %
NEUTROPHILS # BLD: 4.5 K/UL (ref 1.7–7.7)
NEUTROPHILS # BLD: 7.8 K/UL (ref 1.7–7.7)
NEUTROPHILS NFR BLD: 55.6 %
NEUTROPHILS NFR BLD: 64.8 %
NT-PROBNP SERPL-MCNC: 100 PG/ML (ref 0–124)
PLATELET # BLD AUTO: 288 K/UL (ref 135–450)
PLATELET # BLD AUTO: 314 K/UL (ref 135–450)
PMV BLD AUTO: 9 FL (ref 5–10.5)
PMV BLD AUTO: 9.2 FL (ref 5–10.5)
POTASSIUM SERPL-SCNC: 4.1 MMOL/L (ref 3.5–5.1)
POTASSIUM SERPL-SCNC: 4.4 MMOL/L (ref 3.5–5.1)
PROCALCITONIN SERPL IA-MCNC: 0.03 NG/ML (ref 0–0.15)
PROT SERPL-MCNC: 6.6 G/DL (ref 6.4–8.2)
PROT SERPL-MCNC: 7.3 G/DL (ref 6.4–8.2)
RBC # BLD AUTO: 4.04 M/UL (ref 4–5.2)
RBC # BLD AUTO: 4.43 M/UL (ref 4–5.2)
RSV AG NOSE QL: NEGATIVE
SARS-COV-2 RNA RESP QL NAA+PROBE: NOT DETECTED
SODIUM SERPL-SCNC: 139 MMOL/L (ref 136–145)
SODIUM SERPL-SCNC: 142 MMOL/L (ref 136–145)
TROPONIN, HIGH SENSITIVITY: <6 NG/L (ref 0–14)
WBC # BLD AUTO: 12.1 K/UL (ref 4–11)
WBC # BLD AUTO: 8.1 K/UL (ref 4–11)

## 2024-01-13 PROCEDURE — 84145 PROCALCITONIN (PCT): CPT

## 2024-01-13 PROCEDURE — 6360000002 HC RX W HCPCS: Performed by: INTERNAL MEDICINE

## 2024-01-13 PROCEDURE — 97161 PT EVAL LOW COMPLEX 20 MIN: CPT

## 2024-01-13 PROCEDURE — 99285 EMERGENCY DEPT VISIT HI MDM: CPT

## 2024-01-13 PROCEDURE — 6370000000 HC RX 637 (ALT 250 FOR IP)

## 2024-01-13 PROCEDURE — 97530 THERAPEUTIC ACTIVITIES: CPT

## 2024-01-13 PROCEDURE — 83605 ASSAY OF LACTIC ACID: CPT

## 2024-01-13 PROCEDURE — 2580000003 HC RX 258: Performed by: INTERNAL MEDICINE

## 2024-01-13 PROCEDURE — 99222 1ST HOSP IP/OBS MODERATE 55: CPT

## 2024-01-13 PROCEDURE — 96375 TX/PRO/DX INJ NEW DRUG ADDON: CPT

## 2024-01-13 PROCEDURE — 93005 ELECTROCARDIOGRAM TRACING: CPT | Performed by: STUDENT IN AN ORGANIZED HEALTH CARE EDUCATION/TRAINING PROGRAM

## 2024-01-13 PROCEDURE — 71045 X-RAY EXAM CHEST 1 VIEW: CPT

## 2024-01-13 PROCEDURE — 36415 COLL VENOUS BLD VENIPUNCTURE: CPT

## 2024-01-13 PROCEDURE — G0378 HOSPITAL OBSERVATION PER HR: HCPCS

## 2024-01-13 PROCEDURE — 93010 ELECTROCARDIOGRAM REPORT: CPT | Performed by: INTERNAL MEDICINE

## 2024-01-13 PROCEDURE — 80053 COMPREHEN METABOLIC PANEL: CPT

## 2024-01-13 PROCEDURE — 84484 ASSAY OF TROPONIN QUANT: CPT

## 2024-01-13 PROCEDURE — 87807 RSV ASSAY W/OPTIC: CPT

## 2024-01-13 PROCEDURE — 6360000002 HC RX W HCPCS: Performed by: STUDENT IN AN ORGANIZED HEALTH CARE EDUCATION/TRAINING PROGRAM

## 2024-01-13 PROCEDURE — 2580000003 HC RX 258: Performed by: STUDENT IN AN ORGANIZED HEALTH CARE EDUCATION/TRAINING PROGRAM

## 2024-01-13 PROCEDURE — 99222 1ST HOSP IP/OBS MODERATE 55: CPT | Performed by: INTERNAL MEDICINE

## 2024-01-13 PROCEDURE — 6370000000 HC RX 637 (ALT 250 FOR IP): Performed by: INTERNAL MEDICINE

## 2024-01-13 PROCEDURE — 96365 THER/PROPH/DIAG IV INF INIT: CPT

## 2024-01-13 PROCEDURE — 83880 ASSAY OF NATRIURETIC PEPTIDE: CPT

## 2024-01-13 PROCEDURE — 85025 COMPLETE CBC W/AUTO DIFF WBC: CPT

## 2024-01-13 PROCEDURE — 6360000002 HC RX W HCPCS

## 2024-01-13 PROCEDURE — 6370000000 HC RX 637 (ALT 250 FOR IP): Performed by: STUDENT IN AN ORGANIZED HEALTH CARE EDUCATION/TRAINING PROGRAM

## 2024-01-13 PROCEDURE — 96366 THER/PROPH/DIAG IV INF ADDON: CPT

## 2024-01-13 PROCEDURE — 87040 BLOOD CULTURE FOR BACTERIA: CPT

## 2024-01-13 PROCEDURE — 87636 SARSCOV2 & INF A&B AMP PRB: CPT

## 2024-01-13 RX ORDER — POTASSIUM CHLORIDE 20 MEQ/1
40 TABLET, EXTENDED RELEASE ORAL PRN
Status: DISCONTINUED | OUTPATIENT
Start: 2024-01-13 | End: 2024-01-13 | Stop reason: HOSPADM

## 2024-01-13 RX ORDER — ATORVASTATIN CALCIUM 10 MG/1
20 TABLET, FILM COATED ORAL DAILY
Status: DISCONTINUED | OUTPATIENT
Start: 2024-01-13 | End: 2024-01-13 | Stop reason: HOSPADM

## 2024-01-13 RX ORDER — ASPIRIN 81 MG/1
81 TABLET, CHEWABLE ORAL DAILY
Status: DISCONTINUED | OUTPATIENT
Start: 2024-01-13 | End: 2024-01-13 | Stop reason: HOSPADM

## 2024-01-13 RX ORDER — POTASSIUM CHLORIDE 7.45 MG/ML
10 INJECTION INTRAVENOUS PRN
Status: DISCONTINUED | OUTPATIENT
Start: 2024-01-13 | End: 2024-01-13 | Stop reason: SDUPTHER

## 2024-01-13 RX ORDER — ACETAMINOPHEN 650 MG/1
650 SUPPOSITORY RECTAL EVERY 6 HOURS PRN
Status: DISCONTINUED | OUTPATIENT
Start: 2024-01-13 | End: 2024-01-13 | Stop reason: HOSPADM

## 2024-01-13 RX ORDER — SODIUM CHLORIDE 0.9 % (FLUSH) 0.9 %
10 SYRINGE (ML) INJECTION PRN
Status: DISCONTINUED | OUTPATIENT
Start: 2024-01-13 | End: 2024-01-13 | Stop reason: HOSPADM

## 2024-01-13 RX ORDER — SODIUM CHLORIDE 0.9 % (FLUSH) 0.9 %
10 SYRINGE (ML) INJECTION EVERY 12 HOURS SCHEDULED
Status: DISCONTINUED | OUTPATIENT
Start: 2024-01-13 | End: 2024-01-13 | Stop reason: HOSPADM

## 2024-01-13 RX ORDER — LANOLIN ALCOHOL/MO/W.PET/CERES
3 CREAM (GRAM) TOPICAL NIGHTLY
Status: DISCONTINUED | OUTPATIENT
Start: 2024-01-13 | End: 2024-01-13 | Stop reason: HOSPADM

## 2024-01-13 RX ORDER — ONDANSETRON 2 MG/ML
4 INJECTION INTRAMUSCULAR; INTRAVENOUS EVERY 6 HOURS PRN
Status: DISCONTINUED | OUTPATIENT
Start: 2024-01-13 | End: 2024-01-13 | Stop reason: HOSPADM

## 2024-01-13 RX ORDER — MAGNESIUM SULFATE IN WATER 40 MG/ML
2000 INJECTION, SOLUTION INTRAVENOUS PRN
Status: DISCONTINUED | OUTPATIENT
Start: 2024-01-13 | End: 2024-01-13 | Stop reason: HOSPADM

## 2024-01-13 RX ORDER — ONDANSETRON 2 MG/ML
4 INJECTION INTRAMUSCULAR; INTRAVENOUS ONCE
Status: COMPLETED | OUTPATIENT
Start: 2024-01-13 | End: 2024-01-13

## 2024-01-13 RX ORDER — ESCITALOPRAM OXALATE 10 MG/1
20 TABLET ORAL DAILY
Status: DISCONTINUED | OUTPATIENT
Start: 2024-01-13 | End: 2024-01-13 | Stop reason: HOSPADM

## 2024-01-13 RX ORDER — GABAPENTIN 300 MG/1
300 CAPSULE ORAL 3 TIMES DAILY
Status: DISCONTINUED | OUTPATIENT
Start: 2024-01-13 | End: 2024-01-13 | Stop reason: HOSPADM

## 2024-01-13 RX ORDER — METHYLPREDNISOLONE 4 MG/1
TABLET ORAL
Qty: 5 TABLET | Refills: 0 | Status: SHIPPED | OUTPATIENT
Start: 2024-01-13 | End: 2024-01-14

## 2024-01-13 RX ORDER — 0.9 % SODIUM CHLORIDE 0.9 %
500 INTRAVENOUS SOLUTION INTRAVENOUS ONCE
Status: COMPLETED | OUTPATIENT
Start: 2024-01-13 | End: 2024-01-13

## 2024-01-13 RX ORDER — TIZANIDINE 4 MG/1
2 TABLET ORAL EVERY 8 HOURS PRN
Status: DISCONTINUED | OUTPATIENT
Start: 2024-01-13 | End: 2024-01-13 | Stop reason: HOSPADM

## 2024-01-13 RX ORDER — ACETAMINOPHEN 325 MG/1
650 TABLET ORAL EVERY 6 HOURS PRN
Status: DISCONTINUED | OUTPATIENT
Start: 2024-01-13 | End: 2024-01-13 | Stop reason: HOSPADM

## 2024-01-13 RX ORDER — ASPIRIN 81 MG/1
81 TABLET, CHEWABLE ORAL DAILY
Status: CANCELLED | OUTPATIENT
Start: 2024-01-13

## 2024-01-13 RX ORDER — PANTOPRAZOLE SODIUM 40 MG/1
40 TABLET, DELAYED RELEASE ORAL
Status: DISCONTINUED | OUTPATIENT
Start: 2024-01-14 | End: 2024-01-13 | Stop reason: HOSPADM

## 2024-01-13 RX ORDER — NICOTINE 21 MG/24HR
1 PATCH, TRANSDERMAL 24 HOURS TRANSDERMAL DAILY
Status: DISCONTINUED | OUTPATIENT
Start: 2024-01-13 | End: 2024-01-13 | Stop reason: HOSPADM

## 2024-01-13 RX ORDER — PROMETHAZINE HYDROCHLORIDE 25 MG/1
12.5 TABLET ORAL EVERY 6 HOURS PRN
Status: DISCONTINUED | OUTPATIENT
Start: 2024-01-13 | End: 2024-01-13 | Stop reason: HOSPADM

## 2024-01-13 RX ORDER — LEVOFLOXACIN 750 MG/1
750 TABLET, FILM COATED ORAL DAILY
Qty: 10 TABLET | Refills: 0 | Status: SHIPPED | OUTPATIENT
Start: 2024-01-13 | End: 2024-01-14

## 2024-01-13 RX ORDER — METHYLPREDNISOLONE 4 MG/1
4 TABLET ORAL DAILY
Status: DISCONTINUED | OUTPATIENT
Start: 2024-01-13 | End: 2024-01-13 | Stop reason: HOSPADM

## 2024-01-13 RX ORDER — LEVOFLOXACIN 5 MG/ML
750 INJECTION, SOLUTION INTRAVENOUS EVERY 24 HOURS
Status: DISCONTINUED | OUTPATIENT
Start: 2024-01-13 | End: 2024-01-13 | Stop reason: HOSPADM

## 2024-01-13 RX ORDER — SODIUM CHLORIDE 9 MG/ML
INJECTION, SOLUTION INTRAVENOUS PRN
Status: DISCONTINUED | OUTPATIENT
Start: 2024-01-13 | End: 2024-01-13 | Stop reason: HOSPADM

## 2024-01-13 RX ORDER — MECOBALAMIN 5000 MCG
10 TABLET,DISINTEGRATING ORAL ONCE
Status: COMPLETED | OUTPATIENT
Start: 2024-01-13 | End: 2024-01-13

## 2024-01-13 RX ORDER — ESCITALOPRAM OXALATE 10 MG/1
20 TABLET ORAL DAILY
Status: CANCELLED | OUTPATIENT
Start: 2024-01-13

## 2024-01-13 RX ORDER — POTASSIUM CHLORIDE 7.45 MG/ML
10 INJECTION INTRAVENOUS PRN
Status: DISCONTINUED | OUTPATIENT
Start: 2024-01-13 | End: 2024-01-13 | Stop reason: HOSPADM

## 2024-01-13 RX ADMIN — LEVOFLOXACIN 750 MG: 5 INJECTION, SOLUTION INTRAVENOUS at 11:44

## 2024-01-13 RX ADMIN — PROMETHAZINE HYDROCHLORIDE 12.5 MG: 25 TABLET ORAL at 13:49

## 2024-01-13 RX ADMIN — ONDANSETRON 4 MG: 2 INJECTION INTRAMUSCULAR; INTRAVENOUS at 04:19

## 2024-01-13 RX ADMIN — ATORVASTATIN CALCIUM 20 MG: 10 TABLET, FILM COATED ORAL at 11:43

## 2024-01-13 RX ADMIN — GABAPENTIN 300 MG: 300 CAPSULE ORAL at 13:49

## 2024-01-13 RX ADMIN — ASPIRIN 81 MG: 81 TABLET, CHEWABLE ORAL at 11:43

## 2024-01-13 RX ADMIN — APIXABAN 5 MG: 5 TABLET, FILM COATED ORAL at 11:43

## 2024-01-13 RX ADMIN — METHYLPREDNISOLONE 4 MG: 4 TABLET ORAL at 08:23

## 2024-01-13 RX ADMIN — ESCITALOPRAM OXALATE 20 MG: 10 TABLET ORAL at 11:43

## 2024-01-13 RX ADMIN — Medication 10 MG: at 04:21

## 2024-01-13 RX ADMIN — Medication 10 ML: at 08:23

## 2024-01-13 RX ADMIN — SODIUM CHLORIDE 500 ML: 9 INJECTION, SOLUTION INTRAVENOUS at 05:06

## 2024-01-13 ASSESSMENT — LIFESTYLE VARIABLES
HOW OFTEN DO YOU HAVE A DRINK CONTAINING ALCOHOL: NEVER
HOW MANY STANDARD DRINKS CONTAINING ALCOHOL DO YOU HAVE ON A TYPICAL DAY: PATIENT DOES NOT DRINK

## 2024-01-13 ASSESSMENT — PAIN - FUNCTIONAL ASSESSMENT: PAIN_FUNCTIONAL_ASSESSMENT: NONE - DENIES PAIN

## 2024-01-13 NOTE — ED PROVIDER NOTES
Emergency Department Encounter    Patient: Valeria Flores  MRN: [id number]  : 1975  Date of Evaluation: 2024  ED Provider:  Artie Harding MD    Triage Chief Complaint:   Hypotension (PT was just discharged from here around 1300 yesterday and was diagnosed with cardiomyopathy. PT was at home and got a low BP reading and low O2 sat and called 911. Pt complains of weakness and feeling tired)    [place]:  Valeria Flores is a 48 y.o. female with history seen below presenting with hypotension, lightheadedness and hypoxia.  Patient does not wear O2 at baseline.  Patient states her oxygen has been going into the mid 80s.  Patient states this has been an issue as she has had in the past.  States also she has become lightheaded at times.  States she has been taking her blood pressure and her systolic pressures have been in the 80s at times.  Patient was discharged yesterday for similar symptoms.  Patient was started on Lasix and Toprol during previous admission patient may not be tolerating this.  There was discussion on last admission the patient may need midodrine as well.  Patient denies any chest pain, shortness of breath states she is intermittently lightheaded denies headache, blurred vision, focal deficits, or sensory changes.  Denies increased lower extremity edema from baseline.  Denies abdominal pain, nausea vomiting, diarrhea constipation or urinary symptoms.  Patient just recently had a CT PE protocol which revealed no pulmonary embolism.    ROS - see HPI, below listed is current ROS at time of my eval:  At least 14 systems reviewed, negative other than HPI    Past Medical History:   Diagnosis Date    CHF (congestive heart failure) (HCC)     COPD (chronic obstructive pulmonary disease) ([facility])     CVA (cerebral vascular accident) ([facility]) 2010    DVT     Factor VIII inhibitor disorder (HCC)     Hx of blood clots     Hypercholesterolemia     ICD (implantable cardioverter-defibrillator) in place  distended.  Back:  No CVA tenderness to palpation     Neurological:  Alert and oriented times 3.  No focal neuro deficits.             Psychiatric:  Appropriate    I have reviewed and interpreted all of the currently available lab results from this visit (if applicable):  No results found for this visit on 01/13/24.   Radiographs (if obtained):  Radiologist's Report Reviewed:  No results found.    EKG (if obtained): (All EKG's are interpreted by myself in the absence of a cardiologist)  Normal sinus rhythm, ventricular rate 72, LA interval 144, QRS duration 92, QTc 481, no significant ST elevation or depression    MDM:    48-year-old female present with history seen above.  Vitals on presentation patient is intermittently hypoxic into the 80s.  Patient placed on 2 L nasal cannula.  Patient's blood pressure is 80 systolic on presentation.  Repeat is 100 systolic.  Patient will intermittently drop into the 80s systolic will give small boluses patient does not appear fluid overloaded at this time.  CBC reveals mild leukocytosis.  Hemoglobin is within normal limits.  CMP is reassuring and nonactionable.  Trope is nonelevated.  BNP is below most recent priors.  Chest x-ray reveals normal heart size the pulmonary vasculature is not congested or cephalized there is no pneumothorax there is a new ill-defined opacity in the left lung base suggestive of atelectatic changes and/or infiltrate.  Patient states she does have a mild cough denies significant sputum production.  Denies fevers.  In discussion with hospitalist we will hold off on antibiotics at this time until Pro-Marcell is return.  Patient will be admitted for further evaluation and treatment.    Clinical Impression:  1. Hypotension, unspecified hypotension type    2. Pure hypercholesterolemia    3. Acute respiratory failure with hypoxia (HCC)          Comment: Please note this report has been produced using speech recognition software and may contain errors related to

## 2024-01-13 NOTE — PROGRESS NOTES
Inpatient Physical Therapy Evaluation & Treatment    Unit: ED  Date:  1/13/2024  Patient Name:    Valeria Flores  Admitting diagnosis:  Acute respiratory failure with hypoxia (HCC) [J96.01]  Admit Date:  1/13/2024  Precautions/Restrictions/WB Status/ Lines/ Wounds/ Oxygen: Telemetry    Treatment Time:  1505 - 1525  Treatment Number:  1   Timed Code Treatment Minutes: 10 minutes  Total Treatment Minutes:  10  minutes    Patient Stated Goals for Therapy: \" to go home \"          Discharge Recommendations: Home independently  DME needs for discharge: Needs Met       Therapy recommendation for EMS Transport: NA    Therapy recommendations for staff:   Independent for ambulation with use of No AD within room    History of Present Illness:   Pt was readmitted after being discharged yesterday. Pt states that she felt very short of breath and is nervous that she has some sort of lung infection. Pt did note that she had tried to clean her whole house upon returning home from the hospital and that may have stimulated her increase in SOB.    Home Health S4 Level Recommendation:  NA        AM-PAC Mobility Score              Subjective  Patient sitting EOB with no family present.  Pt agreeable to this PT session.     Cognition    A&O x4   Able to follow 2 step commands    Pain   No    Preadmission Environment:   Pt lives with    with family (son)  Home environment:    mobile home/trailer  Steps to enter first floor:   4 steps to enter  Steps to second floor/basement: N/A  Laundry:     1st floor  Bathroom:     unknown  Pt sleeps in a:    Flat bed  Equipment owned:    unknown    Preadmission Status:  Pt able to drive: Yes  Pt fully independent with ADLs: Yes  Pt receives assistance from family for: Independent PTA  Pt independent for functional transfers and utilized No Device for mobility in home and No Device out in community  History of falls: No  Home Health Services:None    Objective  Does this pt have an acute or acute on  for physical therapy Evaluation & Treatment. Pt is functional well and no longer needs acute physical therapy. Planning to discontinue acute physical therapy services.    Recommending Home independently upon discharge as patient functioning independently     Signature: Johnson Nickerson PT     If patient discharges from this facility prior to next visit, this note will serve as the Discharge Summary.

## 2024-01-13 NOTE — H&P
[facility] Medicine History & Physical & Discharge Summary      PCP: Teri Garcia, LESLI - CNP    Date of Admission: 1/13/2024    Date of Service: Pt seen/examined on 1/13/24     Chief Complaint:    Chief Complaint   Patient presents with    Hypotension     PT was just discharged from here around 1300 yesterday and was diagnosed with cardiomyopathy. PT was at home and got a low BP reading and low O2 sat and called 911. Pt complains of weakness and feeling tired         History Of Present Illness:      The patient is a 48 y.o. female with pmhx of sarcoidosis, systolic HF, hypotension, hx of VTE, hx of CVA who presented to [facility] ED with complaint of hypotension and hypoxia. Pt states that she was recently discharged from [facility] with a few new medications including toprol, lisinopril, and lasix. She states that she has not been able to tolerate these medications and has been hypotensive. She states that she has been intermittently lightheaded. She denies any CP, n/v, abd pain, dysuria, or fever.    Past Medical History:        Diagnosis Date    CHF (congestive heart failure) ([facility])     COPD (chronic obstructive pulmonary disease) ([facility])     CVA (cerebral vascular accident) ([facility]) 04/2010    DVT     Factor VIII inhibitor disorder ([facility])     Hx of blood clots     Hypercholesterolemia     ICD (implantable cardioverter-defibrillator) in place 2012    MI (myocardial infarction) ([facility]) 03/2010    Myocardial infarct ([facility])     Pacemaker 2012    Pulmonary embolism ([facility])     Smoker     Unspecified cerebral artery occlusion with cerebral infarction        Past Surgical History:        Procedure Laterality Date    CARDIAC DEFIBRILLATOR PLACEMENT  09/15/2020    gen change, original device implanted 8/25/2012    CARDIAC SURGERY      catherization x 4    CHEST TUBE INSERTION      at birth    CORONARY ANGIOPLASTY WITH STENT PLACEMENT  03/2010    BMS to pRCA 4x28 [employer]    DILATION AND CURETTAGE OF UTERUS       function is mildly reduced with ejection fraction estimated at   40-45%.   There is more prominent HK of the inferior wall.   There is mild concentric left ventricular hypertrophy.   Pacemaker / ICD lead is visualized in the right-sided chambers.   8-1-2023 40-45% Ef, Basal-mid inferior hypo, LVH, JAYLYN, mild to mod MR, mod   TR SPAP 31 mmHg.       ASSESSMENT/PLAN:  #Acute hypoxic respiratory failure  #Possible HCAP   -requiring 2 L O2, no baseline oxygen requirements likely needs home O2  -covid, flu, rsv negative   -CXR with possible pna  -start levaquin   -medrol dose pack   -ICS  -walk test for possible home O2 and dc home with oxygen -> Patient oxygen stayed between 92-95 ambulating on room air. Oxygen saturation dropped to 88 percent for a second and quickly recovered back to 95.    -pulmonology consulted   -per walk test, no need to dc with home O2  -will dc with steroids and Levaquin     #Hypotension-acute on chronic   -seems to be recurrent issue in the past   -may need to consider midodrine long term   -lasix and toprol XL were started yesterday, likely cannot tolerate, hold for now     #Cardiac sarcoidosis   With concern for neurosarcoidosis   -s/p ICD   -has been on methotrexate in the past   -follows with pulmonology at    -has also followed with neurology outpatient  -pulmonology consulted here      #Chronic systolic HF   -most recent echo as above   -daily weights, intake and output  -limited echo noted.  EF 40%-45%  -Cardiology started GDMT with Toprol XL 12.5 mg daily and add Lisinopril 2.5 mg daily-->BP cannot tolerate   -dc home with just lisinopril      #Back pain  #Fibromyalgia  -on gabapentin, flexeril      #CAD s/p stents   -ASA, statin     #Hx of CVA   -on ASA, statin, eliquis      #Hx of VTE   #Factor VIII inhibitor disorder   -On eliquis   -follows with hem/onc      #Tobacco use  -recommend cessation      #Depression   -on lexapro     DVT Prophylaxis: Eliquis   Diet: ADULT DIET; Regular; 3

## 2024-01-13 NOTE — CONSULTS
days ago. Her smoking use included cigarettes. She started smoking about 16 years ago. She has a 4.2 pack-year smoking history. She has never used smokeless tobacco.   reports no history of alcohol use.    ALLERGIES:  Patient is allergic to coumadin [warfarin], duloxetine, fluoxetine, lisinopril, pregabalin, baclofen, losartan, bisoprolol fumarate, bupropion, hydrocodone, hydrocodone-acetaminophen, and spironolactone.  Continuous Infusions:   sodium chloride       Scheduled Meds:   sodium chloride  500 mL IntraVENous Once    sodium chloride flush  10 mL IntraVENous 2 times per day    melatonin  3 mg Oral Nightly    nicotine  1 patch TransDERmal Daily     PRN Meds:  sodium chloride flush, sodium chloride, potassium chloride **OR** potassium alternative oral replacement **OR** potassium chloride, magnesium sulfate, promethazine **OR** ondansetron, acetaminophen **OR** acetaminophen    REVIEW OF SYSTEMS:  Constitutional: Negative for fever  HENT: Negative for sore throat  Eyes: Negative for redness   Respiratory: + for dyspnea, no cough  Cardiovascular: + chest pain  Gastrointestinal: Negative for vomiting, diarrhea   Genitourinary: Negative for hematuria   Musculoskeletal: Negative for arthralgias   Skin: Negative for rash  Neurological: Negative for syncope  Hematological: Negative for adenopathy  Psychiatric/Behavorial: Negative for anxiety    PHYSICAL EXAM: BP (!) 88/61   Pulse 68   Temp 98.6 °F (37 °C) (Oral)   Resp 14   Ht 1.524 m (5')   Wt 79.4 kg (175 lb)   SpO2 94%   BMI 34.18 kg/m²  on 2l  Constitutional:  No acute distress.   Eyes: PERRL. Conjunctivae anicteric.   ENT: Normal nose. Normal tongue.    Neck:  Trachea is midline.   Respiratory: No accessory muscle usage.  No Decreased breath sounds. No wheezes. No rales. No Rhonchi.  Cardiovascular: Normal S1S2. No digit clubbing. No digit cyanosis. No LE edema.    Gastrointestinal: No mass palpated. No tenderness palpated.   Skin: No rash on the exposed

## 2024-01-13 NOTE — PROGRESS NOTES
Ambulated patient to the bathroom on room air. Patient oxygen stayed between 92-95 ambulating on room air. Oxygen saturation dropped to 88 percent for a second and quickly recovered back to 95.

## 2024-01-14 RX ORDER — METHYLPREDNISOLONE 4 MG/1
TABLET ORAL
Qty: 5 TABLET | Refills: 0 | Status: SHIPPED | OUTPATIENT
Start: 2024-01-14 | End: 2024-01-20

## 2024-01-14 RX ORDER — LEVOFLOXACIN 750 MG/1
750 TABLET, FILM COATED ORAL DAILY
Qty: 10 TABLET | Refills: 0 | Status: SHIPPED | OUTPATIENT
Start: 2024-01-14 | End: 2024-01-24

## 2024-01-14 NOTE — DISCHARGE SUMMARY
Name:  Valeria Flores  Room:  [facility]NDSAY/NONE  MRN:    [id number]    Discharge Summary      This discharge summary is in conjunction with a complete physical exam done on the day of discharge.    Discharging Provider: Ansley Knight PA-C   Discharging Attending Physician: Dr. Marino      Admit: 1/13/2024  Discharge:  1/13/2024    HPI taken from admission H&P:    The patient is a 48 y.o. female with pmhx of sarcoidosis, systolic HF, hypotension, hx of VTE, hx of CVA who presented to [facility] ED with complaint of hypotension and hypoxia. Pt states that she was recently discharged from [facility] with a few new medications including toprol, lisinopril, and lasix. She states that she has not been able to tolerate these medications and has been hypotensive. She states that she has been intermittently lightheaded. She denies any CP, n/v, abd pain, dysuria, or fever.       Diagnoses this Admission and Hospital Course   #Acute hypoxic respiratory failure  #Possible HCAP   -requiring 2 L O2, no baseline oxygen requirements likely needs home O2  -covid, flu, rsv negative   -CXR with possible pna  -start levaquin   -medrol dose pack   -ICS  -walk test for possible home O2 and dc home with oxygen -> Patient oxygen stayed between 92-95 ambulating on room air. Oxygen saturation dropped to 88 percent for a second and quickly recovered back to 95.    -pulmonology consulted   -per walk test, no need to dc with home O2  -will dc with steroids and Levaquin      #Hypotension-acute on chronic   -seems to be recurrent issue in the past   -may need to consider midodrine long term   -lasix and toprol XL were started yesterday, likely cannot tolerate, hold for now      #Cardiac sarcoidosis   With concern for neurosarcoidosis   -s/p ICD   -has been on methotrexate in the past   -follows with pulmonology at    -has also followed with neurology outpatient  -pulmonology consulted here      #Chronic systolic HF   -most recent echo as

## 2024-01-15 ENCOUNTER — FOLLOWUP TELEPHONE ENCOUNTER (OUTPATIENT)
Dept: ADMINISTRATIVE | Age: 49
End: 2024-01-15

## 2024-01-15 NOTE — TELEPHONE ENCOUNTER
Heart Failure Follow-Up Call:    Call within 72 Hours of Discharge: Yes     Patient: Valeria Flores   Patient : 1975   MRN: [id number]    Date of discharge: 24    Discharge department/facility: [facility]    Discharge Disposition: Home    RARS: Readmission Risk Score: 12    Spoke with: Valeria Shaw stated she left the hospital on  but had to return to . Patient was having chest pain and lower BP and called EMS. Stated they gave her a nitro in route and fluids. Patient was is currently having abdomen pain/nausea with levaquin.  Reinforced Heart Failure education on: signs/symptoms to monitor, medications, daily weights, low sodium diet, 2000 ml fluid restriction, and activity. Reminded patient of follow-up appointment on  at 0930 AM with Deborah Garcia CNP. Stated that date/time would not work. Called [facility] and notifed Trinh and she is going to call patient back to reschedule. Provided Heart Failure Nurse number at [phone] for any further questions or assistance with resources.     Follow Up  Future Appointments   Date Time Provider Department Center   2024 10:00 AM Howard Bee APRN - CNP [facility]   3/5/2024  8:15 AM Feliciano Novoa MD [facility]       Electronically signed by ROGELIO Staley, RN  on 1/15/2024 at 3:42 PM

## 2024-01-18 LAB
BACTERIA BLD CULT ORG #2: NORMAL
BACTERIA BLD CULT: NORMAL

## 2024-01-26 ENCOUNTER — APPOINTMENT (OUTPATIENT)
Dept: GENERAL RADIOLOGY | Age: 49
End: 2024-01-26
Payer: COMMERCIAL

## 2024-01-26 ENCOUNTER — HOSPITAL ENCOUNTER (EMERGENCY)
Age: 49
Discharge: HOME | End: 2024-01-27
Attending: EMERGENCY MEDICINE
Payer: COMMERCIAL

## 2024-01-26 DIAGNOSIS — R06.00 DYSPNEA, UNSPECIFIED TYPE: Primary | ICD-10-CM

## 2024-01-26 LAB
ALBUMIN SERPL-MCNC: 3.9 G/DL (ref 3.4–5)
ALBUMIN/GLOB SERPL: 1.2 {RATIO} (ref 1.1–2.2)
ALP SERPL-CCNC: 119 U/L (ref 40–129)
ALT SERPL-CCNC: 15 U/L (ref 10–40)
ANION GAP SERPL CALCULATED.3IONS-SCNC: 12 MMOL/L (ref 3–16)
AST SERPL-CCNC: 30 U/L (ref 15–37)
BASE EXCESS BLDV CALC-SCNC: -0.7 MMOL/L (ref -3–3)
BASOPHILS # BLD: 0.1 K/UL (ref 0–0.2)
BASOPHILS NFR BLD: 0.8 %
BILIRUB SERPL-MCNC: 0.6 MG/DL (ref 0–1)
BUN SERPL-MCNC: 7 MG/DL (ref 7–20)
CALCIUM SERPL-MCNC: 8.5 MG/DL (ref 8.3–10.6)
CHLORIDE SERPL-SCNC: 100 MMOL/L (ref 99–110)
CO2 BLDV-SCNC: 22 MMOL/L
CO2 SERPL-SCNC: 22 MMOL/L (ref 21–32)
COHGB MFR BLDV: 8.3 % (ref 0–1.5)
CREAT SERPL-MCNC: 0.6 MG/DL (ref 0.6–1.1)
DEPRECATED RDW RBC AUTO: 13.2 % (ref 12.4–15.4)
EOSINOPHIL # BLD: 0.2 K/UL (ref 0–0.6)
EOSINOPHIL NFR BLD: 2.1 %
FLUAV RNA RESP QL NAA+PROBE: NOT DETECTED
FLUBV RNA RESP QL NAA+PROBE: NOT DETECTED
GFR SERPLBLD CREATININE-BSD FMLA CKD-EPI: >60 ML/MIN/{1.73_M2}
GLUCOSE SERPL-MCNC: 110 MG/DL (ref 70–99)
HCO3 BLDV-SCNC: 21.5 MMOL/L (ref 23–29)
HCT VFR BLD AUTO: 38.2 % (ref 36–48)
HGB BLD-MCNC: 13.2 G/DL (ref 12–16)
LIPASE SERPL-CCNC: 22 U/L (ref 13–60)
LYMPHOCYTES # BLD: 3.6 K/UL (ref 1–5.1)
LYMPHOCYTES NFR BLD: 34.1 %
MCH RBC QN AUTO: 31.4 PG (ref 26–34)
MCHC RBC AUTO-ENTMCNC: 34.6 G/DL (ref 31–36)
MCV RBC AUTO: 91 FL (ref 80–100)
METHGB MFR BLDV: 0.3 %
MONOCYTES # BLD: 0.6 K/UL (ref 0–1.3)
MONOCYTES NFR BLD: 6 %
NEUTROPHILS # BLD: 6 K/UL (ref 1.7–7.7)
NEUTROPHILS NFR BLD: 57 %
O2 CT VFR BLDV CALC: 18 VOL %
O2 THERAPY: ABNORMAL
PCO2 BLDV: 29.2 MMHG (ref 40–50)
PH BLDV: 7.49 [PH] (ref 7.35–7.45)
PLATELET # BLD AUTO: 339 K/UL (ref 135–450)
PMV BLD AUTO: 9.4 FL (ref 5–10.5)
PO2 BLDV: 77.7 MMHG (ref 25–40)
POTASSIUM SERPL-SCNC: 4.3 MMOL/L (ref 3.5–5.1)
PROT SERPL-MCNC: 7.2 G/DL (ref 6.4–8.2)
RBC # BLD AUTO: 4.2 M/UL (ref 4–5.2)
SAO2 % BLDV: 97 %
SARS-COV-2 RNA RESP QL NAA+PROBE: NOT DETECTED
SODIUM SERPL-SCNC: 134 MMOL/L (ref 136–145)
TROPONIN, HIGH SENSITIVITY: 7 NG/L (ref 0–14)
WBC # BLD AUTO: 10.6 K/UL (ref 4–11)

## 2024-01-26 PROCEDURE — 87636 SARSCOV2 & INF A&B AMP PRB: CPT

## 2024-01-26 PROCEDURE — 80053 COMPREHEN METABOLIC PANEL: CPT

## 2024-01-26 PROCEDURE — 93005 ELECTROCARDIOGRAM TRACING: CPT | Performed by: EMERGENCY MEDICINE

## 2024-01-26 PROCEDURE — 99285 EMERGENCY DEPT VISIT HI MDM: CPT

## 2024-01-26 PROCEDURE — 83690 ASSAY OF LIPASE: CPT

## 2024-01-26 PROCEDURE — 6370000000 HC RX 637 (ALT 250 FOR IP): Performed by: EMERGENCY MEDICINE

## 2024-01-26 PROCEDURE — 71046 X-RAY EXAM CHEST 2 VIEWS: CPT

## 2024-01-26 PROCEDURE — 84484 ASSAY OF TROPONIN QUANT: CPT

## 2024-01-26 PROCEDURE — 85025 COMPLETE CBC W/AUTO DIFF WBC: CPT

## 2024-01-26 PROCEDURE — 6360000002 HC RX W HCPCS: Performed by: EMERGENCY MEDICINE

## 2024-01-26 PROCEDURE — 96372 THER/PROPH/DIAG INJ SC/IM: CPT

## 2024-01-26 PROCEDURE — 82803 BLOOD GASES ANY COMBINATION: CPT

## 2024-01-26 PROCEDURE — 36415 COLL VENOUS BLD VENIPUNCTURE: CPT

## 2024-01-26 RX ORDER — NICOTINE 21 MG/24HR
1 PATCH, TRANSDERMAL 24 HOURS TRANSDERMAL DAILY
Status: DISCONTINUED | OUTPATIENT
Start: 2024-01-27 | End: 2024-01-27 | Stop reason: HOSPADM

## 2024-01-26 RX ORDER — IPRATROPIUM BROMIDE AND ALBUTEROL SULFATE 2.5; .5 MG/3ML; MG/3ML
2 SOLUTION RESPIRATORY (INHALATION) ONCE
Status: COMPLETED | OUTPATIENT
Start: 2024-01-26 | End: 2024-01-26

## 2024-01-26 RX ORDER — ASPIRIN 325 MG
325 TABLET ORAL ONCE
Status: COMPLETED | OUTPATIENT
Start: 2024-01-26 | End: 2024-01-26

## 2024-01-26 RX ORDER — ONDANSETRON 4 MG/1
4 TABLET, ORALLY DISINTEGRATING ORAL ONCE
Status: COMPLETED | OUTPATIENT
Start: 2024-01-26 | End: 2024-01-26

## 2024-01-26 RX ORDER — ORPHENADRINE CITRATE 30 MG/ML
60 INJECTION INTRAMUSCULAR; INTRAVENOUS ONCE
Status: COMPLETED | OUTPATIENT
Start: 2024-01-27 | End: 2024-01-26

## 2024-01-26 RX ADMIN — ORPHENADRINE CITRATE 60 MG: 60 INJECTION INTRAMUSCULAR; INTRAVENOUS at 23:55

## 2024-01-26 RX ADMIN — IPRATROPIUM BROMIDE AND ALBUTEROL SULFATE 2 DOSE: 2.5; .5 SOLUTION RESPIRATORY (INHALATION) at 23:26

## 2024-01-26 RX ADMIN — ASPIRIN 325 MG: 325 TABLET ORAL at 23:41

## 2024-01-26 RX ADMIN — ONDANSETRON 4 MG: 4 TABLET, ORALLY DISINTEGRATING ORAL at 23:41

## 2024-01-26 ASSESSMENT — PAIN DESCRIPTION - LOCATION: LOCATION: CHEST

## 2024-01-26 ASSESSMENT — PAIN DESCRIPTION - ORIENTATION: ORIENTATION: RIGHT

## 2024-01-26 ASSESSMENT — PAIN - FUNCTIONAL ASSESSMENT: PAIN_FUNCTIONAL_ASSESSMENT: 0-10

## 2024-01-26 ASSESSMENT — PAIN SCALES - GENERAL: PAINLEVEL_OUTOF10: 7

## 2024-01-26 ASSESSMENT — PAIN DESCRIPTION - DESCRIPTORS: DESCRIPTORS: SQUEEZING

## 2024-01-26 ASSESSMENT — PAIN DESCRIPTION - FREQUENCY: FREQUENCY: CONTINUOUS

## 2024-01-26 ASSESSMENT — PAIN DESCRIPTION - PAIN TYPE: TYPE: ACUTE PAIN

## 2024-01-26 NOTE — PROGRESS NOTES
Physician Progress Note      PATIENT:               NILSON SANTOYO  CSN #:                  [id number]  :                       1975  ADMIT DATE:       1/10/2024 12:18 AM  DISCH DATE:        2024 12:36 PM  RESPONDING  PROVIDER #:        Feliciano Novoa MD          QUERY TEXT:    Pt noted to have chest pain and past medical history of sarcoidosis. If   possible, please document in progress notes and discharge summary the   relationship, if any, between chest pain and sarcoidosis flare.    The medical record reflects the following:  Risk Factors: History sarcoidosis, CAD, current smoker  Clinical Indicators: Per Cardiology note , \" Felt like sarcoid flareup and   took 2 days steroids,\"  Treatment: refused steroids, CTA, ECHO, stress test, neg trop x2  Options provided:  -- Chest pain suspected due to cardiac sarcoidosis  -- Chest pain unrelated to cardiac sarcoidosis  -- Other - I will add my own diagnosis  -- Disagree - Not applicable / Not valid  -- Disagree - Clinically unable to determine / Unknown  -- Refer to Clinical Documentation Reviewer    PROVIDER RESPONSE TEXT:    This patient has chest pain, unrelated to cardiac sarcoidosis.    Query created by: aLnette Haile on 2024 12:38 PM      Electronically signed by:  Feliciano Novoa MD 2024 3:55 PM

## 2024-01-27 VITALS
RESPIRATION RATE: 18 BRPM | OXYGEN SATURATION: 96 % | SYSTOLIC BLOOD PRESSURE: 123 MMHG | TEMPERATURE: 97.5 F | HEART RATE: 88 BPM | BODY MASS INDEX: 34.36 KG/M2 | WEIGHT: 175 LBS | HEIGHT: 60 IN | DIASTOLIC BLOOD PRESSURE: 77 MMHG

## 2024-01-27 LAB
EKG ATRIAL RATE: 67 BPM
EKG DIAGNOSIS: NORMAL
EKG P AXIS: 29 DEGREES
EKG P-R INTERVAL: 152 MS
EKG Q-T INTERVAL: 406 MS
EKG QRS DURATION: 98 MS
EKG QTC CALCULATION (BAZETT): 429 MS
EKG R AXIS: -22 DEGREES
EKG T AXIS: -10 DEGREES
EKG VENTRICULAR RATE: 67 BPM
TROPONIN, HIGH SENSITIVITY: 7 NG/L (ref 0–14)

## 2024-01-27 PROCEDURE — 36415 COLL VENOUS BLD VENIPUNCTURE: CPT

## 2024-01-27 PROCEDURE — 93010 ELECTROCARDIOGRAM REPORT: CPT | Performed by: INTERNAL MEDICINE

## 2024-01-27 PROCEDURE — 84484 ASSAY OF TROPONIN QUANT: CPT

## 2024-01-27 ASSESSMENT — PAIN SCALES - GENERAL: PAINLEVEL_OUTOF10: 0

## 2024-01-27 NOTE — ED PROVIDER NOTES
Result Value    Sodium 134 (*)     Glucose 110 (*)     All other components within normal limits   BLOOD GAS, VENOUS - Abnormal; Notable for the following components:    pH, [place] 7.485 (*)     pCO2, [place] 29.2 (*)     pO2, [place] 77.7 (*)     HCO3, Venous 21.5 (*)     Carboxyhemoglobin 8.3 (*)     All other components within normal limits   COVID-19 & INFLUENZA COMBO   CBC WITH AUTO DIFFERENTIAL   LIPASE   TROPONIN   TROPONIN   URINALYSIS WITH REFLEX TO CULTURE       When ordered only abnormal lab results are displayed. All other labs were within normal range or not returned as of this dictation.    EKG: EKG shows a paced rhythm with a ventricular to 6 7 bpm.  TX interval and QTc interval within normal limits.  Has normal axis.  There is ectopy present.  There are no significant ST elevations or depressions EKG is nondiagnostic for ACS as interpreted by me..     RADIOLOGY:   Non-plain film images such as CT, Ultrasound and MRI are read by the radiologist. Plain radiographic images are visualized and preliminarily interpreted by the ED Provider with the below findings:    Do not appreciate any acute cardiopulmonary disease.  No obvious abnormality osseous structures as interpreted by me.  Left lower lobe infiltrate appears to be resolving compared to previous.     Interpretation per the Radiologist below, if available at the time of this note:    Discussed with Radiologist:     XR CHEST (2 VW)   Final Result   Resolving left basilar opacity with minimal residual left retrocardiac   opacity which is much less prominent and interval resolution of the pleural   effusion      Slight decrease in the heart size with resolving central pulmonary congestion.      Calcified granulomas throughout the right lung which is unchanged           XR CHEST (2 VW)    Result Date: 1/26/2024  EXAMINATION: TWO XRAY VIEWS OF THE CHEST 1/26/2024 11:09 pm COMPARISON: 01/13/2024 HISTORY: ORDERING SYSTEM PROVIDED HISTORY: sob TECHNOLOGIST PROVIDED  somnolent.  Ox saturations were in the low 90s but improved when the patient was speaking.  Reviewing her medical record she does have a history of intermittent episodes of hypoxia this appears to be associated with sleep.  Patient's oxygen saturation within normal limits and the patient is speaking.  I have low suspicion for PE as the patient has been taking her prescribed occasions and not tachycardic or hypoxic when speaking.     CONSULTS: (Who and What was discussed)  None          Chronic Conditions:   Past Medical History:   Diagnosis Date    CHF (congestive heart failure) ([facility])     COPD (chronic obstructive pulmonary disease) ([facility])     CVA (cerebral vascular accident) ([facility]) 04/2010    DVT     Factor VIII inhibitor disorder ([facility])     Hx of blood clots     Hypercholesterolemia     ICD (implantable cardioverter-defibrillator) in place 2012    MI (myocardial infarction) ([facility]) 03/2010    Myocardial infarct ([facility])     Pacemaker 2012    Pulmonary embolism ([facility])     Smoker     Unspecified cerebral artery occlusion with cerebral infarction          Records Reviewed (External and source): Reviewed patient's most recent admission.     Disposition Considerations (include 1 Tests not done, Admit vs D/C, Shared Decision Making, Pt Expectation of Test or Tx.): Consider obtaining CT PE study but patient reports improvement of symptoms and is not tachycardic or complaining of respiratory difficulties.  She is hemodynamically stable and she does have a PE noted likely be of low clinical significance.   Admission to the hospital considered but patient's symptoms are improving.  Vital signs and testing performed is reassuring.  Based on this patient is appropriate for outpatient management.  No indication for admission at this time.     Symptomatic treatment with expectant management discussed with the patient and/or family member or surrogates present and they are amenable to treatment plan and outpatient follow-up.  Strict

## 2024-01-27 NOTE — ED NOTES
Pt becoming verbally aggressive upon discharge stating \"we did nothing for her\". Test results reviewed with the patient. Pt concerned about oxygen level. Pt oxygen level 95% on room air and other vital signs stable. Pt educated on needing to follow up outpatient about further testing. Pt ambulated with steady gait out of department. Charge RN made aware.

## 2024-01-31 ENCOUNTER — OFFICE VISIT (OUTPATIENT)
Dept: CARDIOLOGY CLINIC | Age: 49
End: 2024-01-31
Payer: COMMERCIAL

## 2024-01-31 VITALS
SYSTOLIC BLOOD PRESSURE: 133 MMHG | WEIGHT: 177 LBS | DIASTOLIC BLOOD PRESSURE: 82 MMHG | OXYGEN SATURATION: 97 % | HEART RATE: 80 BPM | HEIGHT: 60 IN | BODY MASS INDEX: 34.75 KG/M2

## 2024-01-31 DIAGNOSIS — I25.10 CORONARY ARTERY DISEASE INVOLVING NATIVE CORONARY ARTERY OF NATIVE HEART WITHOUT ANGINA PECTORIS: Primary | ICD-10-CM

## 2024-01-31 DIAGNOSIS — I25.5 ISCHEMIC CARDIOMYOPATHY: ICD-10-CM

## 2024-01-31 PROCEDURE — 1036F TOBACCO NON-USER: CPT | Performed by: NURSE PRACTITIONER

## 2024-01-31 PROCEDURE — G8427 DOCREV CUR MEDS BY ELIG CLIN: HCPCS | Performed by: NURSE PRACTITIONER

## 2024-01-31 PROCEDURE — 99214 OFFICE O/P EST MOD 30 MIN: CPT | Performed by: NURSE PRACTITIONER

## 2024-01-31 PROCEDURE — 1111F DSCHRG MED/CURRENT MED MERGE: CPT | Performed by: NURSE PRACTITIONER

## 2024-01-31 PROCEDURE — G8484 FLU IMMUNIZE NO ADMIN: HCPCS | Performed by: NURSE PRACTITIONER

## 2024-01-31 PROCEDURE — 3079F DIAST BP 80-89 MM HG: CPT | Performed by: NURSE PRACTITIONER

## 2024-01-31 PROCEDURE — 3075F SYST BP GE 130 - 139MM HG: CPT | Performed by: NURSE PRACTITIONER

## 2024-01-31 PROCEDURE — G8417 CALC BMI ABV UP PARAM F/U: HCPCS | Performed by: NURSE PRACTITIONER

## 2024-01-31 RX ORDER — FUROSEMIDE 20 MG/1
20 TABLET ORAL EVERY OTHER DAY
COMMUNITY

## 2024-01-31 RX ORDER — METOPROLOL SUCCINATE 25 MG/1
12.5 TABLET, EXTENDED RELEASE ORAL DAILY
Qty: 30 TABLET | Refills: 3
Start: 2024-01-31

## 2024-01-31 NOTE — PROGRESS NOTES
diet encouraged  5.  Monitor BP at home and call the office if consistently out of goal range  6.  She previously followed with [facility] but plans to follow with I, will contact device clinic  7.  Follow-up as planned with Dr. Novoa in 3/2024    LESLI Mckeon-CNP  [facility] [place]  [phone]

## 2024-01-31 NOTE — PATIENT INSTRUCTIONS
Start metoprolol 12.5 mg; start every other day opposite of lasix; once you tolerate it, then can take daily with food/water  Continue other medications  Follow up with Dr. Novoa in 3 months

## 2024-02-01 ASSESSMENT — ENCOUNTER SYMPTOMS
GASTROINTESTINAL NEGATIVE: 1
RESPIRATORY NEGATIVE: 1

## 2024-02-02 ENCOUNTER — TELEPHONE (OUTPATIENT)
Dept: CARDIOLOGY CLINIC | Age: 49
End: 2024-02-02

## 2024-02-02 NOTE — TELEPHONE ENCOUNTER
PT called and stated Metoprolol Succinate 25mg is working. PT stated her bp has been between 110-115 systolic. PT wanted  nplr know.    negative

## 2024-02-27 ENCOUNTER — APPOINTMENT (OUTPATIENT)
Dept: GENERAL RADIOLOGY | Age: 49
End: 2024-02-27
Payer: COMMERCIAL

## 2024-02-27 ENCOUNTER — HOSPITAL ENCOUNTER (EMERGENCY)
Age: 49
Discharge: HOME | End: 2024-02-27
Payer: COMMERCIAL

## 2024-02-27 VITALS
SYSTOLIC BLOOD PRESSURE: 147 MMHG | RESPIRATION RATE: 18 BRPM | HEIGHT: 60 IN | OXYGEN SATURATION: 98 % | BODY MASS INDEX: 34.83 KG/M2 | HEART RATE: 68 BPM | TEMPERATURE: 97.7 F | DIASTOLIC BLOOD PRESSURE: 80 MMHG | WEIGHT: 177.4 LBS

## 2024-02-27 DIAGNOSIS — R07.81 RIB PAIN ON LEFT SIDE: ICD-10-CM

## 2024-02-27 DIAGNOSIS — L03.213 PRESEPTAL CELLULITIS OF LEFT LOWER EYELID: Primary | ICD-10-CM

## 2024-02-27 PROCEDURE — 99284 EMERGENCY DEPT VISIT MOD MDM: CPT

## 2024-02-27 PROCEDURE — 71101 X-RAY EXAM UNILAT RIBS/CHEST: CPT

## 2024-02-27 PROCEDURE — 6370000000 HC RX 637 (ALT 250 FOR IP)

## 2024-02-27 PROCEDURE — 6360000002 HC RX W HCPCS

## 2024-02-27 PROCEDURE — 96372 THER/PROPH/DIAG INJ SC/IM: CPT

## 2024-02-27 RX ORDER — LIDOCAINE 4 G/G
1 PATCH TOPICAL ONCE
Status: DISCONTINUED | OUTPATIENT
Start: 2024-02-27 | End: 2024-02-27 | Stop reason: HOSPADM

## 2024-02-27 RX ORDER — IBUPROFEN 800 MG/1
800 TABLET ORAL ONCE
Status: COMPLETED | OUTPATIENT
Start: 2024-02-27 | End: 2024-02-27

## 2024-02-27 RX ORDER — AMOXICILLIN AND CLAVULANATE POTASSIUM 875; 125 MG/1; MG/1
1 TABLET, FILM COATED ORAL 2 TIMES DAILY
Qty: 14 TABLET | Refills: 0 | Status: SHIPPED | OUTPATIENT
Start: 2024-02-27 | End: 2024-03-05

## 2024-02-27 RX ORDER — CEFTRIAXONE 1 G/1
1000 INJECTION, POWDER, FOR SOLUTION INTRAMUSCULAR; INTRAVENOUS ONCE
Status: COMPLETED | OUTPATIENT
Start: 2024-02-27 | End: 2024-02-27

## 2024-02-27 RX ADMIN — IBUPROFEN 800 MG: 800 TABLET, FILM COATED ORAL at 20:44

## 2024-02-27 RX ADMIN — CEFTRIAXONE SODIUM 1000 MG: 1 INJECTION, POWDER, FOR SOLUTION INTRAMUSCULAR; INTRAVENOUS at 20:44

## 2024-02-27 ASSESSMENT — ENCOUNTER SYMPTOMS
FACIAL SWELLING: 1
RHINORRHEA: 0

## 2024-02-27 ASSESSMENT — PAIN DESCRIPTION - LOCATION: LOCATION: FACE

## 2024-02-27 ASSESSMENT — PAIN - FUNCTIONAL ASSESSMENT: PAIN_FUNCTIONAL_ASSESSMENT: 0-10

## 2024-02-27 ASSESSMENT — PAIN SCALES - GENERAL: PAINLEVEL_OUTOF10: 6

## 2024-02-27 ASSESSMENT — PAIN DESCRIPTION - PAIN TYPE: TYPE: ACUTE PAIN

## 2024-02-28 NOTE — DISCHARGE INSTRUCTIONS
Your x-ray does not show any fracture of the ribs.    Examination is consistent with preseptal cellulitis of the left lower eyelid.  You were given a dose of Rocephin in the emergency department.  A prescription for Augmentin was sent to your pharmacy.   tomorrow and take as directed.  Make sure to complete entirely.  Do not stop early if symptoms improve or resolve.  Schedule follow-up appointment with your primary care provider for later this week or early next week.  With persistent or worsening symptoms, follow-up with [facility].    Regarding your chronic dental issues, follow-up with dentistry for definitive plan and management, likely tooth extractions

## 2024-02-28 NOTE — ED PROVIDER NOTES
[facility] ED  EMERGENCY DEPARTMENT ENCOUNTER        Pt Name: Valeria Flores  MRN: [id number]  Birthdate 1975  Date of evaluation: 2/27/2024  Provider: LESLI Darling CNP  PCP: Teri Garcia APRN - CNP  Note Started: 10:59 PM EST 2/27/24      ANATOLY. I have evaluated this patient.        CHIEF COMPLAINT       Chief Complaint   Patient presents with    Cellulitis    Eye Pain     Pt states she woke up this morning with left eye swelling        HISTORY OF PRESENT ILLNESS: 1 or more Elements     History From: Patient    Chief Complaint: Left facial swelling, left-sided rib pain    Valeria Flores is a 48 y.o. female who presents for evaluation of primarily left-sided facial swelling that she first noted this morning.  She has a secondary concern of left rib pain that she sustained when leaning over her washing machine earlier today.  Patient tells me that she has known generally poor dentition and is connected with dentistry for multiple tooth extractions.  Today she woke up and the soft tissue under the left orbit extending down into the cheek was swollen.  She is concerned for dental infection versus a different infection.  Regarding the rib pain she tells me that when she was leaning over her washing machine to hit transferred close from the washer to the dryer she felt a pop in her left rib and is concerned for rib fracture.  No chest pain or shortness of breath.  No activity intolerance.  Pain is reproduced with movement.  She had some leftover Percocet from a prior surgery that she took prior to arrival with minimal improvement of symptoms.  Pain 6 out of 10 intensity upon arrival.     Nursing Notes were all reviewed and agreed with or any disagreements were addressed in the HPI.    REVIEW OF SYSTEMS :      Review of Systems   Constitutional:  Negative for chills, fatigue and fever.   HENT:  Positive for dental problem and facial swelling. Negative for congestion, ear

## 2024-03-27 ENCOUNTER — TELEPHONE (OUTPATIENT)
Dept: PULMONOLOGY | Age: 49
End: 2024-03-27

## 2024-03-27 NOTE — TELEPHONE ENCOUNTER
Pt has ref scanned into media for a transfer of care from  to  Pt stated that she has cardiac sacradiosis and COPD and states that \"she is not doing good\" and wants scheduled sooner then July please advise

## 2024-04-08 RX ORDER — METOPROLOL SUCCINATE 25 MG/1
12.5 TABLET, EXTENDED RELEASE ORAL DAILY
Qty: 30 TABLET | Refills: 6 | Status: SHIPPED | OUTPATIENT
Start: 2024-04-08

## 2024-04-24 ENCOUNTER — APPOINTMENT (OUTPATIENT)
Dept: GENERAL RADIOLOGY | Age: 49
End: 2024-04-24
Payer: COMMERCIAL

## 2024-04-24 ENCOUNTER — HOSPITAL ENCOUNTER (EMERGENCY)
Age: 49
Discharge: HOME | End: 2024-04-24
Payer: COMMERCIAL

## 2024-04-24 VITALS
RESPIRATION RATE: 18 BRPM | BODY MASS INDEX: 33.18 KG/M2 | TEMPERATURE: 97.5 F | OXYGEN SATURATION: 99 % | WEIGHT: 169 LBS | SYSTOLIC BLOOD PRESSURE: 126 MMHG | HEIGHT: 60 IN | DIASTOLIC BLOOD PRESSURE: 67 MMHG | HEART RATE: 78 BPM

## 2024-04-24 DIAGNOSIS — S60.221A CONTUSION OF RIGHT HAND, INITIAL ENCOUNTER: Primary | ICD-10-CM

## 2024-04-24 PROCEDURE — 99283 EMERGENCY DEPT VISIT LOW MDM: CPT

## 2024-04-24 PROCEDURE — 73130 X-RAY EXAM OF HAND: CPT

## 2024-04-24 ASSESSMENT — PAIN - FUNCTIONAL ASSESSMENT
PAIN_FUNCTIONAL_ASSESSMENT: ACTIVITIES ARE NOT PREVENTED
PAIN_FUNCTIONAL_ASSESSMENT: 0-10

## 2024-04-24 ASSESSMENT — PAIN SCALES - GENERAL: PAINLEVEL_OUTOF10: 6

## 2024-04-24 ASSESSMENT — PAIN DESCRIPTION - ORIENTATION: ORIENTATION: RIGHT

## 2024-04-24 ASSESSMENT — LIFESTYLE VARIABLES
HOW MANY STANDARD DRINKS CONTAINING ALCOHOL DO YOU HAVE ON A TYPICAL DAY: PATIENT DOES NOT DRINK
HOW OFTEN DO YOU HAVE A DRINK CONTAINING ALCOHOL: NEVER

## 2024-04-24 ASSESSMENT — PAIN DESCRIPTION - ONSET: ONSET: ON-GOING

## 2024-04-24 ASSESSMENT — PAIN DESCRIPTION - PAIN TYPE: TYPE: ACUTE PAIN

## 2024-04-24 ASSESSMENT — PAIN DESCRIPTION - LOCATION: LOCATION: WRIST

## 2024-04-24 ASSESSMENT — PAIN DESCRIPTION - FREQUENCY: FREQUENCY: CONTINUOUS

## 2024-04-24 ASSESSMENT — PAIN DESCRIPTION - DESCRIPTORS: DESCRIPTORS: ACHING

## 2024-04-24 NOTE — ED PROVIDER NOTES
SCREENINGS          Juve Coma Scale  Eye Opening: Spontaneous  Best Motor Response: Obeys commands              CIWA Assessment  BP: 126/67  Pulse: 78             PHYSICAL EXAM  1 or more Elements     ED Triage Vitals [04/24/24 0029]   BP Temp Temp Source Pulse Respirations SpO2 Height Weight - Scale   126/67 97.5 °F (36.4 °C) Oral 78 18 99 % 1.524 m (5') 76.7 kg (169 lb)       Physical Exam  Constitutional:       General: She is not in acute distress.     Appearance: Normal appearance. She is not ill-appearing.   HENT:      Head: Normocephalic and atraumatic.   Eyes:      General: No scleral icterus.     Extraocular Movements: Extraocular movements intact.      Conjunctiva/sclera: Conjunctivae normal.   Pulmonary:      Effort: No respiratory distress.   Musculoskeletal:      Cervical back: Normal range of motion and neck supple.      Comments: Tenderness to the fifth metacarpal and MCP.  No reproducible tenderness of the right wrist or elbow.  Full range of motion of the right upper extremity including the right pinky finger.  Radial ulnar pulses 1+ equal bilateral.  No sensation loss to the right upper extremity.  No snuffbox tenderness.   Skin:     General: Skin is warm and dry.      Findings: No rash.   Neurological:      General: No focal deficit present.      Mental Status: She is alert and oriented to person, place, and time. Mental status is at baseline.   Psychiatric:         Mood and Affect: Mood normal.         Behavior: Behavior normal.         Thought Content: Thought content normal.         Judgment: Judgment normal.           DIAGNOSTIC RESULTS   LABS:    Labs Reviewed - No data to display    When ordered only abnormal lab results are displayed. All other labs were within normal range or not returned as of this dictation.    EKG: When ordered, EKG's are interpreted by the Emergency Department Physician in the absence of a cardiologist.  Please see their note for interpretation of

## 2024-05-16 ENCOUNTER — TELEPHONE (OUTPATIENT)
Dept: PULMONOLOGY | Age: 49
End: 2024-05-16

## 2024-05-16 NOTE — TELEPHONE ENCOUNTER
Appointment canceled for Valeria Flores ([id number])  Visit Type: NEW PATIENT  Date        Time      Length    Provider                  Department  7/16/2024   11:00 AM  30 mins.  Dr. Keke Kaur MD    [facility]     Reason for Cancellation: Other

## 2024-05-23 ENCOUNTER — TELEPHONE (OUTPATIENT)
Dept: CARDIOLOGY CLINIC | Age: 49
End: 2024-05-23

## 2024-05-23 RX ORDER — METOPROLOL SUCCINATE 25 MG/1
25 TABLET, EXTENDED RELEASE ORAL DAILY
Qty: 30 TABLET | Refills: 6 | OUTPATIENT
Start: 2024-05-23

## 2024-05-23 NOTE — TELEPHONE ENCOUNTER
Patient called into office wanting to know if she can increase metoprolol to whole pill since she has bee off lasix.  She notes that her BP has been 117-120;s and HR fine.  She does not keep a log and did not have exact numbers.

## 2024-05-28 NOTE — TELEPHONE ENCOUNTER
[phone] Left message on voicemail to call back     Unable to contact   well-developed/no distress/well-groomed/well-nourished

## 2024-06-05 ENCOUNTER — APPOINTMENT (OUTPATIENT)
Dept: CT IMAGING | Age: 49
End: 2024-06-05
Payer: COMMERCIAL

## 2024-06-05 ENCOUNTER — HOSPITAL ENCOUNTER (EMERGENCY)
Age: 49
Discharge: HOME | End: 2024-06-06
Payer: COMMERCIAL

## 2024-06-05 ENCOUNTER — APPOINTMENT (OUTPATIENT)
Dept: GENERAL RADIOLOGY | Age: 49
End: 2024-06-05
Payer: COMMERCIAL

## 2024-06-05 VITALS
DIASTOLIC BLOOD PRESSURE: 72 MMHG | SYSTOLIC BLOOD PRESSURE: 134 MMHG | TEMPERATURE: 98 F | OXYGEN SATURATION: 96 % | HEART RATE: 67 BPM | RESPIRATION RATE: 14 BRPM

## 2024-06-05 DIAGNOSIS — D86.9 SARCOIDOSIS: Primary | ICD-10-CM

## 2024-06-05 DIAGNOSIS — R10.9 FLANK PAIN: ICD-10-CM

## 2024-06-05 LAB
ALBUMIN SERPL-MCNC: 4.2 G/DL (ref 3.4–5)
ALBUMIN/GLOB SERPL: 1.3 {RATIO} (ref 1.1–2.2)
ALP SERPL-CCNC: 144 U/L (ref 40–129)
ALT SERPL-CCNC: 12 U/L (ref 10–40)
ANION GAP SERPL CALCULATED.3IONS-SCNC: 8 MMOL/L (ref 3–16)
AST SERPL-CCNC: 19 U/L (ref 15–37)
BACTERIA URNS QL MICRO: ABNORMAL /HPF
BASOPHILS # BLD: 0.1 K/UL (ref 0–0.2)
BASOPHILS NFR BLD: 0.7 %
BILIRUB SERPL-MCNC: 0.4 MG/DL (ref 0–1)
BILIRUB UR QL STRIP.AUTO: NEGATIVE
BUN SERPL-MCNC: 4 MG/DL (ref 7–20)
CALCIUM SERPL-MCNC: 9 MG/DL (ref 8.3–10.6)
CHLORIDE SERPL-SCNC: 104 MMOL/L (ref 99–110)
CLARITY UR: CLEAR
CO2 SERPL-SCNC: 28 MMOL/L (ref 21–32)
COLOR UR: YELLOW
CREAT SERPL-MCNC: 0.6 MG/DL (ref 0.6–1.1)
DEPRECATED RDW RBC AUTO: 13.2 % (ref 12.4–15.4)
EOSINOPHIL # BLD: 0.2 K/UL (ref 0–0.6)
EOSINOPHIL NFR BLD: 3.1 %
EPI CELLS #/AREA URNS HPF: ABNORMAL /HPF (ref 0–5)
GFR SERPLBLD CREATININE-BSD FMLA CKD-EPI: >90 ML/MIN/{1.73_M2}
GLUCOSE SERPL-MCNC: 94 MG/DL (ref 70–99)
GLUCOSE UR STRIP.AUTO-MCNC: NEGATIVE MG/DL
HGB BLD-MCNC: 14 G/DL (ref 12–16)
HGB UR QL STRIP.AUTO: ABNORMAL
KETONES UR STRIP.AUTO-MCNC: NEGATIVE MG/DL
LEUKOCYTE ESTERASE UR QL STRIP.AUTO: NEGATIVE
LYMPHOCYTES # BLD: 3.2 K/UL (ref 1–5.1)
LYMPHOCYTES NFR BLD: 40.4 %
MCH RBC QN AUTO: 31.1 PG (ref 26–34)
MCHC RBC AUTO-ENTMCNC: 34.2 G/DL (ref 31–36)
MCV RBC AUTO: 91.1 FL (ref 80–100)
MONOCYTES # BLD: 0.5 K/UL (ref 0–1.3)
NEUTROPHILS # BLD: 3.9 K/UL (ref 1.7–7.7)
NEUTROPHILS NFR BLD: 49.2 %
NITRITE UR QL STRIP.AUTO: NEGATIVE
PLATELET # BLD AUTO: 311 K/UL (ref 135–450)
PMV BLD AUTO: 9.2 FL (ref 5–10.5)
POTASSIUM SERPL-SCNC: 4.3 MMOL/L (ref 3.5–5.1)
PROT SERPL-MCNC: 7.4 G/DL (ref 6.4–8.2)
PROT UR STRIP.AUTO-MCNC: NEGATIVE MG/DL
RBC # BLD AUTO: 4.49 M/UL (ref 4–5.2)
RBC #/AREA URNS HPF: ABNORMAL /HPF (ref 0–4)
SODIUM SERPL-SCNC: 140 MMOL/L (ref 136–145)
SP GR UR STRIP.AUTO: <=1.005 (ref 1–1.03)
TROPONIN, HIGH SENSITIVITY: <6 NG/L (ref 0–14)
UA COMPLETE W REFLEX CULTURE PNL UR: ABNORMAL
UA DIPSTICK W REFLEX MICRO PNL UR: YES
URN SPEC COLLECT METH UR: ABNORMAL
UROBILINOGEN UR STRIP-ACNC: 0.2 E.U./DL
WBC # BLD AUTO: 8 K/UL (ref 4–11)

## 2024-06-05 PROCEDURE — 85025 COMPLETE CBC W/AUTO DIFF WBC: CPT

## 2024-06-05 PROCEDURE — 6370000000 HC RX 637 (ALT 250 FOR IP)

## 2024-06-05 PROCEDURE — 80053 COMPREHEN METABOLIC PANEL: CPT

## 2024-06-05 PROCEDURE — 71045 X-RAY EXAM CHEST 1 VIEW: CPT

## 2024-06-05 PROCEDURE — 36415 COLL VENOUS BLD VENIPUNCTURE: CPT

## 2024-06-05 PROCEDURE — 81001 URINALYSIS AUTO W/SCOPE: CPT

## 2024-06-05 PROCEDURE — 84484 ASSAY OF TROPONIN QUANT: CPT

## 2024-06-05 PROCEDURE — 96375 TX/PRO/DX INJ NEW DRUG ADDON: CPT

## 2024-06-05 PROCEDURE — 6360000002 HC RX W HCPCS

## 2024-06-05 PROCEDURE — 6360000004 HC RX CONTRAST MEDICATION

## 2024-06-05 PROCEDURE — 2580000003 HC RX 258

## 2024-06-05 PROCEDURE — 99285 EMERGENCY DEPT VISIT HI MDM: CPT

## 2024-06-05 PROCEDURE — 93005 ELECTROCARDIOGRAM TRACING: CPT

## 2024-06-05 PROCEDURE — 96374 THER/PROPH/DIAG INJ IV PUSH: CPT

## 2024-06-05 PROCEDURE — 74177 CT ABD & PELVIS W/CONTRAST: CPT

## 2024-06-05 RX ORDER — LORAZEPAM 2 MG/ML
1 INJECTION INTRAMUSCULAR ONCE
Status: COMPLETED | OUTPATIENT
Start: 2024-06-05 | End: 2024-06-05

## 2024-06-05 RX ORDER — DROPERIDOL 2.5 MG/ML
1.25 INJECTION, SOLUTION INTRAMUSCULAR; INTRAVENOUS ONCE
Status: COMPLETED | OUTPATIENT
Start: 2024-06-05 | End: 2024-06-05

## 2024-06-05 RX ORDER — IPRATROPIUM BROMIDE AND ALBUTEROL SULFATE 2.5; .5 MG/3ML; MG/3ML
1 SOLUTION RESPIRATORY (INHALATION) ONCE
Status: COMPLETED | OUTPATIENT
Start: 2024-06-05 | End: 2024-06-05

## 2024-06-05 RX ADMIN — WATER 40 MG: 1 INJECTION INTRAMUSCULAR; INTRAVENOUS; SUBCUTANEOUS at 19:23

## 2024-06-05 RX ADMIN — IPRATROPIUM BROMIDE AND ALBUTEROL SULFATE 1 DOSE: 2.5; .5 SOLUTION RESPIRATORY (INHALATION) at 19:25

## 2024-06-05 RX ADMIN — DROPERIDOL 1.25 MG: 2.5 INJECTION, SOLUTION INTRAMUSCULAR; INTRAVENOUS at 21:56

## 2024-06-05 RX ADMIN — IOPAMIDOL 75 ML: 755 INJECTION, SOLUTION INTRAVENOUS at 21:20

## 2024-06-05 RX ADMIN — LORAZEPAM 1 MG: 2 INJECTION INTRAMUSCULAR; INTRAVENOUS at 19:35

## 2024-06-05 ASSESSMENT — PAIN - FUNCTIONAL ASSESSMENT: PAIN_FUNCTIONAL_ASSESSMENT: 0-10

## 2024-06-05 ASSESSMENT — PAIN SCALES - GENERAL: PAINLEVEL_OUTOF10: 6

## 2024-06-06 LAB
EKG ATRIAL RATE: 62 BPM
EKG P AXIS: 22 DEGREES
EKG P-R INTERVAL: 154 MS
EKG QRS DURATION: 96 MS
EKG QTC CALCULATION (BAZETT): 458 MS
EKG R AXIS: -24 DEGREES
EKG VENTRICULAR RATE: 62 BPM

## 2024-06-06 PROCEDURE — 93010 ELECTROCARDIOGRAM REPORT: CPT | Performed by: INTERNAL MEDICINE

## 2024-06-06 ASSESSMENT — ENCOUNTER SYMPTOMS
COUGH: 0
RHINORRHEA: 0
CHEST TIGHTNESS: 0
SINUS PRESSURE: 0
TROUBLE SWALLOWING: 0
SORE THROAT: 0
ABDOMINAL PAIN: 0
BACK PAIN: 1
VOMITING: 0
NAUSEA: 0
SHORTNESS OF BREATH: 0
WHEEZING: 0
SINUS PAIN: 0

## 2024-06-06 ASSESSMENT — PAIN - FUNCTIONAL ASSESSMENT: PAIN_FUNCTIONAL_ASSESSMENT: NONE - DENIES PAIN

## 2024-06-06 NOTE — ED PROVIDER NOTES
Independently reviewed ECG completed for Valeria Flores. I did not see this patient and was not involved in their care.     ECG  The Ekg interpreted by me shows  normal sinus rhythm with a rate of 62  Axis is   Left axis deviation  QTc is   458  Intervals and Durations are unremarkable.      ST Segments: no acute change  No significant change from prior EKG dated 1/26/24        Mira Gallo MD  06/06/24 0010    
in the SEP-1 Core Measure due to severe sepsis or septic shock?   No   Exclusion criteria - the patient is NOT to be included for SEP-1 Core Measure due to:  2+ SIRS criteria are not met        Chronic Conditions affecting care:    has a past medical history of CHF (congestive heart failure) ([facility]), COPD (chronic obstructive pulmonary disease) ([facility]), CVA (cerebral vascular accident) ([facility]) (04/2010), DVT, Factor VIII inhibitor disorder ([facility]), blood clots, Hypercholesterolemia, ICD (implantable cardioverter-defibrillator) in place (2012), MI (myocardial infarction) ([facility]) (03/2010), Myocardial infarct ([facility]), Pacemaker (2012), Pulmonary embolism ([facility]), Smoker, and Unspecified cerebral artery occlusion with cerebral infarction.    CONSULTS: (Who and What was discussed)  None      Records Reviewed (External and Source) EMR reviewed    CC/HPI Summary, DDx, ED Course, and Reassessment: Briefly, this is a 48-year-old female who presents with the above-mentioned concerns.  Examination as noted above.  Patient is afebrile and hemodynamically stable.  No CVA tenderness present on examination.  This lowers clinical suspicion for pyelonephritis however is included in differential diagnosis.  Patient does have a significant cardiac history including prior MI.  Cardiac workup included in diagnostic workup    EKG is interpreted by ED attending.  Chest x-ray unremarkable for acute cardiopulmonary etiology.  Stable CBC with no leukocytosis, neutropenia, anemia, thrombocytopenia appreciated.  No renal impairment or electrolyte derangements on CMP.  LFTs within normal limits.  Troponin negative.  Urine analysis is notable for a small amount of blood however negative for infection.  CT abdomen and pelvis unremarkable for any acute findings including but not limited to pyelonephritis nor obstructive uropathy.    Reassuring diagnostic workup reviewed with the patient.  Discussed supportive therapies and PCP referral.  The patient became

## 2024-06-06 NOTE — DISCHARGE INSTRUCTIONS
All of your blood work is reassuring.  No concerns for anemia or infection.  Urine does not look grossly infected.  There is no evidence of pyelonephritis on your CT scan nor any abnormalities within the abdomen or pelvis.    I believe that your symptoms are related to your underlying psych lordosis

## 2024-06-29 ENCOUNTER — HOSPITAL ENCOUNTER (OUTPATIENT)
Dept: CT IMAGING | Age: 49
Discharge: HOME | End: 2024-06-29
Payer: COMMERCIAL

## 2024-06-29 DIAGNOSIS — R40.4 ALTERED SENSORIUM: ICD-10-CM

## 2024-06-29 PROCEDURE — 70450 CT HEAD/BRAIN W/O DYE: CPT

## 2024-07-01 ENCOUNTER — APPOINTMENT (OUTPATIENT)
Dept: CT IMAGING | Age: 49
End: 2024-07-01
Payer: COMMERCIAL

## 2024-07-01 ENCOUNTER — APPOINTMENT (OUTPATIENT)
Dept: GENERAL RADIOLOGY | Age: 49
End: 2024-07-01
Payer: COMMERCIAL

## 2024-07-01 ENCOUNTER — HOSPITAL ENCOUNTER (OUTPATIENT)
Age: 49
Setting detail: OBSERVATION
Discharge: HOME | End: 2024-07-03
Attending: EMERGENCY MEDICINE
Payer: COMMERCIAL

## 2024-07-01 DIAGNOSIS — R53.83 OTHER FATIGUE: ICD-10-CM

## 2024-07-01 DIAGNOSIS — R41.3 MEMORY PROBLEM: ICD-10-CM

## 2024-07-01 DIAGNOSIS — R93.0 ABNORMAL HEAD CT: ICD-10-CM

## 2024-07-01 DIAGNOSIS — R41.0 CONFUSION: Primary | ICD-10-CM

## 2024-07-01 DIAGNOSIS — G45.9 TIA (TRANSIENT ISCHEMIC ATTACK): ICD-10-CM

## 2024-07-01 LAB
ALBUMIN SERPL-MCNC: 4.3 G/DL (ref 3.4–5)
ALBUMIN/GLOB SERPL: 1.4 {RATIO} (ref 1.1–2.2)
ALP SERPL-CCNC: 148 U/L (ref 40–129)
ALT SERPL-CCNC: 13 U/L (ref 10–40)
ANION GAP SERPL CALCULATED.3IONS-SCNC: 11 MMOL/L (ref 3–16)
AST SERPL-CCNC: 17 U/L (ref 15–37)
BASOPHILS # BLD: 0.1 K/UL (ref 0–0.2)
BASOPHILS NFR BLD: 0.7 %
BILIRUB SERPL-MCNC: 0.3 MG/DL (ref 0–1)
BILIRUB UR QL STRIP.AUTO: NEGATIVE
BUN SERPL-MCNC: 5 MG/DL (ref 7–20)
CALCIUM SERPL-MCNC: 8.7 MG/DL (ref 8.3–10.6)
CHLORIDE SERPL-SCNC: 102 MMOL/L (ref 99–110)
CLARITY UR: CLEAR
CO2 SERPL-SCNC: 24 MMOL/L (ref 21–32)
COLOR UR: YELLOW
CREAT SERPL-MCNC: 0.6 MG/DL (ref 0.6–1.1)
DEPRECATED RDW RBC AUTO: 12.7 % (ref 12.4–15.4)
EOSINOPHIL # BLD: 0.3 K/UL (ref 0–0.6)
EOSINOPHIL NFR BLD: 3.1 %
EPI CELLS #/AREA URNS HPF: NORMAL /HPF (ref 0–5)
GFR SERPLBLD CREATININE-BSD FMLA CKD-EPI: >90 ML/MIN/{1.73_M2}
GLUCOSE SERPL-MCNC: 94 MG/DL (ref 70–99)
GLUCOSE UR STRIP.AUTO-MCNC: NEGATIVE MG/DL
HCT VFR BLD AUTO: 37.5 % (ref 36–48)
HGB BLD-MCNC: 13 G/DL (ref 12–16)
HGB UR QL STRIP.AUTO: ABNORMAL
KETONES UR STRIP.AUTO-MCNC: NEGATIVE MG/DL
LEUKOCYTE ESTERASE UR QL STRIP.AUTO: NEGATIVE
LYMPHOCYTES # BLD: 3.1 K/UL (ref 1–5.1)
LYMPHOCYTES NFR BLD: 34 %
MCH RBC QN AUTO: 32.1 PG (ref 26–34)
MCHC RBC AUTO-ENTMCNC: 34.8 G/DL (ref 31–36)
MCV RBC AUTO: 92.2 FL (ref 80–100)
MONOCYTES # BLD: 0.6 K/UL (ref 0–1.3)
MONOCYTES NFR BLD: 6.3 %
NEUTROPHILS # BLD: 5.1 K/UL (ref 1.7–7.7)
NEUTROPHILS NFR BLD: 55.9 %
NITRITE UR QL STRIP.AUTO: NEGATIVE
PH UR STRIP.AUTO: 6 [PH] (ref 5–8)
PLATELET # BLD AUTO: 347 K/UL (ref 135–450)
PMV BLD AUTO: 8.7 FL (ref 5–10.5)
POTASSIUM SERPL-SCNC: 3.6 MMOL/L (ref 3.5–5.1)
PROT SERPL-MCNC: 7.3 G/DL (ref 6.4–8.2)
PROT UR STRIP.AUTO-MCNC: NEGATIVE MG/DL
RBC # BLD AUTO: 4.07 M/UL (ref 4–5.2)
RBC #/AREA URNS HPF: NORMAL /HPF (ref 0–4)
SODIUM SERPL-SCNC: 137 MMOL/L (ref 136–145)
SP GR UR STRIP.AUTO: <=1.005 (ref 1–1.03)
TROPONIN, HIGH SENSITIVITY: <6 NG/L (ref 0–14)
UA DIPSTICK W REFLEX MICRO PNL UR: YES
URN SPEC COLLECT METH UR: ABNORMAL
UROBILINOGEN UR STRIP-ACNC: 0.2 E.U./DL
WBC # BLD AUTO: 9.2 K/UL (ref 4–11)
WBC #/AREA URNS HPF: NORMAL /HPF (ref 0–5)

## 2024-07-01 PROCEDURE — 81001 URINALYSIS AUTO W/SCOPE: CPT

## 2024-07-01 PROCEDURE — 96374 THER/PROPH/DIAG INJ IV PUSH: CPT

## 2024-07-01 PROCEDURE — 71045 X-RAY EXAM CHEST 1 VIEW: CPT

## 2024-07-01 PROCEDURE — 85025 COMPLETE CBC W/AUTO DIFF WBC: CPT

## 2024-07-01 PROCEDURE — 6360000004 HC RX CONTRAST MEDICATION: Performed by: PHYSICIAN ASSISTANT

## 2024-07-01 PROCEDURE — 6360000002 HC RX W HCPCS: Performed by: PHYSICIAN ASSISTANT

## 2024-07-01 PROCEDURE — 71260 CT THORAX DX C+: CPT

## 2024-07-01 PROCEDURE — 96375 TX/PRO/DX INJ NEW DRUG ADDON: CPT

## 2024-07-01 PROCEDURE — 80053 COMPREHEN METABOLIC PANEL: CPT

## 2024-07-01 PROCEDURE — G0378 HOSPITAL OBSERVATION PER HR: HCPCS

## 2024-07-01 PROCEDURE — 99285 EMERGENCY DEPT VISIT HI MDM: CPT

## 2024-07-01 PROCEDURE — 74176 CT ABD & PELVIS W/O CONTRAST: CPT

## 2024-07-01 PROCEDURE — 84484 ASSAY OF TROPONIN QUANT: CPT

## 2024-07-01 PROCEDURE — 93005 ELECTROCARDIOGRAM TRACING: CPT | Performed by: PHYSICIAN ASSISTANT

## 2024-07-01 RX ORDER — ONDANSETRON 2 MG/ML
4 INJECTION INTRAMUSCULAR; INTRAVENOUS ONCE
Status: COMPLETED | OUTPATIENT
Start: 2024-07-01 | End: 2024-07-01

## 2024-07-01 RX ORDER — LORAZEPAM 2 MG/ML
1 INJECTION INTRAMUSCULAR ONCE
Status: COMPLETED | OUTPATIENT
Start: 2024-07-01 | End: 2024-07-01

## 2024-07-01 RX ADMIN — ONDANSETRON 4 MG: 2 INJECTION INTRAMUSCULAR; INTRAVENOUS at 22:31

## 2024-07-01 RX ADMIN — LORAZEPAM 1 MG: 2 INJECTION INTRAMUSCULAR; INTRAVENOUS at 21:24

## 2024-07-01 RX ADMIN — IOPAMIDOL 75 ML: 755 INJECTION, SOLUTION INTRAVENOUS at 21:18

## 2024-07-01 ASSESSMENT — PAIN - FUNCTIONAL ASSESSMENT: PAIN_FUNCTIONAL_ASSESSMENT: NONE - DENIES PAIN

## 2024-07-01 NOTE — ED PROVIDER NOTES
[facility] A[facility] REMOTE TELEMETRY  Emergency Department Encounter    Patient Name: Valeria Flores  MRN: [id number]  YOB: 1975  Date of Evaluation: 7/1/2024  Provider: Teri Garcia APRN - CNP  Note Started: 7:55 PM EDT 7/1/24    CHIEF COMPLAINT  Memory Loss (States for a week she has been having memory loss, balance issues, urinary retention, headaches on left side. )    SHARED SERVICE VISIT  I have seen and evaluated this patient with my supervising physician, Dr. Bustos.     HISTORY OF PRESENT ILLNESS  Valeria Flores is a 48 y.o. female who presents to the ED for evaluation of confusion and fatigue.  Patient states that she has been feeling unwell for the past week.  Reports some memory loss issues.  States that she had a head CT done on outpatient basis per PCP 2 days ago which she states that she is still unaware of the results on.  Does have some headache.  No lightheadedness, dizziness or confusion.  She reports some lower extremity weakness bilaterally.  Neck and back pain.  Denies fevers chills.  Has had no nausea or vomiting.  Denies leg pain or swelling.  No recent travel, trauma or surgery.  Reports that she has also had some dysuria feeling she needs to urinate although unable to do so.  She denies numbness or tingling although again lower extremity weakness for past several days.    No other complaints, modifying factors or associated symptoms.     Nursing notes reviewed were all reviewed and agreed with or any disagreements were addressed in the HPI.    PMH:  Past Medical History:   Diagnosis Date    CHF (congestive heart failure) ([facility])     COPD (chronic obstructive pulmonary disease) ([facility])     CVA (cerebral vascular accident) ([facility]) 04/2010    DVT     Factor VIII inhibitor disorder ([facility])     Hx of blood clots     Hypercholesterolemia     ICD (implantable cardioverter-defibrillator) in place 2012    MI (myocardial infarction) ([facility]) 03/2010    Myocardial infarct ([facility])

## 2024-07-02 ENCOUNTER — APPOINTMENT (OUTPATIENT)
Dept: CT IMAGING | Age: 49
End: 2024-07-02
Payer: COMMERCIAL

## 2024-07-02 PROBLEM — G45.9 TIA (TRANSIENT ISCHEMIC ATTACK): Status: ACTIVE | Noted: 2024-07-02

## 2024-07-02 PROBLEM — R41.0 CONFUSION: Status: ACTIVE | Noted: 2024-07-02

## 2024-07-02 PROBLEM — R93.0 ABNORMAL HEAD CT: Status: ACTIVE | Noted: 2024-07-02

## 2024-07-02 LAB
CHOLEST SERPL-MCNC: 208 MG/DL (ref 0–199)
CK SERPL-CCNC: 63 U/L (ref 26–192)
CRP SERPL-MCNC: <3 MG/L (ref 0–5.1)
EKG ATRIAL RATE: 69 BPM
EKG DIAGNOSIS: NORMAL
EKG P AXIS: 7 DEGREES
EKG P-R INTERVAL: 152 MS
EKG Q-T INTERVAL: 384 MS
EKG QRS DURATION: 96 MS
EKG QTC CALCULATION (BAZETT): 411 MS
EKG R AXIS: -22 DEGREES
EKG T AXIS: -6 DEGREES
EKG VENTRICULAR RATE: 69 BPM
ERYTHROCYTE [SEDIMENTATION RATE] IN BLOOD BY WESTERGREN METHOD: 22 MM/HR (ref 0–20)
HDLC SERPL-MCNC: 50 MG/DL (ref 40–60)
LDLC SERPL CALC-MCNC: 131 MG/DL
TRIGL SERPL-MCNC: 133 MG/DL (ref 0–150)
TSH SERPL DL<=0.005 MIU/L-ACNC: 3.64 UIU/ML (ref 0.27–4.2)
VLDLC SERPL CALC-MCNC: 27 MG/DL

## 2024-07-02 PROCEDURE — G0378 HOSPITAL OBSERVATION PER HR: HCPCS

## 2024-07-02 PROCEDURE — 82550 ASSAY OF CK (CPK): CPT

## 2024-07-02 PROCEDURE — 70460 CT HEAD/BRAIN W/DYE: CPT

## 2024-07-02 PROCEDURE — 80061 LIPID PANEL: CPT

## 2024-07-02 PROCEDURE — 86038 ANTINUCLEAR ANTIBODIES: CPT

## 2024-07-02 PROCEDURE — 99223 1ST HOSP IP/OBS HIGH 75: CPT | Performed by: PSYCHIATRY & NEUROLOGY

## 2024-07-02 PROCEDURE — APPSS45 APP SPLIT SHARED TIME 31-45 MINUTES

## 2024-07-02 PROCEDURE — 84443 ASSAY THYROID STIM HORMONE: CPT

## 2024-07-02 PROCEDURE — 6360000004 HC RX CONTRAST MEDICATION

## 2024-07-02 PROCEDURE — 82607 VITAMIN B-12: CPT

## 2024-07-02 PROCEDURE — 2580000003 HC RX 258: Performed by: NURSE PRACTITIONER

## 2024-07-02 PROCEDURE — 96375 TX/PRO/DX INJ NEW DRUG ADDON: CPT

## 2024-07-02 PROCEDURE — 84155 ASSAY OF PROTEIN SERUM: CPT

## 2024-07-02 PROCEDURE — 70498 CT ANGIOGRAPHY NECK: CPT

## 2024-07-02 PROCEDURE — 84165 PROTEIN E-PHORESIS SERUM: CPT

## 2024-07-02 PROCEDURE — 6370000000 HC RX 637 (ALT 250 FOR IP): Performed by: NURSE PRACTITIONER

## 2024-07-02 PROCEDURE — 36415 COLL VENOUS BLD VENIPUNCTURE: CPT

## 2024-07-02 PROCEDURE — 85652 RBC SED RATE AUTOMATED: CPT

## 2024-07-02 PROCEDURE — 6360000002 HC RX W HCPCS: Performed by: NURSE PRACTITIONER

## 2024-07-02 PROCEDURE — 93010 ELECTROCARDIOGRAM REPORT: CPT | Performed by: INTERNAL MEDICINE

## 2024-07-02 PROCEDURE — 6370000000 HC RX 637 (ALT 250 FOR IP)

## 2024-07-02 PROCEDURE — 82746 ASSAY OF FOLIC ACID SERUM: CPT

## 2024-07-02 PROCEDURE — 86140 C-REACTIVE PROTEIN: CPT

## 2024-07-02 RX ORDER — SODIUM CHLORIDE 9 MG/ML
INJECTION, SOLUTION INTRAVENOUS PRN
Status: DISCONTINUED | OUTPATIENT
Start: 2024-07-02 | End: 2024-07-03 | Stop reason: HOSPADM

## 2024-07-02 RX ORDER — TIZANIDINE 4 MG/1
2 TABLET ORAL EVERY 8 HOURS PRN
Status: DISCONTINUED | OUTPATIENT
Start: 2024-07-02 | End: 2024-07-03 | Stop reason: HOSPADM

## 2024-07-02 RX ORDER — HYDROXYZINE HYDROCHLORIDE 10 MG/1
25 TABLET, FILM COATED ORAL 3 TIMES DAILY PRN
Status: DISCONTINUED | OUTPATIENT
Start: 2024-07-02 | End: 2024-07-03 | Stop reason: HOSPADM

## 2024-07-02 RX ORDER — GABAPENTIN 600 MG/1
600 TABLET ORAL 3 TIMES DAILY
COMMUNITY
Start: 2024-05-31

## 2024-07-02 RX ORDER — SODIUM CHLORIDE 0.9 % (FLUSH) 0.9 %
5-40 SYRINGE (ML) INJECTION EVERY 12 HOURS SCHEDULED
Status: DISCONTINUED | OUTPATIENT
Start: 2024-07-02 | End: 2024-07-03 | Stop reason: HOSPADM

## 2024-07-02 RX ORDER — POLYETHYLENE GLYCOL 3350 17 G/17G
17 POWDER, FOR SOLUTION ORAL DAILY PRN
Status: DISCONTINUED | OUTPATIENT
Start: 2024-07-02 | End: 2024-07-03 | Stop reason: HOSPADM

## 2024-07-02 RX ORDER — ATORVASTATIN CALCIUM 10 MG/1
20 TABLET, FILM COATED ORAL DAILY
Status: DISCONTINUED | OUTPATIENT
Start: 2024-07-02 | End: 2024-07-03 | Stop reason: HOSPADM

## 2024-07-02 RX ORDER — PROCHLORPERAZINE EDISYLATE 5 MG/ML
10 INJECTION INTRAMUSCULAR; INTRAVENOUS EVERY 6 HOURS PRN
Status: DISCONTINUED | OUTPATIENT
Start: 2024-07-02 | End: 2024-07-03 | Stop reason: HOSPADM

## 2024-07-02 RX ORDER — SODIUM CHLORIDE 0.9 % (FLUSH) 0.9 %
5-40 SYRINGE (ML) INJECTION PRN
Status: DISCONTINUED | OUTPATIENT
Start: 2024-07-02 | End: 2024-07-03 | Stop reason: HOSPADM

## 2024-07-02 RX ORDER — ACETAMINOPHEN 325 MG/1
650 TABLET ORAL EVERY 6 HOURS PRN
Status: DISCONTINUED | OUTPATIENT
Start: 2024-07-02 | End: 2024-07-03 | Stop reason: HOSPADM

## 2024-07-02 RX ORDER — FUROSEMIDE 20 MG/1
20 TABLET ORAL EVERY OTHER DAY
Status: DISCONTINUED | OUTPATIENT
Start: 2024-07-02 | End: 2024-07-02

## 2024-07-02 RX ORDER — ACETAMINOPHEN 650 MG/1
650 SUPPOSITORY RECTAL EVERY 6 HOURS PRN
Status: DISCONTINUED | OUTPATIENT
Start: 2024-07-02 | End: 2024-07-03 | Stop reason: HOSPADM

## 2024-07-02 RX ORDER — GABAPENTIN 300 MG/1
300 CAPSULE ORAL 3 TIMES DAILY
Status: DISCONTINUED | OUTPATIENT
Start: 2024-07-02 | End: 2024-07-02

## 2024-07-02 RX ORDER — PANTOPRAZOLE SODIUM 40 MG/1
40 TABLET, DELAYED RELEASE ORAL
Status: DISCONTINUED | OUTPATIENT
Start: 2024-07-02 | End: 2024-07-03 | Stop reason: HOSPADM

## 2024-07-02 RX ORDER — NICOTINE 21 MG/24HR
1 PATCH, TRANSDERMAL 24 HOURS TRANSDERMAL DAILY PRN
Status: DISCONTINUED | OUTPATIENT
Start: 2024-07-02 | End: 2024-07-03 | Stop reason: HOSPADM

## 2024-07-02 RX ORDER — ASPIRIN 81 MG/1
81 TABLET, CHEWABLE ORAL DAILY
Status: DISCONTINUED | OUTPATIENT
Start: 2024-07-02 | End: 2024-07-03 | Stop reason: HOSPADM

## 2024-07-02 RX ORDER — GABAPENTIN 300 MG/1
600 CAPSULE ORAL 3 TIMES DAILY
Status: DISCONTINUED | OUTPATIENT
Start: 2024-07-02 | End: 2024-07-02

## 2024-07-02 RX ORDER — ESCITALOPRAM OXALATE 10 MG/1
20 TABLET ORAL DAILY
Status: DISCONTINUED | OUTPATIENT
Start: 2024-07-02 | End: 2024-07-03 | Stop reason: HOSPADM

## 2024-07-02 RX ORDER — METOPROLOL SUCCINATE 25 MG/1
12.5 TABLET, EXTENDED RELEASE ORAL DAILY
Status: DISCONTINUED | OUTPATIENT
Start: 2024-07-02 | End: 2024-07-03 | Stop reason: HOSPADM

## 2024-07-02 RX ORDER — GABAPENTIN 300 MG/1
300 CAPSULE ORAL 3 TIMES DAILY
Status: DISCONTINUED | OUTPATIENT
Start: 2024-07-02 | End: 2024-07-03 | Stop reason: HOSPADM

## 2024-07-02 RX ADMIN — APIXABAN 5 MG: 5 TABLET, FILM COATED ORAL at 00:22

## 2024-07-02 RX ADMIN — GABAPENTIN 600 MG: 300 CAPSULE ORAL at 13:31

## 2024-07-02 RX ADMIN — PROCHLORPERAZINE EDISYLATE 10 MG: 5 INJECTION INTRAMUSCULAR; INTRAVENOUS at 13:30

## 2024-07-02 RX ADMIN — GABAPENTIN 300 MG: 300 CAPSULE ORAL at 20:46

## 2024-07-02 RX ADMIN — HYDROXYZINE HYDROCHLORIDE 25 MG: 10 TABLET ORAL at 12:16

## 2024-07-02 RX ADMIN — IOPAMIDOL 75 ML: 755 INJECTION, SOLUTION INTRAVENOUS at 15:30

## 2024-07-02 RX ADMIN — ESCITALOPRAM OXALATE 20 MG: 10 TABLET ORAL at 08:51

## 2024-07-02 RX ADMIN — ATORVASTATIN CALCIUM 20 MG: 10 TABLET, FILM COATED ORAL at 08:51

## 2024-07-02 RX ADMIN — APIXABAN 5 MG: 5 TABLET, FILM COATED ORAL at 20:46

## 2024-07-02 RX ADMIN — PANTOPRAZOLE SODIUM 40 MG: 40 TABLET, DELAYED RELEASE ORAL at 15:44

## 2024-07-02 RX ADMIN — SODIUM CHLORIDE, PRESERVATIVE FREE 10 ML: 5 INJECTION INTRAVENOUS at 08:52

## 2024-07-02 RX ADMIN — PANTOPRAZOLE SODIUM 40 MG: 40 TABLET, DELAYED RELEASE ORAL at 08:51

## 2024-07-02 RX ADMIN — ASPIRIN 81 MG 81 MG: 81 TABLET ORAL at 08:51

## 2024-07-02 RX ADMIN — APIXABAN 5 MG: 5 TABLET, FILM COATED ORAL at 08:51

## 2024-07-02 RX ADMIN — SODIUM CHLORIDE, PRESERVATIVE FREE 10 ML: 5 INJECTION INTRAVENOUS at 20:47

## 2024-07-02 RX ADMIN — METOPROLOL SUCCINATE 12.5 MG: 25 TABLET, EXTENDED RELEASE ORAL at 08:51

## 2024-07-02 RX ADMIN — GABAPENTIN 600 MG: 300 CAPSULE ORAL at 08:51

## 2024-07-02 ASSESSMENT — PAIN SCALES - GENERAL: PAINLEVEL_OUTOF10: 0

## 2024-07-02 NOTE — H&P
[facility] Medicine History & Physical        Date of Service: 07/01/2024    Time of Service: 2220    Disposition:    []Admitted to inpatient status with expected LOS greater than two midnights due to medical therapy.  [x]Placed in observation status.    Historian: Self/patient, chart review, Care Everywhere, ED documentation    Chief Admission Complaint:  Forgetfulness x 1 week    Presenting Admission History:      Valeria Flores is a/an 48 y.o. female with a significant past medical history of hypertension, hyperlipidemia, NICM s/p AICD, CVA, CHF, and COPD who presents to [facility] ED with complaint of headaches and memory problems for the last week. She was evaluated by PCP, had outpatient NCCT of the head 6/29, which showed stable area of hypoattenuation in the left frontal white matter that is an interval change from CT head 1/2024. Patient denies any other symptoms, no recent injury, no new medications. Her evaluation here included laboratory studies, EKG, CT Chest PE protocol, and CT abdomen/pelvis with contrast. Imaging was overall negative for acute findings; CT chest showed mild cardiomegaly with small pericardial effusion. Laboratory studies were reviewed and were overall unremarkable; small blood in urine noted. Hospital team was consulted to admit.     Assessment/Plan:      Current Principal Problem:  Memory problem    Memory Problems/Headache  - Admit to floor  - NCCT head negative, planned for MRI; however, pt endorses AICD wires incompatible with MRI  - Neurology consult place    NICM s/p AICD/Chronic HFrEF  - Last echo 1/2024, rEF at 45%  - Stable, compensated  - Continue metoprolol    Factor VIII Inhibitor Disorder  - Continue apixaban    Nicotine Use  - Nicotine patch, if needed    Discussed management of the case in detail with the emergency department provider: NEHEMIAS Tucker PA-C    All Imaging: I have reviewed all the radiographic images and reports pertinent to this encounter with the following

## 2024-07-02 NOTE — ED NOTES
104 @ 1267 tele  
abdomen or pelvis. 2. No urinary tract calculi or hydronephrosis. 3. Normal appendix. 4. Status post cholecystectomy.     XR CHEST PORTABLE    Result Date: 7/1/2024  EXAMINATION: ONE XRAY VIEW OF THE CHEST 7/1/2024 7:34 pm COMPARISON: Chest x-ray dated 06/05/2024. HISTORY: ORDERING SYSTEM PROVIDED HISTORY: cp TECHNOLOGIST PROVIDED HISTORY: Reason for exam:->cp Reason for Exam: chest pain FINDINGS: Medical devices: Left subclavian AICD is again noted. Heart/Mediastinum: The cardiomediastinal silhouette is stable. Pleura/Lungs.  No focal consolidation.  No pleural effusion.  No appreciable pneumothorax.  Multiple calcified granulomas are again noted in the lungs bilaterally. Bones/soft tissue: No acute abnormality.     1. No radiographic evidence of acute pulmonary disease. 2. Multiple calcified granulomas are again noted in the lungs bilaterally.     CT HEAD WO CONTRAST    Result Date: 7/1/2024  EXAMINATION: CT OF THE HEAD WITHOUT CONTRAST  6/29/2024 1:11 pm TECHNIQUE: CT of the head was performed without the administration of intravenous contrast. Automated exposure control, iterative reconstruction, and/or weight based adjustment of the mA/kV was utilized to reduce the radiation dose to as low as reasonably achievable. COMPARISON: January 10, 2024 HISTORY: ORDERING SYSTEM PROVIDED HISTORY: Altered sensorium TECHNOLOGIST PROVIDED HISTORY: Reason for Exam: new on eliquis, vision problems, altered sensorium FINDINGS: BRAIN/VENTRICLES: There is no acute intracranial hemorrhage, mass effect or midline shift.  No abnormal extra-axial fluid collection.  The gray-white differentiation is maintained without evidence of an acute infarct.  There is no evidence of hydrocephalus. Redemonstration of a small hypodensity in the white matter of the left frontal brain also previously seen and has a similar appearance compared to the previous evaluation. ORBITS: The visualized portion of the orbits demonstrate no acute abnormality.

## 2024-07-02 NOTE — ED PROVIDER NOTES
THIS IS MY ANATOLY SUPERVISORY AND SHARED VISIT NOTE:    I personally saw the patient and made/approved the management plan and take responsibility for the patient management.    History: 48-year-old female presenting for evaluation of memory issues, multiple other complaints.  She states that she has also been having balance issues issues with urinary retention, headaches.  She had outpatient CT head which was unremarkable.  She had worsening symptoms and came to the emergency department.  She reports no prior history of such.  She has had prior CVA.  She has history of CAD, with ICD implanted.    Exam: There is no focal neurological deficit.  NIH scale score 0.  Finger-to-nose testing is intact.  Cranial nerves grossly intact.  No respiratory distress.  Alert, no focal deficits.    MDM: 48-year-old female presenting with multiple complaints including memory loss, difficulty walking, headaches.  She has stable vital signs.  No focal neurological deficits on exam.  Repeat CT imaging was obtained.  Repeat CT head without acute intracranial abnormality.  CT abdomen pelvis without acute intra-abdominal abnormality.  Patient will require evaluation by neurology for her complaints.  Patient is agreeable with plan for admission        I personally saw the patient and independently provided 0 minutes of non-concurrent critical care out of the total shared critical care time provided.     EKG  The Ekg interpreted by myself in the emergency department in the absence of a cardiologist.  normal sinus rhythm with a rate of 69  Axis is   Normal  QTc is  within an acceptable range  Intervals and Durations are unremarkable.      No specific ST-T wave changes appreciated.  No evidence of acute ischemia.   No significant change from prior EKG dated 6/5/2024    No results found.      I, Dr. Bustos, am the primary clinician of record.     Comment: Please note this report has been produced using speech recognition software and may

## 2024-07-02 NOTE — CONSULTS
Supplement    Anthropometric Measures:  Height: 152.4 cm (5')  Ideal Body Weight (IBW): 100 lbs (45 kg)       Current Body Weight: 77.6 kg (171 lb), 171 % IBW. Weight Source: Standing Scale  Current BMI (kg/m2): 33.4        Weight Adjustment For: No Adjustment                 BMI Categories: Obese Class 1 (BMI 30.0-34.9)    Estimated Daily Nutrient Needs:  Energy Requirements Based On: Kcal/kg (30-35 kcals/kg)  Weight Used for Energy Requirements: Ideal (45 kg)  Energy (kcal/day): 2275-3579  Weight Used for Protein Requirements: Ideal (1.2-1.5 g/kg)  Protein (g/day): 54-68 g  Method Used for Fluid Requirements: 1 ml/kcal  Fluid (ml/day):      Nutrition Diagnosis:   Increased nutrient needs related to increase demand for energy/nutrients as evidenced by poor intake prior to admission, weight loss, intake 51-75%    Nutrition Interventions:   Food and/or Nutrient Delivery: Modify Current Diet, Start Oral Nutrition Supplement  Nutrition Education/Counseling: Education completed  Coordination of Nutrition Care: Continue to monitor while inpatient       Goals:     Goals: PO intake 50% or greater, prior to discharge       Nutrition Monitoring and Evaluation:   Behavioral-Environmental Outcomes: None Identified  Food/Nutrient Intake Outcomes: Food and Nutrient Intake, Supplement Intake  Physical Signs/Symptoms Outcomes: Biochemical Data, Weight, Nutrition Focused Physical Findings, Fluid Status or Edema    Discharge Planning:    Continue Oral Nutrition Supplement, Continue current diet     Kylie Tapia, MS, RD, LD  Contact: Office: [phone]; [place]: [phone]      
several hours.  No triggers.  No other associated symptoms.  No headache, dysphagia or dysarthria.  No weakness or numbness of falling.  No recent fever chills or other associated symptoms.  She came to the ED over the weekend she was admitted.  Now she is back to her baseline.  CT head showed no acute findings.  She cannot have MRI due to AICD.  She denies recent travel.  No history of migraine.  No fever or chills.  Other review of system was unremarkable.    On examination:  No acute distress  Awake and alert x3.  Fluent speech.  Appears appropriate with intact recent and remote memory.  Pupil reactive and symmetric, extraocular motor intact, no ophthalmoplegia, face is symmetric and tongue is midline  No focal weakness with symmetric DTR  Normal tone  No sensory disturbance or abnormal movement    Impression:  Acute amnesia seems to be improving.  So far no specific etiology.  Possible vascular/TIA and TGA.  Will add CT head with contrast to exclude any space-occupying lesion.  Can not assess for multiple sclerosis although seems less likely.  History of factor V on Eliquis  Hypertension          Recommendation:    CT head with contrast  CTA head and neck to evaluate vascular anatomy and exclude thrombosis/stenosis  Continue Eliquis discussed risk of bleeding with Eliquis  PT and OT  Statin  Check TFT  Check inflammatory markers in the serum and B12  Continue current dose of Lopressor and avoid blood pressure below 140 systolic  Follow A1c, LDL and lipid panel  If symptoms recur, can consider repeating CT head with contrast in 3 months  Can be discharged from neurology once medically stable      Electronically signed by Alex Bruce MD on 7/2/24 at 1:47 PM EDT

## 2024-07-02 NOTE — DISCHARGE INSTRUCTIONS
Heart Failure Resources:  Heart Failure Interactive Workbook:  Go to [email or web address] for a Free Heart Failure Interactive Workbook provided by The American [employer] Association. This interactive workbook will provide information on Healthier Living with Heart Failure. Please copy and paste link into search bar. Use your mouse to scroll through the pages.    HF [place] selma:   Heart Failure Free smart phone selma available for iPhone and Android download. Use your phone to track sodium intake, fluid intake, symptoms, and weight.     Low Sodium Diet / Recipes:  Go to www.[employer] website for “renal” diet which is Low Sodium! [employer] is a dialysis company, but this website offers free seasonal cookbooks. Each quarter, they will release 25-30 new recipes with a breakdown of calories, sodium, and glucose. You can also go to www.[employer]Field/recipes website for free recipes.     Home Exercise Program:   Identification of Green/Yellow/Red zones:  You should be able to identify when you feel good (green zone), if you have 1-2 symptoms of HF (yellow zone), or if you are in need of medical attention (red zone).  In your CHF education folder you were provided a “stop light tool” to outline this information.     We want to you to rate your exertion levels:    Our therapy team has discussed means of identification with you such as the \"Ponce scale.\"  The Ponce rating scale ranges from 6 to 20, where 6 means \"no exertion at all\" and 20 means \"maximal exertion.\" The goal is to use this to gauge how much effort it is taking for you to do your normal daily tasks.   You should be able to recognize when too much exertion is being expended.    Elements of Energy Conservation:   Prioritize/Plan: Decide what needs to be done today, and what can wait for a later date, write to do lists, plan ahead to avoid extra trips, and gather supplies and equipment needed before starting an activity.   Position:

## 2024-07-02 NOTE — PLAN OF CARE
Problem: Safety - Adult  Goal: Free from fall injury  7/2/2024 0852 by Cari Mendoza, RN  Outcome: Progressing  7/2/2024 0705 by Jamee Shook, RN  Outcome: Progressing

## 2024-07-03 VITALS
DIASTOLIC BLOOD PRESSURE: 54 MMHG | SYSTOLIC BLOOD PRESSURE: 96 MMHG | HEIGHT: 60 IN | OXYGEN SATURATION: 95 % | TEMPERATURE: 98.1 F | WEIGHT: 171.1 LBS | HEART RATE: 61 BPM | BODY MASS INDEX: 33.59 KG/M2 | RESPIRATION RATE: 18 BRPM

## 2024-07-03 LAB
ALBUMIN SERPL ELPH-MCNC: 3.3 G/DL (ref 3.1–4.9)
ALPHA1 GLOB SERPL ELPH-MCNC: 0.3 G/DL (ref 0.2–0.4)
ALPHA2 GLOB SERPL ELPH-MCNC: 0.9 G/DL (ref 0.4–1.1)
ANA SER QL IA: NEGATIVE
ANION GAP SERPL CALCULATED.3IONS-SCNC: 12 MMOL/L (ref 3–16)
B-GLOBULIN SERPL ELPH-MCNC: 1 G/DL (ref 0.9–1.6)
BASOPHILS # BLD: 0.1 K/UL (ref 0–0.2)
BASOPHILS NFR BLD: 0.9 %
BUN SERPL-MCNC: 11 MG/DL (ref 7–20)
CALCIUM SERPL-MCNC: 9.2 MG/DL (ref 8.3–10.6)
CHLORIDE SERPL-SCNC: 102 MMOL/L (ref 99–110)
CO2 SERPL-SCNC: 24 MMOL/L (ref 21–32)
CREAT SERPL-MCNC: 0.6 MG/DL (ref 0.6–1.1)
DEPRECATED RDW RBC AUTO: 13.2 % (ref 12.4–15.4)
EOSINOPHIL # BLD: 0.3 K/UL (ref 0–0.6)
EOSINOPHIL NFR BLD: 4.1 %
FOLATE SERPL-MCNC: 8.61 NG/ML (ref 4.78–24.2)
GAMMA GLOB SERPL ELPH-MCNC: 0.9 G/DL (ref 0.6–1.8)
GFR SERPLBLD CREATININE-BSD FMLA CKD-EPI: >90 ML/MIN/{1.73_M2}
GLUCOSE SERPL-MCNC: 98 MG/DL (ref 70–99)
HCT VFR BLD AUTO: 40.9 % (ref 36–48)
HGB BLD-MCNC: 14 G/DL (ref 12–16)
LYMPHOCYTES # BLD: 2.6 K/UL (ref 1–5.1)
LYMPHOCYTES NFR BLD: 41.9 %
MCH RBC QN AUTO: 31.6 PG (ref 26–34)
MCHC RBC AUTO-ENTMCNC: 34.1 G/DL (ref 31–36)
MCV RBC AUTO: 92.6 FL (ref 80–100)
MONOCYTES # BLD: 0.5 K/UL (ref 0–1.3)
MONOCYTES NFR BLD: 7.4 %
NEUTROPHILS # BLD: 2.9 K/UL (ref 1.7–7.7)
NEUTROPHILS NFR BLD: 45.7 %
PLATELET # BLD AUTO: 330 K/UL (ref 135–450)
PMV BLD AUTO: 9 FL (ref 5–10.5)
POTASSIUM SERPL-SCNC: 4 MMOL/L (ref 3.5–5.1)
PROT SERPL-MCNC: 6.4 G/DL (ref 6.4–8.2)
RBC # BLD AUTO: 4.42 M/UL (ref 4–5.2)
SODIUM SERPL-SCNC: 138 MMOL/L (ref 136–145)
SPE/IFE INTERPRETATION: NORMAL
TSH SERPL DL<=0.005 MIU/L-ACNC: 2.89 UIU/ML (ref 0.27–4.2)
VIT B12 SERPL-MCNC: 317 PG/ML (ref 211–911)
WBC # BLD AUTO: 6.3 K/UL (ref 4–11)

## 2024-07-03 PROCEDURE — G0378 HOSPITAL OBSERVATION PER HR: HCPCS

## 2024-07-03 PROCEDURE — APPNB30 APP NON BILLABLE TIME 0-30 MINS

## 2024-07-03 PROCEDURE — 85025 COMPLETE CBC W/AUTO DIFF WBC: CPT

## 2024-07-03 PROCEDURE — 2580000003 HC RX 258: Performed by: NURSE PRACTITIONER

## 2024-07-03 PROCEDURE — 6370000000 HC RX 637 (ALT 250 FOR IP): Performed by: NURSE PRACTITIONER

## 2024-07-03 PROCEDURE — 6370000000 HC RX 637 (ALT 250 FOR IP)

## 2024-07-03 PROCEDURE — 80048 BASIC METABOLIC PNL TOTAL CA: CPT

## 2024-07-03 RX ADMIN — SODIUM CHLORIDE, PRESERVATIVE FREE 10 ML: 5 INJECTION INTRAVENOUS at 09:07

## 2024-07-03 RX ADMIN — ESCITALOPRAM OXALATE 20 MG: 10 TABLET ORAL at 08:48

## 2024-07-03 RX ADMIN — APIXABAN 5 MG: 5 TABLET, FILM COATED ORAL at 08:48

## 2024-07-03 RX ADMIN — PANTOPRAZOLE SODIUM 40 MG: 40 TABLET, DELAYED RELEASE ORAL at 06:52

## 2024-07-03 RX ADMIN — ATORVASTATIN CALCIUM 20 MG: 10 TABLET, FILM COATED ORAL at 08:48

## 2024-07-03 RX ADMIN — ASPIRIN 81 MG 81 MG: 81 TABLET ORAL at 08:48

## 2024-07-03 NOTE — DISCHARGE SUMMARY
months if symptoms persist.  Consider workup for MS/sarcoidosis contributing     NICM s/p AICD/Chronic HFrEF  -Last echo 1/2024, rEF at 45%  -Stable, compensated  -Discharge with home metoprolol     Factor VIII disorder  History of blood clots and PE  -Discharge with home Eliquis     Tobacco use  -Smoking cessation counseling     Anxiety  -Received Atarax 25 mg 3 times daily as needed during admission     History of sarcoidosis  -May be contributing to some of her symptoms  -Recommend continuing outpatient follow-up with  or [facility]    The patient expressed appropriate understanding of, and agreement with the discharge recommendations, medications, and plan.     Consults this admission:  IP CONSULT TO HEART FAILURE NURSE/COORDINATOR  IP CONSULT TO DIETITIAN  IP CONSULT TO NEUROLOGY    Discharge Diagnosis:   Memory problem    Discharge Instruction:   Follow up appointments:   Primary care physician: Teri Garcia APRN - CNP within 2 weeks  Diet: regular diet   Activity: activity as tolerated  Disposition: Discharged to:   [x]Home, [][facility], []SNF, []Acute Rehab, []Hospice   Condition on discharge: Stable  Labs and Tests to be Followed up as an outpatient by PCP or Specialist:     Discharge Medications:        Medication List        CHANGE how you take these medications      simvastatin 40 MG tablet  Commonly known as: ZOCOR  Take 1 tablet by mouth nightly  What changed: additional instructions            CONTINUE taking these medications      aspirin 81 MG chewable tablet  Take 1 tablet by mouth daily     Eliquis 5 MG Tabs tablet  Generic drug: apixaban     escitalopram 20 MG tablet  Commonly known as: LEXAPRO  Take 1 tablet by mouth daily     furosemide 20 MG tablet  Commonly known as: LASIX     gabapentin 600 MG tablet  Commonly known as: NEURONTIN     loratadine 10 MG capsule  Commonly known as: CLARITIN  Take 1 capsule by mouth daily     metoprolol succinate 25 MG extended release tablet  Commonly

## 2024-07-03 NOTE — PLAN OF CARE
Problem: Discharge Planning  Goal: Discharge to home or other facility with appropriate resources  Outcome: Progressing  Flowsheets (Taken 7/2/2024 2100)  Discharge to home or other facility with appropriate resources: Identify barriers to discharge with patient and caregiver     Problem: Safety - Adult  Goal: Free from fall injury  7/2/2024 2222 by Alicia Paris, RN  Outcome: Progressing     Problem: Nutrition Deficit:  Goal: Optimize nutritional status  Outcome: Progressing

## 2024-07-03 NOTE — PROGRESS NOTES
0900- Morning assessments and vital signs complete. Patient given scheduled medications as prescribed. Patient declines to take lasix at this time stating that she no longer takes this medication. Patient is alert and oriented x 4, forgetful at times. She is able to ambulate to restroom with SBA. She denies pain at this time.  is at bedside. Update of care discussed, questions and concerns addressed.     1040- Patient is complaining of extreme anxiety and is asking for something. She is visibly shaking and crying. She had ativan last night and states it was effective. MD notified, awaiting response.    1200- New order noted per Neurology. Atarax given as prescribed. Call received from CT informing that patient needs to be NPO x4hrs prior to scan. Patient educated, verbal understanding stated.    1500- Patient transported off unit to CT via wheelchair. Patient returned to unit without incident.  
4 Eyes Skin Assessment     NAME:  Valeria Flores  YOB: 1975  MEDICAL RECORD NUMBER:  [id number]    The patient is being assessed for  Admission    I agree that at least one RN has performed a thorough Head to Toe Skin Assessment on the patient. ALL assessment sites listed below have been assessed.      Areas assessed by both nurses:    Head, Face, Ears, Shoulders, Back, Chest, Arms, Elbows, Hands, Sacrum. Buttock, Coccyx, Ischium, Legs. Feet and Heels, Under Medical Devices , and Other ***        Does the Patient have a Wound? No noted wound(s), Scars on arms bilaterally.       Shiv Prevention initiated by RN: Yes  Wound Care Orders initiated by RN: No    Pressure Injury (Stage 3,4, Unstageable, DTI, NWPT, and Complex wounds) if present, place Wound referral order by RN under : No    New Ostomies, if present place, Ostomy referral order under : No     Nurse 1 eSignature: Electronically signed by Jamee Shook RN on 7/2/24 at 12:20 AM EDT    **SHARE this note so that the co-signing nurse can place an eSignature**    Nurse 2 eSignature: {Esignature:[id number]}   
Attending Physician Attestation    Addendum to AI (serving as scribe)/Resident Note:     Pt seen,examined and evaluated the patient with AI/resident. I have reviewed the current history, physical findings, labs and assessment and plan and agree with note as documented including:    Subjective: memory symptoms seem improved but having vague weakness complaints.     PHYSICAL EXAM:    /60   Pulse 60   Temp 97.7 °F (36.5 °C) (Oral)   Resp 19   Ht 1.524 m (5')   Wt 77.6 kg (171 lb 1.6 oz)   SpO2 97%   BMI 33.42 kg/m²     General appearance:  No apparent distress  Respiratory:  Normal respiratory effort.   Cardiovascular:  Regular rate and rhythm.  Abdomen:  Soft, non-tender, non-distended.  Musculoskeletal:  No edema  Neurologic:  Non-focal  Psychiatric:  Alert and oriented      Labs:     Recent Labs     07/01/24 2001   WBC 9.2   HGB 13.0   HCT 37.5        Recent Labs     07/01/24 2001      K 3.6      CO2 24   BUN 5*   CREATININE 0.6   CALCIUM 8.7     Recent Labs     07/01/24 2001   AST 17   ALT 13   BILITOT 0.3   ALKPHOS 148*     No results for input(s): \"INR\" in the last 72 hours.  No results for input(s): \"CKTOTAL\", \"TROPONINI\" in the last 72 hours.      Active Hospital Problems    Diagnosis Date Noted    HTN (hypertension), benign [I10] 07/17/2022     Priority: Medium    Confusion [R41.0] 07/02/2024    Abnormal head CT [R93.0] 07/02/2024    TIA (transient ischemic attack) [G45.9] 07/02/2024    Memory problem [R41.3] 07/01/2024       Assessment  Memory loss  Chronic systolic heart failure  Clotting disorder  Sarcoidosis  Anxiety  Tobacco dependence    Plan  Neurology consulted  Unable to get MRI due to pacer  CT head negative  CTA head and neck ordered  CT head with contrast ordered  Continue metoprolol, eliquis  Needs follow up for known sarcoidosis. Not currently on treatment    MDM  [x] High (any 2)    A. Problems (any 1)  [x] Acute/Chronic Illness/injury posing threat to life or 
Physical Therapy and Occupational Therapy    Therapy orders received and Pt chart reviewed. Attempted to see pt this afternoon. Per RN pt INDEP in room. Pt steady with ambulation to bathroom. No acute therapy needs at this time.  Will sign off        Shaina Florian, SANDEE Duffy, OT      
Pt arrived in room. AxO4, VS and 4eyes completed. Pt appears fatigued and sleepy. Pt bp running soft. Pt states she runs low usually, therefore the ativan probably lowed it. Pt assessment to follow.   
Pt ok for discharge per MD. Discharge instructions given. IV removed. Telemetry monitor removed and CMU notified. No questions or concerns at this time. Patient walked out to "[employer] car with all belongings.   
Valeria Flores  Neurology Follow-up  [facility]    Date of Service: 7/3/2024    Subjective:   CC: Follow up today regarding: Acute memory disturbance     Events noted. Chart and lab reviewed. Pt states she feels back to her baseline, states her memory is better and she no longer feels weak. States she feels better being away from the stress of her home life. Updated on CT results.       ROS : A 10-12 system review obtained and updated today and is unremarkable except as mentioned  in my interval history.     Medication, past medical history, social history, and family history reviewed.    Objective:  Exam:   Constitutional:   Vitals:    07/02/24 2000 07/02/24 2334 07/03/24 0356 07/03/24 0829   BP: 92/60 (!) 91/58 (!) 92/54 105/61   Pulse: 60 60 60 60   Resp: 18 18 16 16   Temp: 97.3 °F (36.3 °C) 97.6 °F (36.4 °C) 97.6 °F (36.4 °C) 97.9 °F (36.6 °C)   TempSrc: Oral Oral Oral Oral   SpO2: 93% 92% (!) 89% 92%   Weight:       Height:           General appearance:  Normal development and appear in no acute distress.   Mental Status:   Oriented to person, place, problem, and time.    Memory: Good immediate recall.  Intact remote memory  Normal attention span and concentration.  Language: intact naming, repeating and fluency   Good fund of Knowledge. Aware of current events and vocabulary   Cranial Nerves:   II: Visual fields: Full. Pupils: equal, round, reactive to light, bilaterally  III,IV,VI: Extra Ocular Movements are intact. No nystagmus  V: Facial sensation is intact  VII: Facial strength and movements: intact and symmetric  IX: Normal palatal elevation and shoulder shrug  XII: Tongue movements are normal  Musculoskeletal: 5/5 in all 4 extremities.  Good range of motion.  No muscle atrophy.    Tone: Normal tone.   Reflexes: Symmetric 2+ in the arms and 2+ in the legs   Planters: Flexor bilaterally  Coordination: no pronator drift, no dysmetria with FNF and normal REM  Sensation: Bilateral lower 
reconstruction, and/or weight based adjustment of the mA/kV was utilized to reduce the radiation dose to as low as reasonably achievable. COMPARISON: January 10, 2024 HISTORY: ORDERING SYSTEM PROVIDED HISTORY: Altered sensorium TECHNOLOGIST PROVIDED HISTORY: Reason for Exam: new on eliquis, vision problems, altered sensorium FINDINGS: BRAIN/VENTRICLES: There is no acute intracranial hemorrhage, mass effect or midline shift.  No abnormal extra-axial fluid collection.  The gray-white differentiation is maintained without evidence of an acute infarct.  There is no evidence of hydrocephalus. Redemonstration of a small hypodensity in the white matter of the left frontal brain also previously seen and has a similar appearance compared to the previous evaluation. ORBITS: The visualized portion of the orbits demonstrate no acute abnormality. SINUSES: The visualized paranasal sinuses and mastoid air cells demonstrate no acute abnormality. SOFT TISSUES/SKULL:  No acute abnormality of the visualized skull or soft tissues.     No acute intracranial abnormality. Stable area of hypoattenuation in the left frontal white matter which is not seen when compared with previous evaluations.       CBC:   Recent Labs     07/01/24 2001   WBC 9.2   HGB 13.0        BMP:    Recent Labs     07/01/24 2001      K 3.6      CO2 24   BUN 5*   CREATININE 0.6   GLUCOSE 94     Hepatic:   Recent Labs     07/01/24 2001   AST 17   ALT 13   BILITOT 0.3   ALKPHOS 148*     Lipids:   Lab Results   Component Value Date/Time    CHOL 337 03/24/2023 09:19 AM    HDL 51 03/24/2023 09:19 AM    HDL 68 05/09/2011 06:15 AM    TRIG 153 03/24/2023 09:19 AM     Hemoglobin A1C:   Lab Results   Component Value Date/Time    LABA1C 5.1 03/08/2022 03:57 PM     TSH:   Lab Results   Component Value Date/Time    TSH 6.56 01/10/2024 12:37 AM     Troponin: No results found for: \"TROPONINT\"  Lactic Acid: No results for input(s): \"LACTA\" in the last 72

## 2024-07-03 NOTE — CARE COORDINATION
CASE MANAGEMENT DISCHARGE SUMMARY      Discharge to: home with son.  Patient denies d/c needs.        IMM given: NA    New Durable Medical Equipment ordered/agency: NA    Transportation:    Family/car: [employer] me[employer] Iain       Confirmed discharge plan with:     Patient: yes     Family:  yes, per patient         RN, name: Juana    Note: Discharging nurse to complete MP, reconcile AVS, and place final copy with patient's discharge packet. RN to ensure that written prescriptions for  Level II medications are sent with patient to the facility as per protocol.        
      Potential DME:    Patient expects to discharge to: Trailer/mobile home  Plan for transportation at discharge: (P) Self    Financial    Payor: [employer] [place] / Plan: [employer] [place] [employer]UAL / Product Type: *No Product type* /     Does insurance require precert for SNF: No    Potential assistance Purchasing Medications: (P) No  Meds-to-Beds request: Yes      [facility] [place], [place] - [address] -  [phone] - F [phone]  [address] [facility]  [place] [address]  Phone: [phone] Fax: [phone]      Notes:    Factors facilitating achievement of predicted outcomes: Family support, Motivated, Cooperative, and Pleasant    Barriers to discharge: Medical complications    Additional Case Management Notes: Referred to patient for d/c planning.  Spoke to patient.  Patient is a 48 year old female admitted for AMS.  Patient lives at home with son.  Patient reports she is independent in ADLs.  Patient reports she recently moved here from [place].  Patient interested in housing resources.  Resources provided. Patient has PCP and is able to obtain medications.  Patient currently denies d/c needs.     The Plan for Transition of Care is related to the following treatment goals of Confusion [R41.0]  Memory problem [R41.3]  Abnormal head CT [R93.0]  Other fatigue [R53.83]    IF APPLICABLE: The Patient and/or patient representative Valeria and her family were provided with a choice of provider and agrees with the discharge plan. Freedom of choice list with basic dialogue that supports the patient's individualized plan of care/goals and shares the quality data associated with the providers was provided to:     Patient Representative Name:       The Patient and/or Patient Representative Agree with the Discharge Plan?      YOLI Lee, JUWANS   Case Management Department  Ph: [phone] Fax: [phone]

## 2024-07-03 NOTE — PLAN OF CARE
Problem: Discharge Planning  Goal: Discharge to home or other facility with appropriate resources  7/3/2024 1323 by Juana Lemos, RN  Outcome: Completed     Problem: Safety - Adult  Goal: Free from fall injury  7/3/2024 1323 by Juana Lemos, RN  Outcome: Completed     Problem: Pain  Goal: Verbalizes/displays adequate comfort level or baseline comfort level  7/3/2024 1323 by Juana Lemos, RN  Outcome: Completed     Problem: Nutrition Deficit:  Goal: Optimize nutritional status  7/3/2024 1323 by Juana Lemos, RN  Outcome: Completed

## 2024-07-03 NOTE — PLAN OF CARE
Problem: Discharge Planning  Goal: Discharge to home or other facility with appropriate resources  7/3/2024 1051 by Juana Lemos, RN  Outcome: Progressing     Problem: Safety - Adult  Goal: Free from fall injury  7/3/2024 1051 by Juana Lemos, RN  Outcome: Progressing     Problem: Pain  Goal: Verbalizes/displays adequate comfort level or baseline comfort level  7/3/2024 1051 by Juana Lemos, RN  Outcome: Progressing     Problem: Nutrition Deficit:  Goal: Optimize nutritional status  7/3/2024 1051 by Juana Lemos, RN  Outcome: Progressing

## 2024-09-09 ENCOUNTER — OFFICE VISIT (OUTPATIENT)
Age: 49
End: 2024-09-09

## 2024-09-09 VITALS
SYSTOLIC BLOOD PRESSURE: 119 MMHG | OXYGEN SATURATION: 96 % | BODY MASS INDEX: 34.36 KG/M2 | DIASTOLIC BLOOD PRESSURE: 77 MMHG | HEART RATE: 70 BPM | WEIGHT: 175 LBS | HEIGHT: 60 IN

## 2024-09-09 DIAGNOSIS — D86.9 SARCOIDOSIS, UNSPECIFIED: Primary | ICD-10-CM

## 2024-09-09 DIAGNOSIS — R52 ACUTE PAIN: ICD-10-CM

## 2024-09-09 RX ORDER — PREDNISONE 20 MG/1
20 TABLET ORAL 2 TIMES DAILY
Qty: 10 TABLET | Refills: 0 | Status: SHIPPED | OUTPATIENT
Start: 2024-09-09 | End: 2024-09-14

## 2024-09-09 RX ORDER — ONDANSETRON 4 MG/1
4 TABLET, ORALLY DISINTEGRATING ORAL EVERY 6 HOURS PRN
Qty: 20 TABLET | Refills: 0 | Status: SHIPPED | OUTPATIENT
Start: 2024-09-09 | End: 2024-09-14

## 2024-09-25 ENCOUNTER — TELEPHONE (OUTPATIENT)
Dept: NEUROLOGY | Age: 49
End: 2024-09-25

## 2024-12-13 ENCOUNTER — OFFICE VISIT (OUTPATIENT)
Age: 49
End: 2024-12-13
Payer: COMMERCIAL

## 2024-12-13 ENCOUNTER — TELEPHONE (OUTPATIENT)
Dept: ADMINISTRATIVE | Age: 49
End: 2024-12-13

## 2024-12-13 VITALS
SYSTOLIC BLOOD PRESSURE: 135 MMHG | DIASTOLIC BLOOD PRESSURE: 73 MMHG | WEIGHT: 166.6 LBS | HEIGHT: 60 IN | OXYGEN SATURATION: 97 % | BODY MASS INDEX: 32.71 KG/M2 | HEART RATE: 76 BPM

## 2024-12-13 DIAGNOSIS — R21 RASH: ICD-10-CM

## 2024-12-13 DIAGNOSIS — R41.0 TRANSIENT CONFUSION: ICD-10-CM

## 2024-12-13 DIAGNOSIS — G43.109 MIGRAINE WITH AURA AND WITHOUT STATUS MIGRAINOSUS, NOT INTRACTABLE: ICD-10-CM

## 2024-12-13 DIAGNOSIS — D86.9 SARCOIDOSIS: Primary | ICD-10-CM

## 2024-12-13 DIAGNOSIS — Z86.73 HISTORY OF STROKE: ICD-10-CM

## 2024-12-13 PROBLEM — I63.9 CEREBROVASCULAR ACCIDENT (CVA) (HCC): Status: RESOLVED | Noted: 2022-09-27 | Resolved: 2024-12-13

## 2024-12-13 PROBLEM — R90.89 ABNORMAL BRAIN MRI: Status: ACTIVE | Noted: 2024-12-13

## 2024-12-13 PROCEDURE — G8427 DOCREV CUR MEDS BY ELIG CLIN: HCPCS | Performed by: STUDENT IN AN ORGANIZED HEALTH CARE EDUCATION/TRAINING PROGRAM

## 2024-12-13 PROCEDURE — 3075F SYST BP GE 130 - 139MM HG: CPT | Performed by: STUDENT IN AN ORGANIZED HEALTH CARE EDUCATION/TRAINING PROGRAM

## 2024-12-13 PROCEDURE — 99205 OFFICE O/P NEW HI 60 MIN: CPT | Performed by: STUDENT IN AN ORGANIZED HEALTH CARE EDUCATION/TRAINING PROGRAM

## 2024-12-13 PROCEDURE — 4004F PT TOBACCO SCREEN RCVD TLK: CPT | Performed by: STUDENT IN AN ORGANIZED HEALTH CARE EDUCATION/TRAINING PROGRAM

## 2024-12-13 PROCEDURE — 3078F DIAST BP <80 MM HG: CPT | Performed by: STUDENT IN AN ORGANIZED HEALTH CARE EDUCATION/TRAINING PROGRAM

## 2024-12-13 PROCEDURE — G8417 CALC BMI ABV UP PARAM F/U: HCPCS | Performed by: STUDENT IN AN ORGANIZED HEALTH CARE EDUCATION/TRAINING PROGRAM

## 2024-12-13 PROCEDURE — G8484 FLU IMMUNIZE NO ADMIN: HCPCS | Performed by: STUDENT IN AN ORGANIZED HEALTH CARE EDUCATION/TRAINING PROGRAM

## 2024-12-13 RX ORDER — ONDANSETRON 4 MG/1
4 TABLET, ORALLY DISINTEGRATING ORAL 3 TIMES DAILY PRN
Qty: 20 TABLET | Refills: 11 | Status: SHIPPED | OUTPATIENT
Start: 2024-12-13 | End: 2025-12-08

## 2024-12-13 RX ORDER — TRAZODONE HYDROCHLORIDE 50 MG/1
TABLET, FILM COATED ORAL
COMMUNITY
Start: 2024-11-13

## 2024-12-13 RX ORDER — ATORVASTATIN CALCIUM 80 MG/1
80 TABLET, FILM COATED ORAL DAILY
Qty: 30 TABLET | Refills: 11 | Status: SHIPPED | OUTPATIENT
Start: 2024-12-13 | End: 2025-12-08

## 2024-12-13 NOTE — ASSESSMENT & PLAN NOTE
Chronic, not at goal (unstable), changes made today: continue metroprolol 25mg/day (indication HTN but since starting headache frequency is improving), start Nurtec 75mg prn (contraindication to triptan = h/o stroke), continue Zofran 4mg ODT prn (effective treatment for migraine with nausea), medication adherence emphasized, and lifestyle modifications recommended. Neurological exam is normal including funduscopic exam. Low suspicion for active CNS neurosarcoidosis at this time.

## 2024-12-13 NOTE — ASSESSMENT & PLAN NOTE
Acute condition 7/2024 now resolved. Multiple normal EEG's in the past. Possible TGA per description in consult note at the time. Observe for now.

## 2024-12-13 NOTE — ASSESSMENT & PLAN NOTE
Chronic, at goal (stable), no neurological intervention today. History of cardiac sarcoidosis but it is unclear if there is neurological PNS/CNS involvement. She has neurological symptoms (subjective weakness primarily) but no objective findings on exam save for symmetric hyperreflexia with non sustained ankle clonus. Unable to obtain MRI due to AICD not MR compatible. Defer management to pulmonology at this time. PET of the brain and/or whole body (to involve spinal cord) could be considered in the future if there are objective abnormal findings on neuro exam that may localize to CNS. EMG may be considered in the future if there are abnormal findings on neuro exam that may localize to PNS.

## 2024-12-13 NOTE — TELEPHONE ENCOUNTER
Submitted PA for Nurtec 75MG dispersible tablets  [facility] [id number]  STATUS:     Outcome  N/A  [employer] is not the processor for this request. For additional information, please contact customer service using the number on the back of the benefit card.    Spoke with Lauren. She has to fax a form for completion.

## 2024-12-13 NOTE — PROGRESS NOTES
trapezius strength: normal    CN XII   Tongue: not atrophic  Fasciculations: absent  Tongue deviation: none  No APD or BOYD.   Funduscopic exam did not reveal evidence of papilledema or optic neuritis/atrophy.        Motor Exam   Muscle bulk: normal  Overall muscle tone: normal  Right arm tone: normal  Left arm tone: normal  Right arm pronator drift: absent  Left arm pronator drift: absent  Right leg tone: normal  Left leg tone: normal    Strength   Strength 5/5 throughout.     Sensory Exam   Light touch normal.   Vibration normal.     Gait, Coordination, and Reflexes     Gait  Gait: normal (able to walk on heels and toes)    Coordination   Romberg: negative  Finger to nose coordination: normal  Tandem walking coordination: normal    Tremor   Resting tremor: absent  Action tremor: absent    Reflexes   Right brachioradialis: 3+  Left brachioradialis: 3+  Right biceps: 3+  Left biceps: 3+  Right triceps: 3+  Left triceps: 3+  Right patellar: 3+  Left patellar: 3+  Right achilles: 4+  Left achilles: 4+  Right plantar: normal  Left plantar: normal  Right ankle clonus: present (not sustained)  Left ankle clonus: present (not sustained)  Roseanne normal          On this date 12/13/2024 I have spent 69 minutes reviewing previous notes, test results and face to face with the patient discussing the diagnosis and importance of compliance with the treatment plan as well as documenting on the day of the visit.

## 2024-12-13 NOTE — ASSESSMENT & PLAN NOTE
Chronic, not at goal (unstable), changes made today: referred to dermatology. Recurrent painful steroid responsive rash on trunk. Possibly sarcoidosis and if this is the cause, then it would inform treatment of cardiac sarcoidosis.

## 2024-12-13 NOTE — ASSESSMENT & PLAN NOTE
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 She reports that she suffered a stroke in 2010 in [place], [place] at the same time as DVT/PE and MI. I don't have an MRI from 2010 to confirm this but 2012 MRI revealed old T2 hyperintense lesion in the left frontal lobe (not contrast enhancing) that could be explained by 2010 stroke. Multiple TTE studies did not reveal or mention right to left shunt. There is one 2010 TTE that mentioned possible intracardiac thrombus. Given her clinical history anticoagulation is the best anti thrombotic treatment. Anti platelet will not provide significant benefit and only increase risk of major bleeding event but this may be required for cardiac indication.

## 2024-12-18 ENCOUNTER — HOSPITAL ENCOUNTER (EMERGENCY)
Age: 49
Discharge: HOME | End: 2024-12-18
Attending: EMERGENCY MEDICINE
Payer: COMMERCIAL

## 2024-12-18 ENCOUNTER — APPOINTMENT (OUTPATIENT)
Dept: CT IMAGING | Age: 49
End: 2024-12-18
Payer: COMMERCIAL

## 2024-12-18 ENCOUNTER — APPOINTMENT (OUTPATIENT)
Dept: GENERAL RADIOLOGY | Age: 49
End: 2024-12-18
Payer: COMMERCIAL

## 2024-12-18 VITALS
DIASTOLIC BLOOD PRESSURE: 79 MMHG | SYSTOLIC BLOOD PRESSURE: 98 MMHG | OXYGEN SATURATION: 96 % | RESPIRATION RATE: 15 BRPM | TEMPERATURE: 98.4 F | HEART RATE: 63 BPM

## 2024-12-18 DIAGNOSIS — R11.2 NAUSEA VOMITING AND DIARRHEA: Primary | ICD-10-CM

## 2024-12-18 DIAGNOSIS — R19.7 NAUSEA VOMITING AND DIARRHEA: Primary | ICD-10-CM

## 2024-12-18 LAB
ALBUMIN SERPL-MCNC: 4.2 G/DL (ref 3.4–5)
ALBUMIN/GLOB SERPL: 1.4 {RATIO} (ref 1.1–2.2)
ALP SERPL-CCNC: 137 U/L (ref 40–129)
ALT SERPL-CCNC: 10 U/L (ref 10–40)
ANION GAP SERPL CALCULATED.3IONS-SCNC: 12 MMOL/L (ref 3–16)
AST SERPL-CCNC: 21 U/L (ref 15–37)
BASOPHILS # BLD: 0.1 K/UL (ref 0–0.2)
BASOPHILS NFR BLD: 0.8 %
BILIRUB SERPL-MCNC: 0.5 MG/DL (ref 0–1)
BILIRUB UR QL STRIP.AUTO: NEGATIVE
BUN SERPL-MCNC: 5 MG/DL (ref 7–20)
CALCIUM SERPL-MCNC: 9.2 MG/DL (ref 8.3–10.6)
CHLORIDE SERPL-SCNC: 98 MMOL/L (ref 99–110)
CLARITY UR: CLEAR
CO2 SERPL-SCNC: 24 MMOL/L (ref 21–32)
COLOR UR: YELLOW
CREAT SERPL-MCNC: 0.8 MG/DL (ref 0.6–1.1)
DEPRECATED RDW RBC AUTO: 13 % (ref 12.4–15.4)
EKG ATRIAL RATE: 65 BPM
EKG DIAGNOSIS: NORMAL
EKG P AXIS: -9 DEGREES
EKG P-R INTERVAL: 152 MS
EKG Q-T INTERVAL: 424 MS
EKG QRS DURATION: 104 MS
EKG QTC CALCULATION (BAZETT): 440 MS
EKG R AXIS: 4 DEGREES
EKG T AXIS: -25 DEGREES
EKG VENTRICULAR RATE: 65 BPM
EOSINOPHIL # BLD: 0.3 K/UL (ref 0–0.6)
EOSINOPHIL NFR BLD: 2.7 %
EPI CELLS #/AREA URNS HPF: NORMAL /HPF (ref 0–5)
GFR SERPLBLD CREATININE-BSD FMLA CKD-EPI: >90 ML/MIN/{1.73_M2}
GLUCOSE SERPL-MCNC: 117 MG/DL (ref 70–99)
GLUCOSE UR STRIP.AUTO-MCNC: NEGATIVE MG/DL
HCT VFR BLD AUTO: 41.4 % (ref 36–48)
HGB BLD-MCNC: 14.3 G/DL (ref 12–16)
HGB UR QL STRIP.AUTO: ABNORMAL
KETONES UR STRIP.AUTO-MCNC: NEGATIVE MG/DL
LEUKOCYTE ESTERASE UR QL STRIP.AUTO: NEGATIVE
LIPASE SERPL-CCNC: 31 U/L (ref 13–60)
LYMPHOCYTES # BLD: 4.4 K/UL (ref 1–5.1)
LYMPHOCYTES NFR BLD: 41.3 %
MAGNESIUM SERPL-MCNC: 2.18 MG/DL (ref 1.8–2.4)
MCH RBC QN AUTO: 32.3 PG (ref 26–34)
MCHC RBC AUTO-ENTMCNC: 34.5 G/DL (ref 31–36)
MCV RBC AUTO: 93.6 FL (ref 80–100)
MONOCYTES # BLD: 0.5 K/UL (ref 0–1.3)
MONOCYTES NFR BLD: 5.1 %
NEUTROPHILS # BLD: 5.3 K/UL (ref 1.7–7.7)
NEUTROPHILS NFR BLD: 50.1 %
NITRITE UR QL STRIP.AUTO: NEGATIVE
PH UR STRIP.AUTO: 5.5 [PH] (ref 5–8)
PLATELET # BLD AUTO: 365 K/UL (ref 135–450)
PMV BLD AUTO: 9.4 FL (ref 5–10.5)
POTASSIUM SERPL-SCNC: 3.4 MMOL/L (ref 3.5–5.1)
PROT SERPL-MCNC: 7.3 G/DL (ref 6.4–8.2)
PROT UR STRIP.AUTO-MCNC: NEGATIVE MG/DL
RBC # BLD AUTO: 4.42 M/UL (ref 4–5.2)
RBC #/AREA URNS HPF: NORMAL /HPF (ref 0–4)
SODIUM SERPL-SCNC: 134 MMOL/L (ref 136–145)
SP GR UR STRIP.AUTO: <=1.005 (ref 1–1.03)
TROPONIN, HIGH SENSITIVITY: 11 NG/L (ref 0–14)
TROPONIN, HIGH SENSITIVITY: 8 NG/L (ref 0–14)
UA COMPLETE W REFLEX CULTURE PNL UR: ABNORMAL
UA DIPSTICK W REFLEX MICRO PNL UR: YES
URN SPEC COLLECT METH UR: ABNORMAL
UROBILINOGEN UR STRIP-ACNC: 0.2 E.U./DL
WBC # BLD AUTO: 10.6 K/UL (ref 4–11)
WBC #/AREA URNS HPF: NORMAL /HPF (ref 0–5)

## 2024-12-18 PROCEDURE — 99285 EMERGENCY DEPT VISIT HI MDM: CPT

## 2024-12-18 PROCEDURE — 93010 ELECTROCARDIOGRAM REPORT: CPT | Performed by: INTERNAL MEDICINE

## 2024-12-18 PROCEDURE — 6360000002 HC RX W HCPCS: Performed by: EMERGENCY MEDICINE

## 2024-12-18 PROCEDURE — 74177 CT ABD & PELVIS W/CONTRAST: CPT

## 2024-12-18 PROCEDURE — 96365 THER/PROPH/DIAG IV INF INIT: CPT

## 2024-12-18 PROCEDURE — 83735 ASSAY OF MAGNESIUM: CPT

## 2024-12-18 PROCEDURE — 6360000004 HC RX CONTRAST MEDICATION: Performed by: EMERGENCY MEDICINE

## 2024-12-18 PROCEDURE — 83690 ASSAY OF LIPASE: CPT

## 2024-12-18 PROCEDURE — 96375 TX/PRO/DX INJ NEW DRUG ADDON: CPT

## 2024-12-18 PROCEDURE — 93005 ELECTROCARDIOGRAM TRACING: CPT | Performed by: EMERGENCY MEDICINE

## 2024-12-18 PROCEDURE — 36415 COLL VENOUS BLD VENIPUNCTURE: CPT

## 2024-12-18 PROCEDURE — 2580000003 HC RX 258: Performed by: EMERGENCY MEDICINE

## 2024-12-18 PROCEDURE — 80053 COMPREHEN METABOLIC PANEL: CPT

## 2024-12-18 PROCEDURE — 81001 URINALYSIS AUTO W/SCOPE: CPT

## 2024-12-18 PROCEDURE — 84484 ASSAY OF TROPONIN QUANT: CPT

## 2024-12-18 PROCEDURE — 71045 X-RAY EXAM CHEST 1 VIEW: CPT

## 2024-12-18 PROCEDURE — 85025 COMPLETE CBC W/AUTO DIFF WBC: CPT

## 2024-12-18 RX ORDER — POTASSIUM CHLORIDE 7.45 MG/ML
10 INJECTION INTRAVENOUS ONCE
Status: COMPLETED | OUTPATIENT
Start: 2024-12-18 | End: 2024-12-18

## 2024-12-18 RX ORDER — METOCLOPRAMIDE HYDROCHLORIDE 5 MG/ML
10 INJECTION INTRAMUSCULAR; INTRAVENOUS ONCE
Status: COMPLETED | OUTPATIENT
Start: 2024-12-18 | End: 2024-12-18

## 2024-12-18 RX ORDER — LORAZEPAM 2 MG/ML
1 INJECTION INTRAMUSCULAR ONCE
Status: COMPLETED | OUTPATIENT
Start: 2024-12-18 | End: 2024-12-18

## 2024-12-18 RX ORDER — IOPAMIDOL 755 MG/ML
75 INJECTION, SOLUTION INTRAVASCULAR
Status: COMPLETED | OUTPATIENT
Start: 2024-12-18 | End: 2024-12-18

## 2024-12-18 RX ORDER — METOCLOPRAMIDE 10 MG/1
10 TABLET ORAL
Qty: 30 TABLET | Refills: 0 | Status: SHIPPED | OUTPATIENT
Start: 2024-12-18

## 2024-12-18 RX ORDER — 0.9 % SODIUM CHLORIDE 0.9 %
1000 INTRAVENOUS SOLUTION INTRAVENOUS ONCE
Status: COMPLETED | OUTPATIENT
Start: 2024-12-18 | End: 2024-12-18

## 2024-12-18 RX ORDER — MORPHINE SULFATE 2 MG/ML
2 INJECTION, SOLUTION INTRAMUSCULAR; INTRAVENOUS ONCE
Status: COMPLETED | OUTPATIENT
Start: 2024-12-18 | End: 2024-12-18

## 2024-12-18 RX ADMIN — SODIUM CHLORIDE 1000 ML: 9 INJECTION, SOLUTION INTRAVENOUS at 03:52

## 2024-12-18 RX ADMIN — METOCLOPRAMIDE 10 MG: 5 INJECTION, SOLUTION INTRAMUSCULAR; INTRAVENOUS at 03:30

## 2024-12-18 RX ADMIN — LORAZEPAM 1 MG: 2 INJECTION INTRAMUSCULAR; INTRAVENOUS at 03:52

## 2024-12-18 RX ADMIN — MORPHINE SULFATE 2 MG: 2 INJECTION, SOLUTION INTRAMUSCULAR; INTRAVENOUS at 03:30

## 2024-12-18 RX ADMIN — IOPAMIDOL 75 ML: 755 INJECTION, SOLUTION INTRAVENOUS at 03:44

## 2024-12-18 RX ADMIN — POTASSIUM CHLORIDE 10 MEQ: 7.45 INJECTION INTRAVENOUS at 03:52

## 2024-12-18 ASSESSMENT — PAIN - FUNCTIONAL ASSESSMENT: PAIN_FUNCTIONAL_ASSESSMENT: 0-10

## 2024-12-18 ASSESSMENT — PAIN DESCRIPTION - LOCATION: LOCATION: CHEST;JAW

## 2024-12-18 ASSESSMENT — PAIN DESCRIPTION - DESCRIPTORS: DESCRIPTORS: TIGHTNESS

## 2024-12-18 ASSESSMENT — PAIN SCALES - GENERAL: PAINLEVEL_OUTOF10: 6

## 2024-12-18 NOTE — TELEPHONE ENCOUNTER
Submitted PA for Nurtec 75mg , via FAX to [phone]. STATUS: PENDING     Follow up done daily; if no decision with in three days we will refax.  If another three days goes by with no decision will call the insurance for status.

## 2024-12-18 NOTE — ED PROVIDER NOTES
EMERGENCY DEPARTMENT ENCOUNTER     [facility]  ED     Pt Name: Valeria Flores   MRN: [id number]   Birthdate 1975   Date of evaluation: 12/18/2024   Provider: Abbi Linda MD   PCP: Teri Garcia APRN - CNP   Note Started: 4:50 AM EST 12/18/24     CHIEF COMPLAINT:     Chief Complaint   Patient presents with    Chest Pain    Vomiting     Midsternal chest pain and N/V for the past 3 days per pt. States she feels like the left side of her jaw is locking up   Pt very tearful during triage   States anxiety is through the roof since yesterday   Does have an ICD  Given ASA en route and took home dose zofran         HISTORY OF PRESENT ILLNESS:  Adult female who comes in with nausea and vomiting and diarrhea for the past 2 to 3 days.  She is worried because she has a history of MIs and she is not sure if this could be related.  She complains of chest pain but really points to the epigastric region and states there is no radiation.  She also mention cramping of her left jaw intermittently but she has not had this since yesterday.  She has had no fevers or chills.  No shortness of breath.  No lightheadedness.  She has not been able to keep anything down.  She has a history of nausea and vomiting he tried taking his Zofran with no improvement.    PHYSICAL EXAM:    ED Triage Vitals   BP Systolic BP Percentile Diastolic BP Percentile Temp Temp Source Pulse Respirations SpO2   12/18/24 0224 -- -- 12/18/24 0222 12/18/24 0222 12/18/24 0221 12/18/24 0221 12/18/24 0221   (!) 130/90   98.4 °F (36.9 °C) Oral 66 19 99 %      Height Weight         -- --                        Physical Exam  Vitals and nursing note reviewed.   Constitutional:       Appearance: Normal appearance. She is well-developed. She is not ill-appearing.   HENT:      Head: Normocephalic and atraumatic.      Right Ear: External ear normal.      Left Ear: External ear normal.      Nose: Nose normal.   Eyes:      General: No

## 2024-12-19 NOTE — TELEPHONE ENCOUNTER
The medication is APPROVED. Letter uploaded into media.     If this requires a response please respond to the pool ( P MHCX P[facility] MEDICATION PRE-AUTH).      Thank you please advise patient.

## 2024-12-25 ENCOUNTER — HOSPITAL ENCOUNTER (EMERGENCY)
Age: 49
Discharge: HOME | End: 2024-12-26
Attending: EMERGENCY MEDICINE
Payer: COMMERCIAL
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
## 2024-12-25 DIAGNOSIS — K04.7 DENTAL ABSCESS: ICD-10-CM

## 2024-12-25 DIAGNOSIS — L03.211 FACIAL CELLULITIS: Primary | ICD-10-CM

## 2024-12-25 PROCEDURE — 99285 EMERGENCY DEPT VISIT HI MDM: CPT

## 2024-12-25 ASSESSMENT — PAIN SCALES - GENERAL: PAINLEVEL_OUTOF10: 5

## 2024-12-25 ASSESSMENT — PAIN - FUNCTIONAL ASSESSMENT: PAIN_FUNCTIONAL_ASSESSMENT: 0-10

## 2024-12-26 ENCOUNTER — APPOINTMENT (OUTPATIENT)
Dept: GENERAL RADIOLOGY | Age: 49
End: 2024-12-26
Payer: COMMERCIAL

## 2024-12-26 ENCOUNTER — APPOINTMENT (OUTPATIENT)
Dept: CT IMAGING | Age: 49
End: 2024-12-26
Payer: COMMERCIAL

## 2024-12-26 VITALS
TEMPERATURE: 98.3 F | BODY MASS INDEX: 29.45 KG/M2 | DIASTOLIC BLOOD PRESSURE: 64 MMHG | RESPIRATION RATE: 19 BRPM | WEIGHT: 150 LBS | HEIGHT: 60 IN | SYSTOLIC BLOOD PRESSURE: 117 MMHG | HEART RATE: 88 BPM | OXYGEN SATURATION: 95 %

## 2024-12-26 LAB
ANION GAP SERPL CALCULATED.3IONS-SCNC: 11 MMOL/L (ref 3–16)
BASOPHILS # BLD: 0.1 K/UL (ref 0–0.2)
BASOPHILS NFR BLD: 0.7 %
BUN SERPL-MCNC: 6 MG/DL (ref 7–20)
CALCIUM SERPL-MCNC: 8.4 MG/DL (ref 8.3–10.6)
CHLORIDE SERPL-SCNC: 105 MMOL/L (ref 99–110)
CO2 SERPL-SCNC: 25 MMOL/L (ref 21–32)
CREAT SERPL-MCNC: 0.8 MG/DL (ref 0.6–1.1)
DEPRECATED RDW RBC AUTO: 13.2 % (ref 12.4–15.4)
EOSINOPHIL # BLD: 0.2 K/UL (ref 0–0.6)
EOSINOPHIL NFR BLD: 2.3 %
GFR SERPLBLD CREATININE-BSD FMLA CKD-EPI: >90 ML/MIN/{1.73_M2}
GLUCOSE SERPL-MCNC: 86 MG/DL (ref 70–99)
HCT VFR BLD AUTO: 39.7 % (ref 36–48)
HGB BLD-MCNC: 13.4 G/DL (ref 12–16)
LYMPHOCYTES # BLD: 3.1 K/UL (ref 1–5.1)
LYMPHOCYTES NFR BLD: 36.9 %
MAGNESIUM SERPL-MCNC: 1.84 MG/DL (ref 1.8–2.4)
MCH RBC QN AUTO: 31.4 PG (ref 26–34)
MCHC RBC AUTO-ENTMCNC: 33.8 G/DL (ref 31–36)
MCV RBC AUTO: 92.9 FL (ref 80–100)
MONOCYTES # BLD: 0.5 K/UL (ref 0–1.3)
MONOCYTES NFR BLD: 6.3 %
NEUTROPHILS # BLD: 4.5 K/UL (ref 1.7–7.7)
NEUTROPHILS NFR BLD: 53.8 %
PLATELET # BLD AUTO: 292 K/UL (ref 135–450)
PMV BLD AUTO: 9.4 FL (ref 5–10.5)
POTASSIUM SERPL-SCNC: 3.4 MMOL/L (ref 3.5–5.1)
RBC # BLD AUTO: 4.28 M/UL (ref 4–5.2)
SODIUM SERPL-SCNC: 141 MMOL/L (ref 136–145)
WBC # BLD AUTO: 8.4 K/UL (ref 4–11)

## 2024-12-26 PROCEDURE — 83735 ASSAY OF MAGNESIUM: CPT

## 2024-12-26 PROCEDURE — 73080 X-RAY EXAM OF ELBOW: CPT

## 2024-12-26 PROCEDURE — 6360000002 HC RX W HCPCS: Performed by: EMERGENCY MEDICINE

## 2024-12-26 PROCEDURE — 70487 CT MAXILLOFACIAL W/DYE: CPT

## 2024-12-26 PROCEDURE — 85025 COMPLETE CBC W/AUTO DIFF WBC: CPT

## 2024-12-26 PROCEDURE — 6360000004 HC RX CONTRAST MEDICATION: Performed by: EMERGENCY MEDICINE

## 2024-12-26 PROCEDURE — 96374 THER/PROPH/DIAG INJ IV PUSH: CPT

## 2024-12-26 PROCEDURE — 96375 TX/PRO/DX INJ NEW DRUG ADDON: CPT

## 2024-12-26 PROCEDURE — 80048 BASIC METABOLIC PNL TOTAL CA: CPT

## 2024-12-26 RX ORDER — ONDANSETRON 2 MG/ML
4 INJECTION INTRAMUSCULAR; INTRAVENOUS
Status: DISCONTINUED | OUTPATIENT
Start: 2024-12-26 | End: 2024-12-26 | Stop reason: HOSPADM

## 2024-12-26 RX ORDER — IOPAMIDOL 755 MG/ML
75 INJECTION, SOLUTION INTRAVASCULAR
Status: COMPLETED | OUTPATIENT
Start: 2024-12-26 | End: 2024-12-26

## 2024-12-26 RX ORDER — KETOROLAC TROMETHAMINE 15 MG/ML
15 INJECTION, SOLUTION INTRAMUSCULAR; INTRAVENOUS ONCE
Status: COMPLETED | OUTPATIENT
Start: 2024-12-26 | End: 2024-12-26

## 2024-12-26 RX ORDER — CLINDAMYCIN HYDROCHLORIDE 300 MG/1
300 CAPSULE ORAL 3 TIMES DAILY
Qty: 21 CAPSULE | Refills: 0 | Status: SHIPPED | OUTPATIENT
Start: 2024-12-26 | End: 2025-01-02

## 2024-12-26 RX ADMIN — ONDANSETRON 4 MG: 2 INJECTION INTRAMUSCULAR; INTRAVENOUS at 00:07

## 2024-12-26 RX ADMIN — IOPAMIDOL 75 ML: 755 INJECTION, SOLUTION INTRAVENOUS at 00:24

## 2024-12-26 RX ADMIN — KETOROLAC TROMETHAMINE 15 MG: 15 INJECTION, SOLUTION INTRAMUSCULAR; INTRAVENOUS at 00:09

## 2024-12-26 ASSESSMENT — PAIN SCALES - GENERAL: PAINLEVEL_OUTOF10: 3

## 2024-12-26 NOTE — ED PROVIDER NOTES
oral surgery.  Patient felt comfortable with that plan.      She also had reported a cat bite to the right elbow, but no evidence clinically of any infection.  The wounds are currently healing and she had been cleansed again caring for it with general wound care.  I did obtain plain films to evaluate for any underlying bony chip fracture as she did report tenderness, but this was negative.  No evidence of joint infection.  Patient to continue with general wound care.    At this time, will discharge home with close follow-up with oral surgery as well as her PCP for further management.  Antibiotic was sent to her pharmacy of choice.  All questions answered at time of discharge      Sepsis:  Is this patient to be included in the SEP-1 Core Measure due to severe sepsis or septic shock?   No     Exclusion criteria - the patient is NOT to be included for SEP-1 Core Measure due to:  2+ SIRS criteria are not met       Labs and imaging reviewed and results discussed with patient. Patient was reassessed as noted above . Plan of care discussed with patient. Patient in agreement with plan. Strict return precautions have been given.      I, Dr. Monserrat Newsome MD, am the primary clinician of record.        During the patient's ED course, the patient was given:  Medications   ondansetron (ZOFRAN) injection 4 mg (4 mg IntraVENous Given 12/26/24 0007)   ketorolac (TORADOL) injection 15 mg (15 mg IntraVENous Given 12/26/24 0009)   iopamidol (ISOVUE-370) 76 % injection 75 mL (75 mLs IntraVENous Given 12/26/24 0024)        CLINICAL IMPRESSION  1. Facial cellulitis    2. Dental abscess    3. Cat bite, initial encounter        Blood pressure 117/64, pulse 88, temperature 98.3 °F (36.8 °C), temperature source Oral, resp. rate 19, height 1.524 m (5'), weight 68 kg (150 lb), SpO2 95%.    DISPOSITION  Valeria Flores was discharged to home in stable condition.      Patient was given scripts for the following medications. I counseled

## 2025-01-22 ENCOUNTER — APPOINTMENT (OUTPATIENT)
Dept: GENERAL RADIOLOGY | Age: 50
End: 2025-01-22
Payer: COMMERCIAL

## 2025-01-22 ENCOUNTER — HOSPITAL ENCOUNTER (EMERGENCY)
Age: 50
Discharge: HOME | End: 2025-01-22
Attending: STUDENT IN AN ORGANIZED HEALTH CARE EDUCATION/TRAINING PROGRAM
Payer: COMMERCIAL

## 2025-01-22 ENCOUNTER — APPOINTMENT (OUTPATIENT)
Dept: CT IMAGING | Age: 50
End: 2025-01-22
Payer: COMMERCIAL

## 2025-01-22 VITALS
HEART RATE: 68 BPM | HEIGHT: 60 IN | TEMPERATURE: 97.6 F | WEIGHT: 166 LBS | BODY MASS INDEX: 32.59 KG/M2 | OXYGEN SATURATION: 92 % | SYSTOLIC BLOOD PRESSURE: 99 MMHG | RESPIRATION RATE: 18 BRPM | DIASTOLIC BLOOD PRESSURE: 71 MMHG
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
## 2025-01-22 DIAGNOSIS — S60.221A CONTUSION OF RIGHT HAND, INITIAL ENCOUNTER: ICD-10-CM

## 2025-01-22 DIAGNOSIS — S40.021A CONTUSION OF MULTIPLE SITES OF RIGHT SHOULDER AND UPPER ARM, INITIAL ENCOUNTER: ICD-10-CM

## 2025-01-22 DIAGNOSIS — S40.011A CONTUSION OF MULTIPLE SITES OF RIGHT SHOULDER AND UPPER ARM, INITIAL ENCOUNTER: ICD-10-CM

## 2025-01-22 DIAGNOSIS — S70.11XA CONTUSION OF RIGHT THIGH, INITIAL ENCOUNTER: ICD-10-CM

## 2025-01-22 PROCEDURE — 73130 X-RAY EXAM OF HAND: CPT

## 2025-01-22 PROCEDURE — 73502 X-RAY EXAM HIP UNI 2-3 VIEWS: CPT

## 2025-01-22 PROCEDURE — 73552 X-RAY EXAM OF FEMUR 2/>: CPT

## 2025-01-22 PROCEDURE — 73030 X-RAY EXAM OF SHOULDER: CPT

## 2025-01-22 PROCEDURE — 70450 CT HEAD/BRAIN W/O DYE: CPT

## 2025-01-22 PROCEDURE — 99284 EMERGENCY DEPT VISIT MOD MDM: CPT

## 2025-01-22 PROCEDURE — 6370000000 HC RX 637 (ALT 250 FOR IP): Performed by: STUDENT IN AN ORGANIZED HEALTH CARE EDUCATION/TRAINING PROGRAM

## 2025-01-22 PROCEDURE — 73060 X-RAY EXAM OF HUMERUS: CPT

## 2025-01-22 RX ORDER — TIZANIDINE 2 MG/1
2 TABLET ORAL EVERY 6 HOURS PRN
Qty: 8 TABLET | Refills: 0 | Status: SHIPPED | OUTPATIENT
Start: 2025-01-22 | End: 2025-01-24

## 2025-01-22 RX ORDER — METHOCARBAMOL 500 MG/1
1500 TABLET, FILM COATED ORAL ONCE
Status: COMPLETED | OUTPATIENT
Start: 2025-01-22 | End: 2025-01-22

## 2025-01-22 RX ADMIN — METHOCARBAMOL TABLETS 1500 MG: 500 TABLET, COATED ORAL at 09:25

## 2025-01-22 ASSESSMENT — PAIN DESCRIPTION - LOCATION
LOCATION: LEG
LOCATION: LEG

## 2025-01-22 ASSESSMENT — PAIN DESCRIPTION - ORIENTATION
ORIENTATION: RIGHT
ORIENTATION: RIGHT

## 2025-01-22 ASSESSMENT — PAIN SCALES - GENERAL
PAINLEVEL_OUTOF10: 7
PAINLEVEL_OUTOF10: 6

## 2025-01-22 ASSESSMENT — PAIN - FUNCTIONAL ASSESSMENT: PAIN_FUNCTIONAL_ASSESSMENT: 0-10

## 2025-01-22 NOTE — ED PROVIDER NOTES
[facility] EMERGENCY DEPARTMENT     EMERGENCY DEPARTMENT ENCOUNTER            Pt Name: Valeria Flores   MRN: [id number]   Birthdate 1975   Date of evaluation: 1/22/2025   Provider: Sunita Ba MD   PCP: Teri Garcia APRN - CNP   Note Started: 8:20 AM EST 1/22/25          CHIEF COMPLAINT     Chief Complaint   Patient presents with    Fall     Slipped on ice yesterday, resulting in right sided pain, leg, hip, arm and shoulder. On Eliquis and ASA, has clotting disorder      HISTORY OF PRESENT ILLNESS:   History from : Patient   Limitations to history : None     Valeria Flores is a 49 y.o. female who presents complaining of right shoulder and upper arm pain, right hand pain, right hip and right thigh pain.  Patient states that yesterday she slipped on some ice and fell.  She believes that she hit the right side of her body but does not remember hitting her head.  She states that she is on blood thinners due to the history of clotting disorder.  She has been able to ambulate since this occurred.  States that she woke up overnight with pain primarily in her right thigh.  Took some Tylenol at home but is not take anything else for symptoms.  Denies chest pain or shortness of breath.  Denies other complaints or concerns.    Nursing Notes were all reviewed and agreed with, or any disagreements were addressed in the HPI.     REVIEW OF SYSTEMS :    Positives and Pertinent negatives as per HPI.      MEDICAL HISTORY   has a past medical history of CHF (congestive heart failure) ([facility]), COPD (chronic obstructive pulmonary disease) ([facility]), CVA (cerebral vascular accident) ([facility]) (04/2010), DVT, Factor VIII inhibitor disorder ([facility]), blood clots, Hypercholesterolemia, ICD (implantable cardioverter-defibrillator) in place (2012), MI (myocardial infarction) ([facility]) (03/2010), Myocardial infarct ([facility]), Pacemaker (2012), Pulmonary embolism ([facility]), Smoker, and Unspecified cerebral artery occlusion with

## 2025-02-21 ENCOUNTER — TELEPHONE (OUTPATIENT)
Dept: CARDIOLOGY CLINIC | Age: 50
End: 2025-02-21

## 2025-02-21 NOTE — TELEPHONE ENCOUNTER
Pt called wanting to let SMM know she completed her myocardial pet scan. Scan is in care everywhere.  Please advise.

## 2025-02-24 NOTE — TELEPHONE ENCOUNTER
She is complicated patient with sarcoidosis. I only saw once as inpatient and recommend she f/u with  cardiology given study completed there and looking at chart she saw  cards docs recently.

## 2025-02-24 NOTE — TELEPHONE ENCOUNTER
Pt had it at      Impression    IMPRESSION:  1.  Inferior and inferolateral infarct.  2.  Remainder of the left ventricular myocardium demonstrates uniform FDG uptake, likely physiologic unsuppressed myocardium; however, superimposed inflammation is not excluded.  3.  Mildly FDG avid thoracic lymph nodes are nonspecific and may be inflammatory/granulomatous. Tissue sampling may be needed for definitive diagnosis.    Report Verified by: Cora Hughes MD at 2/21/2025 11:37 AM EST  Narrative    EXAM: NM PET MYOCARD COMB PERF AND METABOLIC DUAL TRACER W CT-SARCOID    INDICATION: Cardiac sarcoidosis    RADIOPHARMACEUTICAL: 11.87 mCi F18-FDG IV and 28 mCi Rb-82 chloride IV    TECHNICAL: Myocardial imaging for metabolic evaluation using positron emission tomography and non-breath-hold low dose CT was performed after the intravenous injection of FDG.  Resting myocardial perfusion imaging using positron emission tomography and non-breath-hold low dose CT was performed after the intravenous injection of Rb-82. No IV contrast material was administered. Limited CT imaging is done for attenuation correction and anatomic localization purposes. It is limited by lower dose and respiratory motion. Injection site, right wrist.    Uptake time, body:  approximately 60 minutes  Uptake time, cardiac:  approximately 90 minutes  Fingerstick glucose at the time of radiopharmaceutical injection was 100 mg/dL.  Liver background mean SUV for reference is 1.5.  Mediastinal blood pool background mean SUV for reference is 1.2.    COMPARISON:  CTPA dated 11/6/2020. No prior PET/CT for comparison.    FINDINGS:    CARDIAC:  Resting myocardial perfusion imaging demonstrates large severe defect in the inferior wall, extending into the inferolateral wall.    Metabolic myocardial imaging with focal defect in the inferior and inferolateral walls, corresponding to the resting perfusion defect. The remainder of the left ventricular myocardium

## 2025-02-27 ENCOUNTER — APPOINTMENT (OUTPATIENT)
Dept: GENERAL RADIOLOGY | Age: 50
DRG: 388 | End: 2025-02-27
Payer: COMMERCIAL

## 2025-02-27 ENCOUNTER — APPOINTMENT (OUTPATIENT)
Dept: CT IMAGING | Age: 50
DRG: 388 | End: 2025-02-27
Payer: COMMERCIAL

## 2025-02-27 ENCOUNTER — HOSPITAL ENCOUNTER (INPATIENT)
Age: 50
LOS: 1 days | Discharge: HOME | DRG: 388 | End: 2025-03-02
Attending: EMERGENCY MEDICINE | Admitting: HOSPITALIST
Payer: COMMERCIAL

## 2025-02-27 DIAGNOSIS — R11.0 NAUSEA: ICD-10-CM

## 2025-02-27 DIAGNOSIS — R10.84 GENERALIZED ABDOMINAL PAIN: ICD-10-CM

## 2025-02-27 DIAGNOSIS — K56.7 ILEUS (HCC): ICD-10-CM

## 2025-02-27 DIAGNOSIS — R07.9 CHEST PAIN, UNSPECIFIED TYPE: Primary | ICD-10-CM

## 2025-02-27 LAB
ALBUMIN SERPL-MCNC: 4.2 G/DL (ref 3.4–5)
ALBUMIN/GLOB SERPL: 1.4 {RATIO} (ref 1.1–2.2)
ALP SERPL-CCNC: 142 U/L (ref 40–129)
ALT SERPL-CCNC: 13 U/L (ref 10–40)
ANION GAP SERPL CALCULATED.3IONS-SCNC: 13 MMOL/L (ref 3–16)
AST SERPL-CCNC: 24 U/L (ref 15–37)
BACTERIA URNS QL MICRO: ABNORMAL /HPF
BASOPHILS # BLD: 0.1 K/UL (ref 0–0.2)
BASOPHILS NFR BLD: 0.6 %
BILIRUB SERPL-MCNC: 0.5 MG/DL (ref 0–1)
BILIRUB UR QL STRIP.AUTO: NEGATIVE
BUN SERPL-MCNC: 4 MG/DL (ref 7–20)
CALCIUM SERPL-MCNC: 9 MG/DL (ref 8.3–10.6)
CHLORIDE SERPL-SCNC: 98 MMOL/L (ref 99–110)
CLARITY UR: CLEAR
CO2 SERPL-SCNC: 21 MMOL/L (ref 21–32)
COLOR UR: YELLOW
CREAT SERPL-MCNC: 0.7 MG/DL (ref 0.6–1.1)
DEPRECATED RDW RBC AUTO: 13 % (ref 12.4–15.4)
EOSINOPHIL # BLD: 0 K/UL (ref 0–0.6)
EOSINOPHIL NFR BLD: 0.4 %
EPI CELLS #/AREA URNS HPF: ABNORMAL /HPF (ref 0–5)
FLUAV RNA RESP QL NAA+PROBE: NOT DETECTED
FLUBV RNA RESP QL NAA+PROBE: NOT DETECTED
GFR SERPLBLD CREATININE-BSD FMLA CKD-EPI: >90 ML/MIN/{1.73_M2}
GLUCOSE SERPL-MCNC: 122 MG/DL (ref 70–99)
GLUCOSE UR STRIP.AUTO-MCNC: NEGATIVE MG/DL
HCT VFR BLD AUTO: 39.4 % (ref 36–48)
HGB BLD-MCNC: 13.7 G/DL (ref 12–16)
HGB UR QL STRIP.AUTO: ABNORMAL
KETONES UR STRIP.AUTO-MCNC: NEGATIVE MG/DL
LEUKOCYTE ESTERASE UR QL STRIP.AUTO: NEGATIVE
LIPASE SERPL-CCNC: 24 U/L (ref 13–60)
LYMPHOCYTES # BLD: 0.8 K/UL (ref 1–5.1)
LYMPHOCYTES NFR BLD: 9.2 %
MCH RBC QN AUTO: 32.1 PG (ref 26–34)
MCHC RBC AUTO-ENTMCNC: 34.8 G/DL (ref 31–36)
MCV RBC AUTO: 92.4 FL (ref 80–100)
MONOCYTES # BLD: 0.1 K/UL (ref 0–1.3)
MONOCYTES NFR BLD: 1 %
NEUTROPHILS # BLD: 7.7 K/UL (ref 1.7–7.7)
NEUTROPHILS NFR BLD: 88.8 %
NITRITE UR QL STRIP.AUTO: NEGATIVE
NT-PROBNP SERPL-MCNC: 124 PG/ML (ref 0–124)
PH UR STRIP.AUTO: 5.5 [PH] (ref 5–8)
PLATELET # BLD AUTO: 333 K/UL (ref 135–450)
PMV BLD AUTO: 8.5 FL (ref 5–10.5)
POTASSIUM SERPL-SCNC: 3.7 MMOL/L (ref 3.5–5.1)
PROT SERPL-MCNC: 7.2 G/DL (ref 6.4–8.2)
PROT UR STRIP.AUTO-MCNC: NEGATIVE MG/DL
RBC # BLD AUTO: 4.27 M/UL (ref 4–5.2)
RBC #/AREA URNS HPF: ABNORMAL /HPF (ref 0–4)
SARS-COV-2 RNA RESP QL NAA+PROBE: NOT DETECTED
SODIUM SERPL-SCNC: 132 MMOL/L (ref 136–145)
SP GR UR STRIP.AUTO: <=1.005 (ref 1–1.03)
TROPONIN, HIGH SENSITIVITY: 12 NG/L (ref 0–14)
TROPONIN, HIGH SENSITIVITY: <6 NG/L (ref 0–14)
UA COMPLETE W REFLEX CULTURE PNL UR: ABNORMAL
UA DIPSTICK W REFLEX MICRO PNL UR: YES
URN SPEC COLLECT METH UR: ABNORMAL
UROBILINOGEN UR STRIP-ACNC: 0.2 E.U./DL
WBC # BLD AUTO: 8.6 K/UL (ref 4–11)
WBC #/AREA URNS HPF: ABNORMAL /HPF (ref 0–5)

## 2025-02-27 PROCEDURE — 87636 SARSCOV2 & INF A&B AMP PRB: CPT

## 2025-02-27 PROCEDURE — 80053 COMPREHEN METABOLIC PANEL: CPT

## 2025-02-27 PROCEDURE — 99285 EMERGENCY DEPT VISIT HI MDM: CPT

## 2025-02-27 PROCEDURE — 71045 X-RAY EXAM CHEST 1 VIEW: CPT

## 2025-02-27 PROCEDURE — 85025 COMPLETE CBC W/AUTO DIFF WBC: CPT

## 2025-02-27 PROCEDURE — 96375 TX/PRO/DX INJ NEW DRUG ADDON: CPT

## 2025-02-27 PROCEDURE — 96374 THER/PROPH/DIAG INJ IV PUSH: CPT

## 2025-02-27 PROCEDURE — 81001 URINALYSIS AUTO W/SCOPE: CPT

## 2025-02-27 PROCEDURE — 96376 TX/PRO/DX INJ SAME DRUG ADON: CPT

## 2025-02-27 PROCEDURE — 93005 ELECTROCARDIOGRAM TRACING: CPT | Performed by: EMERGENCY MEDICINE

## 2025-02-27 PROCEDURE — 83880 ASSAY OF NATRIURETIC PEPTIDE: CPT

## 2025-02-27 PROCEDURE — 84484 ASSAY OF TROPONIN QUANT: CPT

## 2025-02-27 PROCEDURE — 83690 ASSAY OF LIPASE: CPT

## 2025-02-27 PROCEDURE — 6360000002 HC RX W HCPCS: Performed by: EMERGENCY MEDICINE

## 2025-02-27 PROCEDURE — 6360000004 HC RX CONTRAST MEDICATION: Performed by: EMERGENCY MEDICINE

## 2025-02-27 PROCEDURE — 74177 CT ABD & PELVIS W/CONTRAST: CPT

## 2025-02-27 PROCEDURE — 6360000002 HC RX W HCPCS: Performed by: PHYSICIAN ASSISTANT

## 2025-02-27 RX ORDER — MORPHINE SULFATE 4 MG/ML
4 INJECTION, SOLUTION INTRAMUSCULAR; INTRAVENOUS ONCE
Status: COMPLETED | OUTPATIENT
Start: 2025-02-27 | End: 2025-02-27

## 2025-02-27 RX ORDER — IOPAMIDOL 755 MG/ML
75 INJECTION, SOLUTION INTRAVASCULAR
Status: COMPLETED | OUTPATIENT
Start: 2025-02-27 | End: 2025-02-27

## 2025-02-27 RX ORDER — ONDANSETRON 2 MG/ML
4 INJECTION INTRAMUSCULAR; INTRAVENOUS ONCE
Status: COMPLETED | OUTPATIENT
Start: 2025-02-27 | End: 2025-02-27

## 2025-02-27 RX ADMIN — MORPHINE SULFATE 4 MG: 4 INJECTION, SOLUTION INTRAMUSCULAR; INTRAVENOUS at 21:55

## 2025-02-27 RX ADMIN — IOPAMIDOL 75 ML: 755 INJECTION, SOLUTION INTRAVENOUS at 23:27

## 2025-02-27 RX ADMIN — ONDANSETRON 4 MG: 2 INJECTION, SOLUTION INTRAMUSCULAR; INTRAVENOUS at 21:51

## 2025-02-27 RX ADMIN — ONDANSETRON 4 MG: 2 INJECTION, SOLUTION INTRAMUSCULAR; INTRAVENOUS at 23:37

## 2025-02-27 ASSESSMENT — PAIN DESCRIPTION - DESCRIPTORS: DESCRIPTORS: PRESSURE

## 2025-02-27 ASSESSMENT — PAIN SCALES - GENERAL
PAINLEVEL_OUTOF10: 6
PAINLEVEL_OUTOF10: 6

## 2025-02-27 ASSESSMENT — PAIN DESCRIPTION - LOCATION
LOCATION: BACK
LOCATION: CHEST;BACK

## 2025-02-27 ASSESSMENT — PAIN - FUNCTIONAL ASSESSMENT: PAIN_FUNCTIONAL_ASSESSMENT: 0-10

## 2025-02-28 ENCOUNTER — APPOINTMENT (OUTPATIENT)
Dept: GENERAL RADIOLOGY | Age: 50
DRG: 388 | End: 2025-02-28
Payer: COMMERCIAL

## 2025-02-28 PROBLEM — K56.7 ILEUS (HCC): Status: ACTIVE | Noted: 2025-02-28

## 2025-02-28 LAB
APTT BLD: 138.4 SEC (ref 22.1–36.4)
APTT BLD: 29.1 SEC (ref 22.1–36.4)
APTT BLD: 69.6 SEC (ref 22.1–36.4)
APTT BLD: 83.6 SEC (ref 22.1–36.4)
DEPRECATED RDW RBC AUTO: 13.1 % (ref 12.4–15.4)
EKG ATRIAL RATE: 81 BPM
EKG DIAGNOSIS: NORMAL
EKG P AXIS: 3 DEGREES
EKG P-R INTERVAL: 136 MS
EKG Q-T INTERVAL: 420 MS
EKG QRS DURATION: 96 MS
EKG QTC CALCULATION (BAZETT): 487 MS
EKG R AXIS: -16 DEGREES
EKG T AXIS: 8 DEGREES
EKG VENTRICULAR RATE: 81 BPM
HCT VFR BLD AUTO: 42.1 % (ref 36–48)
HGB BLD-MCNC: 14.3 G/DL (ref 12–16)
INR PPP: 1.08 (ref 0.85–1.15)
MCH RBC QN AUTO: 31.9 PG (ref 26–34)
MCHC RBC AUTO-ENTMCNC: 34 G/DL (ref 31–36)
MCV RBC AUTO: 94 FL (ref 80–100)
PLATELET # BLD AUTO: 348 K/UL (ref 135–450)
PMV BLD AUTO: 8.6 FL (ref 5–10.5)
PROTHROMBIN TIME: 14.2 SEC (ref 11.9–14.9)
RBC # BLD AUTO: 4.49 M/UL (ref 4–5.2)
WBC # BLD AUTO: 5.1 K/UL (ref 4–11)

## 2025-02-28 PROCEDURE — 96366 THER/PROPH/DIAG IV INF ADDON: CPT

## 2025-02-28 PROCEDURE — 6370000000 HC RX 637 (ALT 250 FOR IP): Performed by: HOSPITALIST

## 2025-02-28 PROCEDURE — 85027 COMPLETE CBC AUTOMATED: CPT

## 2025-02-28 PROCEDURE — 36415 COLL VENOUS BLD VENIPUNCTURE: CPT

## 2025-02-28 PROCEDURE — 85730 THROMBOPLASTIN TIME PARTIAL: CPT

## 2025-02-28 PROCEDURE — 85610 PROTHROMBIN TIME: CPT

## 2025-02-28 PROCEDURE — 96376 TX/PRO/DX INJ SAME DRUG ADON: CPT

## 2025-02-28 PROCEDURE — G0378 HOSPITAL OBSERVATION PER HR: HCPCS

## 2025-02-28 PROCEDURE — 2700000000 HC OXYGEN THERAPY PER DAY

## 2025-02-28 PROCEDURE — 93010 ELECTROCARDIOGRAM REPORT: CPT | Performed by: INTERNAL MEDICINE

## 2025-02-28 PROCEDURE — 6360000002 HC RX W HCPCS: Performed by: HOSPITALIST

## 2025-02-28 PROCEDURE — 6360000002 HC RX W HCPCS: Performed by: PHYSICIAN ASSISTANT

## 2025-02-28 PROCEDURE — 96365 THER/PROPH/DIAG IV INF INIT: CPT

## 2025-02-28 PROCEDURE — 2580000003 HC RX 258: Performed by: HOSPITALIST

## 2025-02-28 PROCEDURE — 2580000003 HC RX 258: Performed by: NURSE PRACTITIONER

## 2025-02-28 PROCEDURE — 94761 N-INVAS EAR/PLS OXIMETRY MLT: CPT

## 2025-02-28 PROCEDURE — 74018 RADEX ABDOMEN 1 VIEW: CPT

## 2025-02-28 RX ORDER — MORPHINE SULFATE 4 MG/ML
4 INJECTION, SOLUTION INTRAMUSCULAR; INTRAVENOUS ONCE
Status: COMPLETED | OUTPATIENT
Start: 2025-02-28 | End: 2025-02-28

## 2025-02-28 RX ORDER — ONDANSETRON 4 MG/1
4 TABLET, ORALLY DISINTEGRATING ORAL EVERY 8 HOURS PRN
Status: DISCONTINUED | OUTPATIENT
Start: 2025-02-28 | End: 2025-03-02 | Stop reason: HOSPADM

## 2025-02-28 RX ORDER — HEPARIN SODIUM 1000 [USP'U]/ML
4000 INJECTION, SOLUTION INTRAVENOUS; SUBCUTANEOUS ONCE
Status: COMPLETED | OUTPATIENT
Start: 2025-02-28 | End: 2025-02-28

## 2025-02-28 RX ORDER — HEPARIN SODIUM 1000 [USP'U]/ML
4000 INJECTION, SOLUTION INTRAVENOUS; SUBCUTANEOUS PRN
Status: DISCONTINUED | OUTPATIENT
Start: 2025-02-28 | End: 2025-03-02 | Stop reason: HOSPADM

## 2025-02-28 RX ORDER — POTASSIUM CHLORIDE 7.45 MG/ML
10 INJECTION INTRAVENOUS PRN
Status: DISCONTINUED | OUTPATIENT
Start: 2025-02-28 | End: 2025-02-28

## 2025-02-28 RX ORDER — SODIUM CHLORIDE 0.9 % (FLUSH) 0.9 %
5-40 SYRINGE (ML) INJECTION PRN
Status: DISCONTINUED | OUTPATIENT
Start: 2025-02-28 | End: 2025-03-02 | Stop reason: HOSPADM

## 2025-02-28 RX ORDER — POTASSIUM CHLORIDE 1500 MG/1
40 TABLET, EXTENDED RELEASE ORAL PRN
Status: DISCONTINUED | OUTPATIENT
Start: 2025-02-28 | End: 2025-02-28

## 2025-02-28 RX ORDER — HEPARIN SODIUM 1000 [USP'U]/ML
2000 INJECTION, SOLUTION INTRAVENOUS; SUBCUTANEOUS PRN
Status: DISCONTINUED | OUTPATIENT
Start: 2025-02-28 | End: 2025-03-02 | Stop reason: HOSPADM

## 2025-02-28 RX ORDER — ACETAMINOPHEN 325 MG/1
650 TABLET ORAL EVERY 6 HOURS PRN
Status: DISCONTINUED | OUTPATIENT
Start: 2025-02-28 | End: 2025-03-02 | Stop reason: HOSPADM

## 2025-02-28 RX ORDER — SODIUM CHLORIDE 9 MG/ML
INJECTION, SOLUTION INTRAVENOUS PRN
Status: DISCONTINUED | OUTPATIENT
Start: 2025-02-28 | End: 2025-03-02 | Stop reason: HOSPADM

## 2025-02-28 RX ORDER — MORPHINE SULFATE 2 MG/ML
2 INJECTION, SOLUTION INTRAMUSCULAR; INTRAVENOUS EVERY 4 HOURS PRN
Status: DISCONTINUED | OUTPATIENT
Start: 2025-02-28 | End: 2025-03-02 | Stop reason: HOSPADM

## 2025-02-28 RX ORDER — 0.9 % SODIUM CHLORIDE 0.9 %
500 INTRAVENOUS SOLUTION INTRAVENOUS ONCE
Status: COMPLETED | OUTPATIENT
Start: 2025-02-28 | End: 2025-02-28

## 2025-02-28 RX ORDER — HEPARIN SODIUM 10000 [USP'U]/100ML
5-30 INJECTION, SOLUTION INTRAVENOUS CONTINUOUS
Status: DISCONTINUED | OUTPATIENT
Start: 2025-02-28 | End: 2025-03-01

## 2025-02-28 RX ORDER — ENOXAPARIN SODIUM 100 MG/ML
40 INJECTION SUBCUTANEOUS DAILY
Status: DISCONTINUED | OUTPATIENT
Start: 2025-02-28 | End: 2025-02-28

## 2025-02-28 RX ORDER — SODIUM CHLORIDE 0.9 % (FLUSH) 0.9 %
5-40 SYRINGE (ML) INJECTION EVERY 12 HOURS SCHEDULED
Status: DISCONTINUED | OUTPATIENT
Start: 2025-02-28 | End: 2025-03-02 | Stop reason: HOSPADM

## 2025-02-28 RX ORDER — HEPARIN SODIUM 10000 [USP'U]/100ML
5-30 INJECTION, SOLUTION INTRAVENOUS CONTINUOUS
Status: DISCONTINUED | OUTPATIENT
Start: 2025-02-28 | End: 2025-02-28

## 2025-02-28 RX ORDER — MAGNESIUM SULFATE IN WATER 40 MG/ML
2000 INJECTION, SOLUTION INTRAVENOUS PRN
Status: DISCONTINUED | OUTPATIENT
Start: 2025-02-28 | End: 2025-02-28

## 2025-02-28 RX ORDER — ACETAMINOPHEN 650 MG/1
650 SUPPOSITORY RECTAL EVERY 6 HOURS PRN
Status: DISCONTINUED | OUTPATIENT
Start: 2025-02-28 | End: 2025-03-02 | Stop reason: HOSPADM

## 2025-02-28 RX ORDER — DEXTROSE MONOHYDRATE AND SODIUM CHLORIDE 5; .45 G/100ML; G/100ML
INJECTION, SOLUTION INTRAVENOUS CONTINUOUS
Status: ACTIVE | OUTPATIENT
Start: 2025-02-28 | End: 2025-03-01

## 2025-02-28 RX ORDER — ONDANSETRON 2 MG/ML
4 INJECTION INTRAMUSCULAR; INTRAVENOUS EVERY 6 HOURS PRN
Status: DISCONTINUED | OUTPATIENT
Start: 2025-02-28 | End: 2025-03-02 | Stop reason: HOSPADM

## 2025-02-28 RX ORDER — PROCHLORPERAZINE EDISYLATE 5 MG/ML
10 INJECTION INTRAMUSCULAR; INTRAVENOUS EVERY 6 HOURS PRN
Status: DISCONTINUED | OUTPATIENT
Start: 2025-02-28 | End: 2025-03-02 | Stop reason: HOSPADM

## 2025-02-28 RX ORDER — POLYETHYLENE GLYCOL 3350 17 G/17G
17 POWDER, FOR SOLUTION ORAL DAILY PRN
Status: DISCONTINUED | OUTPATIENT
Start: 2025-02-28 | End: 2025-03-02 | Stop reason: HOSPADM

## 2025-02-28 RX ORDER — ONDANSETRON 2 MG/ML
4 INJECTION INTRAMUSCULAR; INTRAVENOUS ONCE
Status: DISCONTINUED | OUTPATIENT
Start: 2025-02-28 | End: 2025-03-02 | Stop reason: HOSPADM

## 2025-02-28 RX ADMIN — MORPHINE SULFATE 2 MG: 2 INJECTION, SOLUTION INTRAMUSCULAR; INTRAVENOUS at 08:15

## 2025-02-28 RX ADMIN — HEPARIN SODIUM 4000 UNITS: 1000 INJECTION INTRAVENOUS; SUBCUTANEOUS at 02:05

## 2025-02-28 RX ADMIN — ACETAMINOPHEN 650 MG: 325 TABLET ORAL at 05:14

## 2025-02-28 RX ADMIN — MORPHINE SULFATE 2 MG: 2 INJECTION, SOLUTION INTRAMUSCULAR; INTRAVENOUS at 17:33

## 2025-02-28 RX ADMIN — MORPHINE SULFATE 4 MG: 4 INJECTION, SOLUTION INTRAMUSCULAR; INTRAVENOUS at 00:06

## 2025-02-28 RX ADMIN — MORPHINE SULFATE 2 MG: 2 INJECTION, SOLUTION INTRAMUSCULAR; INTRAVENOUS at 21:36

## 2025-02-28 RX ADMIN — HEPARIN SODIUM 14 UNITS/KG/HR: 10000 INJECTION, SOLUTION INTRAVENOUS at 17:40

## 2025-02-28 RX ADMIN — DEXTROSE AND SODIUM CHLORIDE: 5; 450 INJECTION, SOLUTION INTRAVENOUS at 01:53

## 2025-02-28 RX ADMIN — MORPHINE SULFATE 2 MG: 2 INJECTION, SOLUTION INTRAMUSCULAR; INTRAVENOUS at 13:16

## 2025-02-28 RX ADMIN — ONDANSETRON 4 MG: 2 INJECTION, SOLUTION INTRAMUSCULAR; INTRAVENOUS at 13:13

## 2025-02-28 RX ADMIN — SODIUM CHLORIDE 500 ML: 0.9 INJECTION, SOLUTION INTRAVENOUS at 20:26

## 2025-02-28 RX ADMIN — HEPARIN SODIUM 2000 UNITS: 1000 INJECTION INTRAVENOUS; SUBCUTANEOUS at 17:40

## 2025-02-28 RX ADMIN — HEPARIN SODIUM 12 UNITS/KG/HR: 10000 INJECTION, SOLUTION INTRAVENOUS at 02:08

## 2025-02-28 RX ADMIN — ONDANSETRON 4 MG: 2 INJECTION, SOLUTION INTRAMUSCULAR; INTRAVENOUS at 21:36

## 2025-02-28 ASSESSMENT — PAIN DESCRIPTION - DESCRIPTORS
DESCRIPTORS: PRESSURE
DESCRIPTORS: PRESSURE;ACHING

## 2025-02-28 ASSESSMENT — PAIN DESCRIPTION - LOCATION
LOCATION: ABDOMEN
LOCATION: STERNUM;ABDOMEN
LOCATION: ABDOMEN
LOCATION: ABDOMEN

## 2025-02-28 ASSESSMENT — PAIN SCALES - GENERAL
PAINLEVEL_OUTOF10: 6
PAINLEVEL_OUTOF10: 3
PAINLEVEL_OUTOF10: 1
PAINLEVEL_OUTOF10: 4
PAINLEVEL_OUTOF10: 5
PAINLEVEL_OUTOF10: 3
PAINLEVEL_OUTOF10: 6
PAINLEVEL_OUTOF10: 6

## 2025-02-28 ASSESSMENT — PAIN DESCRIPTION - ORIENTATION
ORIENTATION: MID
ORIENTATION: UPPER
ORIENTATION: MID
ORIENTATION: MID

## 2025-02-28 ASSESSMENT — PAIN - FUNCTIONAL ASSESSMENT
PAIN_FUNCTIONAL_ASSESSMENT: ACTIVITIES ARE NOT PREVENTED
PAIN_FUNCTIONAL_ASSESSMENT: ACTIVITIES ARE NOT PREVENTED

## 2025-02-28 ASSESSMENT — PAIN DESCRIPTION - PAIN TYPE
TYPE: ACUTE PAIN
TYPE: ACUTE PAIN

## 2025-02-28 NOTE — H&P
(congestive heart failure) ([facility])     COPD (chronic obstructive pulmonary disease) ([facility])     CVA (cerebral vascular accident) ([facility]) 04/2010    DVT     Factor VIII inhibitor disorder     Hx of blood clots     Hypercholesterolemia     ICD (implantable cardioverter-defibrillator) in place 2012    MI (myocardial infarction) ([facility]) 03/2010    Myocardial infarct ([facility])     Pacemaker 2012    Pulmonary embolism ([facility])     Smoker     Unspecified cerebral artery occlusion with cerebral infarction        Past Surgical History:        Procedure Laterality Date    CARDIAC DEFIBRILLATOR PLACEMENT  09/15/2020    gen change, original device implanted 8/25/2012    CARDIAC SURGERY      catherization x 4    CHEST TUBE INSERTION      at birth    CORONARY ANGIOPLASTY WITH STENT PLACEMENT  03/2010    BMS to pRCA 4x28 [employer]    DILATION AND CURETTAGE OF UTERUS      KNEE ARTHROSCOPY      bilat knees    LAPAROSCOPY      x 2 for ovaries    TONSILLECTOMY AND ADENOIDECTOMY      UPPER GASTROINTESTINAL ENDOSCOPY  10/18/2010    with biopsies       Medications Prior to Admission:   Prior to Admission medications    Medication Sig Start Date End Date Taking? Authorizing Provider   metoclopramide (REGLAN) 10 MG tablet Take 1 tablet by mouth 3 times daily (with meals) 12/18/24   Abbi Linda MD   traZODone (DESYREL) 50 MG tablet  11/13/24   ProviderMela MD   rimegepant sulfate 75 MG TBDP Take 75 mg by mouth as needed (migraine) Maximum: 75 mg per 24 hours 12/13/24 12/13/25  Sascha Scott MD   ondansetron (ZOFRAN-ODT) 4 MG disintegrating tablet Take 1 tablet by mouth 3 times daily as needed (migraine with nausea) 12/13/24 12/8/25  Sascha Scott MD   atorvastatin (LIPITOR) 80 MG tablet Take 1 tablet by mouth daily 12/13/24 12/8/25  Sascha Scott MD   gabapentin (NEURONTIN) 600 MG tablet Take 1 tablet by mouth 3 times daily. 5/31/24   Mela Thomas MD   metoprolol succinate (TOPROL XL) 25 MG extended release tablet

## 2025-02-28 NOTE — ED PROVIDER NOTES
Nutrition Assessment Note    -late entry-     Reason for Assessment  Reason for Assessment: Admission nursing screening    Pt admitted for:  Seizure (Multi) [R56.9]    MST triggered for unsure for weight loss.  Chart reviewed.  No weight loss per weight history. Current BMI 43.8, over IBW.   Fair intake per flowsheets.    No nutritional concerns at this time. Full nutritional assessment unwarranted at this time. Please re consult nutrition services should concerns present.     Past Medical History:   Diagnosis Date    Brain tumor (Multi)      Results for orders placed or performed during the hospital encounter of 08/24/24 (from the past 96 hour(s))   Lavender Top   Result Value Ref Range    Extra Tube Hold for add-ons.    SST TOP   Result Value Ref Range    Extra Tube Hold for add-ons.    Basic Metabolic Panel   Result Value Ref Range    Glucose 87 74 - 99 mg/dL    Sodium 137 136 - 145 mmol/L    Potassium 3.8 3.5 - 5.3 mmol/L    Chloride 105 98 - 107 mmol/L    Bicarbonate 25 21 - 32 mmol/L    Anion Gap 11 10 - 20 mmol/L    Urea Nitrogen 10 6 - 23 mg/dL    Creatinine 0.79 0.50 - 1.30 mg/dL    eGFR >90 >60 mL/min/1.73m*2    Calcium 8.9 8.6 - 10.3 mg/dL   Electrocardiogram, 12-lead PRN ACS symptoms   Result Value Ref Range    Ventricular Rate 46 BPM    Atrial Rate 46 BPM    WA Interval 230 ms    QRS Duration 100 ms    QT Interval 500 ms    QTC Calculation(Bazett) 437 ms    P Axis 16 degrees    R Axis -4 degrees    T Axis -4 degrees    QRS Count 8 beats    Q Onset 209 ms    P Onset 94 ms    P Offset 157 ms    T Offset 459 ms    QTC Fredericia 457 ms   Troponin I, High Sensitivity   Result Value Ref Range    Troponin I, High Sensitivity 4 0 - 20 ng/L   TSH   Result Value Ref Range    Thyroid Stimulating Hormone 0.39 (L) 0.44 - 3.98 mIU/L   POCT GLUCOSE   Result Value Ref Range    POCT Glucose 135 (H) 74 - 99 mg/dL       Scheduled medications  aspirin, 81 mg, oral, Daily  atorvastatin, 40 mg, oral, Daily  carvedilol, 
"3.125 mg, oral, BID  donepezil, 10 mg, oral, Nightly  DULoxetine, 30 mg, oral, Daily  fluticasone furoate-vilanteroL, 1 puff, inhalation, Daily  lamoTRIgine, 150 mg, oral, BID  levETIRAcetam, 1,500 mg, intravenous, q12h  LORazepam, 2 mg, intravenous, Once  [Held by provider] mirtazapine, 7.5 mg, oral, Nightly  pantoprazole, 40 mg, oral, Daily before breakfast  QUEtiapine, 25 mg, oral, Nightly  remdesivir, 100 mg, intravenous, q24h  rivaroxaban, 20 mg, oral, Daily with evening meal  rOPINIRole, 8 mg, oral, Nightly  sertraline, 150 mg, oral, Nightly  [Held by provider] venlafaxine XR, 150 mg, oral, Daily  zonisamide, 100 mg, oral, Daily      Continuous medications     PRN medications  PRN medications: acetaminophen **OR** acetaminophen **OR** acetaminophen, LORazepam  Dietary Orders (From admission, onward)       Start     Ordered    08/25/24 1853  Adult diet Regular  Diet effective now        Question:  Diet type  Answer:  Regular    08/25/24 1852                    History:  Food and Nutrient History  Energy Intake: Fair 50-75 %    Anthropometrics:  Height: 175.3 cm (5' 9.02\")  Weight: 134 kg (296 lb 4.8 oz)  BMI (Calculated): 43.74    Weight Change  Weight History / % Weight Change: 259# 5/9/24  Significant Weight Loss: No    Nutrition Focused Physical Findings:  Edema  Edema: none    Physical Findings (Nutrition Deficiency/Toxicity)  Skin: Negative           Follow Up  Time Spent (min): 45 minutes  Last Date of Nutrition Visit: 08/27/24  Nutrition Follow-Up Needed?: Dietitian to reassess per policy  Follow up Comment: [facility]  "
there are any questions or concerns please feel free to contact the dictating provider for clarification.     Sonny Bustos MD  02/27/25 5728    
02/28/25 0024   Thu Feb 27, 2025 1946 EKG here in the emergency department shows a normal sinus rhythm with premature atrial complexes.  Inferior Q wave abnormalities.  Rate 81.  QTc 487.  Abnormal EKG, without any significant STEMI criteria or significant arrhythmias. EVELINE FREEMAN DO   [TT]      ED Course User Index  [TT] Roc Freemanpapo POE DO        Is this patient to be included in the SEP-1 Core Measure due to severe sepsis or septic shock?   No   Exclusion criteria - the patient is NOT to be included for SEP-1 Core Measure due to:  Infection is not suspected    CONSULTS: (Who and What was discussed)  IP CONSULT TO HOSPITALIST  IP CONSULT TO GENERAL SURGERY      Social Determinants : None    Records Reviewed : Source NM PET myocardial   IMPRESSION:   1. Inferior and inferolateral infarct.   2.  Remainder of the left ventricular myocardium demonstrates uniform FDG uptake, likely physiologic unsuppressed myocardium; however, superimposed inflammation is not excluded.   3.  Mildly FDG avid thoracic lymph nodes are nonspecific and may be inflammatory/granulomatous. Tissue sampling may be needed for definitive diagnosis.     CC/HPI Summary, DDx, ED Course, and Reassessment: The patient was evaluated in the emergency department today for chest pain, abdominal pain, nausea, and generalized fatigue.  Her vital signs are stable at triage.  She received morphine and Zofran for symptom relief.    Differential diagnosis includes ACS, CHF, COVID, flu, COPD exacerbation, UTI, pancreatitis, diverticulitis, small bowel obstruction.    Workup in the emergency department includes EKG which was interpreted by attending physician.  Chest x-ray shows no acute cardiopulmonary abnormality.  CBC without evidence of leukocytosis or acute anemia  CMP with sodium of 132 and chloride 98.  Renal function well-maintained.  No transaminitis  BNP is 124  Lipase is 24  Initial high sensitive troponin is 12 and repeat is less than

## 2025-02-28 NOTE — PLAN OF CARE
[facility] Medicine Plan of Care Note  V 10.25      Date of Admission: 2/27/2025  Hospital Day: 2    Chief Admission Complaint:   \"my stomach hurts\"     Subjective:  no new c/o    Presenting Admission History:         \"49 y.o. female with multiple medical problems including Factor VIII inhibitor disorder on eliquis, CHF, COPD, CVA, CAD, PPM presenting with generalized fatigue, nausea, upper abdominal pain since yesterday. She has history of sarcoidosis, not currently on medication. She recent had a PET scan of her heart that showed some infiltrative disease. She is waiting on cardiology evaluation for further management of this. She reports that she has been feeling generally unwell today with worsening abdominal discomfort and nausea. Workup in the ED revealed a colonic ileus. Pt to be admitted for further evaluation\".       Assessment/Plan:      Current Principal Problem:  Ileus (HCC)      Ileus              - CT reviewed              - NPO, IVF              - pain control, antiemetics              - serial abd exams              - check KUB in AM              - gen surgery consult  Hx Factor VIII inhibitor disorder on eliquis              - IV heparin for now while NPO  Hx sarcoidosis              - had a PET scan of her heart that showed some infiltrative disease --> She is waiting on cardiology evaluation for further management of this              - consider Pulm eval  COPD              - stable              - nebs, O2 PRN  Hx CVA              - stable              - resume eliquis, statin when taking PO    Consults:      IP CONSULT TO HOSPITALIST  IP CONSULT TO GENERAL SURGERY  IP CONSULT TO PHARMACY      General Surgery consulted and appreciated.  Available consultant notes from yesterday and today were reviewed on 2/28/2025, w/ plan for continued inpatient w/up and management - as above    --------------------------------------------------  Patient on Heparin as currently ordered.  Closely following CBC as

## 2025-02-28 NOTE — CONSULTS
Pharmacy to Manage Heparin Infusion per Hospital Nomogram    Dx: Hx Factor VIII inhibitor disorder   Pt wt = 76.7 kg  Baseline aPTT = pending    Oral factor Xa-inhibitors may alter and elevate anti-Xa levels used for unfractionated heparin monitoring. As a result, anti-Xa monitoring is not accurate while Xa-inhibitor activity is detectable. Utilize aPTT monitoring when patient received an oral factor Xa-inhibitor (apixaban, betrixaban, edoxaban or rivaroxaban) within 72 hours prior to admission (please document last administration time). The goal is to allow a washout of oral factor Xa-inhibitors by using aPTT for 72 hours, then change to ant-Xa levels for UFH.       Heparin (weight-based) Infusion: CAD/STEMI/NSTEMI/UA/AFib)   Heparin 60 units/kg IVP bolus (max 4,000 units) followed by Heparin infusion at 12 units/kg/hr (recommended max initial rate: 1000 units/hr).  Recheck anti-Xa (unless aPTT being used) in 6 hours.  Goal anti-Xa 0.3-0.7 IU/mL  Goal aPTT =  seconds.  Raymundo Dixon Pharm D.2/28/2025 1:14 AM    2/28 0923  aPTT = 83.6 sec  Continue with current heparin drip rate of 12 units/kg/hr  Recheck aPTT in 6 hours at 1530  Ayden Caicedo PharmD  2/28/2025 at 10:40 AM      2/28  @ 1653  aPTT 69.6 seconds at 1533  Heparin 2000 units IVP bolus then increase Heparin infusion to 14 units/kg/hr  Recheck aPTT in 6 hours.  Omar Burns PharmD 2/28/2025 4:53 PM    2/28 2249  aptt = 138.4 sec.  Hold x 1 hr; Decrease rate =   11  Units/kg/hr  Next aptt 0600  Raymundo Dixon Pharm D.2/28/2025 11:44 PM    3/1 0610  aptt = 63.3 sec.  2000 bolus; Increase rate =  13   Units/kg/hr  Next aptt 1300  Raymundo Dixon Pharm D.3/1/2025 6:53 AM

## 2025-03-01 LAB
ANION GAP SERPL CALCULATED.3IONS-SCNC: 10 MMOL/L (ref 3–16)
APTT BLD: 63.3 SEC (ref 22.1–36.4)
APTT BLD: 94.6 SEC (ref 22.1–36.4)
BASOPHILS # BLD: 0.1 K/UL (ref 0–0.2)
BASOPHILS NFR BLD: 1 %
BUN SERPL-MCNC: 5 MG/DL (ref 7–20)
CALCIUM SERPL-MCNC: 8.4 MG/DL (ref 8.3–10.6)
CHLORIDE SERPL-SCNC: 107 MMOL/L (ref 99–110)
CO2 SERPL-SCNC: 23 MMOL/L (ref 21–32)
CREAT SERPL-MCNC: 0.6 MG/DL (ref 0.6–1.1)
DEPRECATED RDW RBC AUTO: 13.1 % (ref 12.4–15.4)
EOSINOPHIL # BLD: 0.2 K/UL (ref 0–0.6)
EOSINOPHIL NFR BLD: 2.6 %
GFR SERPLBLD CREATININE-BSD FMLA CKD-EPI: >90 ML/MIN/{1.73_M2}
GLUCOSE SERPL-MCNC: 88 MG/DL (ref 70–99)
HCT VFR BLD AUTO: 39.1 % (ref 36–48)
HGB BLD-MCNC: 13.2 G/DL (ref 12–16)
LYMPHOCYTES # BLD: 2.8 K/UL (ref 1–5.1)
LYMPHOCYTES NFR BLD: 43.4 %
MCH RBC QN AUTO: 32.1 PG (ref 26–34)
MCHC RBC AUTO-ENTMCNC: 33.8 G/DL (ref 31–36)
MCV RBC AUTO: 94.8 FL (ref 80–100)
MONOCYTES # BLD: 0.4 K/UL (ref 0–1.3)
MONOCYTES NFR BLD: 5.5 %
NEUTROPHILS # BLD: 3 K/UL (ref 1.7–7.7)
NEUTROPHILS NFR BLD: 47.5 %
PLATELET # BLD AUTO: 302 K/UL (ref 135–450)
PMV BLD AUTO: 8.8 FL (ref 5–10.5)
POTASSIUM SERPL-SCNC: 3.7 MMOL/L (ref 3.5–5.1)
RBC # BLD AUTO: 4.12 M/UL (ref 4–5.2)
SODIUM SERPL-SCNC: 140 MMOL/L (ref 136–145)
WBC # BLD AUTO: 6.3 K/UL (ref 4–11)

## 2025-03-01 PROCEDURE — 85025 COMPLETE CBC W/AUTO DIFF WBC: CPT

## 2025-03-01 PROCEDURE — 96366 THER/PROPH/DIAG IV INF ADDON: CPT

## 2025-03-01 PROCEDURE — 80048 BASIC METABOLIC PNL TOTAL CA: CPT

## 2025-03-01 PROCEDURE — 96376 TX/PRO/DX INJ SAME DRUG ADON: CPT

## 2025-03-01 PROCEDURE — G0378 HOSPITAL OBSERVATION PER HR: HCPCS

## 2025-03-01 PROCEDURE — 96375 TX/PRO/DX INJ NEW DRUG ADDON: CPT

## 2025-03-01 PROCEDURE — 85730 THROMBOPLASTIN TIME PARTIAL: CPT

## 2025-03-01 PROCEDURE — 6360000002 HC RX W HCPCS: Performed by: HOSPITALIST

## 2025-03-01 PROCEDURE — 36415 COLL VENOUS BLD VENIPUNCTURE: CPT

## 2025-03-01 PROCEDURE — 6360000002 HC RX W HCPCS: Performed by: INTERNAL MEDICINE

## 2025-03-01 PROCEDURE — 2500000003 HC RX 250 WO HCPCS: Performed by: HOSPITALIST

## 2025-03-01 PROCEDURE — 6370000000 HC RX 637 (ALT 250 FOR IP): Performed by: INTERNAL MEDICINE

## 2025-03-01 RX ADMIN — MORPHINE SULFATE 2 MG: 2 INJECTION, SOLUTION INTRAMUSCULAR; INTRAVENOUS at 11:11

## 2025-03-01 RX ADMIN — MORPHINE SULFATE 2 MG: 2 INJECTION, SOLUTION INTRAMUSCULAR; INTRAVENOUS at 01:35

## 2025-03-01 RX ADMIN — HEPARIN SODIUM 2000 UNITS: 1000 INJECTION INTRAVENOUS; SUBCUTANEOUS at 07:00

## 2025-03-01 RX ADMIN — MORPHINE SULFATE 2 MG: 2 INJECTION, SOLUTION INTRAMUSCULAR; INTRAVENOUS at 21:47

## 2025-03-01 RX ADMIN — SODIUM CHLORIDE, PRESERVATIVE FREE 10 ML: 5 INJECTION INTRAVENOUS at 21:07

## 2025-03-01 RX ADMIN — APIXABAN 5 MG: 5 TABLET, FILM COATED ORAL at 13:49

## 2025-03-01 RX ADMIN — APIXABAN 5 MG: 5 TABLET, FILM COATED ORAL at 21:03

## 2025-03-01 RX ADMIN — SODIUM CHLORIDE, PRESERVATIVE FREE 10 ML: 5 INJECTION INTRAVENOUS at 08:42

## 2025-03-01 RX ADMIN — MORPHINE SULFATE 2 MG: 2 INJECTION, SOLUTION INTRAMUSCULAR; INTRAVENOUS at 06:31

## 2025-03-01 RX ADMIN — HEPARIN SODIUM 13 UNITS/KG/HR: 10000 INJECTION, SOLUTION INTRAVENOUS at 07:01

## 2025-03-01 RX ADMIN — PROCHLORPERAZINE EDISYLATE 10 MG: 5 INJECTION INTRAMUSCULAR; INTRAVENOUS at 11:11

## 2025-03-01 RX ADMIN — ONDANSETRON 4 MG: 2 INJECTION, SOLUTION INTRAMUSCULAR; INTRAVENOUS at 21:06

## 2025-03-01 ASSESSMENT — PAIN DESCRIPTION - LOCATION
LOCATION: ABDOMEN

## 2025-03-01 ASSESSMENT — PAIN DESCRIPTION - ORIENTATION
ORIENTATION: UPPER;MID
ORIENTATION: MID
ORIENTATION: UPPER
ORIENTATION: LEFT;UPPER
ORIENTATION: MID

## 2025-03-01 ASSESSMENT — PAIN SCALES - GENERAL
PAINLEVEL_OUTOF10: 2
PAINLEVEL_OUTOF10: 6
PAINLEVEL_OUTOF10: 4
PAINLEVEL_OUTOF10: 7
PAINLEVEL_OUTOF10: 7

## 2025-03-01 ASSESSMENT — PAIN DESCRIPTION - DESCRIPTORS
DESCRIPTORS: PRESSURE
DESCRIPTORS: SHARP;SHOOTING;HEAVINESS

## 2025-03-01 NOTE — PLAN OF CARE
Problem: Pain  Goal: Verbalizes/displays adequate comfort level or baseline comfort level  Flowsheets (Taken 2/28/2025 2133)  Verbalizes/displays adequate comfort level or baseline comfort level:   Encourage patient to monitor pain and request assistance   Assess pain using appropriate pain scale   Administer analgesics based on type and severity of pain and evaluate response   Implement non-pharmacological measures as appropriate and evaluate response   Consider cultural and social influences on pain and pain management

## 2025-03-02 VITALS
RESPIRATION RATE: 16 BRPM | SYSTOLIC BLOOD PRESSURE: 102 MMHG | HEART RATE: 74 BPM | DIASTOLIC BLOOD PRESSURE: 56 MMHG | OXYGEN SATURATION: 97 % | BODY MASS INDEX: 33.18 KG/M2 | WEIGHT: 169 LBS | HEIGHT: 60 IN | TEMPERATURE: 98.1 F

## 2025-03-02 LAB
ALBUMIN SERPL-MCNC: 3.8 G/DL (ref 3.4–5)
ANION GAP SERPL CALCULATED.3IONS-SCNC: 10 MMOL/L (ref 3–16)
BUN SERPL-MCNC: 5 MG/DL (ref 7–20)
CALCIUM SERPL-MCNC: 9.1 MG/DL (ref 8.3–10.6)
CHLORIDE SERPL-SCNC: 106 MMOL/L (ref 99–110)
CO2 SERPL-SCNC: 26 MMOL/L (ref 21–32)
CREAT SERPL-MCNC: 0.7 MG/DL (ref 0.6–1.1)
DEPRECATED RDW RBC AUTO: 13.1 % (ref 12.4–15.4)
GFR SERPLBLD CREATININE-BSD FMLA CKD-EPI: >90 ML/MIN/{1.73_M2}
GLUCOSE SERPL-MCNC: 100 MG/DL (ref 70–99)
HCT VFR BLD AUTO: 40.9 % (ref 36–48)
HGB BLD-MCNC: 13.9 G/DL (ref 12–16)
MCH RBC QN AUTO: 31.6 PG (ref 26–34)
MCHC RBC AUTO-ENTMCNC: 34 G/DL (ref 31–36)
MCV RBC AUTO: 93 FL (ref 80–100)
PHOSPHATE SERPL-MCNC: 3.9 MG/DL (ref 2.5–4.9)
PLATELET # BLD AUTO: 328 K/UL (ref 135–450)
PMV BLD AUTO: 8.8 FL (ref 5–10.5)
POTASSIUM SERPL-SCNC: 3.8 MMOL/L (ref 3.5–5.1)
RBC # BLD AUTO: 4.4 M/UL (ref 4–5.2)
SODIUM SERPL-SCNC: 142 MMOL/L (ref 136–145)
WBC # BLD AUTO: 5.9 K/UL (ref 4–11)

## 2025-03-02 PROCEDURE — 80069 RENAL FUNCTION PANEL: CPT

## 2025-03-02 PROCEDURE — 1200000000 HC SEMI PRIVATE

## 2025-03-02 PROCEDURE — 85027 COMPLETE CBC AUTOMATED: CPT

## 2025-03-02 PROCEDURE — 36415 COLL VENOUS BLD VENIPUNCTURE: CPT

## 2025-03-02 PROCEDURE — 2500000003 HC RX 250 WO HCPCS: Performed by: HOSPITALIST

## 2025-03-02 PROCEDURE — 6370000000 HC RX 637 (ALT 250 FOR IP): Performed by: INTERNAL MEDICINE

## 2025-03-02 RX ADMIN — SODIUM CHLORIDE, PRESERVATIVE FREE 10 ML: 5 INJECTION INTRAVENOUS at 08:50

## 2025-03-02 RX ADMIN — APIXABAN 5 MG: 5 TABLET, FILM COATED ORAL at 08:50

## 2025-03-02 NOTE — PLAN OF CARE
Problem: Chronic Conditions and Co-morbidities  Goal: Patient's chronic conditions and co-morbidity symptoms are monitored and maintained or improved  Outcome: Progressing     Problem: ABCDS Injury Assessment  Goal: Absence of physical injury  Outcome: Progressing     Problem: Pain  Goal: Verbalizes/displays adequate comfort level or baseline comfort level  Outcome: Progressing     Problem: Nutrition Deficit:  Goal: Optimize nutritional status  Outcome: Progressing

## 2025-03-02 NOTE — PROGRESS NOTES
[facility] Medicine Progress Note  V 1.6      Date of Admission: 2/27/2025    Hospital Day: 4      Chief Admission Complaint:  \"my stomach hurts\"     Subjective:  no new c/o    Presenting Admission History:         \"49 y.o. female with multiple medical problems including Factor VIII inhibitor disorder on eliquis, CHF, COPD, CVA, CAD, PPM presenting with generalized fatigue, nausea, upper abdominal pain since yesterday. She has history of sarcoidosis, not currently on medication. She recent had a PET scan of her heart that showed some infiltrative disease. She is waiting on cardiology evaluation for further management of this. She reports that she has been feeling generally unwell today with worsening abdominal discomfort and nausea. Workup in the ED revealed a colonic ileus. Pt to be admitted for further evaluation\".       Assessment/Plan:      Current Principal Problem:  Ileus (HCC)      Ileus - CT scan w/out evidence of obstruction.  Diet advanced to clears and tolerated.  Surgery initially consulted but discussed and no need - consult cancelled.     Coagulopathy - Hx Factor VIII inhibitor disorder anticoagulated at baseline on Eliquis.  Initially placed on IV heparin and changed back to PO Eliquis 1 March.     Hx Sarcoidosis - had a PET scan of her heart that showed some infiltrative disease.  She is waiting on cardiology evaluation out for further management of this - NOT YET consulted.     COPD - w/out chronic hypoxic respiratory failure on no baseline home O2.  Controlled on home medication regimen - continued.      Hx CVA - Supportive care.       Ongoing threat to life and/or bodily function without ongoing treatment due to:  Ileus    Consults:      IP CONSULT TO HOSPITALIST  IP CONSULT TO PHARMACY  IP CONSULT TO SOCIAL WORK        --------------------------------------------------    Patient with ongoing pain continued on IV Narcotic Analgesia as currently ordered.  Closely following mental and respiratory 
500ml IVF bolus given as ordered. Will continue to monitor.  
Awake on bed, alert and oriented. IV on left AC and left forearm. Hypotensive BP 87/56 on left arm and 90/41 on right arm. Denies dizziness.  Notified Olga Shelton NP via [employer]. Side rails x 2. Call light within reach. Will continue to monitor.  
I reviewed the patient's chart, including all imaging studies, and discussed with Dr Navarrete  - colonic ileus is typically a functional disorder that by definition will not require any surgical involvement.  Dr Navarrete agrees with this assessment and has cancelled the surgical consult at this time.    Please call w/ further questions in the future.  
Pt assessment completed and charted. VSS. Pt a/o4. No c/o of pain or N/V. Resumed regular diet, pt tolerated. Bed in lowest position and wheels locked. Call light within reach. Bedside table within reach. Non-skid socks in place. Pt denies any other needs at this time.  Pt calls out appropriately.  
Pt states she felt more discomfort after eating clear liquids.  
Report given to [facility] Pt notified of transfer to   
Stool sample rejected by lab as it was formed.   
aPTT 138.4, held heparin drip for 1 hour.  
Interventions:   Food and/or Nutrient Delivery: Continue Current Diet  Nutrition Education/Counseling: No recommendation at this time  Coordination of Nutrition Care: Continue to monitor while inpatient       Goals:  Goals: Initiate PO diet, by next RD assessment          Nutrition Monitoring and Evaluation:   Behavioral-Environmental Outcomes: None Identified  Food/Nutrient Intake Outcomes: Diet Advancement/Tolerance  Physical Signs/Symptoms Outcomes: Nausea or Vomiting, GI Status, Meal Time Behavior, Biochemical Data    Discharge Planning:    Too soon to determine     Ashlie Paul RD  Contact: [phone]    
significance as documented above.     Recent Labs     02/27/25 1941 02/28/25 0154 03/01/25  0610   WBC 8.6 5.1 6.3   HGB 13.7 14.3 13.2   HCT 39.4 42.1 39.1    348 302     Recent Labs     02/27/25 1941 03/01/25  0610   * 140   K 3.7 3.7   CL 98* 107   CO2 21 23   BUN 4* 5*   CREATININE 0.7 0.6   CALCIUM 9.0 8.4     Recent Labs     02/27/25 1941 02/27/25  2056   PROBNP 124  --    TROPHS 12 <6     No results for input(s): \"LABA1C\" in the last 72 hours.  Recent Labs     02/27/25 1941   AST 24   ALT 13   BILITOT 0.5   ALKPHOS 142*     Recent Labs     02/28/25 0154   INR 1.08       Urine Cultures: No results found for: \"LABURIN\"  Blood Cultures:   Lab Results   Component Value Date/Time    BC No Growth after 4 days of incubation. 01/13/2024 04:50 AM     Lab Results   Component Value Date/Time    BLOODCULT2 No Growth after 4 days of incubation. 01/13/2024 05:00 AM     Organism: No results found for: \"ORG\"      Colton Navarrete MD      
No

## 2025-03-02 NOTE — CARE COORDINATION
Potential DME:    Patient expects to discharge to: [facility]  Plan for transportation at discharge:      Financial    Payor: [employer] [place] / Plan: [employer] [place] D[employer] / Product Type: *No Product type* /     Does insurance require precert for SNF: Yes    Potential assistance Purchasing Medications:    Meds-to-Beds request:        [facility] [address] [place], [place] - [address] [phone] - F [phone]  [address] [place] [address]  Phone: [phone] Fax: [phone]      Notes:    Factors facilitating achievement of predicted outcomes: Friend support, Motivated, Cooperative, Pleasant, Sense of humor, and Good insight into deficits    Barriers to discharge: transportation.     Additional Case Management Notes: spoke with patient. Lives in mobile home with roommate who is also IP. IPTA ambulatory in room. IADL's. May need LYFT home. Confirmed address. Denied other needs. Juan Rubio RN      The Plan for Transition of Care is related to the following treatment goals of Ileus (HCC) [K56.7]  Nausea [R11.0]  Generalized abdominal pain [R10.84]  Chest pain, unspecified type [R07.9]    IF APPLICABLE: The Patient and/or patient representative Valeria and her family were provided with a choice of provider and agrees with the discharge plan. Freedom of choice list with basic dialogue that supports the patient's individualized plan of care/goals and shares the quality data associated with the providers was provided to:     Patient Representative Name:       The Patient and/or Patient Representative Agree with the Discharge Plan?      Juan Rubio RN  Case Management Department

## 2025-03-06 NOTE — DISCHARGE SUMMARY
Pt a/o x4. VSS. All prescriptions, discharge and follow up instructions given to the patient. The patient verbalizes understanding and denies questions. All belongings collected and sent with the patient. The patient was discharged off the unit in stable condition to [facility].  
Pt a/o x4. VSS. PIV removed, no complications post removal All prescriptions, discharge and follow up instructions given to the patient. The patient verbalizes understanding and denies questions. All belongings collected and sent with the patient. The patient was discharged off the unit in stable condition to .    Cosign Electronically signed by Dominick Gordon RN on 3/2/2025 at 4:03 PM    
BC No Growth after 4 days of incubation. 01/13/2024 04:50 AM     Lab Results   Component Value Date/Time    BLOODCULT2 No Growth after 4 days of incubation. 01/13/2024 05:00 AM     Organism: No results found for: \"ORG\"    Signed:    Colton Navarrete MD

## 2025-04-10 ENCOUNTER — TELEPHONE (OUTPATIENT)
Dept: NEUROLOGY | Age: 50
End: 2025-04-10

## 2025-04-10 NOTE — TELEPHONE ENCOUNTER
LM that appt in [facility] is being cancelled after 3x calling.    Let pt know to callback to R/S in AND.

## 2025-04-25 ENCOUNTER — HOSPITAL ENCOUNTER (OUTPATIENT)
Age: 50
Discharge: HOME | End: 2025-04-25
Payer: COMMERCIAL

## 2025-04-25 LAB
ALBUMIN SERPL-MCNC: 3.9 G/DL (ref 3.4–5)
ALBUMIN/GLOB SERPL: 1.4 {RATIO} (ref 1.1–2.2)
ALP SERPL-CCNC: 139 U/L (ref 40–129)
ALT SERPL-CCNC: 14 U/L (ref 10–40)
ANION GAP SERPL CALCULATED.3IONS-SCNC: 14 MMOL/L (ref 3–16)
AST SERPL-CCNC: 15 U/L (ref 15–37)
BASOPHILS # BLD: 0.1 K/UL (ref 0–0.2)
BASOPHILS NFR BLD: 1.7 %
BILIRUB SERPL-MCNC: 0.3 MG/DL (ref 0–1)
BUN SERPL-MCNC: 6 MG/DL (ref 7–20)
CALCIUM SERPL-MCNC: 9.3 MG/DL (ref 8.3–10.6)
CHLORIDE SERPL-SCNC: 101 MMOL/L (ref 99–110)
CO2 SERPL-SCNC: 25 MMOL/L (ref 21–32)
CREAT SERPL-MCNC: 0.7 MG/DL (ref 0.6–1.1)
DEPRECATED RDW RBC AUTO: 14.3 % (ref 12.4–15.4)
EOSINOPHIL # BLD: 0.2 K/UL (ref 0–0.6)
EOSINOPHIL NFR BLD: 2.5 %
GFR SERPLBLD CREATININE-BSD FMLA CKD-EPI: >90 ML/MIN/{1.73_M2}
GLUCOSE SERPL-MCNC: 93 MG/DL (ref 70–99)
HCT VFR BLD AUTO: 40.9 % (ref 36–48)
HGB BLD-MCNC: 14 G/DL (ref 12–16)
LYMPHOCYTES # BLD: 1.3 K/UL (ref 1–5.1)
LYMPHOCYTES NFR BLD: 18.1 %
MCH RBC QN AUTO: 32.6 PG (ref 26–34)
MCHC RBC AUTO-ENTMCNC: 34.3 G/DL (ref 31–36)
MCV RBC AUTO: 95 FL (ref 80–100)
MONOCYTES # BLD: 0.4 K/UL (ref 0–1.3)
MONOCYTES NFR BLD: 5.1 %
NEUTROPHILS # BLD: 5.3 K/UL (ref 1.7–7.7)
NEUTROPHILS NFR BLD: 72.6 %
PLATELET # BLD AUTO: 379 K/UL (ref 135–450)
PMV BLD AUTO: 8.3 FL (ref 5–10.5)
POTASSIUM SERPL-SCNC: 3.8 MMOL/L (ref 3.5–5.1)
PROT SERPL-MCNC: 6.7 G/DL (ref 6.4–8.2)
RBC # BLD AUTO: 4.31 M/UL (ref 4–5.2)
SODIUM SERPL-SCNC: 140 MMOL/L (ref 136–145)
WBC # BLD AUTO: 7.3 K/UL (ref 4–11)

## 2025-04-25 PROCEDURE — 85025 COMPLETE CBC W/AUTO DIFF WBC: CPT

## 2025-04-25 PROCEDURE — 36415 COLL VENOUS BLD VENIPUNCTURE: CPT

## 2025-04-25 PROCEDURE — 80053 COMPREHEN METABOLIC PANEL: CPT

## 2025-06-08 ENCOUNTER — APPOINTMENT (OUTPATIENT)
Dept: GENERAL RADIOLOGY | Age: 50
End: 2025-06-08
Payer: COMMERCIAL

## 2025-06-08 ENCOUNTER — HOSPITAL ENCOUNTER (EMERGENCY)
Age: 50
Discharge: HOME | End: 2025-06-08
Attending: EMERGENCY MEDICINE
Payer: COMMERCIAL

## 2025-06-08 VITALS
DIASTOLIC BLOOD PRESSURE: 80 MMHG | HEART RATE: 64 BPM | WEIGHT: 172 LBS | OXYGEN SATURATION: 93 % | HEIGHT: 60 IN | RESPIRATION RATE: 25 BRPM | SYSTOLIC BLOOD PRESSURE: 106 MMHG | TEMPERATURE: 98.7 F | BODY MASS INDEX: 33.77 KG/M2

## 2025-06-08 DIAGNOSIS — R52 ACUTE GENERALIZED BODY PAIN: Primary | ICD-10-CM

## 2025-06-08 DIAGNOSIS — R07.9 CHEST PAIN, UNSPECIFIED TYPE: ICD-10-CM

## 2025-06-08 DIAGNOSIS — D86.9 SARCOIDOSIS: ICD-10-CM

## 2025-06-08 LAB
ALBUMIN SERPL-MCNC: 3.8 G/DL (ref 3.4–5)
ALBUMIN/GLOB SERPL: 1.4 {RATIO} (ref 1.1–2.2)
ALP SERPL-CCNC: 148 U/L (ref 40–129)
ALT SERPL-CCNC: 19 U/L (ref 10–40)
ANION GAP SERPL CALCULATED.3IONS-SCNC: 10 MMOL/L (ref 3–16)
AST SERPL-CCNC: 22 U/L (ref 15–37)
BASOPHILS # BLD: 0.1 K/UL (ref 0–0.2)
BASOPHILS NFR BLD: 1 %
BILIRUB SERPL-MCNC: 0.5 MG/DL (ref 0–1)
BUN SERPL-MCNC: 6 MG/DL (ref 7–20)
CALCIUM SERPL-MCNC: 8.8 MG/DL (ref 8.3–10.6)
CHLORIDE SERPL-SCNC: 100 MMOL/L (ref 99–110)
CO2 SERPL-SCNC: 25 MMOL/L (ref 21–32)
CREAT SERPL-MCNC: 0.7 MG/DL (ref 0.6–1.1)
DEPRECATED RDW RBC AUTO: 14.3 % (ref 12.4–15.4)
EKG ATRIAL RATE: 86 BPM
EKG DIAGNOSIS: NORMAL
EKG P AXIS: 36 DEGREES
EKG P-R INTERVAL: 142 MS
EKG Q-T INTERVAL: 382 MS
EKG QRS DURATION: 100 MS
EKG QTC CALCULATION (BAZETT): 457 MS
EKG R AXIS: -12 DEGREES
EKG T AXIS: -8 DEGREES
EKG VENTRICULAR RATE: 86 BPM
EOSINOPHIL # BLD: 0.3 K/UL (ref 0–0.6)
EOSINOPHIL NFR BLD: 3.3 %
GFR SERPLBLD CREATININE-BSD FMLA CKD-EPI: >90 ML/MIN/{1.73_M2}
GLUCOSE SERPL-MCNC: 96 MG/DL (ref 70–99)
HCT VFR BLD AUTO: 35.6 % (ref 36–48)
HGB BLD-MCNC: 12.4 G/DL (ref 12–16)
LYMPHOCYTES # BLD: 2.8 K/UL (ref 1–5.1)
LYMPHOCYTES NFR BLD: 27.9 %
MCH RBC QN AUTO: 33 PG (ref 26–34)
MCHC RBC AUTO-ENTMCNC: 34.9 G/DL (ref 31–36)
MCV RBC AUTO: 94.4 FL (ref 80–100)
MONOCYTES # BLD: 0.7 K/UL (ref 0–1.3)
MONOCYTES NFR BLD: 6.5 %
NEUTROPHILS # BLD: 6.2 K/UL (ref 1.7–7.7)
NEUTROPHILS NFR BLD: 61.3 %
PLATELET # BLD AUTO: 320 K/UL (ref 135–450)
PMV BLD AUTO: 7.7 FL (ref 5–10.5)
POTASSIUM SERPL-SCNC: 3.6 MMOL/L (ref 3.5–5.1)
PROT SERPL-MCNC: 6.5 G/DL (ref 6.4–8.2)
RBC # BLD AUTO: 3.77 M/UL (ref 4–5.2)
REASON FOR REJECTION: NORMAL
REJECTED TEST: NORMAL
SODIUM SERPL-SCNC: 135 MMOL/L (ref 136–145)
TROPONIN, HIGH SENSITIVITY: <6 NG/L (ref 0–14)
WBC # BLD AUTO: 10.1 K/UL (ref 4–11)

## 2025-06-08 PROCEDURE — 6370000000 HC RX 637 (ALT 250 FOR IP): Performed by: EMERGENCY MEDICINE

## 2025-06-08 PROCEDURE — 93010 ELECTROCARDIOGRAM REPORT: CPT | Performed by: INTERNAL MEDICINE

## 2025-06-08 PROCEDURE — 80053 COMPREHEN METABOLIC PANEL: CPT

## 2025-06-08 PROCEDURE — 71046 X-RAY EXAM CHEST 2 VIEWS: CPT

## 2025-06-08 PROCEDURE — 36415 COLL VENOUS BLD VENIPUNCTURE: CPT

## 2025-06-08 PROCEDURE — 93005 ELECTROCARDIOGRAM TRACING: CPT | Performed by: EMERGENCY MEDICINE

## 2025-06-08 PROCEDURE — 84484 ASSAY OF TROPONIN QUANT: CPT

## 2025-06-08 PROCEDURE — 99285 EMERGENCY DEPT VISIT HI MDM: CPT

## 2025-06-08 PROCEDURE — 85025 COMPLETE CBC W/AUTO DIFF WBC: CPT

## 2025-06-08 RX ORDER — DIAZEPAM 5 MG/1
5 TABLET ORAL ONCE
Status: COMPLETED | OUTPATIENT
Start: 2025-06-08 | End: 2025-06-08

## 2025-06-08 RX ORDER — ACETAMINOPHEN 500 MG
1000 TABLET ORAL ONCE
Status: COMPLETED | OUTPATIENT
Start: 2025-06-08 | End: 2025-06-08

## 2025-06-08 RX ADMIN — DIAZEPAM 5 MG: 5 TABLET ORAL at 01:47

## 2025-06-08 RX ADMIN — ACETAMINOPHEN 1000 MG: 500 TABLET ORAL at 01:47

## 2025-06-08 ASSESSMENT — PAIN SCALES - GENERAL
PAINLEVEL_OUTOF10: 10
PAINLEVEL_OUTOF10: 5

## 2025-06-08 ASSESSMENT — PAIN DESCRIPTION - DESCRIPTORS: DESCRIPTORS: BURNING

## 2025-06-08 ASSESSMENT — PAIN DESCRIPTION - LOCATION: LOCATION: GENERALIZED

## 2025-06-08 NOTE — ED PROVIDER NOTES
times daily 7/17/23   David Pierson APRN - CNP   aspirin 81 MG chewable tablet Take 1 tablet by mouth daily 7/17/23   David Pierson APRN - CNP   vitamin D3 (CHOLECALCIFEROL) 25 MCG (1000 UT) TABS tablet Take 1 tablet by mouth daily 7/17/23   David Pierson APRN - CNP       Social history:  reports that she has been smoking cigarettes. She started smoking about 18 years ago. She has a 4.2 pack-year smoking history. She has never used smokeless tobacco. She reports current drug use. Drug: Marijuana (Weed). She reports that she does not drink alcohol.      Physical Exam  ED Triage Vitals   BP Systolic BP Percentile Diastolic BP Percentile Temp Temp src Pulse Respirations SpO2   06/08/25 0056 -- -- 06/08/25 0054 -- 06/08/25 0056 06/08/25 0056 06/08/25 0056   115/78   98.7 °F (37.1 °C)  86 16 94 %      Height Weight - Scale         06/08/25 0149 06/08/25 0054         1.524 m (5') 78 kg (172 lb)             GENERAL: Alert, Non-toxic appearing  PSYCH: Orientation, insight appropriate to patient's age/baseline, no SI or HI  HEAD: Normocephalic atraumatic  EYES: Pupils equal and reactive, Extraocular movements intact  EARS/NOSE/MOUTH/THROAT: Atraumatic external nose and ears. Mucous membranes normal  NECK: Supple, No tracheal deviation  RESPIRATORY: Normal respiratory effort, Breath sounds equal bilateral  CARDIOVASCULAR:  Regular rhythm, no murmurs  ABDOMINAL: Soft. Not Distended, no tenderness to palpation  NEUROLOGICAL: Alert and oriented x 4, speech slightly slurred-from old stroke, CN 2-12 grossly intact. Sensation intact to light touch, 5/5 strength in 4 extremities, normal gait  MUSCULOSKELETAL: No deformity. No tenderness to palpation, no edema, moves extremities well without pain  SKIN: Warm and dry      MDM/ED Course    EKG  Rhythm is sinus  Rate is 86  Axis is Normal  Conduction Abnormalities: Old lateral Q waves, no ST changes, no signs of ischemia  No STEMI criteria    Patient seen and evaluated.

## 2025-06-08 NOTE — ED TRIAGE NOTES
Pt arrives from home via squad for generalized body pressure and discomfort. She reports chest pressure as well. She has a hx of sarcoidosis and her provider told pt to come to the ER after she  attempted gabapentin and a muscle relaxer.   She has a significant health hx.     At the time of triage, patient is A&O x4 and independently ambulatory.

## 2025-08-13 ENCOUNTER — HOSPITAL ENCOUNTER (EMERGENCY)
Age: 50
Discharge: HOME | End: 2025-08-13
Attending: EMERGENCY MEDICINE
Payer: COMMERCIAL

## 2025-08-13 ENCOUNTER — APPOINTMENT (OUTPATIENT)
Dept: GENERAL RADIOLOGY | Age: 50
End: 2025-08-13
Payer: COMMERCIAL

## 2025-08-13 ENCOUNTER — APPOINTMENT (OUTPATIENT)
Dept: CT IMAGING | Age: 50
End: 2025-08-13
Payer: COMMERCIAL

## 2025-08-13 VITALS
SYSTOLIC BLOOD PRESSURE: 114 MMHG | HEART RATE: 79 BPM | DIASTOLIC BLOOD PRESSURE: 83 MMHG | WEIGHT: 176.38 LBS | OXYGEN SATURATION: 99 % | TEMPERATURE: 98.1 F | HEIGHT: 60 IN | BODY MASS INDEX: 34.63 KG/M2 | RESPIRATION RATE: 17 BRPM

## 2025-08-13 DIAGNOSIS — M54.6 ACUTE BILATERAL THORACIC BACK PAIN: Primary | ICD-10-CM

## 2025-08-13 LAB
ALBUMIN SERPL-MCNC: 4.3 G/DL (ref 3.4–5)
ALBUMIN/GLOB SERPL: 1.6 {RATIO} (ref 1.1–2.2)
ALP SERPL-CCNC: 143 U/L (ref 40–129)
ALT SERPL-CCNC: 26 U/L (ref 10–40)
ANION GAP SERPL CALCULATED.3IONS-SCNC: 11 MMOL/L (ref 3–16)
AST SERPL-CCNC: 28 U/L (ref 15–37)
BASOPHILS # BLD: 0 K/UL (ref 0–0.2)
BASOPHILS NFR BLD: 0.6 %
BILIRUB SERPL-MCNC: 0.4 MG/DL (ref 0–1)
BILIRUB UR QL STRIP.AUTO: NEGATIVE
BUN SERPL-MCNC: 4 MG/DL (ref 7–20)
CALCIUM SERPL-MCNC: 9.4 MG/DL (ref 8.3–10.6)
CHLORIDE SERPL-SCNC: 105 MMOL/L (ref 99–110)
CLARITY UR: CLEAR
CO2 SERPL-SCNC: 22 MMOL/L (ref 21–32)
COLOR UR: YELLOW
CREAT SERPL-MCNC: 0.7 MG/DL (ref 0.6–1.1)
DEPRECATED RDW RBC AUTO: 14.2 % (ref 12.4–15.4)
EOSINOPHIL # BLD: 0.2 K/UL (ref 0–0.6)
EOSINOPHIL NFR BLD: 2.4 %
EPI CELLS #/AREA URNS HPF: NORMAL /HPF (ref 0–5)
GFR SERPLBLD CREATININE-BSD FMLA CKD-EPI: >90 ML/MIN/{1.73_M2}
GLUCOSE SERPL-MCNC: 107 MG/DL (ref 70–99)
GLUCOSE UR STRIP.AUTO-MCNC: NEGATIVE MG/DL
HCT VFR BLD AUTO: 38.2 % (ref 36–48)
HGB BLD-MCNC: 13.2 G/DL (ref 12–16)
HGB UR QL STRIP.AUTO: ABNORMAL
KETONES UR STRIP.AUTO-MCNC: NEGATIVE MG/DL
LEUKOCYTE ESTERASE UR QL STRIP.AUTO: NEGATIVE
LYMPHOCYTES # BLD: 2.8 K/UL (ref 1–5.1)
LYMPHOCYTES NFR BLD: 34.2 %
MCH RBC QN AUTO: 32.4 PG (ref 26–34)
MCHC RBC AUTO-ENTMCNC: 34.5 G/DL (ref 31–36)
MCV RBC AUTO: 93.8 FL (ref 80–100)
MONOCYTES # BLD: 0.5 K/UL (ref 0–1.3)
MONOCYTES NFR BLD: 6 %
NEUTROPHILS # BLD: 4.7 K/UL (ref 1.7–7.7)
NEUTROPHILS NFR BLD: 56.8 %
NITRITE UR QL STRIP.AUTO: NEGATIVE
NT-PROBNP SERPL-MCNC: 149 PG/ML (ref 0–124)
PH UR STRIP.AUTO: 6 [PH] (ref 5–8)
PLATELET # BLD AUTO: 349 K/UL (ref 135–450)
PMV BLD AUTO: 8 FL (ref 5–10.5)
POTASSIUM SERPL-SCNC: 3.8 MMOL/L (ref 3.5–5.1)
PROT SERPL-MCNC: 7 G/DL (ref 6.4–8.2)
PROT UR STRIP.AUTO-MCNC: NEGATIVE MG/DL
RBC # BLD AUTO: 4.07 M/UL (ref 4–5.2)
RBC #/AREA URNS HPF: NORMAL /HPF (ref 0–4)
SODIUM SERPL-SCNC: 138 MMOL/L (ref 136–145)
SP GR UR STRIP.AUTO: <=1.005 (ref 1–1.03)
TROPONIN, HIGH SENSITIVITY: <6 NG/L (ref 0–14)
UA COMPLETE W REFLEX CULTURE PNL UR: ABNORMAL
UA DIPSTICK W REFLEX MICRO PNL UR: YES
URN SPEC COLLECT METH UR: ABNORMAL
UROBILINOGEN UR STRIP-ACNC: 0.2 E.U./DL
WBC # BLD AUTO: 8.3 K/UL (ref 4–11)
WBC #/AREA URNS HPF: NORMAL /HPF (ref 0–5)

## 2025-08-13 PROCEDURE — 80053 COMPREHEN METABOLIC PANEL: CPT

## 2025-08-13 PROCEDURE — 83880 ASSAY OF NATRIURETIC PEPTIDE: CPT

## 2025-08-13 PROCEDURE — 6370000000 HC RX 637 (ALT 250 FOR IP): Performed by: EMERGENCY MEDICINE

## 2025-08-13 PROCEDURE — 74177 CT ABD & PELVIS W/CONTRAST: CPT

## 2025-08-13 PROCEDURE — 99285 EMERGENCY DEPT VISIT HI MDM: CPT

## 2025-08-13 PROCEDURE — 6360000004 HC RX CONTRAST MEDICATION: Performed by: EMERGENCY MEDICINE

## 2025-08-13 PROCEDURE — 81001 URINALYSIS AUTO W/SCOPE: CPT

## 2025-08-13 PROCEDURE — 71046 X-RAY EXAM CHEST 2 VIEWS: CPT

## 2025-08-13 PROCEDURE — 84484 ASSAY OF TROPONIN QUANT: CPT

## 2025-08-13 PROCEDURE — 85025 COMPLETE CBC W/AUTO DIFF WBC: CPT

## 2025-08-13 RX ORDER — ACETAMINOPHEN 500 MG
1000 TABLET ORAL ONCE
Status: COMPLETED | OUTPATIENT
Start: 2025-08-13 | End: 2025-08-13

## 2025-08-13 RX ORDER — IOPAMIDOL 755 MG/ML
75 INJECTION, SOLUTION INTRAVASCULAR
Status: COMPLETED | OUTPATIENT
Start: 2025-08-13 | End: 2025-08-13

## 2025-08-13 RX ORDER — TRAMADOL HYDROCHLORIDE 50 MG/1
50 TABLET ORAL ONCE
Status: COMPLETED | OUTPATIENT
Start: 2025-08-13 | End: 2025-08-13

## 2025-08-13 RX ADMIN — ACETAMINOPHEN 1000 MG: 500 TABLET ORAL at 04:35

## 2025-08-13 RX ADMIN — IOPAMIDOL 75 ML: 755 INJECTION, SOLUTION INTRAVENOUS at 05:19

## 2025-08-13 RX ADMIN — TRAMADOL HYDROCHLORIDE 50 MG: 50 TABLET, COATED ORAL at 05:34

## 2025-08-13 ASSESSMENT — PAIN DESCRIPTION - LOCATION
LOCATION: BACK
LOCATION: BACK

## 2025-08-13 ASSESSMENT — PAIN SCALES - GENERAL
PAINLEVEL_OUTOF10: 7
PAINLEVEL_OUTOF10: 5
PAINLEVEL_OUTOF10: 5

## 2025-08-13 ASSESSMENT — PAIN - FUNCTIONAL ASSESSMENT
PAIN_FUNCTIONAL_ASSESSMENT: 0-10

## 2025-08-13 ASSESSMENT — PAIN DESCRIPTION - ORIENTATION
ORIENTATION: RIGHT;LEFT
ORIENTATION: RIGHT;LEFT
